# Patient Record
Sex: MALE | Race: WHITE | Employment: OTHER | URBAN - METROPOLITAN AREA
[De-identification: names, ages, dates, MRNs, and addresses within clinical notes are randomized per-mention and may not be internally consistent; named-entity substitution may affect disease eponyms.]

---

## 2017-01-05 ENCOUNTER — OFFICE VISIT (OUTPATIENT)
Dept: SURGERY | Age: 67
End: 2017-01-05

## 2017-01-05 VITALS — WEIGHT: 139 LBS | SYSTOLIC BLOOD PRESSURE: 100 MMHG | BODY MASS INDEX: 27.15 KG/M2 | DIASTOLIC BLOOD PRESSURE: 64 MMHG

## 2017-01-05 DIAGNOSIS — L91.8 HYPERTROPHIC GRANULATION TISSUE: ICD-10-CM

## 2017-01-05 DIAGNOSIS — K94.23 MALFUNCTION OF GASTROSTOMY TUBE (HCC): Primary | ICD-10-CM

## 2017-01-05 PROCEDURE — 99202 OFFICE O/P NEW SF 15 MIN: CPT | Performed by: SURGERY

## 2017-02-09 ENCOUNTER — OFFICE VISIT (OUTPATIENT)
Dept: SURGERY | Age: 67
End: 2017-02-09

## 2017-02-09 VITALS — SYSTOLIC BLOOD PRESSURE: 100 MMHG | DIASTOLIC BLOOD PRESSURE: 70 MMHG

## 2017-02-09 DIAGNOSIS — K94.23 GASTROSTOMY TUBE DYSFUNCTION (HCC): Primary | ICD-10-CM

## 2017-02-09 PROCEDURE — 43760 CHANGE GASTROSTOMY TUBE PERCUTANEOUS W/O GUIDE: CPT | Performed by: SURGERY

## 2017-02-09 PROCEDURE — 1036F TOBACCO NON-USER: CPT | Performed by: SURGERY

## 2017-03-10 ENCOUNTER — HOSPITAL ENCOUNTER (OUTPATIENT)
Dept: ULTRASOUND IMAGING | Age: 67
Discharge: HOME | End: 2017-03-10
Attending: UROLOGY | Admitting: UROLOGY

## 2017-03-10 DIAGNOSIS — R33.9 RETENTION OF URINE: ICD-10-CM

## 2017-03-10 DIAGNOSIS — R33.9 RETENTION OF URINE, UNSPECIFIED: ICD-10-CM

## 2017-04-02 PROBLEM — I95.9 HYPOTENSION: Status: ACTIVE | Noted: 2017-04-02

## 2017-04-03 PROBLEM — G40.909 RECURRENT SEIZURES (HCC): Status: ACTIVE | Noted: 2017-04-03

## 2017-05-10 PROBLEM — N39.0 UTI (URINARY TRACT INFECTION): Status: ACTIVE | Noted: 2017-05-10

## 2018-03-13 ENCOUNTER — HOSPITAL ENCOUNTER (OUTPATIENT)
Dept: OTHER | Age: 68
Discharge: HOME | End: 2018-03-13
Attending: INTERNAL MEDICINE | Admitting: INTERNAL MEDICINE

## 2018-03-13 DIAGNOSIS — K94.23: ICD-10-CM

## 2018-03-15 ENCOUNTER — OFFICE VISIT (OUTPATIENT)
Dept: SURGERY | Age: 68
End: 2018-03-15

## 2018-03-15 DIAGNOSIS — K94.23 MALFUNCTION OF GASTROSTOMY TUBE (HCC): Primary | ICD-10-CM

## 2018-03-15 PROCEDURE — 43760 CHANGE GASTROSTOMY TUBE PERCUTANEOUS W/O GUIDE: CPT | Performed by: SURGERY

## 2018-03-22 ENCOUNTER — HOSPITAL ENCOUNTER (OUTPATIENT)
Dept: OTHER | Age: 68
Discharge: HOME | End: 2018-03-22
Attending: SURGERY | Admitting: SURGERY

## 2018-03-22 DIAGNOSIS — K94.23 MALFUNCTION OF GASTROSTOMY TUBE (HCC): Primary | ICD-10-CM

## 2018-03-22 DIAGNOSIS — K94.13 JEJUNOSTOMY MALFUNCTION (HCC): Primary | ICD-10-CM

## 2018-03-22 DIAGNOSIS — K94.13: ICD-10-CM

## 2018-09-26 PROBLEM — N39.0 UTI (URINARY TRACT INFECTION): Status: RESOLVED | Noted: 2017-05-10 | Resolved: 2018-09-26

## 2019-03-23 ENCOUNTER — APPOINTMENT (OUTPATIENT)
Dept: GENERAL RADIOLOGY | Age: 69
End: 2019-03-23
Payer: MEDICARE

## 2019-03-23 ENCOUNTER — HOSPITAL ENCOUNTER (EMERGENCY)
Age: 69
Discharge: HOME | End: 2019-03-23
Payer: MEDICARE

## 2019-03-23 VITALS
WEIGHT: 150 LBS | TEMPERATURE: 97.9 F | OXYGEN SATURATION: 99 % | RESPIRATION RATE: 16 BRPM | HEIGHT: 69 IN | HEART RATE: 65 BPM | SYSTOLIC BLOOD PRESSURE: 132 MMHG | DIASTOLIC BLOOD PRESSURE: 69 MMHG | BODY MASS INDEX: 22.22 KG/M2

## 2019-03-23 DIAGNOSIS — M25.512 ACUTE PAIN OF LEFT SHOULDER: Primary | ICD-10-CM

## 2019-03-23 PROCEDURE — 99283 EMERGENCY DEPT VISIT LOW MDM: CPT

## 2019-03-23 PROCEDURE — 73030 X-RAY EXAM OF SHOULDER: CPT

## 2019-03-23 RX ORDER — ACETAMINOPHEN 160 MG
2000 TABLET,DISINTEGRATING ORAL DAILY
COMMUNITY

## 2019-03-23 RX ORDER — GUAIFENESIN 100 MG/5ML
200 SOLUTION ORAL EVERY 4 HOURS PRN
Status: ON HOLD | COMMUNITY
End: 2021-02-01

## 2019-03-23 RX ORDER — SODIUM PHOSPHATE, DIBASIC AND SODIUM PHOSPHATE, MONOBASIC 7; 19 G/133ML; G/133ML
1 ENEMA RECTAL
Status: ON HOLD | COMMUNITY
End: 2021-02-04 | Stop reason: HOSPADM

## 2019-03-23 RX ORDER — BENZOIN
TINCTURE TOPICAL
COMMUNITY
End: 2020-08-21 | Stop reason: ALTCHOICE

## 2019-03-23 ASSESSMENT — ENCOUNTER SYMPTOMS
DIARRHEA: 0
COLOR CHANGE: 0
SHORTNESS OF BREATH: 0
NAUSEA: 0
COUGH: 0
VOMITING: 0
CONSTIPATION: 0
RESPIRATORY NEGATIVE: 1
ABDOMINAL PAIN: 0
BACK PAIN: 0

## 2019-03-23 ASSESSMENT — PAIN DESCRIPTION - ORIENTATION: ORIENTATION: LEFT

## 2019-03-23 ASSESSMENT — PAIN DESCRIPTION - PAIN TYPE: TYPE: ACUTE PAIN

## 2019-03-23 ASSESSMENT — PAIN DESCRIPTION - LOCATION: LOCATION: SHOULDER

## 2019-03-23 ASSESSMENT — PAIN SCALES - GENERAL: PAINLEVEL_OUTOF10: 4

## 2019-04-12 ENCOUNTER — TELEPHONE (OUTPATIENT)
Dept: ORTHOPEDIC SURGERY | Age: 69
End: 2019-04-12

## 2019-04-12 ENCOUNTER — OFFICE VISIT (OUTPATIENT)
Dept: ORTHOPEDIC SURGERY | Age: 69
End: 2019-04-12
Payer: MEDICARE

## 2019-04-12 VITALS
HEART RATE: 71 BPM | SYSTOLIC BLOOD PRESSURE: 115 MMHG | BODY MASS INDEX: 22.15 KG/M2 | HEIGHT: 69 IN | DIASTOLIC BLOOD PRESSURE: 62 MMHG

## 2019-04-12 DIAGNOSIS — S49.92XA INJURY OF LEFT SHOULDER, INITIAL ENCOUNTER: Primary | ICD-10-CM

## 2019-04-12 PROCEDURE — 4040F PNEUMOC VAC/ADMIN/RCVD: CPT | Performed by: ORTHOPAEDIC SURGERY

## 2019-04-12 PROCEDURE — G8420 CALC BMI NORM PARAMETERS: HCPCS | Performed by: ORTHOPAEDIC SURGERY

## 2019-04-12 PROCEDURE — 1123F ACP DISCUSS/DSCN MKR DOCD: CPT | Performed by: ORTHOPAEDIC SURGERY

## 2019-04-12 PROCEDURE — 99202 OFFICE O/P NEW SF 15 MIN: CPT | Performed by: ORTHOPAEDIC SURGERY

## 2019-04-12 PROCEDURE — 1036F TOBACCO NON-USER: CPT | Performed by: ORTHOPAEDIC SURGERY

## 2019-04-12 PROCEDURE — G8427 DOCREV CUR MEDS BY ELIG CLIN: HCPCS | Performed by: ORTHOPAEDIC SURGERY

## 2019-04-12 PROCEDURE — 3017F COLORECTAL CA SCREEN DOC REV: CPT | Performed by: ORTHOPAEDIC SURGERY

## 2019-04-12 NOTE — PROGRESS NOTES
by Per J Tube route 3 times daily      omeprazole (PRILOSEC) 20 MG delayed release capsule Take 10 mg by mouth daily      Nystatin POWD Take 1 oz by mouth 4 times daily 1 each 0    ibuprofen (ADVIL;MOTRIN) 600 MG tablet 600 mg by Per G Tube route every 8 hours as needed for Pain      lamoTRIgine (LAMICTAL) 150 MG tablet Take 500 mg by mouth 2 times daily At 1730      QUEtiapine (SEROQUEL) 100 MG tablet Take 100 mg by mouth nightly 2200      docusate (COLACE) 50 MG/5ML liquid Take 50 mg by mouth daily      acetaminophen (TYLENOL) 160 MG/5ML solution 20.3 mLs by Per G Tube route every 4 hours as needed for Fever or Pain 473 mL 3    lamoTRIgine (LAMICTAL) 200 MG tablet 2 tablets by Per G Tube route nightly (Patient taking differently: 450 mg by Per G Tube route every morning ) 30 tablet 3    levETIRAcetam (KEPPRA) 100 MG/ML solution 15 mLs by Per G Tube route 2 times daily 1 Bottle 3    magnesium hydroxide (MILK OF MAGNESIA) 400 MG/5ML suspension 30 mLs by Per G Tube route daily as needed for Constipation      LORazepam (ATIVAN) 0.5 MG tablet Take 0.5 mg by mouth every 12 hours as needed for Anxiety       dextromethorphan (DELSYM) 30 MG/5ML extended release liquid Take 60 mg by mouth 2 times daily as needed for Cough      lactulose (CHRONULAC) 10 GM/15ML solution Take 20 g by mouth daily       carBAMazepine (TEGRETOL) 100 MG/5ML suspension 200 mg by Per J Tube route every evening 1800      ipratropium-albuterol (DUONEB) 0.5-2.5 (3) MG/3ML SOLN nebulizer solution Inhale 3 mLs into the lungs every 4 hours as needed for Shortness of Breath 360 mL 0    doxazosin (CARDURA) 4 MG tablet 4 mg by Per G Tube route daily       sennosides (SENOKOT) 8.8 MG/5ML syrup 5 mLs by Per G Tube route daily       Multiple Vitamins-Minerals (CERTA-KENTON) liquid 30 mLs by PEG Tube route daily       potassium chloride (KAYCIEL) 20 MEQ/15ML (10%) solution 10 mEq by Per G Tube route 2 times daily 10 MEQ--7.5CC PER G TUBE TWICE DAY      ascorbic acid (VITAMIN C) 500 MG tablet 500 mg by Per G Tube route 2 times daily.  QUEtiapine (SEROQUEL) 100 MG tablet 50 mg by Per G Tube route 2 times daily 0530; 1730      escitalopram (LEXAPRO) 10 MG tablet 10 mg by Per G Tube route daily.  chlorhexidine (PERIDEX) 0.12 % solution Take 7.5 mLs by mouth 2 times daily.  carBAMazepine (TEGRETOL) 100 MG/5ML suspension 400 mg by Gastrostomy Tube route 2 times daily 0600, 2200       No current facility-administered medications for this visit.       Past Medical History:   Diagnosis Date    Anemia, macrocytic     Anhidrosis     Anhidrosis     Bilateral cataracts     Bipolar affective (HCC)     Blood circulation, collateral     unspecified PVD    Cataract     Clostridium difficile diarrhea 3/7/15    Depression     Dermatophytosis     Drug induced hepatitis     GERD (gastroesophageal reflux disease)     Impulse control disorder     Liver disease     drug induced    Mental handicap     Movement disorder     Neurogenic bladder     Neuromuscular disorder (HCC)     spastic hemiplegia, MDS,neurogenic bladder    NS (nuclear sclerosis)     Nuclear sclerosis     Osteoarthritis     Periodontal disease     Pneumonia 1/17/2013    Primary optic atrophy     Primary optic atrophy     PVD (peripheral vascular disease) ([address]     Seizure [facility]     Spastic hemiplegia     Tinea pedis     Unspecified diseases of blood and blood-forming organs     anemia    Urinary incontinence      Past Surgical History:   Procedure Laterality Date    ABDOMEN SURGERY      can see scar/details unknown, feeding tube    ABDOMINAL EXPLORATION SURGERY  9-28-14    LAPAROTOMY EXPLORATORY, ERIKA-EN-Y TUBE JEJUNOSTOMY, SMALL BOWEL RESECTION                                              COLONOSCOPY      CYSTOSCOPY  6/25/14    with dilitation    DILATATION, ESOPHAGUS      ENDOSCOPY, COLON, DIAGNOSTIC      OTHER SURGICAL HISTORY  4-30-13    JEJUNOSTOMY TUBE INSERTION    UPPER GASTROINTESTINAL ENDOSCOPY  1/24/13    PEG placement    UPPER GASTROINTESTINAL ENDOSCOPY  4/26/13    allergies, social and family histories were reviewed and updated as appropriate. Review of Systems:  Relevant review of systems reviewed and available in the patient's chart    Vital Signs:  /62   Pulse 71   Ht 5' 9\" (1.753 m)   BMI 22.15 kg/m²     General Exam:   Constitutional: Patient is adequately groomed with no evidence of malnutrition  Mental Status: The patient is oriented to person. The patient's mood and affect reflect limited mental capacity. Lymphatic: The lymphatic examination bilaterally reveals all areas to be without enlargement or induration. Left Shoulder Examination:    Inspection:  No ptosis and muscle atrophy from his disuse contractures noted. No abrasions or erythema. No swelling. Palpation:  He does withdrawal to palpation in the shoulder region anteriorly and laterally. Range of Motion:    Shoulder is contracted at his side with flexion contracture of the elbow at about 95° and flexion contracture at his wrist.   Passively he tolerates only about 3° of forward flexion and abduction with pain response. Strength:  Strength to the left upper extremity unable to be assessed due to his hemiplegia. Special Tests:         Capillary refill is brisk. Skin: There are no rashes, ulcerations or lesions. Radiology:     X-rays obtained 3/23/19 and reviewed in office:  Views 3 (AP, Y, Axillary) left shoulder  Impression: Films are of inadequate quality to determine any presence of fracture. Bone mineralization reflexes disuse. Degenerative cnages noted but also difficult to characterize due to limited positioning. Impression:  Encounter Diagnosis   Name Primary?     Injury of left shoulder, initial encounter Yes       Office Procedures:  Orders Placed This Encounter   Procedures    CT SHOULDER LEFT WO CONTRAST     Is the patient

## 2019-04-12 NOTE — TELEPHONE ENCOUNTER
Order received and faxed to [facility] [address]. Pt should call to schedule follow up for results after scan is completed.       Robert Monteiro  2:07 PM  04/12/19

## 2019-04-15 NOTE — PROGRESS NOTES
Name___Cecil Miller  [birth year]____________________________________Tlcnbtp:____________________  Date and time of surgery__5/2/19  1030______________________Arrival Time:_[facility], [address] @ 0900_______________   1. Do not eat or drink anything after 12 midnight (or____hours) prior to surgery. This includes no water, chewing gum or mints. Endoscopy patients follow your doctors bowel prep instructions,which may include taking part of prep after midnight. 2. Take the following pills with a small sip of water on the morning of surgery____tegetrol, cardura, keppra, lamictal _________________________________________________   3. Aspirin, Ibuprofen, Advil, Naproxen, Vitamin E and other Anti-inflammatory products should be stopped for 5 days before surgery or as directed by your physician. 4. Check with your Doctor regarding stopping Plavix, Coumadin,Eliquis, Lovenox,Effient,Pradaxa,Xarelto, Fragmin or other blood thinners and follow their instructions. 5. Do not smoke, and do not drink any alcoholic beverages 24 hours prior to surgery. This includes NA Beer. Refrain from the usage of any recreational drugs. 6. You may brush your teeth and gargle the morning of surgery. DO NOT SWALLOW WATER   7. You MUST make arrangements for a responsible adult to stay on site while you are here and take you home after your surgery. You will not be allowed to leave alone or drive yourself home. It is strongly suggested someone stay with you the first 24 hrs. Your surgery will be cancelled if you do not have a ride home. 8. A parent/legal guardian must accompany a child scheduled for surgery and plan to stay at the hospital until the child is discharged. Please do not bring other children with you.    9. Please wear simple, loose fitting clothing to the hospital.  Mica Oas not bring valuables (money, credit cards, checkbooks, etc.) Do not wear any makeup (including no eye makeup) or nail polish on your fingers or use oxygen and have a portable tank please bring it  with you the DOS             25. Bring a complete list of all your medications with name and dose include any supplements. 26. Other__________________________________________   *Please call pre admission testing if you any further questions   [address]    [place] [id number]. [facility]  [phone]   [address] [place] [address]       All above information reviewed with patient in person or by phone. Patient verbalizes understanding. All questions and concerns addressed. Patient/Rep____________________                                                                                                                                    PRE OP INSTRUCTIONS;  Nothing per J-tube after midnight the evening before procedure except the above medications. Pt.must have responsible party here with him during his stay.   Contact  for prep/[phone]

## 2019-04-16 ENCOUNTER — ANESTHESIA EVENT (OUTPATIENT)
Dept: ENDOSCOPY | Age: 69
End: 2019-04-16
Payer: MEDICARE

## 2019-04-29 ENCOUNTER — HOSPITAL ENCOUNTER (OUTPATIENT)
Dept: CT IMAGING | Age: 69
Discharge: HOME | End: 2019-04-29
Payer: MEDICARE

## 2019-04-29 DIAGNOSIS — S49.92XA INJURY OF LEFT SHOULDER, INITIAL ENCOUNTER: ICD-10-CM

## 2019-04-29 PROCEDURE — 73200 CT UPPER EXTREMITY W/O DYE: CPT

## 2019-04-30 ENCOUNTER — TELEPHONE (OUTPATIENT)
Dept: ORTHOPEDIC SURGERY | Age: 69
End: 2019-04-30

## 2019-04-30 NOTE — TELEPHONE ENCOUNTER
CT did show evidence of partially healed fracture of the proximal humerus. No therapy for ROM would be recommended at this time. Continue sling as needed for comfort.   Follow up in 4 weeks for repeat assessment / x-rays

## 2019-05-02 ENCOUNTER — ANESTHESIA (OUTPATIENT)
Dept: ENDOSCOPY | Age: 69
End: 2019-05-02
Payer: MEDICARE

## 2019-05-02 ENCOUNTER — HOSPITAL ENCOUNTER (OUTPATIENT)
Age: 69
Setting detail: OUTPATIENT SURGERY
Discharge: OTHER INSTITUTION | End: 2019-05-02
Attending: INTERNAL MEDICINE | Admitting: INTERNAL MEDICINE
Payer: MEDICARE

## 2019-05-02 VITALS — DIASTOLIC BLOOD PRESSURE: 64 MMHG | OXYGEN SATURATION: 100 % | SYSTOLIC BLOOD PRESSURE: 132 MMHG

## 2019-05-02 VITALS
TEMPERATURE: 97.8 F | HEIGHT: 65 IN | HEART RATE: 55 BPM | DIASTOLIC BLOOD PRESSURE: 75 MMHG | BODY MASS INDEX: 23.66 KG/M2 | RESPIRATION RATE: 15 BRPM | WEIGHT: 142 LBS | OXYGEN SATURATION: 100 % | SYSTOLIC BLOOD PRESSURE: 166 MMHG

## 2019-05-02 PROCEDURE — 7100000010 HC PHASE II RECOVERY - FIRST 15 MIN: Performed by: INTERNAL MEDICINE

## 2019-05-02 PROCEDURE — 7100000000 HC PACU RECOVERY - FIRST 15 MIN: Performed by: INTERNAL MEDICINE

## 2019-05-02 PROCEDURE — 2709999900 HC NON-CHARGEABLE SUPPLY: Performed by: INTERNAL MEDICINE

## 2019-05-02 PROCEDURE — 7100000001 HC PACU RECOVERY - ADDTL 15 MIN: Performed by: INTERNAL MEDICINE

## 2019-05-02 PROCEDURE — 2580000003 HC RX 258: Performed by: NURSE ANESTHETIST, CERTIFIED REGISTERED

## 2019-05-02 PROCEDURE — 3700000001 HC ADD 15 MINUTES (ANESTHESIA): Performed by: INTERNAL MEDICINE

## 2019-05-02 PROCEDURE — 3700000000 HC ANESTHESIA ATTENDED CARE: Performed by: INTERNAL MEDICINE

## 2019-05-02 PROCEDURE — 2580000003 HC RX 258: Performed by: ANESTHESIOLOGY

## 2019-05-02 PROCEDURE — 3609008400 HC SIGMOIDOSCOPY DIAGNOSTIC: Performed by: INTERNAL MEDICINE

## 2019-05-02 PROCEDURE — 2500000003 HC RX 250 WO HCPCS: Performed by: NURSE ANESTHETIST, CERTIFIED REGISTERED

## 2019-05-02 PROCEDURE — 6360000002 HC RX W HCPCS: Performed by: NURSE ANESTHETIST, CERTIFIED REGISTERED

## 2019-05-02 PROCEDURE — 7100000011 HC PHASE II RECOVERY - ADDTL 15 MIN: Performed by: INTERNAL MEDICINE

## 2019-05-02 RX ORDER — SODIUM CHLORIDE 9 MG/ML
INJECTION, SOLUTION INTRAVENOUS CONTINUOUS
Status: DISCONTINUED | OUTPATIENT
Start: 2019-05-02 | End: 2019-05-02 | Stop reason: HOSPADM

## 2019-05-02 RX ORDER — SODIUM CHLORIDE 0.9 % (FLUSH) 0.9 %
10 SYRINGE (ML) INJECTION PRN
Status: DISCONTINUED | OUTPATIENT
Start: 2019-05-02 | End: 2019-05-02 | Stop reason: HOSPADM

## 2019-05-02 RX ORDER — LIDOCAINE HYDROCHLORIDE 20 MG/ML
INJECTION, SOLUTION INFILTRATION; PERINEURAL PRN
Status: DISCONTINUED | OUTPATIENT
Start: 2019-05-02 | End: 2019-05-02 | Stop reason: SDUPTHER

## 2019-05-02 RX ORDER — SODIUM CHLORIDE 0.9 % (FLUSH) 0.9 %
10 SYRINGE (ML) INJECTION EVERY 12 HOURS SCHEDULED
Status: DISCONTINUED | OUTPATIENT
Start: 2019-05-02 | End: 2019-05-02 | Stop reason: HOSPADM

## 2019-05-02 RX ORDER — LIDOCAINE HYDROCHLORIDE 10 MG/ML
1 INJECTION, SOLUTION EPIDURAL; INFILTRATION; INTRACAUDAL; PERINEURAL
Status: DISCONTINUED | OUTPATIENT
Start: 2019-05-02 | End: 2019-05-02 | Stop reason: HOSPADM

## 2019-05-02 RX ORDER — PROPOFOL 10 MG/ML
INJECTION, EMULSION INTRAVENOUS PRN
Status: DISCONTINUED | OUTPATIENT
Start: 2019-05-02 | End: 2019-05-02 | Stop reason: SDUPTHER

## 2019-05-02 RX ORDER — SODIUM CHLORIDE 9 MG/ML
INJECTION, SOLUTION INTRAVENOUS CONTINUOUS PRN
Status: DISCONTINUED | OUTPATIENT
Start: 2019-05-02 | End: 2019-05-02 | Stop reason: SDUPTHER

## 2019-05-02 RX ADMIN — LIDOCAINE HYDROCHLORIDE 60 MG: 20 INJECTION, SOLUTION INFILTRATION; PERINEURAL at 11:11

## 2019-05-02 RX ADMIN — PROPOFOL 60 MG: 10 INJECTION, EMULSION INTRAVENOUS at 11:11

## 2019-05-02 RX ADMIN — SODIUM CHLORIDE: 9 INJECTION, SOLUTION INTRAVENOUS at 11:06

## 2019-05-02 RX ADMIN — SODIUM CHLORIDE: 9 INJECTION, SOLUTION INTRAVENOUS at 10:30

## 2019-05-02 ASSESSMENT — PULMONARY FUNCTION TESTS
PIF_VALUE: 1
PIF_VALUE: 0
PIF_VALUE: 1

## 2019-05-02 ASSESSMENT — PAIN - FUNCTIONAL ASSESSMENT: PAIN_FUNCTIONAL_ASSESSMENT: 0-10

## 2019-05-02 NOTE — ANESTHESIA PRE PROCEDURE
Department of Anesthesiology  Preprocedure Note       Name:  Duy Mclaughlin   Age:  76 y.o.  :  1950                                          MRN:  [id number]         Date:  2019      Surgeon: Tri Sellers):  Jack Cedeño MD    Procedure: COLONOSCOPY (N/A )    Medications prior to admission:   Prior to Admission medications    Medication Sig Start Date End Date Taking? Authorizing Provider   lansoprazole (PREVACID SOLUTAB) 15 MG disintegrating tablet 15 mg by Per J Tube route daily    Historical Provider, MD   Cholecalciferol (VITAMIN D3) 2000 units CAPS Take 1 capsule by mouth daily    Historical Provider, MD   guaiFENesin (ROBITUSSIN) 100 MG/5ML SOLN oral solution Take 200 mg by mouth every 4 hours as needed for Cough    Historical Provider, MD   Ostomy Supplies ([facility] [facility] [place] Does not apply route Apply topically and cover with dry dressing and duoderm as neeeded    Historical Provider, MD   Sodium Phosphates (FLEET) 7-19 GM/118ML Place 1 enema rectally once as needed If bisacodyl is ineffective. Historical Provider, MD   nystatin (MYCOSTATIN) 681835 UNIT/GM cream Apply topically 2 times daily. 18   Zay Valencia PA-C   zinc oxide 20 % ointment Apply topically 2 times daily Apply topically as needed. Historical Provider, MD   bisacodyl (DULCOLAX) 10 MG suppository Place 10 mg rectally daily    Historical Provider, MD   diazepam (DIASTAT) 10 MG GEL Place 20 mg rectally once as needed.      Historical Provider, MD   baclofen (LIORESAL) 10 MG tablet 15 mg by Per J Tube route 3 times daily    Historical Provider, MD   omeprazole (PRILOSEC) 20 MG delayed release capsule Take 10 mg by mouth daily    Historical Provider, MD   Nystatin POWD Take 1 oz by mouth 4 times daily 3/7/18   Lyndol Pair, APRN - CNP   ibuprofen (ADVIL;MOTRIN) 600 MG tablet 600 mg by Per G Tube route every 8 hours as needed for Pain    Historical Provider, MD   lamoTRIgine (LAMICTAL) 150 MG tablet Take 500 mg by mouth 2 times daily At 1730    Historical Provider, MD   QUEtiapine (SEROQUEL) 100 MG tablet Take 100 mg by mouth nightly 2200    Historical Provider, MD   docusate (COLACE) 50 MG/5ML liquid Take 50 mg by mouth daily    Historical Provider, MD   acetaminophen (TYLENOL) 160 MG/5ML solution 20.3 mLs by Per G Tube route every 4 hours as needed for Fever or Pain 4/3/17   Payal Joshi MD   lamoTRIgine (LAMICTAL) 200 MG tablet 2 tablets by Per G Tube route nightly  Patient taking differently: 450 mg by Per G Tube route every morning  4/3/17   Payal Joshi MD   levETIRAcetam (KEPPRA) 100 MG/ML solution 15 mLs by Per G Tube route 2 times daily 4/3/17   Payal Joshi MD   magnesium hydroxide (MILK OF MAGNESIA) 400 MG/5ML suspension 30 mLs by Per G Tube route daily as needed for Constipation 4/3/17   Payal Joshi MD   LORazepam (ATIVAN) 0.5 MG tablet Take 0.5 mg by mouth every 12 hours as needed for Anxiety.  For seizures up to 7 days    Historical Provider, MD   dextromethorphan (DELSYM) 30 MG/5ML extended release liquid Take 60 mg by mouth 2 times daily as needed for Cough    Historical Provider, MD   lactulose (CHRONULAC) 10 GM/15ML solution Take 20 g by mouth daily     Historical Provider, MD   carBAMazepine (TEGRETOL) 100 MG/5ML suspension 200 mg by Per J Tube route every evening 1800    Historical Provider, MD   ipratropium-albuterol (DUONEB) 0.5-2.5 (3) MG/3ML SOLN nebulizer solution Inhale 3 mLs into the lungs every 4 hours as needed for Shortness of Breath 2/27/16   Payal Joshi MD   doxazosin (CARDURA) 4 MG tablet 4 mg by Per G Tube route daily     Historical Provider, MD   sennosides (SENOKOT) 8.8 MG/5ML syrup 5 mLs by Per G Tube route daily     Historical Provider, MD   Multiple Vitamins-Minerals (CERTA-KENTON) liquid 30 mLs by PEG Tube route daily     Historical Provider, MD   potassium chloride (KAYCIEL) 20 MEQ/15ML (10%) solution 10 mEq by Per G Tube route 2 times daily 10 MEQ--7.5CC PER G TUBE TWICE DAY    Historical Provider, MD   ascorbic acid (VITAMIN C) 500 MG tablet 500 mg by Per G Tube route 2 times daily. Historical Provider, MD   QUEtiapine (SEROQUEL) 100 MG tablet 50 mg by Per G Tube route 2 times daily 0530; 1730    Historical Provider, MD   escitalopram (LEXAPRO) 10 MG tablet 10 mg by Per G Tube route daily. Historical Provider, MD   chlorhexidine (PERIDEX) 0.12 % solution Take 7.5 mLs by mouth 2 times daily. Historical Provider, MD   carBAMazepine (TEGRETOL) 100 MG/5ML suspension 400 mg by Gastrostomy Tube route 2 times daily 0600, 2200    Historical Provider, MD       Current medications:    No current facility-administered medications for this encounter. Allergies:     Allergies   Allergen Reactions    Biaxin [Clarithromycin] Other (See Comments)     Unknown reaction    Invanz [Ertapenem] Other (See Comments)     Caused SEIZURES  Caused SEIZURES       Problem List:    Patient Active Problem List   Diagnosis Code    Anemia D64.9    Altered mental status R41.82    Hyponatremia E87.1    Jejunostomy malfunction ([address] K94.13    Cerebral palsy ([address] G80.9    Seizure disorder ([address] G40.909    Gastrostomy malfunction ([address] K94.23    Nuclear sclerosis H25.10    Fecal impaction ([address] K56.41    Anemia, chronic disease D63.8    Partial epilepsy with impairment of consciousness, intractable ([address] G40.219    Mental retardation F79    Malfunction of gastrostomy tube ([address] K94.23    Pneumonia J18.9    Hypoxemia R09.02    Hypotension I95.9    Acute encephalopathy G93.40    Recurrent seizures ([address] G40.909    Prolapse, 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    CBC:   Lab Results   Component Value Date    WBC 16.6 09/18/2018    RBC 3.14 09/18/2018    HGB 9.5 09/18/2018    HCT 28.6 09/18/2018    MCV 91.0 09/18/2018    RDW 15.6 09/18/2018     09/18/2018       CMP:   Lab Results   Component Value Date     09/18/2018    K 4.5 09/18/2018    K 5.1 05/29/2018    CL 89 09/18/2018    CO2 27 09/18/2018    BUN 30 09/18/2018    CREATININE 0.9 09/18/2018    GFRAA >60 09/18/2018    GFRAA >60 05/16/2013    AGRATIO 1.1 09/17/2018    LABGLOM >60 09/18/2018    GLUCOSE 117 09/18/2018    PROT 6.9 09/17/2018    PROT 5.9 01/25/2013    CALCIUM 9.0 09/18/2018    BILITOT <0.2 09/17/2018    ALKPHOS 112 09/17/2018    AST 18 09/17/2018    ALT 19 09/17/2018       POC Tests: No results for input(s): POCGLU, POCNA, POCK, POCCL, POCBUN, POCHEMO, POCHCT in the last 72 hours. Coags:   Lab Results   Component Value Date    PROTIME 11.3 09/17/2018    INR 0.99 09/17/2018    APTT 31.5 09/17/2018       HCG (If Applicable): No results found for: PREGTESTUR, PREGSERUM, HCG, HCGQUANT     ABGs: No results found for: PHART, PO2ART, JVH8TUQ, IYO4IMS, BEART, P3HJEDSK     Type & Screen (If Applicable):  Lab Results   Component Value Date    LABABO A 01/17/2013    [address] Positive 01/17/2013       Anesthesia Evaluation   no history of anesthetic complications:   Airway: Mallampati: IV  TM distance: >3 FB     Comment: Unable to understand and open mouth   Dental:          Pulmonary:                              Cardiovascular:  Exercise tolerance: poor (<4 METS),                     Neuro/Psych:   (+) seizures (meds taken last night): well controlled, neuromuscular disease (spastic hemiplegia):,             GI/Hepatic/Renal:   (+) GERD:,           Endo/Other:                     Abdominal:           Vascular:   + PVD, aortic or cerebral, . Anesthesia Plan      MAC     ASA 3       Induction: intravenous.       Anesthetic plan and risks discussed with

## 2019-05-02 NOTE — PROGRESS NOTES
Teaching/ education completed for home care including pain management, wound care and infection control. Guardian verbalized understanding.

## 2019-05-02 NOTE — PROGRESS NOTES
Discharge instructions reviewed with patient/responsible adult. All home medications have been reviewed, questions answered and gaurdian verbalized understanding. Discharge instructions signed and copies given. Patient discharged PER OWN W/C with belongings.

## 2019-05-02 NOTE — OP NOTE
[facility]  Endoscopy Note    Patient: Ching Vargas  : 1950  CSN:     Procedure: flex sig    Date:  2019    Surgeon:  Nate Crawford MD    Referring Physician:  Dr. Mandeep Bailey    Preoperative Diagnosis:  Screening. Postoperative Diagnosis:  Poor prep    Anesthesia:  MAC    EBL: <50 mL        Procedure: An informed consent was obtained from the patient after explanation of indications, benefits, possible risks and complications of the procedure. The patient was then taken to the endoscopy suite, placed in the left lateral decubitus position, and the above IV anesthesia was administered. A digital rectal examination was performed and was normal.   The Olympus CFQ-190-AL video colonoscope was placed in the patient's rectum under digital direction and advanced to the sigmoid colon. The prep was very poor. The colon was decompressed and the scope was withdrawn from the patient. The patient tolerated the procedure well and was taken to the PACU in good condition. Impression:   - Poor prep    Recommendations:   - Retry with 2 day prep.        Nate Crawford, [address]  2019

## 2019-05-02 NOTE — TELEPHONE ENCOUNTER
Spoke to nurse at facility where patient resides. Patient is currently at hospital for a procedure. She took information for patient regarding CT scan and scheduled follow up appointment.

## 2019-05-02 NOTE — PROGRESS NOTES
Received from endo ,drowsy,left side semi fowlers,abdomen soft non tender,incontinent of small amount of brown liquid stool,warm blanket,vss.

## 2019-05-02 NOTE — H&P
[facility] [place]    Pre-operative History and Physical    Patient: Lily Torres  : 1950  CSN:       HISTORY OF PRESENT ILLNESS:    The patient is a 76 y.o. male for screening colon. Past Medical History:        Diagnosis Date    Anemia, macrocytic     Anhidrosis     Anhidrosis     Bilateral cataracts     Bipolar affective (HCC)     Blood circulation, collateral     unspecified PVD    Cataract     Clostridium difficile diarrhea 3/7/15    Depression     Dermatophytosis     Drug induced hepatitis     GERD (gastroesophageal reflux disease)     Impulse control disorder     Liver disease     drug induced    Mental handicap     Movement disorder     Neurogenic bladder     Neuromuscular disorder (HCC)     spastic hemiplegia, MDS,neurogenic bladder    NS (nuclear sclerosis)     Nuclear sclerosis     Osteoarthritis     Periodontal disease     Pneumonia 2013    Primary optic atrophy     Primary optic atrophy     [facility] [address]     Seizure ([facility] [address]     Spastic hemiplegia     Tinea pedis     Unspecified diseases of blood and blood-forming organs     anemia    Urinary incontinence      Past Surgical History:        Procedure Laterality Date    ABDOMEN SURGERY      can see scar/details unknown, feeding tube    ABDOMINAL EXPLORATION SURGERY  14    LAPAROTOMY EXPLORATORY, ERIKA-EN-Y TUBE JEJUNOSTOMY, SMALL BOWEL RESECTION                                              COLONOSCOPY      CYSTOSCOPY  14    with dilitation    DILATATION, ESOPHAGUS      ENDOSCOPY, COLON, DIAGNOSTIC      OTHER SURGICAL HISTORY  13    JEJUNOSTOMY TUBE INSERTION    UPPER GASTROINTESTINAL ENDOSCOPY  13    PEG placement    UPPER GASTROINTESTINAL ENDOSCOPY  13     Medications Prior to Admission:   No current facility-administered medications on file prior to encounter.       Current Outpatient Medications on File Prior to Encounter   Medication Sig Dispense Refill    zinc oxide 20 % ointment Apply topically 2 times daily Apply topically as needed.  Nystatin POWD Take 1 oz by mouth 4 times daily 1 each 0    lamoTRIgine (LAMICTAL) 150 MG tablet Take 500 mg by mouth 2 times daily At 1730      QUEtiapine (SEROQUEL) 100 MG tablet Take 100 mg by mouth nightly 2200      levETIRAcetam (KEPPRA) 100 MG/ML solution 15 mLs by Per G Tube route 2 times daily 1 Bottle 3    lactulose (CHRONULAC) 10 GM/15ML solution Take 20 g by mouth daily       doxazosin (CARDURA) 4 MG tablet 4 mg by Per G Tube route daily       sennosides (SENOKOT) 8.8 MG/5ML syrup 5 mLs by Per G Tube route daily       Multiple Vitamins-Minerals (CERTA-KENTON) liquid 30 mLs by PEG Tube route daily       potassium chloride (KAYCIEL) 20 MEQ/15ML (10%) solution 10 mEq by Per G Tube route 2 times daily 10 MEQ--7.5CC PER G TUBE TWICE DAY      ascorbic acid (VITAMIN C) 500 MG tablet 500 mg by Per G Tube route 2 times daily.  QUEtiapine (SEROQUEL) 100 MG tablet 50 mg by Per G Tube route 2 times daily 0530; 1730      escitalopram (LEXAPRO) 10 MG tablet 10 mg by Per G Tube route daily.  chlorhexidine (PERIDEX) 0.12 % solution Take 7.5 mLs by mouth 2 times daily.  carBAMazepine (TEGRETOL) 100 MG/5ML suspension 400 mg by Gastrostomy Tube route 2 times daily 0600, 2200      nystatin (MYCOSTATIN) 463646 UNIT/GM cream Apply topically 2 times daily. 30 g 0    bisacodyl (DULCOLAX) 10 MG suppository Place 10 mg rectally daily      diazepam (DIASTAT) 10 MG GEL Place 20 mg rectally once as needed.        baclofen (LIORESAL) 10 MG tablet 15 mg by Per J Tube route 3 times daily      ibuprofen (ADVIL;MOTRIN) 600 MG tablet 600 mg by Per G Tube route every 8 hours as needed for Pain      docusate (COLACE) 50 MG/5ML liquid Take 50 mg by mouth daily      acetaminophen (TYLENOL) 160 MG/5ML solution 20.3 mLs by Per G Tube route every 4 hours as needed for Fever or Pain 473 mL 3    LORazepam (ATIVAN) 0.5 MG tablet Take 0.5 mg by mouth every 12 hours as needed for Anxiety. For seizures up to 7 days      dextromethorphan (DELSYM) 30 MG/5ML extended release liquid Take 60 mg by mouth 2 times daily as needed for Cough          Allergies:  Biaxin [clarithromycin] and Invanz [ertapenem]    History of allergic reaction to anesthesia:  No    Social History:   TOBACCO:   reports that he has never smoked. He has never used smokeless tobacco.  Family History:   History reviewed. No pertinent family history. PHYSICAL EXAM:      Ht 5' 5\" (1.651 m)   Wt 142 lb (64.4 kg)   BMI 23.63 kg/m²  I        Heart:  Normal apical impulse, regular rate and rhythm, normal S1 and S2, no S3 or S4, and no murmur noted    Lungs:  No increased work of breathing, good air exchange, clear to auscultation bilaterally, no crackles or wheezing    Abdomen:  Soft nt. ASA Grade:  ASA 3 - Patient with moderate systemic disease with functional limitations    Mallampati Class:  Class I: Soft palate, uvula, fauces, pillars visible  __________  Class II: Soft palate, uvula, fauces visible  __________   Class III: Soft palate, base of uvula visible  __________  Class IV: Hard palate only visible   _____x_____        ASSESSMENT AND PLAN:    1. Patient is a 76 y.o. male here for colonoscopy with anesthesia  2. Procedure options, risks and benefits reviewed with guardian. Guardian expresses understanding.   Gabi Pickens MD  [facility] [place]      Vitals:    05/02/19 1145   BP: (!) 166/75   Pulse: 55   Resp: 15   Temp: 97.8 °F (36.6 °C)   SpO2: 100%

## 2019-05-02 NOTE — ANESTHESIA POSTPROCEDURE EVALUATION
Department of Anesthesiology  Postprocedure Note    Patient: Kassidy Colin  MRN: [id number]  YOB: 1950  Date of evaluation: 5/2/2019  Time:  11:27 AM     Procedure Summary     Date:  05/02/19 Room / Location:  [facility] ENDO 05 / [facility] ENDOSCOPY    Anesthesia Start:  1105 Anesthesia Stop:  4217    Procedure:  SIGMOIDOSCOPY DIAGNOSTIC FLEXIBLE (N/A ) Diagnosis:  (Z12.11 - COLON CANCER SCREENING)    Surgeon:  Yulissa Conde MD Responsible Provider:  Jany Moreno MD    Anesthesia Type:  MAC ASA Status:  3          Anesthesia Type: MAC    Nidhi Phase I:      Nidhi Phase II:      Last vitals: Reviewed and per EMR flowsheets.        Anesthesia Post Evaluation    Patient location during evaluation: PACU  Level of consciousness: awake  Airway patency: patent  Complications: no  Cardiovascular status: hemodynamically stable  Respiratory status: acceptable

## 2019-05-16 ENCOUNTER — ANESTHESIA EVENT (OUTPATIENT)
Dept: ENDOSCOPY | Age: 69
End: 2019-05-16
Payer: MEDICARE

## 2019-05-16 RX ORDER — CARBAMAZEPINE 100 MG/5ML
200 SUSPENSION ORAL
COMMUNITY
End: 2019-10-09 | Stop reason: ALTCHOICE

## 2019-05-16 RX ORDER — IPRATROPIUM BROMIDE AND ALBUTEROL SULFATE 2.5; .5 MG/3ML; MG/3ML
1 SOLUTION RESPIRATORY (INHALATION) EVERY 4 HOURS PRN
Status: ON HOLD | COMMUNITY
End: 2019-10-11

## 2019-05-16 NOTE — PROGRESS NOTES
Name_______________________________________Printed:____________________  Date and time of surgery__6/4/19  1000______________________Arrival Time:___0830  hosp-endo_____________   1. Do not eat or drink anything after 12 midnight (or____hours) prior to surgery. This includes no water, chewing gum or mints. Endoscopy patients follow your doctors bowel prep instructions,which may include taking part of prep after midnight. 2. Take the following pills with a small sip of water on the morning of surgery_____tegretol, cardura, lamictal, keppra, seroquel______________________________________________                  Do not take blood pressure medications ending in pril or sartan the trav prior to surgery or the morning of surgery_   3. Aspirin, Ibuprofen, Advil, Naproxen, Vitamin E and other Anti-inflammatory products should be stopped for 5 days before surgery or as directed by your physician. 4. Check with your Doctor regarding stopping Plavix, Coumadin,Eliquis, Lovenox,Effient,Pradaxa,Xarelto, Fragmin or other blood thinners and follow their instructions. 5. Do not smoke, and do not drink any alcoholic beverages 24 hours prior to surgery. This includes NA Beer. Refrain from the usage of any recreational drugs. 6. You may brush your teeth and gargle the morning of surgery. DO NOT SWALLOW WATER   7. You MUST make arrangements for a responsible adult to stay on site while you are here and take you home after your surgery. You will not be allowed to leave alone or drive yourself home. It is strongly suggested someone stay with you the first 24 hrs. Your surgery will be cancelled if you do not have a ride home. 8. A parent/legal guardian must accompany a child scheduled for surgery and plan to stay at the hospital until the child is discharged. Please do not bring other children with you.    9. Please wear simple, loose fitting clothing to the hospital.  Do not bring valuables (money, credit cards, checkbooks, etc.) Do not wear any makeup (including no eye makeup) or nail polish on your fingers or toes. 10. DO NOT wear any jewelry or piercings on day of surgery. All body piercing jewelry must be removed. 11. If you have ___dentures, they will be removed before going to the OR; we will provide you a container. If you wear ___contact lenses or ___glasses, they will be removed; please bring a case for them. 12. Please see your family doctor/pediatrician for a history & physical and/or concerning medications. Bring any test results/reports from your physician's office. PCP__________________Phone___________H&P Appt. Date________             13 If you  have a Living Will and Durable Power of  for Healthcare, please bring in a copy. 15. Notify your Surgeon if you develop any illness between now and surgery  time, cough, cold, fever, sore throat, nausea, vomiting, etc.  Please notify your surgeon if you experience dizziness, shortness of breath or blurred vision between now & the time of your surgery             15. DO NOT shave your operative site 96 hours prior to surgery. For face & neck surgery, men may use an electric razor 48 hours prior to surgery. 16. Shower the night before surgery with ___Antibacterial soap ___Hibiclens             17. To provide excellent care visitors will be limited to one in the room at any given time. 18.  Please bring picture ID and insurance card. 19.  Visit our web site for additional information:  [email or web address]              20.During flu season no children under the age of 15 are permitted in the hospital for the safety of all patients.                               21. If you take a long acting insulin in the evening only  take half of your usual  dose the night  before your procedure              22. If you use a c-pap please bring DOS if staying overnight,             23.For your [facility] has a pharmacy on site to fill your prescriptions. 24. If you use oxygen and have a portable tank please bring it  with you the DOS             25. Bring a complete list of all your medications with name and dose include any supplements. 26. Other__________________________________________   *Please call pre admission testing if you any further questions   [address] [place]  [phone]   HOSP [facility] [address] [facility]   [phone]       All above information reviewed with Halie at [address] by phone/fax. Phyllis/Donya verbalize understanding. All questions and concerns addressed. Patient/Rep____________________                                                                                                                                    PRE OP INSTRUCTIONS    Please contact Dr. Albright Mayor regarding prep instructions. Pt.will need to have responsible party here with him while here. Nothing per g-tube except for meds after midnight prior to procedure.

## 2019-05-29 ENCOUNTER — OFFICE VISIT (OUTPATIENT)
Dept: ORTHOPEDIC SURGERY | Age: 69
End: 2019-05-29
Payer: MEDICARE

## 2019-05-29 VITALS
HEIGHT: 69 IN | BODY MASS INDEX: 22.22 KG/M2 | HEART RATE: 60 BPM | WEIGHT: 150 LBS | SYSTOLIC BLOOD PRESSURE: 132 MMHG | DIASTOLIC BLOOD PRESSURE: 76 MMHG

## 2019-05-29 DIAGNOSIS — S49.92XD INJURY OF LEFT SHOULDER, SUBSEQUENT ENCOUNTER: Primary | ICD-10-CM

## 2019-05-29 PROCEDURE — 1036F TOBACCO NON-USER: CPT | Performed by: ORTHOPAEDIC SURGERY

## 2019-05-29 PROCEDURE — 4040F PNEUMOC VAC/ADMIN/RCVD: CPT | Performed by: ORTHOPAEDIC SURGERY

## 2019-05-29 PROCEDURE — G8420 CALC BMI NORM PARAMETERS: HCPCS | Performed by: ORTHOPAEDIC SURGERY

## 2019-05-29 PROCEDURE — 99212 OFFICE O/P EST SF 10 MIN: CPT | Performed by: ORTHOPAEDIC SURGERY

## 2019-05-29 PROCEDURE — 3017F COLORECTAL CA SCREEN DOC REV: CPT | Performed by: ORTHOPAEDIC SURGERY

## 2019-05-29 PROCEDURE — G8427 DOCREV CUR MEDS BY ELIG CLIN: HCPCS | Performed by: ORTHOPAEDIC SURGERY

## 2019-05-29 PROCEDURE — 1123F ACP DISCUSS/DSCN MKR DOCD: CPT | Performed by: ORTHOPAEDIC SURGERY

## 2019-06-04 ENCOUNTER — HOSPITAL ENCOUNTER (OUTPATIENT)
Age: 69
Setting detail: OUTPATIENT SURGERY
Discharge: HOME | End: 2019-06-04
Attending: INTERNAL MEDICINE | Admitting: INTERNAL MEDICINE
Payer: MEDICARE

## 2019-06-04 ENCOUNTER — ANESTHESIA (OUTPATIENT)
Dept: ENDOSCOPY | Age: 69
End: 2019-06-04
Payer: MEDICARE

## 2019-06-04 VITALS — DIASTOLIC BLOOD PRESSURE: 55 MMHG | SYSTOLIC BLOOD PRESSURE: 91 MMHG | OXYGEN SATURATION: 100 %

## 2019-06-04 VITALS
BODY MASS INDEX: 23.66 KG/M2 | SYSTOLIC BLOOD PRESSURE: 137 MMHG | HEIGHT: 65 IN | RESPIRATION RATE: 16 BRPM | TEMPERATURE: 97 F | WEIGHT: 142 LBS | DIASTOLIC BLOOD PRESSURE: 71 MMHG | OXYGEN SATURATION: 100 % | HEART RATE: 60 BPM

## 2019-06-04 PROCEDURE — 3609010300 HC COLONOSCOPY W/BIOPSY SINGLE/MULTIPLE: Performed by: INTERNAL MEDICINE

## 2019-06-04 PROCEDURE — 7100000010 HC PHASE II RECOVERY - FIRST 15 MIN: Performed by: INTERNAL MEDICINE

## 2019-06-04 PROCEDURE — 2580000003 HC RX 258: Performed by: FAMILY MEDICINE

## 2019-06-04 PROCEDURE — 2500000003 HC RX 250 WO HCPCS: Performed by: NURSE ANESTHETIST, CERTIFIED REGISTERED

## 2019-06-04 PROCEDURE — 3700000001 HC ADD 15 MINUTES (ANESTHESIA): Performed by: INTERNAL MEDICINE

## 2019-06-04 PROCEDURE — 3700000000 HC ANESTHESIA ATTENDED CARE: Performed by: INTERNAL MEDICINE

## 2019-06-04 PROCEDURE — 7100000001 HC PACU RECOVERY - ADDTL 15 MIN: Performed by: INTERNAL MEDICINE

## 2019-06-04 PROCEDURE — 6360000002 HC RX W HCPCS: Performed by: NURSE ANESTHETIST, CERTIFIED REGISTERED

## 2019-06-04 PROCEDURE — 7100000011 HC PHASE II RECOVERY - ADDTL 15 MIN: Performed by: INTERNAL MEDICINE

## 2019-06-04 PROCEDURE — 7100000000 HC PACU RECOVERY - FIRST 15 MIN: Performed by: INTERNAL MEDICINE

## 2019-06-04 PROCEDURE — 2709999900 HC NON-CHARGEABLE SUPPLY: Performed by: INTERNAL MEDICINE

## 2019-06-04 PROCEDURE — 88305 TISSUE EXAM BY PATHOLOGIST: CPT

## 2019-06-04 RX ORDER — PROPOFOL 10 MG/ML
INJECTION, EMULSION INTRAVENOUS PRN
Status: DISCONTINUED | OUTPATIENT
Start: 2019-06-04 | End: 2019-06-04 | Stop reason: SDUPTHER

## 2019-06-04 RX ORDER — SODIUM CHLORIDE 0.9 % (FLUSH) 0.9 %
10 SYRINGE (ML) INJECTION PRN
Status: DISCONTINUED | OUTPATIENT
Start: 2019-06-04 | End: 2019-06-04 | Stop reason: HOSPADM

## 2019-06-04 RX ORDER — KETAMINE HYDROCHLORIDE 10 MG/ML
INJECTION, SOLUTION INTRAMUSCULAR; INTRAVENOUS PRN
Status: DISCONTINUED | OUTPATIENT
Start: 2019-06-04 | End: 2019-06-04 | Stop reason: SDUPTHER

## 2019-06-04 RX ORDER — LIDOCAINE HYDROCHLORIDE 20 MG/ML
INJECTION, SOLUTION INFILTRATION; PERINEURAL PRN
Status: DISCONTINUED | OUTPATIENT
Start: 2019-06-04 | End: 2019-06-04 | Stop reason: SDUPTHER

## 2019-06-04 RX ORDER — SODIUM CHLORIDE 0.9 % (FLUSH) 0.9 %
10 SYRINGE (ML) INJECTION EVERY 12 HOURS SCHEDULED
Status: DISCONTINUED | OUTPATIENT
Start: 2019-06-04 | End: 2019-06-04 | Stop reason: HOSPADM

## 2019-06-04 RX ORDER — SODIUM CHLORIDE 9 MG/ML
INJECTION, SOLUTION INTRAVENOUS CONTINUOUS
Status: DISCONTINUED | OUTPATIENT
Start: 2019-06-04 | End: 2019-06-04 | Stop reason: HOSPADM

## 2019-06-04 RX ADMIN — PROPOFOL 40 MG: 10 INJECTION, EMULSION INTRAVENOUS at 10:26

## 2019-06-04 RX ADMIN — PROPOFOL 40 MG: 10 INJECTION, EMULSION INTRAVENOUS at 10:01

## 2019-06-04 RX ADMIN — KETAMINE HYDROCHLORIDE 4 MG: 10 INJECTION, SOLUTION INTRAMUSCULAR; INTRAVENOUS at 10:05

## 2019-06-04 RX ADMIN — PROPOFOL 20 MG: 10 INJECTION, EMULSION INTRAVENOUS at 10:41

## 2019-06-04 RX ADMIN — SODIUM CHLORIDE: 9 INJECTION, SOLUTION INTRAVENOUS at 09:15

## 2019-06-04 RX ADMIN — LIDOCAINE HYDROCHLORIDE 40 MG: 20 INJECTION, SOLUTION INFILTRATION; PERINEURAL at 10:01

## 2019-06-04 RX ADMIN — PROPOFOL 40 MG: 10 INJECTION, EMULSION INTRAVENOUS at 10:33

## 2019-06-04 RX ADMIN — PROPOFOL 40 MG: 10 INJECTION, EMULSION INTRAVENOUS at 10:18

## 2019-06-04 RX ADMIN — KETAMINE HYDROCHLORIDE 4 MG: 10 INJECTION, SOLUTION INTRAMUSCULAR; INTRAVENOUS at 10:01

## 2019-06-04 RX ADMIN — PROPOFOL 40 MG: 10 INJECTION, EMULSION INTRAVENOUS at 10:10

## 2019-06-04 RX ADMIN — PROPOFOL 40 MG: 10 INJECTION, EMULSION INTRAVENOUS at 10:05

## 2019-06-04 ASSESSMENT — PULMONARY FUNCTION TESTS: PIF_VALUE: 0

## 2019-06-04 ASSESSMENT — LIFESTYLE VARIABLES: SMOKING_STATUS: 0

## 2019-06-04 NOTE — PROGRESS NOTES
Reviewed problem list, assessment, H&P note and checklist preoperatively. Scope verified using 2 person system. Family in waiting room.

## 2019-06-04 NOTE — ANESTHESIA PRE PROCEDURE
Department of Anesthesiology  Preprocedure Note       Name:  Joshua Alonso   Age:  76 y.o.  :  1950                                          MRN:  [id number]         Date:  2019      Surgeon: Mary Ernst):  Shelbie Dumont MD    Procedure: COLONOSCOPY (N/A )    Medications prior to admission:   Prior to Admission medications    Medication Sig Start Date End Date Taking? Authorizing Provider   carBAMazepine (TEGRETOL) 100 MG/5ML suspension 200 mg by Per G Tube route every morning (before breakfast)   Yes Historical Provider, MD   ipratropium-albuterol (DUONEB) 0.5-2.5 (3) MG/3ML SOLN nebulizer solution Inhale 1 vial into the lungs every 4 hours For shortness of breath   Yes Historical Provider, MD   nystatin (MYCOSTATIN) 699712 UNIT/GM cream Apply topically 2 times daily. 18  Yes Teodora Valencia PA-C   zinc oxide 20 % ointment Apply topically 2 times daily Apply topically as needed. Yes Historical Provider, MD   lamoTRIgine (LAMICTAL) 150 MG tablet Take 500 mg by mouth 2 times daily At 1730   Yes Historical Provider, MD   QUEtiapine (SEROQUEL) 100 MG tablet Take 100 mg by mouth nightly 2200   Yes Historical Provider, MD   levETIRAcetam (KEPPRA) 100 MG/ML solution 15 mLs by Per G Tube route 2 times daily 4/3/17  Yes Ignacio Dominguez MD   doxazosin (CARDURA) 4 MG tablet 4 mg by Per G Tube route daily    Yes Historical Provider, MD   sennosides (SENOKOT) 8.8 MG/5ML syrup 5 mLs by Per G Tube route daily    Yes Historical Provider, MD   QUEtiapine (SEROQUEL) 100 MG tablet 50 mg by Per G Tube route 2 times daily 0530; 1730   Yes Historical Provider, MD   escitalopram (LEXAPRO) 10 MG tablet 10 mg by Per G Tube route daily.    Yes Historical Provider, MD   carBAMazepine (TEGRETOL) 100 MG/5ML suspension 400 mg by Per G Tube route nightly    Yes Historical Provider, MD   lansoprazole (PREVACID SOLUTAB) 15 MG disintegrating tablet 15 mg by Per J Tube route daily    Historical Provider, MD   Cholecalciferol (VITAMIN D3) 2000 units CAPS Take 1 capsule by mouth daily    Historical Provider, MD   guaiFENesin (ROBITUSSIN) 100 MG/5ML SOLN oral solution Take 200 mg by mouth every 4 hours as needed for Cough    Historical Provider, MD   Ostomy Supplies ([facility] [place] Does not apply route Apply topically and cover with dry dressing and duoderm as neeeded    Historical Provider, MD   Sodium Phosphates (FLEET) 7-19 GM/118ML Place 1 enema rectally once as needed If bisacodyl is ineffective. Historical Provider, MD   bisacodyl (DULCOLAX) 10 MG suppository Place 10 mg rectally daily    Historical Provider, MD   diazepam (DIASTAT) 10 MG GEL Place 20 mg rectally once as needed. Historical Provider, MD   baclofen (LIORESAL) 10 MG tablet 15 mg by Per J Tube route 3 times daily    Historical Provider, MD   Nystatin POWD Take 1 oz by mouth 4 times daily 3/7/18   Noé Aguilar, APRN - CNP   ibuprofen (ADVIL;MOTRIN) 600 MG tablet 600 mg by Per G Tube route every 8 hours as needed for Pain    Historical Provider, MD   docusate (COLACE) 50 MG/5ML liquid Take 50 mg by mouth daily    Historical Provider, MD   acetaminophen (TYLENOL) 160 MG/5ML solution 20.3 mLs by Per G Tube route every 4 hours as needed for Fever or Pain 4/3/17   Mulugeta Torres MD   LORazepam (ATIVAN) 0.5 MG tablet Take 0.5 mg by mouth every 12 hours as needed for Anxiety.  For seizures up to 7 days    Historical Provider, MD   dextromethorphan (DELSYM) 30 MG/5ML extended release liquid Take 60 mg by mouth 2 times daily as needed for Cough    Historical Provider, MD   lactulose (CHRONULAC) 10 GM/15ML solution Take 20 g by mouth daily     Historical Provider, MD   Multiple Vitamins-Minerals (CERTA-KENTON) liquid 30 mLs by PEG Tube route daily     Historical Provider, MD   potassium chloride (KAYCIEL) 20 MEQ/15ML (10%) solution 10 mEq by Per G Tube route 2 times daily 10 MEQ--7.5CC PER G TUBE TWICE DAY    Historical Provider, MD   ascorbic acid (VITAMIN C) induced    Mental handicap     Movement disorder     Neurogenic bladder     Neuromuscular disorder (HCC)     spastic hemiplegia, MDS,neurogenic bladder    NS (nuclear sclerosis)     Nuclear sclerosis     Osteoarthritis     Periodontal disease     Pneumonia 1/17/2013    Primary optic atrophy     Primary optic atrophy     PVD (peripheral vascular disease) ([facility] [address]     Seizure ([facility] [address]     Spastic hemiplegia     Tinea pedis     Unspecified diseases of blood and blood-forming organs     anemia    Urinary incontinence        Past Surgical History:        Procedure Laterality Date    ABDOMEN SURGERY      can see scar/details unknown, feeding tube    ABDOMINAL EXPLORATION SURGERY  9-28-14    LAPAROTOMY EXPLORATORY, ERIKA-EN-Y TUBE JEJUNOSTOMY, SMALL BOWEL RESECTION                                              COLONOSCOPY      CYSTOSCOPY  6/25/14    with dilitation    DILATATION, ESOPHAGUS      ENDOSCOPY, COLON, DIAGNOSTIC      OTHER SURGICAL HISTORY  4-30-13    JEJUNOSTOMY TUBE INSERTION    SIGMOIDOSCOPY N/A 5/2/2019    SIGMOIDOSCOPY DIAGNOSTIC FLEXIBLE performed by Yulissa Conde MD at [address]  1/24/13    PEG placement    UPPER GASTROINTESTINAL ENDOSCOPY  4/26/13       Social History:    Social History     Tobacco Use    Smoking status: Never Smoker    Smokeless tobacco: Never Used   Substance Use Topics    Alcohol use:  No                                Counseling given: Not Answered      Vital Signs (Current):   Vitals:    05/16/19 0933 06/04/19 0854   BP:  (!) 150/77   Pulse:  70   Resp:  16   Temp:  96.8 °F (36 °C)   TempSrc:  Temporal   SpO2:  95%   Weight: 142 lb (64.4 kg)    Height: 5' 5\" (1.651 m)                                               BP Readings from Last 3 Encounters:   06/04/19 (!) 150/77   05/29/19 132/76   05/02/19 132/64       NPO Status:                                                                                 BMI:   Wt Readings from Last 3 Encounters:   05/16/19 142 lb (64.4 kg)   05/29/19 150 lb (68 kg)   04/15/19 142 lb (64.4 kg)     Body mass index is 23.63 kg/m². CBC:   Lab Results   Component Value Date    WBC 16.6 09/18/2018    RBC 3.14 09/18/2018    HGB 9.5 09/18/2018    HCT 28.6 09/18/2018    MCV 91.0 09/18/2018    RDW 15.6 09/18/2018     09/18/2018       CMP:   Lab Results   Component Value Date     09/18/2018    K 4.5 09/18/2018    K 5.1 05/29/2018    CL 89 09/18/2018    CO2 27 09/18/2018    BUN 30 09/18/2018    CREATININE 0.9 09/18/2018    GFRAA >60 09/18/2018    GFRAA >60 05/16/2013    AGRATIO 1.1 09/17/2018    LABGLOM >60 09/18/2018    GLUCOSE 117 09/18/2018    PROT 6.9 09/17/2018    PROT 5.9 01/25/2013    CALCIUM 9.0 09/18/2018    BILITOT <0.2 09/17/2018    ALKPHOS 112 09/17/2018    AST 18 09/17/2018    ALT 19 09/17/2018       POC Tests: No results for input(s): POCGLU, POCNA, POCK, POCCL, POCBUN, POCHEMO, POCHCT in the last 72 hours.     Coags:   Lab Results   Component Value Date    PROTIME 11.3 09/17/2018    INR 0.99 09/17/2018    APTT 31.5 09/17/2018       HCG (If Applicable): No results found for: PREGTESTUR, PREGSERUM, HCG, HCGQUANT     ABGs: No results found for: PHART, PO2ART, WQV2DQS, HMB7PHK, BEART, P1LWUGIJ     Type & Screen (If Applicable):  Lab Results   Component Value Date    LABABO A 01/17/2013    [address] Positive 01/17/2013       Anesthesia Evaluation  Patient summary reviewed and Nursing notes reviewed no history of anesthetic complications:   Airway: Mallampati: II  TM distance: >3 FB   Neck ROM: limited  Mouth opening: > = 3 FB Dental:          Pulmonary:normal exam  breath sounds clear to auscultation      (-) COPD, asthma, sleep apnea and not a current smoker                          ROS comment: Inhaler use w/ pnas, no recent issues or infx   Cardiovascular:Negative CV ROS        (-) hypertension, past MI, CAD, CABG/stent, dysrhythmias,  angina and  CHF    ECG reviewed  Rhythm: regular  Rate: normal                    Neuro/Psych:   (+) seizures (last keysha in 4/19, compliant w/ meds):, neuromuscular disease:, psychiatric history (bipolar, MRDD):depression/anxiety    (-) TIA and CVA            ROS comment: 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 Seizures improved with LTG titration. Per neurology note GI/Hepatic/Renal:   (+) GERD:, hepatitis (h/o drug induced in past):,      (-) liver disease and no renal disease       Endo/Other:    (+) : arthritis: OA., .    (-) diabetes mellitus, hypothyroidism, hyperthyroidism               Abdominal:           Vascular:                                        Anesthesia Plan      MAC     ASA 3       Induction: intravenous. Anesthetic plan and risks discussed with patient and legal guardian. Plan discussed with CRNA.                   Neville Cruz MD   6/4/2019

## 2019-06-04 NOTE — PROGRESS NOTES
Patient to endoscopy in wheelchair for colonoscopy. Teaching / education initiated regarding perioperative experience, expectations, and pain management during stay. Patient with caregiver from [facility] and brother at bedside. Plan of care verbally understood by caregiver and patients brother. Patient stood and pivoted with caregiver to bed with assist of 2.

## 2019-06-04 NOTE — H&P
[address] [place] [address]  [facility]    Pre-operative History and Physical    Patient: Robinson Allen  : 1950  CSN:     History Obtained From:  patient    HISTORY OF PRESENT ILLNESS:    The patient is a 76 y.o. male with significant past medical history of screening colon    past Medical History:        Diagnosis Date    Anemia, macrocytic     Anhidrosis     Anhidrosis     Bilateral cataracts     Bipolar affective ([address]     Blood circulation, collateral     unspecified PVD    Cataract     Clostridium difficile diarrhea 3/7/15    Depression     Dermatophytosis     Drug induced hepatitis     GERD (gastroesophageal reflux disease)     Impulse control disorder     Liver disease     drug induced    Mental handicap     Movement disorder     Neurogenic bladder     Neuromuscular disorder (HCC)     spastic hemiplegia, MDS,neurogenic bladder    NS (nuclear sclerosis)     Nuclear sclerosis     Osteoarthritis     Periodontal disease     Pneumonia 2013    Primary optic atrophy     Primary optic atrophy     PVD (peripheral vascular disease) ([address]     Seizure ([address]     Spastic hemiplegia     Tinea pedis     Unspecified diseases of blood and blood-forming organs     anemia    Urinary incontinence      Past Surgical History:        Procedure Laterality Date    ABDOMEN SURGERY      can see scar/details unknown, feeding tube    ABDOMINAL EXPLORATION SURGERY  14    LAPAROTOMY EXPLORATORY, ERIKA-EN-Y TUBE JEJUNOSTOMY, SMALL BOWEL RESECTION                                              COLONOSCOPY      CYSTOSCOPY  14    with dilitation    DILATATION, ESOPHAGUS      ENDOSCOPY, COLON, DIAGNOSTIC      OTHER SURGICAL HISTORY  13    JEJUNOSTOMY TUBE INSERTION    SIGMOIDOSCOPY N/A 2019    SIGMOIDOSCOPY DIAGNOSTIC FLEXIBLE performed by Jon Marte MD at [address] [place] [address]  13    PEG placement    UPPER GASTROINTESTINAL ENDOSCOPY  13 Medications Prior to Admission:   No current facility-administered medications on file prior to encounter. Current Outpatient Medications on File Prior to Encounter   Medication Sig Dispense Refill    carBAMazepine (TEGRETOL) 100 MG/5ML suspension 200 mg by Per G Tube route every morning (before breakfast)      ipratropium-albuterol (DUONEB) 0.5-2.5 (3) MG/3ML SOLN nebulizer solution Inhale 1 vial into the lungs every 4 hours For shortness of breath      lansoprazole (PREVACID SOLUTAB) 15 MG disintegrating tablet 15 mg by Per J Tube route daily      Cholecalciferol (VITAMIN D3) 2000 units CAPS Take 1 capsule by mouth daily      nystatin (MYCOSTATIN) 302654 UNIT/GM cream Apply topically 2 times daily. 30 g 0    zinc oxide 20 % ointment Apply topically 2 times daily Apply topically as needed.  baclofen (LIORESAL) 10 MG tablet 15 mg by Per J Tube route 3 times daily      lamoTRIgine (LAMICTAL) 150 MG tablet Take 500 mg by mouth 2 times daily At 1730      QUEtiapine (SEROQUEL) 100 MG tablet Take 100 mg by mouth nightly 2200      levETIRAcetam (KEPPRA) 100 MG/ML solution 15 mLs by Per G Tube route 2 times daily 1 Bottle 3    lactulose (CHRONULAC) 10 GM/15ML solution Take 20 g by mouth daily       doxazosin (CARDURA) 4 MG tablet 4 mg by Per G Tube route daily       sennosides (SENOKOT) 8.8 MG/5ML syrup 5 mLs by Per G Tube route daily       Multiple Vitamins-Minerals (CERTA-KENTON) liquid 30 mLs by PEG Tube route daily       potassium chloride (KAYCIEL) 20 MEQ/15ML (10%) solution 10 mEq by Per G Tube route 2 times daily 10 MEQ--7.5CC PER G TUBE TWICE DAY      ascorbic acid (VITAMIN C) 500 MG tablet 500 mg by Per G Tube route 2 times daily.  QUEtiapine (SEROQUEL) 100 MG tablet 50 mg by Per G Tube route 2 times daily 0530; 1730      escitalopram (LEXAPRO) 10 MG tablet 10 mg by Per G Tube route daily.  chlorhexidine (PERIDEX) 0.12 % solution Take 7.5 mLs by mouth 2 times daily.       carBAMazepine (TEGRETOL) 100 MG/5ML suspension 400 mg by Per G Tube route nightly       guaiFENesin (ROBITUSSIN) 100 MG/5ML SOLN oral solution Take 200 mg by mouth every 4 hours as needed for Cough      Ostomy Supplies (SKIN PREP SPRAY) MISC by Does not apply route Apply topically and cover with dry dressing and duoderm as neeeded      Sodium Phosphates (FLEET) 7-19 GM/118ML Place 1 enema rectally once as needed If bisacodyl is ineffective.  bisacodyl (DULCOLAX) 10 MG suppository Place 10 mg rectally daily      diazepam (DIASTAT) 10 MG GEL Place 20 mg rectally once as needed.  Nystatin POWD Take 1 oz by mouth 4 times daily 1 each 0    ibuprofen (ADVIL;MOTRIN) 600 MG tablet 600 mg by Per G Tube route every 8 hours as needed for Pain      docusate (COLACE) 50 MG/5ML liquid Take 50 mg by mouth daily      acetaminophen (TYLENOL) 160 MG/5ML solution 20.3 mLs by Per G Tube route every 4 hours as needed for Fever or Pain 473 mL 3    LORazepam (ATIVAN) 0.5 MG tablet Take 0.5 mg by mouth every 12 hours as needed for Anxiety. For seizures up to 7 days      dextromethorphan (DELSYM) 30 MG/5ML extended release liquid Take 60 mg by mouth 2 times daily as needed for Cough          Allergies:  Biaxin [clarithromycin] and Invanz [ertapenem]    History of allergic reaction to anesthesia:  No    Social History:   TOBACCO:   reports that he has never smoked. He has never used smokeless tobacco.  Family History:   History reviewed. No pertinent family history.     PHYSICAL EXAM:      BP (!) 150/77   Pulse 70   Temp 96.8 °F (36 °C) (Temporal)   Resp 16   Ht 5' 5\" (1.651 m)   Wt 142 lb (64.4 kg)   SpO2 95%   BMI 23.63 kg/m²  I        Heart:  Normal apical impulse, regular rate and rhythm, normal S1 and S2, no S3 or S4, and no murmur noted    Lungs:  No increased work of breathing, good air exchange, clear to auscultation bilaterally, no crackles or wheezing    Abdomen:   normal bowel sounds, soft, non-distended, non-tender, no masses palpated, no hepatosplenomegally      ASA Grade:  ASA 2 - Patient with mild systemic disease with no functional limitations    Mallampati Class:  Class I: Soft palate, uvula, fauces, pillars visible  __________  Class II: Soft palate, uvula, fauces visible  ____x______   Class III: Soft palate, base of uvula visible  __________  Class IV: Hard palate only visible   __________        ASSESSMENT AND PLAN:    1. Patient is a 76 y.o. male here for colonoscopy with anesthesia  2. Procedure options, risks and benefits reviewed with guardian. Guardian expresses understanding.     Humera Lockhart MD  [address] and [address]

## 2019-06-20 NOTE — PROGRESS NOTES
the patient speak English ? yes   Location of test F   Best days of the week Any   Best times of the day [employer] phone number to reach patient [address], caregiver at [place] [id number] Physician Provided Reason for Exam: Ania Caraballo of left shoulder,   initial encounter   Acuity: Unknown   Type of Exam: Unknown       FINDINGS:   Bones: Osteopenia.  Comminuted mildly impacted fracture involving the   proximal humerus at the head neck junction.  Some callus formation noted   medially and laterally.  Fracture lines are still visualized.       Soft Tissue:  Calcifications noted along the course of the coracoclavicular   ligament likely from previous injury.  Severe fatty atrophy of the   supraspinatus.  Moderate to severe fatty atrophy of the subscapularis and   infraspinatus.       Joint:  Small glenohumeral joint effusion.  Edema noted about the left   shoulder.           Impression   Proximal humeral fractures above.  Some callus formation suggesting this is   subacute to remote however fracture lines are still visualized.       Prominent fatty atrophy involving the supraspinatus, infraspinatus, and   subscapularis.       Remote coracoclavicular ligament injury. Assessment:   Diagnosis Orders   1. Injury of left shoulder, subsequent encounter     Healing left proximal humerus fracture    Plan: At this point he is fracture is healing and easy exhibiting signs of improved pain control. From my perspective he can participate any significant therapy that he would be able to perform given the level of contractures. Still, it is nice to confirm that he actually did have a new fracture with this injury for documentation purposes of his case.     He can follow-up with me as symptoms require

## 2019-10-02 ENCOUNTER — APPOINTMENT (OUTPATIENT)
Dept: GENERAL RADIOLOGY | Age: 69
End: 2019-10-02
Payer: MEDICARE

## 2019-10-02 ENCOUNTER — HOSPITAL ENCOUNTER (EMERGENCY)
Age: 69
Discharge: HOME | End: 2019-10-02
Attending: EMERGENCY MEDICINE
Payer: MEDICARE

## 2019-10-02 VITALS
SYSTOLIC BLOOD PRESSURE: 107 MMHG | BODY MASS INDEX: 22.15 KG/M2 | WEIGHT: 150 LBS | OXYGEN SATURATION: 96 % | DIASTOLIC BLOOD PRESSURE: 55 MMHG | RESPIRATION RATE: 18 BRPM | TEMPERATURE: 96.8 F | HEART RATE: 59 BPM

## 2019-10-02 DIAGNOSIS — Z46.59 ENCOUNTER FOR CARE RELATED TO FEEDING TUBE: Primary | ICD-10-CM

## 2019-10-02 PROCEDURE — 99283 EMERGENCY DEPT VISIT LOW MDM: CPT

## 2019-10-02 PROCEDURE — 6360000004 HC RX CONTRAST MEDICATION

## 2019-10-02 PROCEDURE — 49465 FLUORO EXAM OF G/COLON TUBE: CPT

## 2019-10-02 PROCEDURE — 4500000023 HC ED LEVEL 3 PROCEDURE

## 2019-10-02 RX ADMIN — IOHEXOL 50 ML: 350 INJECTION, SOLUTION INTRAVENOUS at 07:51

## 2019-10-02 ASSESSMENT — ENCOUNTER SYMPTOMS
COLOR CHANGE: 1
NAUSEA: 0
SHORTNESS OF BREATH: 0
ABDOMINAL PAIN: 0
CHEST TIGHTNESS: 0
VOMITING: 0
DIARRHEA: 0

## 2019-10-09 RX ORDER — SENNA PLUS 8.6 MG/1
2 TABLET ORAL DAILY
Status: ON HOLD | COMMUNITY
End: 2021-04-15 | Stop reason: HOSPADM

## 2019-10-10 ENCOUNTER — ANESTHESIA EVENT (OUTPATIENT)
Dept: ENDOSCOPY | Age: 69
End: 2019-10-10
Payer: MEDICARE

## 2019-10-10 RX ORDER — SODIUM CHLORIDE 0.9 % (FLUSH) 0.9 %
10 SYRINGE (ML) INJECTION PRN
Status: CANCELLED | OUTPATIENT
Start: 2019-10-10

## 2019-10-10 RX ORDER — SODIUM CHLORIDE 9 MG/ML
INJECTION, SOLUTION INTRAVENOUS CONTINUOUS
Status: CANCELLED | OUTPATIENT
Start: 2019-10-10

## 2019-10-10 RX ORDER — SODIUM CHLORIDE 0.9 % (FLUSH) 0.9 %
10 SYRINGE (ML) INJECTION EVERY 12 HOURS SCHEDULED
Status: CANCELLED | OUTPATIENT
Start: 2019-10-10

## 2019-10-11 ENCOUNTER — ANESTHESIA (OUTPATIENT)
Dept: ENDOSCOPY | Age: 69
End: 2019-10-11
Payer: MEDICARE

## 2019-10-11 ENCOUNTER — HOSPITAL ENCOUNTER (OUTPATIENT)
Age: 69
Setting detail: OUTPATIENT SURGERY
Discharge: NURSING, REHAB OR LONG-TERM CARE | End: 2019-10-11
Attending: INTERNAL MEDICINE | Admitting: INTERNAL MEDICINE
Payer: MEDICARE

## 2019-10-11 VITALS
OXYGEN SATURATION: 99 % | RESPIRATION RATE: 11 BRPM | SYSTOLIC BLOOD PRESSURE: 76 MMHG | DIASTOLIC BLOOD PRESSURE: 47 MMHG

## 2019-10-11 VITALS
SYSTOLIC BLOOD PRESSURE: 124 MMHG | RESPIRATION RATE: 15 BRPM | DIASTOLIC BLOOD PRESSURE: 62 MMHG | TEMPERATURE: 97.1 F | OXYGEN SATURATION: 98 % | HEART RATE: 61 BPM

## 2019-10-11 PROCEDURE — C1726 CATH, BAL DIL, NON-VASCULAR: HCPCS | Performed by: INTERNAL MEDICINE

## 2019-10-11 PROCEDURE — 2709999900 HC NON-CHARGEABLE SUPPLY: Performed by: INTERNAL MEDICINE

## 2019-10-11 PROCEDURE — 7100000011 HC PHASE II RECOVERY - ADDTL 15 MIN: Performed by: INTERNAL MEDICINE

## 2019-10-11 PROCEDURE — 7100000010 HC PHASE II RECOVERY - FIRST 15 MIN: Performed by: INTERNAL MEDICINE

## 2019-10-11 PROCEDURE — 2580000003 HC RX 258: Performed by: NURSE ANESTHETIST, CERTIFIED REGISTERED

## 2019-10-11 PROCEDURE — 3700000001 HC ADD 15 MINUTES (ANESTHESIA): Performed by: INTERNAL MEDICINE

## 2019-10-11 PROCEDURE — 6360000002 HC RX W HCPCS: Performed by: NURSE ANESTHETIST, CERTIFIED REGISTERED

## 2019-10-11 PROCEDURE — 7100000001 HC PACU RECOVERY - ADDTL 15 MIN: Performed by: INTERNAL MEDICINE

## 2019-10-11 PROCEDURE — 7100000000 HC PACU RECOVERY - FIRST 15 MIN: Performed by: INTERNAL MEDICINE

## 2019-10-11 PROCEDURE — 3700000000 HC ANESTHESIA ATTENDED CARE: Performed by: INTERNAL MEDICINE

## 2019-10-11 PROCEDURE — 2500000003 HC RX 250 WO HCPCS: Performed by: NURSE ANESTHETIST, CERTIFIED REGISTERED

## 2019-10-11 PROCEDURE — 3609017700 HC EGD DILATION GASTRIC/DUODENAL STRICTURE: Performed by: INTERNAL MEDICINE

## 2019-10-11 RX ORDER — ONDANSETRON 2 MG/ML
4 INJECTION INTRAMUSCULAR; INTRAVENOUS
Status: DISCONTINUED | OUTPATIENT
Start: 2019-10-11 | End: 2019-10-11 | Stop reason: HOSPADM

## 2019-10-11 RX ORDER — LABETALOL HYDROCHLORIDE 5 MG/ML
5 INJECTION, SOLUTION INTRAVENOUS EVERY 10 MIN PRN
Status: DISCONTINUED | OUTPATIENT
Start: 2019-10-11 | End: 2019-10-11 | Stop reason: HOSPADM

## 2019-10-11 RX ORDER — SODIUM CHLORIDE 9 MG/ML
INJECTION, SOLUTION INTRAVENOUS CONTINUOUS PRN
Status: DISCONTINUED | OUTPATIENT
Start: 2019-10-11 | End: 2019-10-11 | Stop reason: SDUPTHER

## 2019-10-11 RX ORDER — PROMETHAZINE HYDROCHLORIDE 25 MG/ML
6.25 INJECTION, SOLUTION INTRAMUSCULAR; INTRAVENOUS
Status: DISCONTINUED | OUTPATIENT
Start: 2019-10-11 | End: 2019-10-11 | Stop reason: HOSPADM

## 2019-10-11 RX ORDER — LIDOCAINE HYDROCHLORIDE 20 MG/ML
INJECTION, SOLUTION EPIDURAL; INFILTRATION; INTRACAUDAL; PERINEURAL PRN
Status: DISCONTINUED | OUTPATIENT
Start: 2019-10-11 | End: 2019-10-11 | Stop reason: SDUPTHER

## 2019-10-11 RX ORDER — PROPOFOL 10 MG/ML
INJECTION, EMULSION INTRAVENOUS PRN
Status: DISCONTINUED | OUTPATIENT
Start: 2019-10-11 | End: 2019-10-11 | Stop reason: SDUPTHER

## 2019-10-11 RX ADMIN — LIDOCAINE HYDROCHLORIDE 60 MG: 20 INJECTION, SOLUTION EPIDURAL; INFILTRATION; INTRACAUDAL; PERINEURAL at 10:51

## 2019-10-11 RX ADMIN — LIDOCAINE HYDROCHLORIDE 60 MG: 20 INJECTION, SOLUTION EPIDURAL; INFILTRATION; INTRACAUDAL; PERINEURAL at 10:40

## 2019-10-11 RX ADMIN — PROPOFOL 80 MG: 10 INJECTION, EMULSION INTRAVENOUS at 10:41

## 2019-10-11 RX ADMIN — LIDOCAINE HYDROCHLORIDE 60 MG: 20 INJECTION, SOLUTION EPIDURAL; INFILTRATION; INTRACAUDAL; PERINEURAL at 10:47

## 2019-10-11 RX ADMIN — SODIUM CHLORIDE: 9 INJECTION, SOLUTION INTRAVENOUS at 10:31

## 2019-10-11 ASSESSMENT — PULMONARY FUNCTION TESTS
PIF_VALUE: 1
PIF_VALUE: 3
PIF_VALUE: 1
PIF_VALUE: 2
PIF_VALUE: 1
PIF_VALUE: 4
PIF_VALUE: 1
PIF_VALUE: 4
PIF_VALUE: 1
PIF_VALUE: 1
PIF_VALUE: 4
PIF_VALUE: 6
PIF_VALUE: 1

## 2019-10-11 ASSESSMENT — PAIN - FUNCTIONAL ASSESSMENT: PAIN_FUNCTIONAL_ASSESSMENT: 0-10

## 2020-04-21 ENCOUNTER — HOSPITAL ENCOUNTER (OUTPATIENT)
Age: 70
Setting detail: OUTPATIENT SURGERY
Discharge: NURSING, REHAB OR LONG-TERM CARE | End: 2020-04-21
Attending: INTERNAL MEDICINE | Admitting: INTERNAL MEDICINE
Payer: MEDICARE

## 2020-04-21 PROCEDURE — 3609038000 HC PEG TUBE REMOVAL: Performed by: INTERNAL MEDICINE

## 2020-04-21 PROCEDURE — 2709999900 HC NON-CHARGEABLE SUPPLY: Performed by: INTERNAL MEDICINE

## 2020-04-21 PROCEDURE — 7100000010 HC PHASE II RECOVERY - FIRST 15 MIN: Performed by: INTERNAL MEDICINE

## 2020-04-21 PROCEDURE — 3609013300 HC EGD TUBE PLACEMENT: Performed by: INTERNAL MEDICINE

## 2020-04-21 NOTE — OP NOTE
J Tube CHANGE NOTE:      Sedation: none. A history and physical exam was done and informed consent was obtained. The patient was brought into the procedure room and placed in the supine position with the head elevated. The balloon on the existing tube was deflated, the existing pull-type PEG tube was removed. EBL was none. A 20Fr Balloon type replacement PEG tube was inserted easily into the existing gastrocutaneous fistula and 10ml of saline was instilled into the internal balloon. The external bolster was adjusted to the 2cm dwayne (same dwayne as the old tube). The patient tolerated the procedure well. Placement was confirmed with air via the PEG. The patient was take to recovery. Recommendations:  Routine J tube care. EBL none.        May Thomas MD  [address] and [facility]

## 2020-04-21 NOTE — H&P
20.3 mLs by Per G Tube route every 4 hours as needed for Fever or Pain 473 mL 3    levETIRAcetam (KEPPRA) 100 MG/ML solution 15 mLs by Per G Tube route 2 times daily 1 Bottle 3    LORazepam (ATIVAN) 0.5 MG tablet Take 0.5 mg by mouth every 12 hours as needed for Anxiety. For seizures up to 7 days      dextromethorphan (DELSYM) 30 MG/5ML extended release liquid Take 60 mg by mouth 2 times daily as needed for Cough      lactulose (CHRONULAC) 10 GM/15ML solution Take 30 g by mouth daily       doxazosin (CARDURA) 4 MG tablet 4 mg by Per G Tube route daily       Multiple Vitamins-Minerals (CERTA-KENTON) liquid 30 mLs by PEG Tube route daily       potassium chloride (KAYCIEL) 20 MEQ/15ML (10%) solution 10 mEq by Per G Tube route 2 times daily 10 MEQ--7.5CC PER G TUBE TWICE DAY      ascorbic acid (VITAMIN C) 500 MG tablet 500 mg by Per G Tube route 2 times daily.  QUEtiapine (SEROQUEL) 100 MG tablet 50 mg by Per G Tube route 2 times daily 0530; 1730      escitalopram (LEXAPRO) 10 MG tablet 10 mg by Per G Tube route daily.  chlorhexidine (PERIDEX) 0.12 % solution Take 7.5 mLs by mouth 2 times daily. Allergies:  Biaxin [clarithromycin] and Invanz [ertapenem]      Social History:   Social History     Tobacco Use    Smoking status: Never Smoker    Smokeless tobacco: Never Used   Substance Use Topics    Alcohol use: No     Family History:   No family history on file. PHYSICAL EXAM:      There were no vitals taken for this visit. I        Heart:  RRR, normal s1s2    Lungs:  CTA and normal effort    Abdomen:   Soft, nt nd. PEG tube being used as J tube in Left lower abdomen. ASSESSMENT AND PLAN:    1. Patient is a 71 y.o. male here for endoscopy with MAC sedation. 2.  Procedure options, risks and benefits reviewed with patient and/or guardian. They express understanding.

## 2020-04-21 NOTE — PROGRESS NOTES
Yellow drainage noted around g tube site. Dr Sharon Wallace notified and bumper tightened to 2cm with good results and less drainage.

## 2020-08-21 ENCOUNTER — APPOINTMENT (OUTPATIENT)
Dept: CT IMAGING | Age: 70
DRG: 368 | End: 2020-08-21
Payer: MEDICARE

## 2020-08-21 ENCOUNTER — APPOINTMENT (OUTPATIENT)
Dept: GENERAL RADIOLOGY | Age: 70
DRG: 368 | End: 2020-08-21
Payer: MEDICARE

## 2020-08-21 ENCOUNTER — HOSPITAL ENCOUNTER (INPATIENT)
Age: 70
LOS: 4 days | Discharge: NURSING, REHAB OR LONG-TERM CARE | DRG: 368 | End: 2020-08-25
Attending: EMERGENCY MEDICINE | Admitting: INTERNAL MEDICINE
Payer: MEDICARE

## 2020-08-21 PROBLEM — K92.2 UPPER GI HEMORRHAGE: Status: ACTIVE | Noted: 2020-08-21

## 2020-08-21 LAB
A/G RATIO: 1.2 (ref 1.1–2.2)
ABO/RH: NORMAL
ALBUMIN SERPL-MCNC: 3 G/DL (ref 3.4–5)
ALP BLD-CCNC: 76 U/L (ref 40–129)
ALT SERPL-CCNC: 22 U/L (ref 10–40)
AMMONIA: 46 UMOL/L (ref 16–60)
ANION GAP SERPL CALCULATED.3IONS-SCNC: 8 MMOL/L (ref 3–16)
ANTIBODY SCREEN: NORMAL
AST SERPL-CCNC: 23 U/L (ref 15–37)
BASOPHILS ABSOLUTE: 0 K/UL (ref 0–0.2)
BASOPHILS RELATIVE PERCENT: 0.5 %
BILIRUB SERPL-MCNC: <0.2 MG/DL (ref 0–1)
BILIRUBIN URINE: NEGATIVE
BLOOD, URINE: NEGATIVE
BUN BLDV-MCNC: 72 MG/DL (ref 7–20)
CALCIUM SERPL-MCNC: 8.6 MG/DL (ref 8.3–10.6)
CHLORIDE BLD-SCNC: 103 MMOL/L (ref 99–110)
CLARITY: CLEAR
CO2: 26 MMOL/L (ref 21–32)
COLOR: YELLOW
CREAT SERPL-MCNC: <0.5 MG/DL (ref 0.8–1.3)
EKG ATRIAL RATE: 89 BPM
EKG DIAGNOSIS: NORMAL
EKG P AXIS: 63 DEGREES
EKG P-R INTERVAL: 164 MS
EKG Q-T INTERVAL: 342 MS
EKG QRS DURATION: 72 MS
EKG QTC CALCULATION (BAZETT): 416 MS
EKG R AXIS: 9 DEGREES
EKG T AXIS: 86 DEGREES
EKG VENTRICULAR RATE: 89 BPM
EOSINOPHILS ABSOLUTE: 0.1 K/UL (ref 0–0.6)
EOSINOPHILS RELATIVE PERCENT: 0.8 %
FOLATE: 19.02 NG/ML (ref 4.78–24.2)
GFR AFRICAN AMERICAN: >60
GFR NON-AFRICAN AMERICAN: >60
GLOBULIN: 2.6 G/DL
GLUCOSE BLD-MCNC: 135 MG/DL (ref 70–99)
GLUCOSE URINE: NEGATIVE MG/DL
HCT VFR BLD CALC: 24.7 % (ref 40.5–52.5)
HCT VFR BLD CALC: 28.5 % (ref 40.5–52.5)
HEMOGLOBIN: 8.2 G/DL (ref 13.5–17.5)
HEMOGLOBIN: 9.8 G/DL (ref 13.5–17.5)
IRON SATURATION: 68 % (ref 20–50)
IRON: 219 UG/DL (ref 59–158)
KEPPRA DOSE AMT: NORMAL
KEPPRA: 41.7 UG/ML (ref 6–46)
KETONES, URINE: NEGATIVE MG/DL
LACTIC ACID: 1.9 MMOL/L (ref 0.4–2)
LEUKOCYTE ESTERASE, URINE: NEGATIVE
LIPASE: 23 U/L (ref 13–60)
LYMPHOCYTES ABSOLUTE: 1.5 K/UL (ref 1–5.1)
LYMPHOCYTES RELATIVE PERCENT: 15.6 %
MCH RBC QN AUTO: 35.4 PG (ref 26–34)
MCHC RBC AUTO-ENTMCNC: 34.4 G/DL (ref 31–36)
MCV RBC AUTO: 102.9 FL (ref 80–100)
MICROSCOPIC EXAMINATION: NORMAL
MONOCYTES ABSOLUTE: 1.2 K/UL (ref 0–1.3)
MONOCYTES RELATIVE PERCENT: 13 %
NEUTROPHILS ABSOLUTE: 6.5 K/UL (ref 1.7–7.7)
NEUTROPHILS RELATIVE PERCENT: 70.1 %
NITRITE, URINE: NEGATIVE
OCCULT BLOOD DIAGNOSTIC: ABNORMAL
PDW BLD-RTO: 13.6 % (ref 12.4–15.4)
PH UA: 7 (ref 5–8)
PLATELET # BLD: 350 K/UL (ref 135–450)
PMV BLD AUTO: 8.1 FL (ref 5–10.5)
POTASSIUM REFLEX MAGNESIUM: 5.2 MMOL/L (ref 3.5–5.1)
PRO-BNP: 102 PG/ML (ref 0–124)
PROTEIN UA: NEGATIVE MG/DL
RBC # BLD: 2.77 M/UL (ref 4.2–5.9)
SODIUM BLD-SCNC: 137 MMOL/L (ref 136–145)
SPECIFIC GRAVITY UA: >1.03 (ref 1–1.03)
TOTAL IRON BINDING CAPACITY: 321 UG/DL (ref 260–445)
TOTAL PROTEIN: 5.6 G/DL (ref 6.4–8.2)
TROPONIN: <0.01 NG/ML
URINE REFLEX TO CULTURE: NORMAL
URINE TYPE: NORMAL
UROBILINOGEN, URINE: 1 E.U./DL
VITAMIN B-12: 268 PG/ML (ref 211–911)
WBC # BLD: 9.3 K/UL (ref 4–11)

## 2020-08-21 PROCEDURE — 6360000002 HC RX W HCPCS: Performed by: PHYSICIAN ASSISTANT

## 2020-08-21 PROCEDURE — 83550 IRON BINDING TEST: CPT

## 2020-08-21 PROCEDURE — 84484 ASSAY OF TROPONIN QUANT: CPT

## 2020-08-21 PROCEDURE — 94761 N-INVAS EAR/PLS OXIMETRY MLT: CPT

## 2020-08-21 PROCEDURE — 82746 ASSAY OF FOLIC ACID SERUM: CPT

## 2020-08-21 PROCEDURE — 86900 BLOOD TYPING SEROLOGIC ABO: CPT

## 2020-08-21 PROCEDURE — 1200000000 HC SEMI PRIVATE

## 2020-08-21 PROCEDURE — 85018 HEMOGLOBIN: CPT

## 2020-08-21 PROCEDURE — 85025 COMPLETE CBC W/AUTO DIFF WBC: CPT

## 2020-08-21 PROCEDURE — 83690 ASSAY OF LIPASE: CPT

## 2020-08-21 PROCEDURE — 71045 X-RAY EXAM CHEST 1 VIEW: CPT

## 2020-08-21 PROCEDURE — 85014 HEMATOCRIT: CPT

## 2020-08-21 PROCEDURE — 86923 COMPATIBILITY TEST ELECTRIC: CPT

## 2020-08-21 PROCEDURE — 74177 CT ABD & PELVIS W/CONTRAST: CPT

## 2020-08-21 PROCEDURE — 80177 DRUG SCRN QUAN LEVETIRACETAM: CPT

## 2020-08-21 PROCEDURE — C9113 INJ PANTOPRAZOLE SODIUM, VIA: HCPCS | Performed by: INTERNAL MEDICINE

## 2020-08-21 PROCEDURE — 86901 BLOOD TYPING SEROLOGIC RH(D): CPT

## 2020-08-21 PROCEDURE — 36415 COLL VENOUS BLD VENIPUNCTURE: CPT

## 2020-08-21 PROCEDURE — 84134 ASSAY OF PREALBUMIN: CPT

## 2020-08-21 PROCEDURE — 82140 ASSAY OF AMMONIA: CPT

## 2020-08-21 PROCEDURE — 6360000004 HC RX CONTRAST MEDICATION: Performed by: PHYSICIAN ASSISTANT

## 2020-08-21 PROCEDURE — 2580000003 HC RX 258: Performed by: PHYSICIAN ASSISTANT

## 2020-08-21 PROCEDURE — 93010 ELECTROCARDIOGRAM REPORT: CPT | Performed by: INTERNAL MEDICINE

## 2020-08-21 PROCEDURE — G0328 FECAL BLOOD SCRN IMMUNOASSAY: HCPCS

## 2020-08-21 PROCEDURE — 6370000000 HC RX 637 (ALT 250 FOR IP): Performed by: INTERNAL MEDICINE

## 2020-08-21 PROCEDURE — 93005 ELECTROCARDIOGRAM TRACING: CPT | Performed by: PHYSICIAN ASSISTANT

## 2020-08-21 PROCEDURE — 80053 COMPREHEN METABOLIC PANEL: CPT

## 2020-08-21 PROCEDURE — 82607 VITAMIN B-12: CPT

## 2020-08-21 PROCEDURE — 83605 ASSAY OF LACTIC ACID: CPT

## 2020-08-21 PROCEDURE — 80175 DRUG SCREEN QUAN LAMOTRIGINE: CPT

## 2020-08-21 PROCEDURE — 99285 EMERGENCY DEPT VISIT HI MDM: CPT

## 2020-08-21 PROCEDURE — 2700000000 HC OXYGEN THERAPY PER DAY

## 2020-08-21 PROCEDURE — U0003 INFECTIOUS AGENT DETECTION BY NUCLEIC ACID (DNA OR RNA); SEVERE ACUTE RESPIRATORY SYNDROME CORONAVIRUS 2 (SARS-COV-2) (CORONAVIRUS DISEASE [COVID-19]), AMPLIFIED PROBE TECHNIQUE, MAKING USE OF HIGH THROUGHPUT TECHNOLOGIES AS DESCRIBED BY CMS-2020-01-R: HCPCS

## 2020-08-21 PROCEDURE — 6360000002 HC RX W HCPCS: Performed by: INTERNAL MEDICINE

## 2020-08-21 PROCEDURE — 81003 URINALYSIS AUTO W/O SCOPE: CPT

## 2020-08-21 PROCEDURE — 83540 ASSAY OF IRON: CPT

## 2020-08-21 PROCEDURE — 83880 ASSAY OF NATRIURETIC PEPTIDE: CPT

## 2020-08-21 PROCEDURE — 96374 THER/PROPH/DIAG INJ IV PUSH: CPT

## 2020-08-21 PROCEDURE — 86850 RBC ANTIBODY SCREEN: CPT

## 2020-08-21 PROCEDURE — 2500000003 HC RX 250 WO HCPCS: Performed by: INTERNAL MEDICINE

## 2020-08-21 PROCEDURE — P9016 RBC LEUKOCYTES REDUCED: HCPCS

## 2020-08-21 PROCEDURE — 2580000003 HC RX 258: Performed by: INTERNAL MEDICINE

## 2020-08-21 RX ORDER — GUAIFENESIN 100 MG/5ML
200 SOLUTION ORAL EVERY 4 HOURS PRN
Status: DISCONTINUED | OUTPATIENT
Start: 2020-08-21 | End: 2020-08-25 | Stop reason: HOSPADM

## 2020-08-21 RX ORDER — POTASSIUM CHLORIDE 750 MG/1
10 TABLET, FILM COATED, EXTENDED RELEASE ORAL 2 TIMES DAILY
Status: DISCONTINUED | OUTPATIENT
Start: 2020-08-21 | End: 2020-08-25 | Stop reason: HOSPADM

## 2020-08-21 RX ORDER — NYSTATIN 100000 [USP'U]/G
POWDER TOPICAL 2 TIMES DAILY PRN
Status: ON HOLD | COMMUNITY
End: 2021-02-01

## 2020-08-21 RX ORDER — VITAMIN B COMPLEX
2000 TABLET ORAL DAILY
Status: DISCONTINUED | OUTPATIENT
Start: 2020-08-22 | End: 2020-08-25 | Stop reason: HOSPADM

## 2020-08-21 RX ORDER — LORAZEPAM 0.5 MG/1
0.5 TABLET ORAL EVERY 12 HOURS PRN
Status: DISCONTINUED | OUTPATIENT
Start: 2020-08-21 | End: 2020-08-25 | Stop reason: HOSPADM

## 2020-08-21 RX ORDER — 0.9 % SODIUM CHLORIDE 0.9 %
1000 INTRAVENOUS SOLUTION INTRAVENOUS ONCE
Status: COMPLETED | OUTPATIENT
Start: 2020-08-21 | End: 2020-08-21

## 2020-08-21 RX ORDER — PANTOPRAZOLE SODIUM 40 MG/10ML
80 INJECTION, POWDER, LYOPHILIZED, FOR SOLUTION INTRAVENOUS ONCE
Status: DISCONTINUED | OUTPATIENT
Start: 2020-08-21 | End: 2020-08-25 | Stop reason: HOSPADM

## 2020-08-21 RX ORDER — CHLORHEXIDINE GLUCONATE 0.12 MG/ML
7.5 RINSE ORAL 2 TIMES DAILY
Status: DISCONTINUED | OUTPATIENT
Start: 2020-08-21 | End: 2020-08-25 | Stop reason: HOSPADM

## 2020-08-21 RX ORDER — NYSTATIN 100000 U/G
CREAM TOPICAL 2 TIMES DAILY PRN
Status: DISCONTINUED | OUTPATIENT
Start: 2020-08-21 | End: 2020-08-25 | Stop reason: HOSPADM

## 2020-08-21 RX ORDER — IPRATROPIUM BROMIDE AND ALBUTEROL SULFATE 2.5; .5 MG/3ML; MG/3ML
1 SOLUTION RESPIRATORY (INHALATION) EVERY 4 HOURS
Status: DISCONTINUED | OUTPATIENT
Start: 2020-08-21 | End: 2020-08-21

## 2020-08-21 RX ORDER — QUETIAPINE FUMARATE 25 MG/1
50 TABLET, FILM COATED ORAL 2 TIMES DAILY
Status: DISCONTINUED | OUTPATIENT
Start: 2020-08-22 | End: 2020-08-25 | Stop reason: HOSPADM

## 2020-08-21 RX ORDER — DOXAZOSIN MESYLATE 4 MG/1
4 TABLET ORAL DAILY
Status: DISCONTINUED | OUTPATIENT
Start: 2020-08-22 | End: 2020-08-25 | Stop reason: HOSPADM

## 2020-08-21 RX ORDER — ACETAMINOPHEN 160 MG/5ML
650 SOLUTION ORAL EVERY 4 HOURS PRN
Status: DISCONTINUED | OUTPATIENT
Start: 2020-08-21 | End: 2020-08-25 | Stop reason: HOSPADM

## 2020-08-21 RX ORDER — DIAZEPAM 10 MG/2ML
20 GEL RECTAL
Status: ACTIVE | OUTPATIENT
Start: 2020-08-21 | End: 2020-08-21

## 2020-08-21 RX ORDER — LEVETIRACETAM 100 MG/ML
1500 SOLUTION ORAL 2 TIMES DAILY
Status: DISCONTINUED | OUTPATIENT
Start: 2020-08-21 | End: 2020-08-25 | Stop reason: HOSPADM

## 2020-08-21 RX ORDER — SENNA PLUS 8.6 MG/1
2 TABLET ORAL DAILY
Status: DISCONTINUED | OUTPATIENT
Start: 2020-08-22 | End: 2020-08-25 | Stop reason: HOSPADM

## 2020-08-21 RX ORDER — ALBUTEROL SULFATE 90 UG/1
2 AEROSOL, METERED RESPIRATORY (INHALATION) EVERY 4 HOURS
Status: DISCONTINUED | OUTPATIENT
Start: 2020-08-21 | End: 2020-08-22

## 2020-08-21 RX ORDER — SODIUM CHLORIDE, SODIUM LACTATE, POTASSIUM CHLORIDE, CALCIUM CHLORIDE 600; 310; 30; 20 MG/100ML; MG/100ML; MG/100ML; MG/100ML
INJECTION, SOLUTION INTRAVENOUS CONTINUOUS
Status: DISCONTINUED | OUTPATIENT
Start: 2020-08-21 | End: 2020-08-24

## 2020-08-21 RX ORDER — ASCORBIC ACID 500 MG
500 TABLET ORAL 2 TIMES DAILY
Status: DISCONTINUED | OUTPATIENT
Start: 2020-08-21 | End: 2020-08-25 | Stop reason: HOSPADM

## 2020-08-21 RX ORDER — QUETIAPINE FUMARATE 100 MG/1
100 TABLET, FILM COATED ORAL NIGHTLY
Status: DISCONTINUED | OUTPATIENT
Start: 2020-08-21 | End: 2020-08-25 | Stop reason: HOSPADM

## 2020-08-21 RX ORDER — BISACODYL 10 MG
10 SUPPOSITORY, RECTAL RECTAL DAILY PRN
Status: DISCONTINUED | OUTPATIENT
Start: 2020-08-21 | End: 2020-08-25 | Stop reason: HOSPADM

## 2020-08-21 RX ORDER — LACTULOSE 10 G/15ML
20 SOLUTION ORAL DAILY
Status: DISCONTINUED | OUTPATIENT
Start: 2020-08-22 | End: 2020-08-25 | Stop reason: HOSPADM

## 2020-08-21 RX ORDER — MULTIVIT-MIN/FERROUS GLUCONATE 9 MG/15 ML
15 LIQUID (ML) ORAL DAILY
Status: DISCONTINUED | OUTPATIENT
Start: 2020-08-22 | End: 2020-08-25 | Stop reason: HOSPADM

## 2020-08-21 RX ORDER — IPRATROPIUM BROMIDE AND ALBUTEROL SULFATE 2.5; .5 MG/3ML; MG/3ML
1 SOLUTION RESPIRATORY (INHALATION) EVERY 6 HOURS PRN
COMMUNITY
End: 2021-03-17 | Stop reason: ALTCHOICE

## 2020-08-21 RX ORDER — BACLOFEN 10 MG/1
15 TABLET ORAL 3 TIMES DAILY
Status: DISCONTINUED | OUTPATIENT
Start: 2020-08-21 | End: 2020-08-25 | Stop reason: HOSPADM

## 2020-08-21 RX ORDER — BACITRACIN 500 [USP'U]/G
OINTMENT TOPICAL 2 TIMES DAILY
Status: DISCONTINUED | OUTPATIENT
Start: 2020-08-21 | End: 2020-08-25 | Stop reason: HOSPADM

## 2020-08-21 RX ORDER — ONDANSETRON 2 MG/ML
4 INJECTION INTRAMUSCULAR; INTRAVENOUS ONCE
Status: COMPLETED | OUTPATIENT
Start: 2020-08-21 | End: 2020-08-21

## 2020-08-21 RX ORDER — ESCITALOPRAM OXALATE 10 MG/1
10 TABLET ORAL DAILY
Status: DISCONTINUED | OUTPATIENT
Start: 2020-08-22 | End: 2020-08-25 | Stop reason: HOSPADM

## 2020-08-21 RX ORDER — LAMOTRIGINE 100 MG/1
400 TABLET ORAL 2 TIMES DAILY
Status: DISCONTINUED | OUTPATIENT
Start: 2020-08-21 | End: 2020-08-25 | Stop reason: HOSPADM

## 2020-08-21 RX ORDER — NYSTATIN 100000 U/G
CREAM TOPICAL 4 TIMES DAILY PRN
COMMUNITY

## 2020-08-21 RX ADMIN — SODIUM CHLORIDE 8 MG/HR: 9 INJECTION, SOLUTION INTRAVENOUS at 21:44

## 2020-08-21 RX ADMIN — QUETIAPINE FUMARATE 100 MG: 100 TABLET ORAL at 23:28

## 2020-08-21 RX ADMIN — Medication 500 MG: at 23:28

## 2020-08-21 RX ADMIN — ZINC OXIDE: 200 OINTMENT TOPICAL at 22:33

## 2020-08-21 RX ADMIN — MICONAZOLE NITRATE: 20 POWDER TOPICAL at 22:41

## 2020-08-21 RX ADMIN — CHLORHEXIDINE GLUCONATE 7.5 ML: 1.2 RINSE ORAL at 22:32

## 2020-08-21 RX ADMIN — LEVETIRACETAM 1500 MG: 500 SOLUTION ORAL at 23:26

## 2020-08-21 RX ADMIN — SODIUM CHLORIDE, POTASSIUM CHLORIDE, SODIUM LACTATE AND CALCIUM CHLORIDE: 600; 310; 30; 20 INJECTION, SOLUTION INTRAVENOUS at 21:45

## 2020-08-21 RX ADMIN — SODIUM CHLORIDE 1000 ML: 9 INJECTION, SOLUTION INTRAVENOUS at 12:45

## 2020-08-21 RX ADMIN — LAMOTRIGINE 400 MG: 100 TABLET ORAL at 23:35

## 2020-08-21 RX ADMIN — ONDANSETRON 4 MG: 2 INJECTION INTRAMUSCULAR; INTRAVENOUS at 12:45

## 2020-08-21 RX ADMIN — BACLOFEN 15 MG: 10 TABLET ORAL at 23:26

## 2020-08-21 RX ADMIN — IOPAMIDOL 75 ML: 755 INJECTION, SOLUTION INTRAVENOUS at 13:15

## 2020-08-21 NOTE — CARE COORDINATION
Discharge Planning Assessment     discharge planner met with patient to discuss reason for admission, current living situation, and potential needs at the time of discharge. Pt in ED d/t upper GI bleed    Demographics/Insurance verified:  Yes    Current type of dwelling:  LTC at [address] [place] [address]    Patient from ECF/SW confirmed with:  Demographics and notes    Level of function/Support:  Pt is MRDD    Tentative discharge plan:  Back to [address] [place] [address]    Transportation at the time of discharge:  Pt will need assistance w/transport.     Electronically signed by CECILE Barbosa, KOBI on 8/21/2020 at 6:05 PM

## 2020-08-21 NOTE — ED PROVIDER NOTES
I independently performed a history and physical on [address] [place] [address]. All diagnostic, treatment, and disposition decisions were made by myself in conjunction with the advanced practice provider. Briefly, this is a 71 y.o. male here for vomiting and abdominal cramping. Patient has significant mental disability and is a resident of [address] [place] [address]. The patient is had a G-tube for the last 2 years and has had some trouble with the G-tube in the past.    On exam, patient was sleeping when I entered the room, but aroused easily to voice. Patient's abdomen was soft and nontender. G-tube was in place with some dark drainage at the edges. Bowel sounds are diminished. Patient appears pale and has dry mucous membranes. EKG  The Ekg interpreted by me in the absence of a cardiologist shows. normal sinus rhythm with a rate of 89  Axis is   Normal  QTc is  normal  Intervals and Durations are unremarkable. No specific ST-T wave changes appreciated. No evidence of acute ischemia. No significant change from prior EKG dated 6/1/2017      FINAL IMPRESSION  1. Upper GI bleed    2. Non-intractable vomiting, presence of nausea not specified, unspecified vomiting type    3. Developmental delay    4. Cardiomegaly    5. Seizure disorder (HCC)        Blood pressure (!) 118/55, pulse 98, temperature 97.4 °F (36.3 °C), temperature source Oral, resp. rate 19, height 5' 8\" (1.727 m), weight 160 lb (72.6 kg), SpO2 97 %.      For further details of [address] [place] [address] emergency department encounter, please see documentation by advanced practice provider, LAURA Cole.        Lyndsay Escobedo MD  08/27/20 2851

## 2020-08-21 NOTE — ED PROVIDER NOTES
[facility]        Pt Name: Dalton Lam  MRN: [id number]  [place] 1950  Date of evaluation: 2020  Provider: Kyle Dorantes PA-C  PCP: Vickie Rodriguez     I have seen and evaluated this patient with my supervising physician Arcadio Apgar, MD.    [facility]       Chief Complaint   Patient presents with    Emesis     Pt brought in per [address] [place] [address] from [address] [place] [address] for vomiting onset today. HISTORY OF PRESENT ILLNESS   (Location, Timing/Onset, Context/Setting, Quality, Duration, Modifying Factors, Severity, Associated Signs and Symptoms)  Note limiting factors. Dalton Lam is a 71 y.o. male patient resenting by EMS from [facility]. Patient with history of emesis x1 or 2 this morning. EMS contacted to bring patient. He has G-tube in place. Patient has MR but does respond to conversation minimally. He does have what appears to be hemiparesis on the left. This is likely secondary to his cerebral palsy. He has known seizure disorder, nuclear sclerosis, MR. Patient with G-tube in place due to GI dysfunction. Patient denies chest pain or shortness of breath. No abdominal pain reported. No urinary symptoms. Patient presenting by EMS for evaluation of vomiting x1 or 2. I do note previous EKG 2017 showing normal QTC. I will order Zofran. Nursing Notes were all reviewed and agreed with or any disagreements were addressed in the HPI. REVIEW OF SYSTEMS    (2-9 systems for level 4, 10 or more for level 5)     Review of Systems    Positives and Pertinent negatives as per HPI. Except as noted above in the ROS, all other systems were reviewed and negative.        PAST MEDICAL HISTORY     Past Medical History:   Diagnosis Date    Anemia, macrocytic     Anhidrosis     Anhidrosis     Bilateral cataracts     Bipolar affective ([address]     Blood circulation, collateral     unspecified PVD    Cataract     Clostridium difficile diarrhea 3/7/15    Depression     Dermatophytosis     Drug induced hepatitis     GERD (gastroesophageal reflux disease)     Impulse control disorder     Liver disease     drug induced    Mental handicap     Movement disorder     Neurogenic bladder     Neuromuscular disorder (HCC)     spastic hemiplegia, MDS,neurogenic bladder    NS (nuclear sclerosis)     Nuclear sclerosis     Osteoarthritis     Periodontal disease     Pneumonia 1/17/2013    Primary optic atrophy     Primary optic atrophy     PVD [facility]peripheral vascular disease) ([facility]     Seizure ([facility]     last seizure 9/20/19    Spastic hemiplegia     Tinea pedis     Unspecified diseases of blood and blood-forming organs     anemia    Urinary incontinence          SURGICAL HISTORY     Past Surgical History:   Procedure Laterality Date    ABDOMEN SURGERY      can see scar/details unknown, feeding tube    ABDOMINAL EXPLORATION SURGERY  9-28-14    LAPAROTOMY EXPLORATORY, ERIKA-EN-Y TUBE JEJUNOSTOMY, SMALL BOWEL RESECTION                                              COLONOSCOPY      COLONOSCOPY N/A 6/4/2019    COLONOSCOPY WITH BIOPSY performed by Vanessa Niño MD at [facility]  6/25/14    with dilitation    DILATATION, ESOPHAGUS      ENDOSCOPY, COLON, DIAGNOSTIC      OTHER SURGICAL HISTORY  4-30-13    JEJUNOSTOMY TUBE INSERTION    SIGMOIDOSCOPY N/A 5/2/2019    SIGMOIDOSCOPY DIAGNOSTIC FLEXIBLE performed by Vanessa Niño MD at [facility] N/A 4/21/2020    REPLACEMENT, JEJUNOSTOMY TUBE performed by Vanessa Niño MD at [place] [address]  1/24/13    PEG placement    UPPER GASTROINTESTINAL ENDOSCOPY  4/26/13    UPPER GASTROINTESTINAL ENDOSCOPY N/A 10/11/2019    EGD DILATION BALLOON performed by Vanessa Niño MD at [facility]       Previous Medications    ACETAMINOPHEN (TYLENOL) 160 MG/5ML SOLUTION    20.3 mLs by Per G Tube route every 4 hours as needed for Fever or Pain    ASCORBIC ACID (VITAMIN C) 500 MG TABLET    500 mg by Per G Tube route 2 times daily. BACLOFEN (LIORESAL) 10 MG TABLET    15 mg by Per G Tube route 3 times daily     BISACODYL (DULCOLAX) 10 MG SUPPOSITORY    Place 10 mg rectally daily as needed If no BM in 48 hours    BISACODYL (DULCOLAX) 5 MG EC TABLET    Take 10 mg by mouth as needed (No bowel movement in 48 hours)    CHLORHEXIDINE (PERIDEX) 0.12 % SOLUTION    Take 7.5 mLs by mouth 2 times daily. CHOLECALCIFEROL (VITAMIN D3) 2000 UNITS CAPS    2,000 Units by Per G Tube route daily     DIAZEPAM (DIASTAT) 10 MG GEL    Place 20 mg rectally once as needed (seizures lasting over 5 minutes). DOXAZOSIN (CARDURA) 4 MG TABLET    4 mg by Per G Tube route daily     ESCITALOPRAM (LEXAPRO) 10 MG TABLET    10 mg by Per G Tube route daily. GUAIFENESIN (ROBITUSSIN) 100 MG/5ML SOLN ORAL SOLUTION    Take 200 mg by mouth every 4 hours as needed for Cough    IBUPROFEN (ADVIL;MOTRIN) 600 MG TABLET    600 mg by Per G Tube route every 8 hours as needed for Pain    IPRATROPIUM-ALBUTEROL (DUONEB) 0.5-2.5 (3) MG/3ML SOLN NEBULIZER SOLUTION    Inhale 1 vial into the lungs every 4 hours    LACTULOSE (CHRONULAC) 10 GM/15ML SOLUTION    20 g by Per G Tube route daily     LAMOTRIGINE (LAMICTAL) 150 MG TABLET    Take 400 mg by mouth 2 times daily Per g-tube    LANSOPRAZOLE (PREVACID SOLUTAB) 15 MG DISINTEGRATING TABLET    15 mg by Per J Tube route daily    LEVETIRACETAM (KEPPRA) 100 MG/ML SOLUTION    15 mLs by Per G Tube route 2 times daily    LORAZEPAM (ATIVAN) 0.5 MG TABLET    Take 0.5 mg by mouth every 12 hours as needed for Anxiety.  For seizures up to 7 days    MULTIPLE VITAMINS-MINERALS (CERTA-KENTON) LIQUID    30 mLs by Per G Tube route daily     NYSTATIN (MYCOSTATIN) 797462 UNIT/GM CREAM    Apply topically 2 times daily as needed (yeast infections)    NYSTATIN (MYCOSTATIN) 068997 UNIT/GM POWDER    Apply topically 2 times daily as needed (yeast infections)    POTASSIUM CHLORIDE (KAYCIEL) 20 MEQ/15ML (10%) SOLUTION    10 mEq by Per G Tube route 2 times daily     QUETIAPINE (SEROQUEL) 100 MG TABLET    50 mg by Per G Tube route 2 times daily Administer at 0530 and 1730. Administer 100 mg at night. QUETIAPINE (SEROQUEL) 100 MG TABLET    Take 100 mg by mouth nightly 2200    SENNA (SENOKOT) 8.6 MG TABLET    2 tablets by Per G Tube route daily     SODIUM PHOSPHATES (FLEET) 7-19 GM/118ML    Place 1 enema rectally once as needed If bisacodyl is ineffective. ZINC OXIDE 20 % OINTMENT    Apply topically 2 times daily Apply topically as needed. ALLERGIES     Biaxin [clarithromycin] and Invanz [ertapenem]    FAMILYHISTORY     History reviewed. No pertinent family history. SOCIAL HISTORY       Social History     Tobacco Use    Smoking status: Never Smoker    Smokeless tobacco: Never Used   Substance Use Topics    Alcohol use: No    Drug use: No     Comment: unknown       SCREENINGS             PHYSICAL EXAM    (up to 7 for level 4, 8 or more for level 5)     ED Triage Vitals [08/21/20 1151]   BP Temp Temp Source Pulse Resp SpO2 Height Weight   (!) 116/59 97.4 °F (36.3 °C) Oral 86 19 95 % 5' 8\" (1.727 m) 160 lb (72.6 kg)       Physical Exam  Vitals signs and nursing note reviewed. Constitutional:       Appearance: Normal appearance. He is well-developed. He is obese. HENT:      Head: Normocephalic and atraumatic. Right Ear: External ear normal.      Left Ear: External ear normal.   Eyes:      General: No scleral icterus. Right eye: No discharge. Left eye: No discharge. Conjunctiva/sclera: Conjunctivae normal.   Neck:      Musculoskeletal: Normal range of motion and neck supple. Cardiovascular:      Rate and Rhythm: Normal rate and regular rhythm. Heart sounds: Normal heart sounds.    Pulmonary:      Effort: Pulmonary effort is normal.      Breath sounds: Normal breath sounds. Abdominal:      General: Abdomen is flat. Bowel sounds are normal.      Palpations: Abdomen is soft. Tenderness: There is no abdominal tenderness. Comments: Left upper quadrant G-tube in place. Some inflammation surrounding but not obviously cellulitic. Genitourinary:     Rectum: Guaiac result positive. Comments: A digital rectal exam performed with nurse in the room. No external lesions. No anorectal masses. Stool in the vault. Stool dark in character. Sent for Hemoccult. Musculoskeletal: Normal range of motion. Skin:     General: Skin is warm and dry. Neurological:      General: No focal deficit present. Mental Status: He is alert and oriented to person, place, and time. Mental status is at baseline. Psychiatric:         Mood and Affect: Mood normal.         Behavior: Behavior normal.         Thought Content:  Thought content normal.         Judgment: Judgment normal.         DIAGNOSTIC RESULTS   LABS:    Labs Reviewed   CBC WITH AUTO DIFFERENTIAL - Abnormal; Notable for the following components:       Result Value    RBC 2.77 (*)     Hemoglobin 9.8 (*)     Hematocrit 28.5 (*)     .9 (*)     MCH 35.4 (*)     All other components within normal limits    Narrative:     Performed at:  [facility]  [address] [facility], [address]   Phone [phone]   COMPREHENSIVE METABOLIC PANEL W/ REFLEX TO MG FOR LOW K - Abnormal; Notable for the following components:    Potassium reflex Magnesium 5.2 (*)     Glucose 135 (*)     BUN 72 (*)     CREATININE <0.5 (*)     Total Protein 5.6 (*)     Alb 3.0 (*)     All other components within normal limits    Narrative:     Performed at:  [facility]  [address] [facility], [address]   Phone [phone]   BLOOD OCCULT STOOL DIAGNOSTIC - Abnormal; Notable for the following components:    Occult Blood Diagnostic   (*)     Value: Result: POSITIVE  Normal range: Negative      All other components within normal limits    Narrative:     ORDER#: [id number]                          ORDERED BY: KARYNA MAX  SOURCE: Stool Semi-formed Without PreservatCOLLECTED:  08/21/20 14:41  ANTIBIOTICS AT KIM.:                      RECEIVED :  08/21/20 14:41  Performed at:  [facility] [address] [facility],  [place], [address]   Phone [phone]   LIPASE    Narrative:     Performed at:  [facility] [address] [facility], [address]   Phone [phone]   LACTIC ACID, PLASMA    Narrative:     Performed at:  [facility] [address], [place], [address]   Phone [phone]   URINE RT REFLEX TO CULTURE    Narrative:     Performed at:  [facility] [address] [facility], [address]   Phone [phone]   LEVETIRACETAM LEVEL    Narrative:     Performed at:  [facility]  [address] [place], [place] [address]   Phone [phone]   AMMONIA    Narrative:     Performed at:  [facility] [address], [place], [address]   Phone [phone]   LAMOTRIGINE LEVEL   BRAIN NATRIURETIC PEPTIDE   TROPONIN   IRON AND TIBC   VITAMIN B12 & FOLATE   PREALBUMIN   TYPE AND SCREEN       All other labs were within normal range or not returned as of this dictation. EKG: All EKG's are interpreted by the Emergency Department Physician in the absence of a cardiologist.  Please see their note for interpretation of EKG.       RADIOLOGY:   Non-plain film images such as CT, Ultrasound and MRI are read by the radiologist. Plain radiographic images are visualized and preliminarily interpreted by the ED Provider with the below findings:        Interpretation per the Radiologist below, if available at the time of this note:    CT ABDOMEN PELVIS W IV CONTRAST Additional Contrast? None   Final Result   1. No acute abdominopelvic process   2. Large hiatal hernia   3. Small right pleural effusion   4. Stable left adrenal adenoma         XR CHEST PORTABLE   Final Result   Marked cardiomegaly. No evidence of pulmonary edema or focal infiltrate. No results found. PROCEDURES   Unless otherwise noted below, none     Procedures    CRITICAL CARE TIME   N/A    CONSULTS:  IP CONSULT TO INTERNAL MEDICINE      EMERGENCY DEPARTMENT COURSE and DIFFERENTIAL DIAGNOSIS/MDM:   Vitals:    Vitals:    08/21/20 1515 08/21/20 1530 08/21/20 1600 08/21/20 1630   BP: (!) 125/51 (!) 115/53 122/62 130/61   Pulse:       Resp:       Temp:       TempSrc:       SpO2: (!) 89% 99% 95% 99%   Weight:       Height:           Patient was given the following medications:  Medications   pantoprazole (PROTONIX) injection 80 mg (has no administration in time range)   0.9 % sodium chloride bolus (0 mLs Intravenous Stopped 8/21/20 1634)   ondansetron (ZOFRAN) injection 4 mg (4 mg Intravenous Given 8/21/20 1245)   iopamidol (ISOVUE-370) 76 % injection 75 mL (75 mLs Intravenous Given 8/21/20 1315)           Presenting from [facility] with history of emesis and not hematemesis as reported by staff. Patient with G-tube x6 years and change periodically. Patient pale in character. Patient's BUN 72 with creatinine <0.5 to suggest GI bleed. Patient's WILLIAN shows dark stool which is Hemoccult positive. I believe he has an upper GI bleed. I placed order for Protonix 80 mg bolus. Earlier in visit patient given 1 L saline and Zofran 4 mg IV. CMP does show GFR >60 with normal hepatic function. Patient's RBC 2.7, hemoglobin 9.8. Hemoglobin not far from previous baseline. Patient is pale in character. Abdominal pelvic CT scan with IV contrast shows no acute process. Large hiatal hernia noted. Chest x-ray shows cardiomegaly without pulmonary edema.     This case is discussed with attending physician who recommends admission. Patient will be diagnosed with upper GI bleed with BUN 72 and creatinine less than 0.5 as well as heme positive stool. Chest x-ray showing cardiomegaly. BNP pending result at this time. Patient is resident at [facility]. Will contact Dr. Park Jane. At 5:20 PM I spoke with Dr. Park Jane. Case reviewed and discussed patient with data. He is aware that the BNP and troponin are pending. FINAL IMPRESSION      1. Upper GI bleed    2. Non-intractable vomiting, presence of nausea not specified, unspecified vomiting type    3. Developmental delay    4. Cardiomegaly          DISPOSITION/PLAN   DISPOSITION Decision To Admit 08/21/2020 03:01:41 PM      PATIENT REFERREDTO:  No follow-up provider specified. DISCHARGE MEDICATIONS:  New Prescriptions    No medications on file       DISCONTINUED MEDICATIONS:  Discontinued Medications    DEXTROMETHORPHAN (DELSYM) 30 MG/5ML EXTENDED RELEASE LIQUID    Take 60 mg by mouth 2 times daily as needed for Cough    NYSTATIN (MYCOSTATIN) 835102 UNIT/GM CREAM    Apply topically 2 times daily. NYSTATIN POWD    Take 1 oz by mouth 4 times daily    OSTOMY SUPPLIES (SKIN PREP SPRAY) MISC    by Does not apply route Apply topically and cover with dry dressing and duoderm as neeeded              (Please note that portions of this note were completed with a voice recognition program.  Efforts were made to edit the dictations but occasionally words are mis-transcribed. )    Laurel Estevez PA-C (electronically signed)      \     Laurel Estevez PA-C  08/21/20 2045

## 2020-08-21 NOTE — ED NOTES
Pt arrived by EMS from [facility]  Per staff pt has had some emesis today, hx of DD, G tube present  Pt drowsy, however responds when you speak to him. No active emesis at this time.        Rosalinda Joseph RN  08/21/20 9678

## 2020-08-21 NOTE — ED NOTES
Pharmacy Medication History Note      List of current medications patient is taking is complete. Source of information: Ronalminerva Acevedo [facility]    Changes made to medication list:  Medications flagged for removal (include reason, ex. noncompliance):  · none    Medications removed (include reason, ex. therapy complete or physician discontinued):  · Delsym- therapy completed    Medications added/doses adjusted:  · Duonebs  · Bisacodyl tablet PRN    Other notes (ex. Recent course of antibiotics, Coumadin dosing):  · Denies use of other OTC or herbal medications. Last dose times updated. Eleanor Alberts, PharmD  ED Pharmacist [id number]  8/21/2020    No current facility-administered medications on file prior to encounter. Current Outpatient Medications on File Prior to Encounter   Medication Sig Dispense Refill    ipratropium-albuterol (DUONEB) 0.5-2.5 (3) MG/3ML SOLN nebulizer solution Inhale 1 vial into the lungs every 4 hours      bisacodyl (DULCOLAX) 5 MG EC tablet Take 10 mg by mouth as needed (No bowel movement in 48 hours)      senna (SENOKOT) 8.6 MG tablet 2 tablets by Per G Tube route daily       lansoprazole (PREVACID SOLUTAB) 15 MG disintegrating tablet 15 mg by Per J Tube route daily      Cholecalciferol (VITAMIN D3) 2000 units CAPS 2,000 Units by Per G Tube route daily       guaiFENesin (ROBITUSSIN) 100 MG/5ML SOLN oral solution Take 200 mg by mouth every 4 hours as needed for Cough      Sodium Phosphates (FLEET) 7-19 GM/118ML Place 1 enema rectally once as needed If bisacodyl is ineffective.  zinc oxide 20 % ointment Apply topically 2 times daily Apply topically as needed.  bisacodyl (DULCOLAX) 10 MG suppository Place 10 mg rectally daily as needed If no BM in 48 hours      diazepam (DIASTAT) 10 MG GEL Place 20 mg rectally once as needed (seizures lasting over 5 minutes).        baclofen (LIORESAL) 10 MG tablet 15 mg by Per G Tube route 3 times daily       ibuprofen (ADVIL;MOTRIN) 600 MG tablet 600 mg by Per G Tube route every 8 hours as needed for Pain      lamoTRIgine (LAMICTAL) 150 MG tablet Take 400 mg by mouth 2 times daily Per g-tube      QUEtiapine (SEROQUEL) 100 MG tablet Take 100 mg by mouth nightly 2200      acetaminophen (TYLENOL) 160 MG/5ML solution 20.3 mLs by Per G Tube route every 4 hours as needed for Fever or Pain 473 mL 3    levETIRAcetam (KEPPRA) 100 MG/ML solution 15 mLs by Per G Tube route 2 times daily 1 Bottle 3    LORazepam (ATIVAN) 0.5 MG tablet Take 0.5 mg by mouth every 12 hours as needed for Anxiety. For seizures up to 7 days      lactulose (CHRONULAC) 10 GM/15ML solution 20 g by Per G Tube route daily       doxazosin (CARDURA) 4 MG tablet 4 mg by Per G Tube route daily       Multiple Vitamins-Minerals (CERTA-KENTON) liquid 30 mLs by Per G Tube route daily       potassium chloride (KAYCIEL) 20 MEQ/15ML (10%) solution 10 mEq by Per G Tube route 2 times daily       ascorbic acid (VITAMIN C) 500 MG tablet 500 mg by Per G Tube route 2 times daily.  QUEtiapine (SEROQUEL) 100 MG tablet 50 mg by Per G Tube route 2 times daily Administer at 0530 and 1730. Administer 100 mg at night.  escitalopram (LEXAPRO) 10 MG tablet 10 mg by Per G Tube route daily.  chlorhexidine (PERIDEX) 0.12 % solution Take 7.5 mLs by mouth 2 times daily.  [DISCONTINUED] Ostomy Supplies (SKIN PREP SPRAY) MISC by Does not apply route Apply topically and cover with dry dressing and duoderm as neeeded      [DISCONTINUED] nystatin (MYCOSTATIN) 343192 UNIT/GM cream Apply topically 2 times daily. 30 g 0    [DISCONTINUED] Nystatin POWD Take 1 oz by mouth 4 times daily 1 each 0    [DISCONTINUED] dextromethorphan (DELSYM) 30 MG/5ML extended release liquid Take 60 mg by mouth 2 times daily as needed for Cough                                                       Type and screen drawn, pt more awake at this time, family at bedside  Attempted to cath pt, depend wet. Unable to pass catheter. Pt voided for me per urinal 300 cc of yellow urine. Ewa care completed, barrier wipes used, new brief applied 2 pt assist to roll  Redness noted to bottom, small open area noted. Family at bedside, updated on plan of care.   Will cont to monitor  VSS     Ranjeet Downing RN  08/21/20 6724

## 2020-08-22 ENCOUNTER — ANESTHESIA (OUTPATIENT)
Dept: ENDOSCOPY | Age: 70
DRG: 368 | End: 2020-08-22
Payer: MEDICARE

## 2020-08-22 ENCOUNTER — ANESTHESIA EVENT (OUTPATIENT)
Dept: ENDOSCOPY | Age: 70
DRG: 368 | End: 2020-08-22
Payer: MEDICARE

## 2020-08-22 VITALS — SYSTOLIC BLOOD PRESSURE: 98 MMHG | OXYGEN SATURATION: 99 % | DIASTOLIC BLOOD PRESSURE: 68 MMHG

## 2020-08-22 LAB
HCT VFR BLD CALC: 18.9 % (ref 40.5–52.5)
HCT VFR BLD CALC: 21.4 % (ref 40.5–52.5)
HCT VFR BLD CALC: 22.4 % (ref 40.5–52.5)
HEMOGLOBIN: 6.5 G/DL (ref 13.5–17.5)
HEMOGLOBIN: 7.3 G/DL (ref 13.5–17.5)
HEMOGLOBIN: 7.6 G/DL (ref 13.5–17.5)
PREALBUMIN: 17.2 MG/DL (ref 20–40)
SARS-COV-2, PCR: NOT DETECTED

## 2020-08-22 PROCEDURE — 94761 N-INVAS EAR/PLS OXIMETRY MLT: CPT

## 2020-08-22 PROCEDURE — 1200000000 HC SEMI PRIVATE

## 2020-08-22 PROCEDURE — 2580000003 HC RX 258: Performed by: INTERNAL MEDICINE

## 2020-08-22 PROCEDURE — 36415 COLL VENOUS BLD VENIPUNCTURE: CPT

## 2020-08-22 PROCEDURE — 6370000000 HC RX 637 (ALT 250 FOR IP): Performed by: INTERNAL MEDICINE

## 2020-08-22 PROCEDURE — 94660 CPAP INITIATION&MGMT: CPT

## 2020-08-22 PROCEDURE — 85014 HEMATOCRIT: CPT

## 2020-08-22 PROCEDURE — 88305 TISSUE EXAM BY PATHOLOGIST: CPT

## 2020-08-22 PROCEDURE — C9113 INJ PANTOPRAZOLE SODIUM, VIA: HCPCS | Performed by: INTERNAL MEDICINE

## 2020-08-22 PROCEDURE — 3700000000 HC ANESTHESIA ATTENDED CARE: Performed by: INTERNAL MEDICINE

## 2020-08-22 PROCEDURE — 2700000000 HC OXYGEN THERAPY PER DAY

## 2020-08-22 PROCEDURE — 0DB58ZX EXCISION OF ESOPHAGUS, VIA NATURAL OR ARTIFICIAL OPENING ENDOSCOPIC, DIAGNOSTIC: ICD-10-PCS | Performed by: INTERNAL MEDICINE

## 2020-08-22 PROCEDURE — 94640 AIRWAY INHALATION TREATMENT: CPT

## 2020-08-22 PROCEDURE — 6360000002 HC RX W HCPCS: Performed by: INTERNAL MEDICINE

## 2020-08-22 PROCEDURE — 88341 IMHCHEM/IMCYTCHM EA ADD ANTB: CPT

## 2020-08-22 PROCEDURE — 85018 HEMOGLOBIN: CPT

## 2020-08-22 PROCEDURE — 88342 IMHCHEM/IMCYTCHM 1ST ANTB: CPT

## 2020-08-22 PROCEDURE — 3609012400 HC EGD TRANSORAL BIOPSY SINGLE/MULTIPLE: Performed by: INTERNAL MEDICINE

## 2020-08-22 PROCEDURE — 2709999900 HC NON-CHARGEABLE SUPPLY: Performed by: INTERNAL MEDICINE

## 2020-08-22 PROCEDURE — 88312 SPECIAL STAINS GROUP 1: CPT

## 2020-08-22 PROCEDURE — 2500000003 HC RX 250 WO HCPCS: Performed by: ANESTHESIOLOGY

## 2020-08-22 PROCEDURE — 6360000002 HC RX W HCPCS: Performed by: ANESTHESIOLOGY

## 2020-08-22 RX ORDER — ALBUTEROL SULFATE 90 UG/1
2 AEROSOL, METERED RESPIRATORY (INHALATION) EVERY 4 HOURS PRN
Status: DISCONTINUED | OUTPATIENT
Start: 2020-08-22 | End: 2020-08-25 | Stop reason: HOSPADM

## 2020-08-22 RX ORDER — LIDOCAINE HYDROCHLORIDE 20 MG/ML
INJECTION, SOLUTION INFILTRATION; PERINEURAL PRN
Status: DISCONTINUED | OUTPATIENT
Start: 2020-08-22 | End: 2020-08-22 | Stop reason: SDUPTHER

## 2020-08-22 RX ORDER — PROPOFOL 10 MG/ML
INJECTION, EMULSION INTRAVENOUS PRN
Status: DISCONTINUED | OUTPATIENT
Start: 2020-08-22 | End: 2020-08-22 | Stop reason: SDUPTHER

## 2020-08-22 RX ADMIN — LEVETIRACETAM 1500 MG: 500 SOLUTION ORAL at 21:38

## 2020-08-22 RX ADMIN — CHLORHEXIDINE GLUCONATE 7.5 ML: 1.2 RINSE ORAL at 21:42

## 2020-08-22 RX ADMIN — Medication 500 MG: at 21:29

## 2020-08-22 RX ADMIN — LIDOCAINE HYDROCHLORIDE 140 MG: 20 INJECTION, SOLUTION INFILTRATION; PERINEURAL at 10:29

## 2020-08-22 RX ADMIN — LAMOTRIGINE 400 MG: 100 TABLET ORAL at 21:29

## 2020-08-22 RX ADMIN — SODIUM CHLORIDE 8 MG/HR: 9 INJECTION, SOLUTION INTRAVENOUS at 06:28

## 2020-08-22 RX ADMIN — ZINC OXIDE: 200 OINTMENT TOPICAL at 17:54

## 2020-08-22 RX ADMIN — POTASSIUM CHLORIDE 10 MEQ: 750 TABLET, FILM COATED, EXTENDED RELEASE ORAL at 21:29

## 2020-08-22 RX ADMIN — QUETIAPINE FUMARATE 50 MG: 25 TABLET ORAL at 15:47

## 2020-08-22 RX ADMIN — PROPOFOL 30 MG: 10 INJECTION, EMULSION INTRAVENOUS at 10:34

## 2020-08-22 RX ADMIN — PROPOFOL 50 MG: 10 INJECTION, EMULSION INTRAVENOUS at 10:29

## 2020-08-22 RX ADMIN — BACLOFEN 15 MG: 10 TABLET ORAL at 15:46

## 2020-08-22 RX ADMIN — SODIUM CHLORIDE 8 MG/HR: 9 INJECTION, SOLUTION INTRAVENOUS at 18:06

## 2020-08-22 RX ADMIN — Medication 2 PUFF: at 03:50

## 2020-08-22 RX ADMIN — BACLOFEN 15 MG: 10 TABLET ORAL at 21:29

## 2020-08-22 RX ADMIN — QUETIAPINE FUMARATE 100 MG: 100 TABLET ORAL at 21:29

## 2020-08-22 RX ADMIN — Medication 2 PUFF: at 03:51

## 2020-08-22 RX ADMIN — MICONAZOLE NITRATE: 20 POWDER TOPICAL at 21:30

## 2020-08-22 RX ADMIN — ZINC OXIDE: 200 OINTMENT TOPICAL at 12:54

## 2020-08-22 RX ADMIN — PROPOFOL 20 MG: 10 INJECTION, EMULSION INTRAVENOUS at 10:39

## 2020-08-22 RX ADMIN — MICONAZOLE NITRATE: 20 POWDER TOPICAL at 12:53

## 2020-08-22 RX ADMIN — PROPOFOL 10 MG: 10 INJECTION, EMULSION INTRAVENOUS at 10:37

## 2020-08-22 ASSESSMENT — COPD QUESTIONNAIRES: CAT_SEVERITY: MODERATE

## 2020-08-22 ASSESSMENT — PAIN SCALES - WONG BAKER
WONGBAKER_NUMERICALRESPONSE: 0

## 2020-08-22 NOTE — PROGRESS NOTES
Called patients POA (brother and legal guardian as well). He was aware of admission but I explained that he was now on 5 tower and his reason for admission. Brother states that patient wears c-pap at night for his sleep apnea. Brother states that the patient has had lots of trouble with his J tube because he tugs/pulls at it. The redness/drainage is typical for him and he has zinc cream ordered to protect the skin. Pt is always STRICT NPO. 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 Pt usually needs mouth/lips swabbed several times an hour for dryness but the pale skin is not typical for him. Has no vision/hearing problems, per brother pt has mental intelligence of a 3year old.

## 2020-08-22 NOTE — ANESTHESIA PRE PROCEDURE
Pre-Anesthesia Evaluation/Consultation       Name:  Sanchez Hardin  : 1950  Age:  71 y.o.                                            MRN:  [id number]  Date: 2020           Surgeon: Surgeon(s):  Suni Slaughter MD    Procedure: Procedure(s):  EGD DIAGNOSTIC ONLY     Allergies   Allergen Reactions    Biaxin [Clarithromycin] Other (See Comments)     Unknown reaction    Invanz [Ertapenem] Other (See Comments)     Caused SEIZURES  Caused SEIZURES     Patient Active Problem List   Diagnosis    Anemia    Altered mental status    Hyponatremia    Jejunostomy malfunction ([address]    Cerebral palsy ([address]    Seizure disorder ([address]    Gastrostomy malfunction ([address]    Nuclear sclerosis    Fecal impaction ([address]    Anemia, chronic disease    Partial epilepsy with impairment of consciousness, intractable ([address]    Mental retardation    Malfunction of gastrostomy tube ([address]    Pneumonia    Hypoxemia    Hypotension    Acute encephalopathy    Recurrent seizures ([address]    Prolapse, intestine    Upper GI hemorrhage     Past Medical History:   Diagnosis Date    Anemia, macrocytic     Anhidrosis     Anhidrosis     Bilateral cataracts     Bipolar affective ([address]     Blood circulation, collateral     unspecified PVD    Cataract     Clostridium difficile diarrhea 3/7/15    Depression     Dermatophytosis     Drug induced hepatitis     GERD (gastroesophageal reflux disease)     Hard of hearing     Impulse control disorder     Liver disease     drug induced    Mental handicap     Movement disorder     Neurogenic bladder     Neuromuscular disorder (HCC)     spastic hemiplegia, MDS,neurogenic bladder    NS (nuclear sclerosis)     Nuclear sclerosis     Osteoarthritis     Periodontal disease     Pneumonia 2013    Primary optic atrophy     Primary optic atrophy     PVD (peripheral vascular disease) ([address]     Seizure ([address]     last seizure 19    Spastic hemiplegia     Tinea pedis  Unspecified diseases of blood and blood-forming organs     anemia    Urinary incontinence      Past Surgical History:   Procedure Laterality Date    ABDOMEN SURGERY      can see scar/details unknown, feeding tube    ABDOMINAL EXPLORATION SURGERY  9-28-14    LAPAROTOMY EXPLORATORY, ERIKA-EN-Y TUBE JEJUNOSTOMY, SMALL BOWEL RESECTION                                              COLONOSCOPY      COLONOSCOPY N/A 6/4/2019    COLONOSCOPY WITH BIOPSY performed by Erick Charles MD at [address]  6/25/14    with dilitation    DILATATION, ESOPHAGUS      ENDOSCOPY, COLON, DIAGNOSTIC      OTHER SURGICAL HISTORY  4-30-13    JEJUNOSTOMY TUBE INSERTION    SIGMOIDOSCOPY N/A 5/2/2019    SIGMOIDOSCOPY DIAGNOSTIC FLEXIBLE performed by Erick Charles MD at [place] N/A 4/21/2020    REPLACEMENT, JEJUNOSTOMY TUBE performed by Erick Charles MD at [address]  1/24/13    PEG placement    UPPER GASTROINTESTINAL ENDOSCOPY  4/26/13    UPPER GASTROINTESTINAL ENDOSCOPY N/A 10/11/2019    EGD DILATION BALLOON performed by Erick Charles MD at [address] History     Tobacco Use    Smoking status: Never Smoker    Smokeless tobacco: Never Used   Substance Use Topics    Alcohol use: No    Drug use: No     Comment: unknown     Medications  Current Facility-Administered Medications on File Prior to Visit   Medication Dose Route Frequency Provider Last Rate Last Dose    pantoprazole (PROTONIX) injection 80 mg  80 mg Intravenous Once Anyi Uriarte PA-C        acetaminophen (TYLENOL) 160 MG/5ML solution 650 mg  650 mg Per G Tube Q4H PRN Inocente Francisco MD        vitamin C (ASCORBIC ACID) tablet 500 mg  500 mg Per G Tube BID Inocente Francisco MD   Stopped at 08/22/20 9741    baclofen (LIORESAL) tablet 15 mg  15 mg Per G Tube TID Inocente Francisco MD   Stopped at 08/22/20 0936    bisacodyl (DULCOLAX) suppository 10 mg  10 mg Rectal Daily PRN Fabian Alford MD        bisacodyl (DULCOLAX) EC tablet 10 mg  10 mg Oral PRN Fabian Alford MD        chlorhexidine (PERIDEX) 0.12 % solution 7.5 mL  7.5 mL Mouth/Throat BID Fabian Alford MD   Stopped at 08/22/20 0944    Vitamin D (CHOLECALCIFEROL) tablet 2,000 Units  2,000 Units Per G Tube Daily Fabian Alford MD   Stopped at 08/22/20 7243    doxazosin (CARDURA) tablet 4 mg  4 mg Per G Tube Daily Fabian Alford MD   Stopped at 08/22/20 6841    escitalopram (LEXAPRO) tablet 10 mg  10 mg Per G Tube Daily Fabian Alford MD   Stopped at 08/22/20 0936    guaiFENesin (ROBITUSSIN) 100 MG/5ML oral solution 200 mg  200 mg Oral Q4H PRN Fabian Alford MD        lactulose (CHRONULAC) 10 GM/15ML solution 20 g  20 g Per G Tube Daily Fabian Alford MD   Stopped at 08/22/20 3427    lamoTRIgine (LAMICTAL) tablet 400 mg  400 mg Oral BID Fabian Alford MD   Stopped at 08/22/20 8214    levETIRAcetam (KEPPRA) 100 MG/ML solution 1,500 mg  1,500 mg Per G Tube BID Fabian Alford MD   Stopped at 08/22/20 2792    LORazepam (ATIVAN) tablet 0.5 mg  0.5 mg Oral Q12H PRN Fabian Alford MD        CENTRUM/CERTA-KENTON with minerals oral solution 15 mL  15 mL Per G Tube Daily Fabian Alford MD   Stopped at 08/22/20 1498    nystatin (MYCOSTATIN) cream   Topical BID PRN Fabian Alford MD        miconazole (MICOTIN) 2 % powder   Topical BID Fabian Alford MD        potassium chloride (KLOR-CON) extended release tablet 10 mEq  10 mEq Oral BID Fabian Alford MD   Stopped at 08/22/20 3550    QUEtiapine (SEROQUEL) tablet 50 mg  50 mg Per G Tube BID Fabian Alford MD   Stopped at 08/22/20 7233    QUEtiapine (SEROQUEL) tablet 100 mg  100 mg Oral Nightly Fabian Alford MD   100 mg at 08/21/20 2328    senna (SENOKOT) tablet 17.2 mg  2 tablet Per G Tube Daily Con MD Rocío   Stopped at 08/22/20 8418    zinc oxide 20 % ointment   Topical BID Con MD Leena Alford pantoprazole (PROTONIX) 80 mg in sodium chloride 0.9 % 100 mL infusion  8 mg/hr Intravenous Continuous Robe Arellano MD 10 mL/hr at 08/22/20 0628 8 mg/hr at 08/22/20 1392    lactated ringers infusion   Intravenous Continuous Robe Arellano MD 75 mL/hr at 08/21/20 2145      albuterol sulfate  (90 Base) MCG/ACT inhaler 2 puff  2 puff Inhalation Q4H Robe Arellano MD   2 puff at 08/22/20 0350    ipratropium (ATROVENT HFA) 17 MCG/ACT inhaler 2 puff  2 puff Inhalation Q4H Robe Arellano MD   2 puff at 08/22/20 0122     Current Outpatient Medications on File Prior to Visit   Medication Sig Dispense Refill    ipratropium-albuterol (DUONEB) 0.5-2.5 (3) MG/3ML SOLN nebulizer solution Inhale 1 vial into the lungs every 4 hours      bisacodyl (DULCOLAX) 5 MG EC tablet Take 10 mg by mouth as needed (No bowel movement in 48 hours)      nystatin (MYCOSTATIN) 305427 UNIT/GM powder Apply topically 2 times daily as needed (yeast infections)      nystatin (MYCOSTATIN) 952602 UNIT/GM cream Apply topically 2 times daily as needed (yeast infections)      senna (SENOKOT) 8.6 MG tablet 2 tablets by Per G Tube route daily       lansoprazole (PREVACID SOLUTAB) 15 MG disintegrating tablet 15 mg by Per J Tube route daily      Cholecalciferol (VITAMIN D3) 2000 units CAPS 2,000 Units by Per G Tube route daily       guaiFENesin (ROBITUSSIN) 100 MG/5ML SOLN oral solution Take 200 mg by mouth every 4 hours as needed for Cough      Sodium Phosphates (FLEET) 7-19 GM/118ML Place 1 enema rectally once as needed If bisacodyl is ineffective.  zinc oxide 20 % ointment Apply topically 2 times daily Apply topically as needed.  bisacodyl (DULCOLAX) 10 MG suppository Place 10 mg rectally daily as needed If no BM in 48 hours      diazepam (DIASTAT) 10 MG GEL Place 20 mg rectally once as needed (seizures lasting over 5 minutes).        baclofen (LIORESAL) 10 MG tablet 15 mg by Per G Tube route 3 times daily       ibuprofen (ADVIL;MOTRIN) 600 MG tablet 600 mg by Per G Tube route every 8 hours as needed for Pain      lamoTRIgine (LAMICTAL) 150 MG tablet Take 400 mg by mouth 2 times daily Per g-tube      QUEtiapine (SEROQUEL) 100 MG tablet Take 100 mg by mouth nightly 2200      acetaminophen (TYLENOL) 160 MG/5ML solution 20.3 mLs by Per G Tube route every 4 hours as needed for Fever or Pain 473 mL 3    levETIRAcetam (KEPPRA) 100 MG/ML solution 15 mLs by Per G Tube route 2 times daily 1 Bottle 3    LORazepam (ATIVAN) 0.5 MG tablet Take 0.5 mg by mouth every 12 hours as needed for Anxiety. For seizures up to 7 days      lactulose (CHRONULAC) 10 GM/15ML solution 20 g by Per G Tube route daily       doxazosin (CARDURA) 4 MG tablet 4 mg by Per G Tube route daily       Multiple Vitamins-Minerals (CERTA-KENTON) liquid 30 mLs by Per G Tube route daily       potassium chloride (KAYCIEL) 20 MEQ/15ML (10%) solution 10 mEq by Per G Tube route 2 times daily       ascorbic acid (VITAMIN C) 500 MG tablet 500 mg by Per G Tube route 2 times daily.  QUEtiapine (SEROQUEL) 100 MG tablet 50 mg by Per G Tube route 2 times daily Administer at 0530 and 1730. Administer 100 mg at night.  escitalopram (LEXAPRO) 10 MG tablet 10 mg by Per G Tube route daily.  chlorhexidine (PERIDEX) 0.12 % solution Take 7.5 mLs by mouth 2 times daily. No current facility-administered medications for this visit. No current outpatient medications on file.      Facility-Administered Medications Ordered in Other Visits   Medication Dose Route Frequency Provider Last Rate Last Dose    pantoprazole (PROTONIX) injection 80 mg  80 mg Intravenous Once Linda Reis PA-C        acetaminophen (TYLENOL) 160 MG/5ML solution 650 mg  650 mg Per G Tube Q4H PRN Lieutenant Kashif MD        vitamin C (ASCORBIC ACID) tablet 500 mg  500 mg Per G Tube BID Lieutenant Kashif MD   Stopped at 08/22/20 8554    baclofen (LIORESAL) tablet 15 mg  15 mg Per G Tube TID Lieutenant Kashif MD   Stopped at 08/22/20 2763    bisacodyl (DULCOLAX) suppository 10 mg  10 mg Rectal Daily PRN Lieutenant Kashif MD        bisacodyl (DULCOLAX) EC tablet 10 mg  10 mg Oral PRN Lieutenant Kashif MD        chlorhexidine (PERIDEX) 0.12 % solution 7.5 mL  7.5 mL Mouth/Throat BID Lieutenant Kashif MD   Stopped at 08/22/20 0944    Vitamin D (CHOLECALCIFEROL) tablet 2,000 Units  2,000 Units Per G Tube Daily Lieutenant Kashif MD   Stopped at 08/22/20 4777    doxazosin (CARDURA) tablet 4 mg  4 mg Per G Tube Daily Lieutenant Kashif MD   Stopped at 08/22/20 3610    escitalopram (LEXAPRO) tablet 10 mg  10 mg Per G Tube Daily Lieutenant Kashif MD   Stopped at 08/22/20 0936    guaiFENesin (ROBITUSSIN) 100 MG/5ML oral solution 200 mg  200 mg Oral Q4H PRN Lieutenant Kashif MD        lactulose (CHRONULAC) 10 GM/15ML solution 20 g  20 g Per G Tube Daily Lieutenant Kashif MD   Stopped at 08/22/20 8320    lamoTRIgine (LAMICTAL) tablet 400 mg  400 mg Oral BID Lieutenant Kashif MD   Stopped at 08/22/20 5377    levETIRAcetam (KEPPRA) 100 MG/ML solution 1,500 mg  1,500 mg Per G Tube BID Lieutenant Kashif MD   Stopped at 08/22/20 8213    LORazepam (ATIVAN) tablet 0.5 mg  0.5 mg Oral Q12H PRN Lieutenant Kashif MD        CENTRUM/CERTA-KENTON with minerals oral solution 15 mL  15 mL Per G Tube Daily Lieutenant Kashif MD   Stopped at 08/22/20 2805    nystatin (MYCOSTATIN) cream   Topical BID PRN Lieutenant Kashif MD        miconazole (MICOTIN) 2 % powder   Topical BID Lieutenant Kashif MD        potassium chloride (KLOR-CON) extended release tablet 10 mEq  10 mEq Oral BID Lieutenant Kashif MD   Stopped at 08/22/20 8500    QUEtiapine (SEROQUEL) tablet 50 mg  50 mg Per G Tube BID Lieutenant Kashif MD   Stopped at 08/22/20 7476    QUEtiapine (SEROQUEL) tablet 100 mg  100 mg Oral Nightly Lieutenant Kashif MD   100 mg at 08/21/20 4328    senna (SENOKOT) tablet 17.2 mg  2 tablet Per G Tube Daily Lieutenant Kashif MD   Stopped at 20    zinc oxide 20 % ointment   Topical BID Vilma Diallo MD        pantoprazole (PROTONIX) 80 mg in sodium chloride 0.9 % 100 mL infusion  8 mg/hr Intravenous Continuous Vilma Diallo MD 10 mL/hr at 20 0628 8 mg/hr at 20 8986    lactated ringers infusion   Intravenous Continuous Vilma Diallo MD 75 mL/hr at 20 2145      albuterol sulfate  (90 Base) MCG/ACT inhaler 2 puff  2 puff Inhalation Q4H Vilma Diallo MD   2 puff at 20 0350    ipratropium (ATROVENT HFA) 17 MCG/ACT inhaler 2 puff  2 puff Inhalation Q4H Vilma Diallo MD   2 puff at 20 0351     Vital Signs (Current) There were no vitals filed for this visit. Vital Signs Statistics (for past 48 hrs)     Temp  Av.7 °F (36.5 °C)  Min: 97.4 °F (36.3 °C)   Min taken time: 20 1151  Max: 97.8 °F (36.6 °C)   Max taken time: 20 0415  Pulse  Av.7  Min: [de-identified]   Min taken time: 20 0953  Max: 102   Max taken time: 20 0245  Resp  Av.7  Min: 13   Min taken time: 20 0119  Max: 32   Max taken time: 20 0415  BP  Min: 92/49   Min taken time: 20 0015  Max: 147/66   Max taken time: 20 1730  MAP (mmHg)  Av.2  Min: 61   Min taken time: 20 0015  Max: 115   Max taken time: 20 2000  SpO2  Av.6 %  Min: 61 %   Min taken time: 20 1922  Max: 100 %   Max taken time: 20 0953    BP Readings from Last 3 Encounters:   20 (!) 98/43   10/11/19 (!) 76/47   10/11/19 124/62     BMI  There is no height or weight on file to calculate BMI. Estimated body mass index is 24.33 kg/m² as calculated from the following:    Height as of 20: 5' 8\" (1.727 m). Weight as of 20: 160 lb (72.6 kg).     CBC   Lab Results   Component Value Date    WBC 9.3 2020    RBC 2.77 2020    HGB 7.3 2020    HCT 21.4 2020    .9 2020    RDW 13.6 2020     2020     CMP    Lab Results   Component Value Date     08/21/2020    K 5.2 08/21/2020     08/21/2020    CO2 26 08/21/2020    BUN 72 08/21/2020    CREATININE <0.5 08/21/2020    GFRAA >60 08/21/2020    GFRAA >60 05/16/2013    AGRATIO 1.2 08/21/2020    LABGLOM >60 08/21/2020    GLUCOSE 135 08/21/2020    PROT 5.6 08/21/2020    PROT 5.9 01/25/2013    CALCIUM 8.6 08/21/2020    BILITOT <0.2 08/21/2020    ALKPHOS 76 08/21/2020    AST 23 08/21/2020    ALT 22 08/21/2020     BMP    Lab Results   Component Value Date     08/21/2020    K 5.2 08/21/2020     08/21/2020    CO2 26 08/21/2020    BUN 72 08/21/2020    CREATININE <0.5 08/21/2020    CALCIUM 8.6 08/21/2020    GFRAA >60 08/21/2020    GFRAA >60 05/16/2013    LABGLOM >60 08/21/2020    GLUCOSE 135 08/21/2020     POCGlucose  Recent Labs     08/21/20  1156   GLUCOSE 135*      Coags    Lab Results   Component Value Date    PROTIME 11.3 09/17/2018    INR 0.99 09/17/2018    APTT 31.5 01/22/5209     HCG (If Applicable) No results found for: PREGTESTUR, PREGSERUM, HCG, HCGQUANT   ABGs No results found for: PHART, PO2ART, CVI7FRR, PXE2IHW, BEART, N6QTUTMG   Type & Screen (If Applicable)  Lab Results   Component Value Date    LABABO A 01/17/2013    LABRH Positive 01/17/2013                            BMI: Wt Readings from Last 3 Encounters:       NPO Status:8 hours                          Anesthesia Evaluation  Patient summary reviewed and Nursing notes reviewed no history of anesthetic complications:   Airway: Mallampati: III  TM distance: >3 FB   Neck ROM: full  Mouth opening: < 3 FB Dental:          Pulmonary:Negative Pulmonary ROS and normal exam    (+) COPD: moderate,  sleep apnea: on CPAP,                             Cardiovascular:  Exercise tolerance: poor (<4 METS),             Rate: normal           Beta Blocker:  Not on Beta Blocker         Neuro/Psych:   (+) seizures:, neuromuscular disease (cerebral palsy, left sided contractures):, psychiatric history:             ROS comment: dementia GI/Hepatic/Renal:   (+) GERD:, PUD, liver disease: portal hypertension,           Endo/Other: Negative Endo/Other ROS                    Abdominal:           Vascular: negative vascular ROS. Anesthesia Plan      MAC     ASA 3 - emergent     (  )  Induction: intravenous. Anesthetic plan and risks discussed with patient, sibling and healthcare power of .                       This pre-anesthesia assessment may be used as a history and physical.        Cassie Garcia MD  August 22, 2020  11:09 AM

## 2020-08-22 NOTE — PROGRESS NOTES
Pt continues to de-sat while sleeping into the 70's and 80's. Pt has c-pap at home while sleeping. Respiratory called and notified of situation. Juan Matos they would come put on c-pap. Will continue to monitor.

## 2020-08-22 NOTE — ANESTHESIA POSTPROCEDURE EVALUATION
Department of Anesthesiology  Postprocedure Note    Patient: Fransisco Lopez  MRN: [id number]  YOB: 1950  Date of evaluation: 8/22/2020  Time:  11:20 AM     Procedure Summary     Date:  08/22/20 Room / Location:  [address]    Anesthesia Start:  1025 Anesthesia Stop:  1641    Procedure:  EGD BIOPSY (N/A Abdomen) Diagnosis:  (GI bleed)    Surgeon:  Tobi Boss MD Responsible Provider:  Isis Mitchell MD    Anesthesia Type:  MAC ASA Status:  3 - Emergent          Anesthesia Type: MAC    Nidhi Phase I:      Nidhi Phase II:      Last vitals: Reviewed and per EMR flowsheets.        Anesthesia Post Evaluation    Patient location during evaluation: PACU  Patient participation: complete - patient participated  Level of consciousness: awake and awake and alert  Pain score: 2  Airway patency: patent  Nausea & Vomiting: no vomiting  Complications: no  Cardiovascular status: blood pressure returned to baseline  Respiratory status: acceptable  Hydration status: euvolemic

## 2020-08-22 NOTE — OP NOTE
[address] [place] [address]  Endoscopy Note    Patient: Cathi Ibrahim  : 1950  CSN:     Procedure: Esophagogastroduodenoscopy with biopsy    Date:  2020     Surgeon:  Fani Shaw     Referring Physician:      Preoperative Diagnosis:  GI bleed    Postoperative Diagnosis:  same    Anesthesia:  mac    EBL: <50 mL    Indications: This is a 71y.o. year old male who presents today with Hematemesis and Melena. Description of Procedure:  Informed consent was obtained from the patient's POA after explanation of indications, benefits and possible risks and complications of the procedure. The patient was then taken to the endoscopy suite, placed in the left lateral decubitus position and the above IV sedation was administrered. The Olympus videoendoscope was placed in the patient's mouth and under direct visualization passed into the esophagus. Visualization of the esophagus demonstrated erosive esophagitis with overlying exudate and some ulceration. At the [facility], there was an edematous area with what appeared to be a central split/tear in the tissue. The scope was then advanced into the stomach and then into the second portion of the duodenum. The second portion of the duodenum was normal.  The duodenal bulb was normal. The scope was brought back into the stomach. The entire examined stomach was normal other than a large HH. Retroflexed views of the cardia and fundus were normal.  The scope was anteflexed and the stomach was decompressed, and the scope was completely withdrawn. The patient tolerated the procedure well and was taken to the post anesthesia care unit in good condition. Impression:      1. Ulcerative esophagitis - biopsies obtained but likely due to pt being supine with a large HH. 2. At the [facility], there was an edematous area with what appeared to be a central split/tear in the tissue c/w a MW tear    3. Large, roughly 10 cm HH    4. No blood throughout the exam    5.  No internal PEG bumper so pt must have a J-tube    6.  No esophageal mass noted (POA thought pt was diagnosed with an esophageal cancer)      Recommendations: continue aggressive acid suppression and then bid PPI at d/c  Ok to resume jejunal TF  Await path    Tobi Boss MD  [address]

## 2020-08-22 NOTE — PROGRESS NOTES
Enrique Frye RN to relay information about EGD and LIWS orders, as well as information on a \"cancerous growth in the esophagus\" per POA.

## 2020-08-22 NOTE — PROGRESS NOTES
08/22/20 0119   NIV Type   NIV Started/Stopped On   Equipment Type v60   Mode CPAP   Mask Type Full face mask   Mask Size Medium   Settings/Measurements   CPAP/EPAP 8 cmH2O   Resp 15   FiO2  30 %   Vt Exhaled 606 mL   Minute Volume 6.8 Liters   Mask Leak (lpm) 16 lpm   Comfort Level Good   Using Accessory Muscles No   SpO2 98     Per RN, pt's was desating to 70's and [de-identified]' while sleeping. Nursing notes indicates pt's wear CPAP at home, however, cpap machine is not here. Pt is on hospital's cpap machine with pressure of 8 cm H2O. Tolerating well. Will continue to monitor.

## 2020-08-22 NOTE — PROGRESS NOTES
Call placed by this RN's orientee Blanca to Dr. Jayna Gomez asking if we an hold patients potassium because it was elevated today. Also got the OK to use J tube for meds only right now. No feedings due to GI bleed.

## 2020-08-22 NOTE — PROGRESS NOTES
Reviewed patient's medical and surgical history in electronic record. Scope verified using 2 person system.

## 2020-08-22 NOTE — PROGRESS NOTES
Comprehensive Nutrition Assessment    Type and Reason for Visit:  Initial, Positive Nutrition Screen(For Jtube)    Nutrition Recommendations/Plan:   1. Continue NPO status  2. Once TF can be started RD recommends- 2 Gagan HN @ goal rate of 45 ml/hr. Provides: 2160 gagan and 90 gms protein. Total EN vol 1080 ml. Free water bolus 230 ml q 4 hours or per MD, but total free water needs are 1400 ml. Nutrition Assessment:  Pt is nutritionally stable at this time. Pt was receiving J tube feedings from NH. Pt was getting Nutren 2.0 @ 65 ml/hr for 15 hours. Pt was tolerating. Current wt per RN from bed is 162 lbs. Pt NPO at this time d/t GI bleed. RD to leave recommendations for J tube feeding when TF can be initiated. Malnutrition Assessment:  Malnutrition Status:  No malnutrition    Context:  Chronic Illness     Findings of the 6 clinical characteristics of malnutrition:  Energy Intake:  No significant decrease in energy intake  Weight Loss:  No significant weight loss     Body Fat Loss:  No significant body fat loss     Muscle Mass Loss:  No significant muscle mass loss    Fluid Accumulation:  No significant fluid accumulation     Strength:  Not Performed    Estimated Daily Nutrient Needs:  Energy (kcal):  4044-9698 cals (25-30 cals/kg 73kg); Weight Used for Energy Requirements:  Current     Protein (g):   gms (1.2-1.5 gms/kg 73kg CBW); Weight Used for Protein Requirements:  Current        Fluid (ml/day):  1 ml/kcal; Weight Used for Fluid Requirements:  Current      Nutrition Related Findings:  No edema noted      Wounds:  None       Current Nutrition Therapies:    Diet NPO Effective Now    Anthropometric Measures:  · Height: 5' 8\" (172.7 cm)  · Current Body Weight: 162 lb (73.5 kg)   · Ideal Body Weight: 154 lbs; % Ideal Body Weight 105.2 %   · BMI: 24.6  · BMI Categories: Normal Weight (BMI 18.5-24. 9)       Nutrition Diagnosis:   · Inadequate energy intake related to inadequate protein-energy intake as

## 2020-08-22 NOTE — H&P
[facility]                     [facility], [address]                              HISTORY AND PHYSICAL    PATIENT NAME: Sher Cevallos                     :        1950  MED REC NO:   [id number]                          ROOM:       9532  ACCOUNT NO:   [de-identified]                           ADMIT DATE: 2020  PROVIDER:     Nadia Adam MD    HISTORY OF PRESENT ILLNESS:  The patient is a 49-year-old intellectually  disabled patient with cerebral palsy from [address] [place] [address] came to the  emergency room with coffee-ground emesis. The patient appeared  dehydrated. The patient is as such nonverbal, unable to provide any  history. Resident of [facility], brought in with vomiting. The  patient was also found to be having dehydrated and there was also some  black emesis around the gastrostomy site. The patient is nonambulatory. He is usually dependent on tube feeding. He has a known seizure  disorder, but not had any seizures, no fever, no chills. PAST MEDICAL HISTORY:  Pertinent for cerebral palsy, intellectual  disability, macrocytic anemia and hydrocele; bilateral cataract, nuclear  sclerosis; neuromuscular disorder, neurogenic bladder, movement  disorder, impulse control disorder, chronic liver disease,  dermatophytosis, drug induced hepatitis, C. diff colitis, peripheral  vascular disease, spastic hemiplegia, tinea pedis, urinary incontinence. PAST SURGICAL HISTORY:  Pertinent for gastrostomy, EGD, colonoscopy,  jejunostomy tube insertion, multiple revision of the same procedure,  abdominal exploratory laparotomy, Luigi-en-Y jejunostomy with small bowel  resection.     CURRENT MEDICATIONS:  Include Tylenol, ascorbic acid, baclofen, Peridex,  vitamin D3, Diastat gel, doxazosin, escitalopram, guaifenesin,  ibuprofen, nebulized aerosol, lamotrigine, lansoprazole, lorazepam,  multivitamin, Mycostatin powder, quetiapine, senna, Fleet Enema, initially and then dropped to  8.2 and then 7.3 just 8 hours later; platelet count 761. Iron level is  219, iron saturation 68, TIBC 321, folate level 19.02. Urinalysis  showed no mercedes evidence of urinary tract infection. DIAGNOSTIC DATA:  The patient had a CT scan of the abdomen and pelvis  that shows no acute abdominopelvic process, left hiatal hernia, small  right pleural effusion, stable left adrenal adenoma. Chest x-ray,  marked cardiomegaly, no evidence of pulmonary edema or focal infiltrate  although _____ may be an issue here. ASSESSMENT:  Upper GI hemorrhage, anemia due to acute blood loss,  prerenal azotemia, nuclear sclerosis, functional quadriplegia, severe  intellectual disability. PLAN:  Get him admitted. Treat him with IV hydration, IV Protonix  infusion. Monitor hemoglobin, hematocrit, GI consultation. The patient  is full code as described to me. As always, it is a pleasure and privilege to take care of your patients  at [facility].         Troy Castillo MD    D: 08/22/2020 6:46:20       T: 08/22/2020 12:34:12     [id number]  Job#: [id number]     Doc#: [id number]    CC:  MD Kaitlin Bocanegra

## 2020-08-23 LAB
BLOOD BANK DISPENSE STATUS: NORMAL
BLOOD BANK DISPENSE STATUS: NORMAL
BLOOD BANK PRODUCT CODE: NORMAL
BLOOD BANK PRODUCT CODE: NORMAL
BPU ID: NORMAL
BPU ID: NORMAL
DESCRIPTION BLOOD BANK: NORMAL
DESCRIPTION BLOOD BANK: NORMAL
HCT VFR BLD CALC: 17.6 % (ref 40.5–52.5)
HCT VFR BLD CALC: 26.4 % (ref 40.5–52.5)
HCT VFR BLD CALC: 27.8 % (ref 40.5–52.5)
HEMOGLOBIN: 6 G/DL (ref 13.5–17.5)
HEMOGLOBIN: 9.6 G/DL (ref 13.5–17.5)
HEMOGLOBIN: 9.7 G/DL (ref 13.5–17.5)
LAMOTRIGINE LEVEL: 8.7 UG/ML (ref 2.5–15)

## 2020-08-23 PROCEDURE — 6370000000 HC RX 637 (ALT 250 FOR IP): Performed by: INTERNAL MEDICINE

## 2020-08-23 PROCEDURE — P9016 RBC LEUKOCYTES REDUCED: HCPCS

## 2020-08-23 PROCEDURE — 94761 N-INVAS EAR/PLS OXIMETRY MLT: CPT

## 2020-08-23 PROCEDURE — 2580000003 HC RX 258: Performed by: INTERNAL MEDICINE

## 2020-08-23 PROCEDURE — 2700000000 HC OXYGEN THERAPY PER DAY

## 2020-08-23 PROCEDURE — 85018 HEMOGLOBIN: CPT

## 2020-08-23 PROCEDURE — C9113 INJ PANTOPRAZOLE SODIUM, VIA: HCPCS | Performed by: INTERNAL MEDICINE

## 2020-08-23 PROCEDURE — 36430 TRANSFUSION BLD/BLD COMPNT: CPT

## 2020-08-23 PROCEDURE — 1200000000 HC SEMI PRIVATE

## 2020-08-23 PROCEDURE — 6360000002 HC RX W HCPCS: Performed by: INTERNAL MEDICINE

## 2020-08-23 PROCEDURE — 36415 COLL VENOUS BLD VENIPUNCTURE: CPT

## 2020-08-23 PROCEDURE — 85014 HEMATOCRIT: CPT

## 2020-08-23 RX ORDER — 0.9 % SODIUM CHLORIDE 0.9 %
20 INTRAVENOUS SOLUTION INTRAVENOUS ONCE
Status: COMPLETED | OUTPATIENT
Start: 2020-08-23 | End: 2020-08-23

## 2020-08-23 RX ADMIN — LAMOTRIGINE 400 MG: 100 TABLET ORAL at 10:10

## 2020-08-23 RX ADMIN — BACLOFEN 15 MG: 10 TABLET ORAL at 22:50

## 2020-08-23 RX ADMIN — DOXAZOSIN 4 MG: 4 TABLET ORAL at 10:11

## 2020-08-23 RX ADMIN — LACTULOSE 20 G: 20 SOLUTION ORAL at 10:10

## 2020-08-23 RX ADMIN — LEVETIRACETAM 1500 MG: 500 SOLUTION ORAL at 22:48

## 2020-08-23 RX ADMIN — LEVETIRACETAM 1500 MG: 500 SOLUTION ORAL at 10:09

## 2020-08-23 RX ADMIN — LAMOTRIGINE 400 MG: 100 TABLET ORAL at 22:49

## 2020-08-23 RX ADMIN — Medication 2000 UNITS: at 10:11

## 2020-08-23 RX ADMIN — CHLORHEXIDINE GLUCONATE 7.5 ML: 1.2 RINSE ORAL at 22:52

## 2020-08-23 RX ADMIN — MICONAZOLE NITRATE: 20 POWDER TOPICAL at 10:47

## 2020-08-23 RX ADMIN — Medication 500 MG: at 22:50

## 2020-08-23 RX ADMIN — ZINC OXIDE: 200 OINTMENT TOPICAL at 22:53

## 2020-08-23 RX ADMIN — CHLORHEXIDINE GLUCONATE 7.5 ML: 1.2 RINSE ORAL at 10:47

## 2020-08-23 RX ADMIN — QUETIAPINE FUMARATE 50 MG: 25 TABLET ORAL at 15:00

## 2020-08-23 RX ADMIN — BACLOFEN 15 MG: 10 TABLET ORAL at 15:00

## 2020-08-23 RX ADMIN — ESCITALOPRAM OXALATE 10 MG: 10 TABLET ORAL at 10:11

## 2020-08-23 RX ADMIN — POTASSIUM CHLORIDE 10 MEQ: 750 TABLET, FILM COATED, EXTENDED RELEASE ORAL at 10:12

## 2020-08-23 RX ADMIN — QUETIAPINE FUMARATE 50 MG: 25 TABLET ORAL at 10:10

## 2020-08-23 RX ADMIN — SODIUM CHLORIDE 20 ML: 9 INJECTION, SOLUTION INTRAVENOUS at 11:13

## 2020-08-23 RX ADMIN — POTASSIUM CHLORIDE 10 MEQ: 750 TABLET, FILM COATED, EXTENDED RELEASE ORAL at 22:50

## 2020-08-23 RX ADMIN — MULTIVITAMIN 15 ML: LIQUID ORAL at 15:01

## 2020-08-23 RX ADMIN — MICONAZOLE NITRATE: 20 POWDER TOPICAL at 22:52

## 2020-08-23 RX ADMIN — QUETIAPINE FUMARATE 100 MG: 100 TABLET ORAL at 22:49

## 2020-08-23 RX ADMIN — SODIUM CHLORIDE 8 MG/HR: 9 INJECTION, SOLUTION INTRAVENOUS at 08:56

## 2020-08-23 RX ADMIN — SODIUM CHLORIDE 8 MG/HR: 9 INJECTION, SOLUTION INTRAVENOUS at 22:40

## 2020-08-23 RX ADMIN — Medication 500 MG: at 10:11

## 2020-08-23 RX ADMIN — BACLOFEN 15 MG: 10 TABLET ORAL at 10:11

## 2020-08-23 RX ADMIN — ZINC OXIDE: 200 OINTMENT TOPICAL at 10:48

## 2020-08-23 RX ADMIN — SENNOSIDES 17.2 MG: 8.6 TABLET, FILM COATED ORAL at 10:12

## 2020-08-23 ASSESSMENT — PAIN SCALES - WONG BAKER
WONGBAKER_NUMERICALRESPONSE: 0

## 2020-08-23 ASSESSMENT — PAIN SCALES - GENERAL
PAINLEVEL_OUTOF10: 0
PAINLEVEL_OUTOF10: 0

## 2020-08-23 NOTE — PROGRESS NOTES
First unit of blood is completed. No signs/ symptoms of adverse reaction noted. Will continue to monitor and call blood bank for second unit of blood.

## 2020-08-23 NOTE — PLAN OF CARE
Problem: Falls - Risk of:  Goal: Will remain free from falls  Description: Will remain free from falls  Outcome: Ongoing     Problem: Skin Integrity:  Goal: Will show no infection signs and symptoms  Description: Will show no infection signs and symptoms  Outcome: Ongoing     Problem: Nutrition  Goal: Optimal nutrition therapy  Outcome: Ongoing

## 2020-08-23 NOTE — PROGRESS NOTES
Blood started at 1038. Vitals complete. Educated patient on possible adverse reactions. Instructed patient to notify this RN if any symptoms occur. Patient acknowledged understanding. Remained in patients room for first 15 minutes of blood transfusion. Will continue to monitor.

## 2020-08-23 NOTE — PROGRESS NOTES
Stayed with patient for first 15 min of blood transfusion. No signs of adverse reaction noted. Will continue to monitor patient.

## 2020-08-23 NOTE — PROGRESS NOTES
Second unit of blood started at this time. Instructed patient to notify this RN of any signs/ symptoms of adverse reaction. Vitals are stable. Will stay with patient for first 15 minutes of transfusion.

## 2020-08-23 NOTE — PROGRESS NOTES
cancer)         ASSESSMENT     UGI bleed: esophagitis and ? MW tear noted on EGD.  No blood throughout exam. Drop in hg since admission but no further hematemesis or melena per nursing       PLAN    PPI   J tube feeding started  Transfuse 2 U    Jeanna Chinchilla MD

## 2020-08-23 NOTE — PROGRESS NOTES
Department of Internal Medicine  General Internal Medicine   Progress Note      SUBJECTIVE: appears  Comfortable  No coffee ground emesis     History obtained from chart review and the patient's nursing staff   General ROS: positive for  - fatigue and malaise  negative for - chills, fever or night sweats  Psychological ROS: negative  Respiratory ROS: no cough, shortness of breath, or wheezing  Cardiovascular ROS: positive for - , dyspnea on exertion and shortness of breath  negative for - chest pain  Gastrointestinal ROS: no abdominal pain, change in bowel habits, or black or bloody stools    OBJECTIVE      Medications      Current Facility-Administered Medications: 0.9 % sodium chloride bolus, 20 mL, Intravenous, Once  albuterol sulfate  (90 Base) MCG/ACT inhaler 2 puff, 2 puff, Inhalation, Q4H PRN  ipratropium (ATROVENT HFA) 17 MCG/ACT inhaler 2 puff, 2 puff, Inhalation, Q4H PRN  pantoprazole (PROTONIX) injection 80 mg, 80 mg, Intravenous, Once  acetaminophen (TYLENOL) 160 MG/5ML solution 650 mg, 650 mg, Per G Tube, Q4H PRN  vitamin C (ASCORBIC ACID) tablet 500 mg, 500 mg, Per G Tube, BID  baclofen (LIORESAL) tablet 15 mg, 15 mg, Per G Tube, TID  bisacodyl (DULCOLAX) suppository 10 mg, 10 mg, Rectal, Daily PRN  bisacodyl (DULCOLAX) EC tablet 10 mg, 10 mg, Oral, PRN  chlorhexidine (PERIDEX) 0.12 % solution 7.5 mL, 7.5 mL, Mouth/Throat, BID  Vitamin D (CHOLECALCIFEROL) tablet 2,000 Units, 2,000 Units, Per G Tube, Daily  doxazosin (CARDURA) tablet 4 mg, 4 mg, Per G Tube, Daily  escitalopram (LEXAPRO) tablet 10 mg, 10 mg, Per G Tube, Daily  guaiFENesin (ROBITUSSIN) 100 MG/5ML oral solution 200 mg, 200 mg, Oral, Q4H PRN  lactulose (CHRONULAC) 10 GM/15ML solution 20 g, 20 g, Per G Tube, Daily  lamoTRIgine (LAMICTAL) tablet 400 mg, 400 mg, Oral, BID  levETIRAcetam (KEPPRA) 100 MG/ML solution 1,500 mg, 1,500 mg, Per G Tube, BID  LORazepam (ATIVAN) tablet 0.5 mg, 0.5 mg, Oral, T02Y PRN  CENTRUM/CERTA-KENTON with minerals oral solution 15 mL, 15 mL, Per G Tube, Daily  nystatin (MYCOSTATIN) cream, , Topical, BID PRN  miconazole (MICOTIN) 2 % powder, , Topical, BID  potassium chloride (KLOR-CON) extended release tablet 10 mEq, 10 mEq, Oral, BID  QUEtiapine (SEROQUEL) tablet 50 mg, 50 mg, Per G Tube, BID  QUEtiapine (SEROQUEL) tablet 100 mg, 100 mg, Oral, Nightly  senna (SENOKOT) tablet 17.2 mg, 2 tablet, Per G Tube, Daily  zinc oxide 20 % ointment, , Topical, BID  pantoprazole (PROTONIX) 80 mg in sodium chloride 0.9 % 100 mL infusion, 8 mg/hr, Intravenous, Continuous  lactated ringers infusion, , Intravenous, Continuous    Physical      VITALS:  BP (!) 87/43   Pulse 72   Temp 98.1 °F (36.7 °C) (Axillary)   Resp 16   Ht 5' 8\" (1.727 m)   Wt 159 lb 6.4 oz (72.3 kg)   SpO2 100%   BMI 24.24 kg/m²   TEMPERATURE:  Current - Temp: 98.1 °F (36.7 °C);  Max - Temp  Av °F (36.7 °C)  Min: 97.4 °F (36.3 °C)  Max: 98.3 °F (36.8 °C)  RESPIRATIONS RANGE: Resp  Avg: 15.3  Min: 9  Max: 20  PULSE RANGE: Pulse  Av.9  Min: 72  Max: 90  BLOOD PRESSURE RANGE:  Systolic (25JIP), FRU:971 , Min:87 , BMB:641   ; Diastolic (53FFY), MRO:91, Min:43, Max:68    PULSE OXIMETRY RANGE: SpO2  Av.1 %  Min: 94 %  Max: 100 %  24HR INTAKE/OUTPUT:      Intake/Output Summary (Last 24 hours) at 2020 3520  Last data filed at 2020 0600  Gross per 24 hour   Intake 2738 ml   Output --   Net 2738 ml     CONSTITUTIONAL:  fatigued, somnolent, uncooperative, distracted, severe distress and appears older than stated age  NECK:  supple, symmetrical, trachea midline, skin normal and no stridor  BACK:  symmetric  LUNGS:  Decreased BS taras crackles , few wheezes   CARDIOVASCULAR:  normal apical pulses, tachycardic with regular rhythm and normal S1 and S2  ABDOMEN:  Soft BS + non tender   MUSCULOSKELETAL:  Contracted      NEUROLOGIC:  Right hemiplegia   SKIN:  Warm and dry  and no bruising or bleeding    Data      No results found for: PHART, PO2ART, Hal Quinonez, [place]    Lab Results   Component Value Date     08/21/2020    K 5.2 08/21/2020     08/21/2020    CO2 26 08/21/2020    BUN 72 08/21/2020    CREATININE <0.5 08/21/2020    GLUCOSE 135 08/21/2020    CALCIUM 8.6 08/21/2020     Lab Results   Component Value Date    WBC 9.3 08/21/2020    HGB 6.0 08/23/2020    HCT 17.6 08/23/2020    .9 08/21/2020     08/21/2020         Lab Results   Component Value Date    INR 0.99 09/17/2018    PROTIME 11.3 09/17/2018       ASSESSMENT AND PLAN      Patient Active Problem List   Diagnosis    Anemia    Altered mental status    Hyponatremia    Jejunostomy malfunction (HCC)    Cerebral palsy (HCC)    Seizure disorder (HCC)    Gastrostomy malfunction ([address]    Nuclear sclerosis    Fecal impaction (HCC)    Anemia, chronic disease    Partial epilepsy with impairment of consciousness, intractable ([address]    Mental retardation    Malfunction of gastrostomy tube ([address]    Pneumonia    Hypoxemia    Hypotension    Acute encephalopathy    Recurrent seizures ([address]    Prolapse, intestine    Upper GI hemorrhage     Ct protonix  infusion    s/p EGD  Which showed ulcerative esophagitis , kwadwo ward tear  Ct PPO    monitor H/H

## 2020-08-23 NOTE — PROGRESS NOTES
Received consent for blood from patient's POA Rudy at this time. Will place consent in chart and notify blood bank.

## 2020-08-24 LAB
ANION GAP SERPL CALCULATED.3IONS-SCNC: 12 MMOL/L (ref 3–16)
BUN BLDV-MCNC: 15 MG/DL (ref 7–20)
CALCIUM SERPL-MCNC: 8.5 MG/DL (ref 8.3–10.6)
CHLORIDE BLD-SCNC: 107 MMOL/L (ref 99–110)
CO2: 21 MMOL/L (ref 21–32)
CREAT SERPL-MCNC: <0.5 MG/DL (ref 0.8–1.3)
GFR AFRICAN AMERICAN: >60
GFR NON-AFRICAN AMERICAN: >60
GLUCOSE BLD-MCNC: 94 MG/DL (ref 70–99)
HCT VFR BLD CALC: 31 % (ref 40.5–52.5)
HCT VFR BLD CALC: 31.5 % (ref 40.5–52.5)
HEMOGLOBIN: 10.4 G/DL (ref 13.5–17.5)
HEMOGLOBIN: 11 G/DL (ref 13.5–17.5)
MAGNESIUM: 2.4 MG/DL (ref 1.8–2.4)
MCH RBC QN AUTO: 33.5 PG (ref 26–34)
MCHC RBC AUTO-ENTMCNC: 33.4 G/DL (ref 31–36)
MCV RBC AUTO: 100.3 FL (ref 80–100)
PDW BLD-RTO: 15 % (ref 12.4–15.4)
PHOSPHORUS: 4 MG/DL (ref 2.5–4.9)
PLATELET # BLD: 251 K/UL (ref 135–450)
PMV BLD AUTO: 7.9 FL (ref 5–10.5)
POTASSIUM SERPL-SCNC: 4.1 MMOL/L (ref 3.5–5.1)
RBC # BLD: 3.09 M/UL (ref 4.2–5.9)
SODIUM BLD-SCNC: 140 MMOL/L (ref 136–145)
WBC # BLD: 7.1 K/UL (ref 4–11)

## 2020-08-24 PROCEDURE — 80048 BASIC METABOLIC PNL TOTAL CA: CPT

## 2020-08-24 PROCEDURE — 85027 COMPLETE CBC AUTOMATED: CPT

## 2020-08-24 PROCEDURE — 85018 HEMOGLOBIN: CPT

## 2020-08-24 PROCEDURE — 2580000003 HC RX 258: Performed by: INTERNAL MEDICINE

## 2020-08-24 PROCEDURE — 83735 ASSAY OF MAGNESIUM: CPT

## 2020-08-24 PROCEDURE — 1200000000 HC SEMI PRIVATE

## 2020-08-24 PROCEDURE — 84100 ASSAY OF PHOSPHORUS: CPT

## 2020-08-24 PROCEDURE — C9113 INJ PANTOPRAZOLE SODIUM, VIA: HCPCS | Performed by: INTERNAL MEDICINE

## 2020-08-24 PROCEDURE — 6360000002 HC RX W HCPCS: Performed by: INTERNAL MEDICINE

## 2020-08-24 PROCEDURE — 94660 CPAP INITIATION&MGMT: CPT

## 2020-08-24 PROCEDURE — 36415 COLL VENOUS BLD VENIPUNCTURE: CPT

## 2020-08-24 PROCEDURE — 6370000000 HC RX 637 (ALT 250 FOR IP): Performed by: INTERNAL MEDICINE

## 2020-08-24 PROCEDURE — 85014 HEMATOCRIT: CPT

## 2020-08-24 RX ORDER — PANTOPRAZOLE SODIUM 40 MG/1
40 TABLET, DELAYED RELEASE ORAL
Status: DISCONTINUED | OUTPATIENT
Start: 2020-08-25 | End: 2020-08-25

## 2020-08-24 RX ADMIN — LAMOTRIGINE 400 MG: 100 TABLET ORAL at 21:17

## 2020-08-24 RX ADMIN — Medication 2000 UNITS: at 09:15

## 2020-08-24 RX ADMIN — ZINC OXIDE: 200 OINTMENT TOPICAL at 11:07

## 2020-08-24 RX ADMIN — SODIUM CHLORIDE 8 MG/HR: 9 INJECTION, SOLUTION INTRAVENOUS at 18:53

## 2020-08-24 RX ADMIN — LEVETIRACETAM 1500 MG: 500 SOLUTION ORAL at 21:15

## 2020-08-24 RX ADMIN — CHLORHEXIDINE GLUCONATE 7.5 ML: 1.2 RINSE ORAL at 21:18

## 2020-08-24 RX ADMIN — Medication 500 MG: at 09:16

## 2020-08-24 RX ADMIN — BACLOFEN 15 MG: 10 TABLET ORAL at 09:15

## 2020-08-24 RX ADMIN — POTASSIUM CHLORIDE 10 MEQ: 750 TABLET, FILM COATED, EXTENDED RELEASE ORAL at 21:17

## 2020-08-24 RX ADMIN — ESCITALOPRAM OXALATE 10 MG: 10 TABLET ORAL at 09:16

## 2020-08-24 RX ADMIN — ZINC OXIDE: 200 OINTMENT TOPICAL at 19:17

## 2020-08-24 RX ADMIN — BACLOFEN 15 MG: 10 TABLET ORAL at 21:17

## 2020-08-24 RX ADMIN — LAMOTRIGINE 400 MG: 100 TABLET ORAL at 09:16

## 2020-08-24 RX ADMIN — QUETIAPINE FUMARATE 50 MG: 25 TABLET ORAL at 16:32

## 2020-08-24 RX ADMIN — Medication 500 MG: at 21:17

## 2020-08-24 RX ADMIN — SODIUM CHLORIDE, POTASSIUM CHLORIDE, SODIUM LACTATE AND CALCIUM CHLORIDE: 600; 310; 30; 20 INJECTION, SOLUTION INTRAVENOUS at 05:32

## 2020-08-24 RX ADMIN — DOXAZOSIN 4 MG: 4 TABLET ORAL at 09:15

## 2020-08-24 RX ADMIN — MICONAZOLE NITRATE: 20 POWDER TOPICAL at 21:18

## 2020-08-24 RX ADMIN — CHLORHEXIDINE GLUCONATE 7.5 ML: 1.2 RINSE ORAL at 09:34

## 2020-08-24 RX ADMIN — SENNOSIDES 17.2 MG: 8.6 TABLET, FILM COATED ORAL at 09:15

## 2020-08-24 RX ADMIN — MULTIVITAMIN 15 ML: LIQUID ORAL at 09:14

## 2020-08-24 RX ADMIN — BACLOFEN 15 MG: 10 TABLET ORAL at 16:32

## 2020-08-24 RX ADMIN — LEVETIRACETAM 1500 MG: 500 SOLUTION ORAL at 10:05

## 2020-08-24 RX ADMIN — QUETIAPINE FUMARATE 100 MG: 100 TABLET ORAL at 21:17

## 2020-08-24 RX ADMIN — SODIUM CHLORIDE 8 MG/HR: 9 INJECTION, SOLUTION INTRAVENOUS at 05:32

## 2020-08-24 RX ADMIN — MICONAZOLE NITRATE: 20 POWDER TOPICAL at 11:07

## 2020-08-24 RX ADMIN — QUETIAPINE FUMARATE 50 MG: 25 TABLET ORAL at 09:16

## 2020-08-24 RX ADMIN — SODIUM CHLORIDE, POTASSIUM CHLORIDE, SODIUM LACTATE AND CALCIUM CHLORIDE: 600; 310; 30; 20 INJECTION, SOLUTION INTRAVENOUS at 18:53

## 2020-08-24 RX ADMIN — POTASSIUM CHLORIDE 10 MEQ: 750 TABLET, FILM COATED, EXTENDED RELEASE ORAL at 09:15

## 2020-08-24 RX ADMIN — LACTULOSE 20 G: 20 SOLUTION ORAL at 09:14

## 2020-08-24 ASSESSMENT — PAIN SCALES - GENERAL
PAINLEVEL_OUTOF10: 0

## 2020-08-24 ASSESSMENT — PAIN SCALES - WONG BAKER
WONGBAKER_NUMERICALRESPONSE: 0

## 2020-08-24 NOTE — PROGRESS NOTES
Reassessment completed,No significant change observed patient denies needs at this time, call light in reach, will continue to monitor.

## 2020-08-24 NOTE — CARE COORDINATION
Discharge planning note:    Remains in ICU with Upper GIB. Continues on 2L O2, & Protonix gtt. TF via Jtube. Patient is LTC at [facility].     Loren Nicole RN BSN  Case Management

## 2020-08-24 NOTE — PLAN OF CARE
Problem: Falls - Risk of:  Goal: Will remain free from falls  Description: Will remain free from falls  8/24/2020 0104 by Sunny Amaro RN  Outcome: Ongoing  8/23/2020 1157 by Natividad Padilla RN  Outcome: Ongoing  Goal: Absence of physical injury  Description: Absence of physical injury  8/24/2020 0104 by Sunny Amaro RN  Outcome: Ongoing  8/23/2020 1157 by Natividad Padilla RN  Outcome: Ongoing     Problem: Skin Integrity:  Goal: Will show no infection signs and symptoms  Description: Will show no infection signs and symptoms  8/24/2020 0104 by Sunny Amaro RN  Outcome: Ongoing  8/23/2020 1157 by Natividad Padilla RN  Outcome: Ongoing  Goal: Absence of new skin breakdown  Description: Absence of new skin breakdown  8/24/2020 0104 by Sunny Amaro RN  Outcome: Ongoing  8/23/2020 1157 by Natividad Padilla RN  Outcome: Ongoing     Problem: Nutrition  Goal: Optimal nutrition therapy  8/24/2020 0104 by Sunny Amaro RN  Outcome: Ongoing  8/23/2020 1157 by Natividad Padilla RN  Outcome: Ongoing

## 2020-08-24 NOTE — CONSULTS
Comprehensive Nutrition Assessment    Type and Reason for Visit:  Reassess, Consult(TF ordering and management)    Nutrition Recommendations/Plan:   1. Recommend 2 gagan HN (fluid restricted formula) at goal rate 45 mL per hour to provide 1080 mL total volume, 2160 calories, 90 grams protein, 756 mL free water. 2. Recommend water flush 30 mL q 6 hours for tube maintenance given IV fluids at 75 mL per hour. 3. It is not recommended to check residuals with a J-tube. The jejunum does not have a reservoir like the stomach and checking residuals through the small J-tube increases risk for occlusion. To assess intolerance check for abdominal distention. Nutrition Assessment:  Pt's nutrition status is declining as tube feeds have not been started since admission. EGD 8/22 showed ulcerative esophagitis and Kenna Mitchell tear. Per GI note 8/23, OK to begin tube feeds via J-tube but these were not started. RD to order today; will use 2 Gagan HN as this is hospital equivalent to pt's typical EN formula, Nutren 2.0. BMP, Mg+ and Phos have been ordered per pharmacy. Will monitor for tolerance to EN at goal.     Malnutrition Assessment:  Malnutrition Status:  No malnutrition      Estimated Daily Nutrient Needs:  Energy (kcal):  4488-5810; Weight Used for Energy Requirements:  Admission(72 kg)     Protein (g):   grams; Weight Used for Protein Requirements:  Admission(72 kg; 1.2-1.5 grams per kg)        Fluid (ml/day):  1 mL per kcal; Weight Used for Fluid Requirements:  Admission      Nutrition Related Findings:  No edema noted. Non-verbal. LR at 75 mL per hour. +3.5 liters. Wounds:  None       Current Nutrition Therapies:    Diet NPO Effective Now    Anthropometric Measures:  · Height: 5' 8\" (172.7 cm)  · Current Body Weight: 162 lb (73.5 kg)   · Admission Body Weight: 159 lb (72.1 kg)    · Ideal Body Weight: 154 lbs; % Ideal Body Weight 105.2 %   · BMI: 24.6  · BMI Categories: Normal Weight (BMI 18.5-24. 9) Nutrition Diagnosis:   · Inadequate energy intake related to inadequate protein-energy intake as evidenced by other (comment)(Tube feeds have not started; NPO x 2 days)      Nutrition Interventions:   Food and/or Nutrient Delivery:  Start Tube Feeding  Nutrition Education/Counseling:  Education not indicated   Coordination of Nutrition Care:  Continued Inpatient Monitoring    Goals:  Pt will tolerate EN at goal       Nutrition Monitoring and Evaluation:   Food/Nutrient Intake Outcomes:  Enteral Nutrition Intake/Tolerance  Physical Signs/Symptoms Outcomes:  Weight, GI Status     Discharge Planning:    Enteral Nutrition     Electronically signed by Dillon Long RD, LD on 8/24/20 at 8:38 AM EDT    Contact: [phone]

## 2020-08-24 NOTE — PROGRESS NOTES
Pt awake in bed. Brief changed and frederic care provided. Pt repositioned onto opposite side with pillow support and warm blankets. Denies any needs. Call light in reach and bed alarm engaged.

## 2020-08-24 NOTE — PROGRESS NOTES
more than expected from 6.0 to 9.7 with only 2 U PRBC. hgb 10.4 today. He passed a black stool today which was his first BM here and was likely old blood as VS stable and BUN:cr normalized. PLAN    PPI   F/u repeat h/h today. If hgb stable, ok to resume J tube feeds. Discussed with Dr. Pattie Ballesteros, [address]    I have personally performed a face to face diagnostic evaluation on this patient. I have interviewed and examined the patient and I agree with the findings and recommended plan of care. In summary, my findings and plan are the following: pt passed some dark stool today but no BM yest. Hg improved and BUN normal. No N/V. Ok from GI standpoint for d/c on panto 40mg bid.  Will be available if needed further      Lori Carranza MD  [address] [facility]

## 2020-08-24 NOTE — PROGRESS NOTES
Pt shift assessment completed. Pt is alert and orient * 3,Respond well with yes and No questions. Doesn't appear to be in pain. Pt follows command. Pt respiration are regular and unlabored and on 2L of oxygen. Breath sounds CTA. Fluids infusing in right  PIV. Pt was check and changed. Scheduled medications given as ordered. Patient encouraged to use call light with any needs. Patient states understanding, call light in reach, bed alarm on. All safety checks in place and will continue to monitor pt.

## 2020-08-24 NOTE — PROGRESS NOTES
Patient morning vitals and assessment done:all within normal limits. Tube feed ordered and started. Patient bathed and repositioned. Will continue to monitor.

## 2020-08-25 VITALS
HEART RATE: 66 BPM | BODY MASS INDEX: 25.04 KG/M2 | DIASTOLIC BLOOD PRESSURE: 68 MMHG | TEMPERATURE: 97.8 F | RESPIRATION RATE: 18 BRPM | HEIGHT: 68 IN | SYSTOLIC BLOOD PRESSURE: 128 MMHG | WEIGHT: 165.2 LBS | OXYGEN SATURATION: 96 %

## 2020-08-25 PROBLEM — D62 ANEMIA DUE TO ACUTE BLOOD LOSS: Status: ACTIVE | Noted: 2020-08-25

## 2020-08-25 LAB
BASOPHILS ABSOLUTE: 0.1 K/UL (ref 0–0.2)
BASOPHILS RELATIVE PERCENT: 0.8 %
EOSINOPHILS ABSOLUTE: 0.4 K/UL (ref 0–0.6)
EOSINOPHILS RELATIVE PERCENT: 5.8 %
GLUCOSE BLD-MCNC: 126 MG/DL (ref 70–99)
GLUCOSE BLD-MCNC: 133 MG/DL (ref 70–99)
HCT VFR BLD CALC: 30.4 % (ref 40.5–52.5)
HEMOGLOBIN: 10.4 G/DL (ref 13.5–17.5)
LYMPHOCYTES ABSOLUTE: 1.5 K/UL (ref 1–5.1)
LYMPHOCYTES RELATIVE PERCENT: 21 %
MCH RBC QN AUTO: 34 PG (ref 26–34)
MCHC RBC AUTO-ENTMCNC: 34.2 G/DL (ref 31–36)
MCV RBC AUTO: 99.6 FL (ref 80–100)
MONOCYTES ABSOLUTE: 0.6 K/UL (ref 0–1.3)
MONOCYTES RELATIVE PERCENT: 8 %
NEUTROPHILS ABSOLUTE: 4.7 K/UL (ref 1.7–7.7)
NEUTROPHILS RELATIVE PERCENT: 64.4 %
PDW BLD-RTO: 14.5 % (ref 12.4–15.4)
PERFORMED ON: ABNORMAL
PERFORMED ON: ABNORMAL
PLATELET # BLD: 337 K/UL (ref 135–450)
PMV BLD AUTO: 7.8 FL (ref 5–10.5)
RBC # BLD: 3.05 M/UL (ref 4.2–5.9)
WBC # BLD: 7.4 K/UL (ref 4–11)

## 2020-08-25 PROCEDURE — 36415 COLL VENOUS BLD VENIPUNCTURE: CPT

## 2020-08-25 PROCEDURE — 6360000002 HC RX W HCPCS: Performed by: INTERNAL MEDICINE

## 2020-08-25 PROCEDURE — 6370000000 HC RX 637 (ALT 250 FOR IP): Performed by: INTERNAL MEDICINE

## 2020-08-25 PROCEDURE — 85025 COMPLETE CBC W/AUTO DIFF WBC: CPT

## 2020-08-25 PROCEDURE — C9113 INJ PANTOPRAZOLE SODIUM, VIA: HCPCS | Performed by: INTERNAL MEDICINE

## 2020-08-25 RX ORDER — PANTOPRAZOLE SODIUM 40 MG/10ML
40 INJECTION, POWDER, LYOPHILIZED, FOR SOLUTION INTRAVENOUS 2 TIMES DAILY
Status: DISCONTINUED | OUTPATIENT
Start: 2020-08-25 | End: 2020-08-25 | Stop reason: HOSPADM

## 2020-08-25 RX ORDER — PANTOPRAZOLE SODIUM 40 MG/1
40 TABLET, DELAYED RELEASE ORAL
Qty: 180 TABLET | Refills: 1 | Status: ON HOLD | OUTPATIENT
Start: 2020-08-25 | End: 2021-03-20 | Stop reason: HOSPADM

## 2020-08-25 RX ORDER — LORAZEPAM 0.5 MG/1
0.5 TABLET ORAL EVERY 12 HOURS PRN
Qty: 20 TABLET | Refills: 0 | Status: SHIPPED | OUTPATIENT
Start: 2020-08-25 | End: 2020-09-24

## 2020-08-25 RX ADMIN — LAMOTRIGINE 400 MG: 100 TABLET ORAL at 09:06

## 2020-08-25 RX ADMIN — Medication 2000 UNITS: at 09:06

## 2020-08-25 RX ADMIN — POTASSIUM CHLORIDE 10 MEQ: 750 TABLET, FILM COATED, EXTENDED RELEASE ORAL at 09:06

## 2020-08-25 RX ADMIN — BACLOFEN 15 MG: 10 TABLET ORAL at 09:07

## 2020-08-25 RX ADMIN — ZINC OXIDE: 200 OINTMENT TOPICAL at 09:53

## 2020-08-25 RX ADMIN — MICONAZOLE NITRATE: 20 POWDER TOPICAL at 09:20

## 2020-08-25 RX ADMIN — PANTOPRAZOLE SODIUM 40 MG: 40 INJECTION, POWDER, FOR SOLUTION INTRAVENOUS at 05:44

## 2020-08-25 RX ADMIN — ESCITALOPRAM OXALATE 10 MG: 10 TABLET ORAL at 09:06

## 2020-08-25 RX ADMIN — BACLOFEN 15 MG: 10 TABLET ORAL at 14:50

## 2020-08-25 RX ADMIN — LACTULOSE 20 G: 20 SOLUTION ORAL at 09:44

## 2020-08-25 RX ADMIN — DOXAZOSIN 4 MG: 4 TABLET ORAL at 09:06

## 2020-08-25 RX ADMIN — SENNOSIDES 17.2 MG: 8.6 TABLET, FILM COATED ORAL at 09:06

## 2020-08-25 RX ADMIN — QUETIAPINE FUMARATE 50 MG: 25 TABLET ORAL at 09:11

## 2020-08-25 RX ADMIN — Medication 500 MG: at 09:06

## 2020-08-25 RX ADMIN — MULTIVITAMIN 15 ML: LIQUID ORAL at 11:50

## 2020-08-25 RX ADMIN — QUETIAPINE FUMARATE 50 MG: 25 TABLET ORAL at 14:50

## 2020-08-25 RX ADMIN — LEVETIRACETAM 1500 MG: 500 SOLUTION ORAL at 11:49

## 2020-08-25 ASSESSMENT — PAIN SCALES - GENERAL
PAINLEVEL_OUTOF10: 0

## 2020-08-25 NOTE — PROGRESS NOTES
Assessment completed, see doc flowsheets. Pt is alert, unable to assess oriented level d/t unable to comprehend speech. Lung sounds are clear. VSS. Medication given per [facility]. Patient has no needs at this time. Call light within in reach, will continue to monitor.

## 2020-08-25 NOTE — CARE COORDINATION
Discharge Plan:     Patient discharged to: [facility]  [address]  Phone [phone]   [phone]  SW/DC Planner faxed, [phone]   Narcotic Prescriptions faxed were:  Yes- ATIVAN 0.5 MG  RN: Sebastián Blanco will call report to:    [phone]   Medical Transport with: [facility]  [phone]   time:  5 pm  Family advised of discharge?: Rosy Martins?:   N/A  All discharge needs met per case management.

## 2020-08-25 NOTE — PROGRESS NOTES
Department of Internal Medicine  General Internal Medicine   Progress Note      SUBJECTIVE: appears in no distress  No evidence of any recurrent melena       History obtained from chart review and the patient's nursing staff   General ROS: positive for  - fatigue and malaise  negative for - chills, fever or night sweats  Psychological ROS: negative  Respiratory ROS: no cough, shortness of breath, or wheezing  Cardiovascular ROS: positive for - , dyspnea on exertion and shortness of breath  negative for - chest pain  Gastrointestinal ROS: no abdominal pain, change in bowel habits, or black or bloody stools    OBJECTIVE      Medications      Current Facility-Administered Medications: albuterol sulfate  (90 Base) MCG/ACT inhaler 2 puff, 2 puff, Inhalation, Q4H PRN  ipratropium (ATROVENT HFA) 17 MCG/ACT inhaler 2 puff, 2 puff, Inhalation, Q4H PRN  pantoprazole (PROTONIX) injection 80 mg, 80 mg, Intravenous, Once  acetaminophen (TYLENOL) 160 MG/5ML solution 650 mg, 650 mg, Per G Tube, Q4H PRN  vitamin C (ASCORBIC ACID) tablet 500 mg, 500 mg, Per G Tube, BID  baclofen (LIORESAL) tablet 15 mg, 15 mg, Per G Tube, TID  bisacodyl (DULCOLAX) suppository 10 mg, 10 mg, Rectal, Daily PRN  bisacodyl (DULCOLAX) EC tablet 10 mg, 10 mg, Oral, PRN  chlorhexidine (PERIDEX) 0.12 % solution 7.5 mL, 7.5 mL, Mouth/Throat, BID  Vitamin D (CHOLECALCIFEROL) tablet 2,000 Units, 2,000 Units, Per G Tube, Daily  doxazosin (CARDURA) tablet 4 mg, 4 mg, Per G Tube, Daily  escitalopram (LEXAPRO) tablet 10 mg, 10 mg, Per G Tube, Daily  guaiFENesin (ROBITUSSIN) 100 MG/5ML oral solution 200 mg, 200 mg, Oral, Q4H PRN  lactulose (CHRONULAC) 10 GM/15ML solution 20 g, 20 g, Per G Tube, Daily  lamoTRIgine (LAMICTAL) tablet 400 mg, 400 mg, Oral, BID  levETIRAcetam (KEPPRA) 100 MG/ML solution 1,500 mg, 1,500 mg, Per G Tube, BID  LORazepam (ATIVAN) tablet 0.5 mg, 0.5 mg, Oral, W99B PRN  CENTRUM/CERTA-KENTON with minerals oral solution 15 mL, 15 mL, Per G

## 2020-08-25 NOTE — PROGRESS NOTES
Department of Internal Medicine  General Internal Medicine   Progress Note      SUBJECTIVE: appears to be in no acute distress  But nurse Cami Carbone has reported a large liquid BM in dark color        History obtained from chart review and the patient's nursing staff   General ROS: positive for  - fatigue and malaise  negative for - chills, fever or night sweats  Psychological ROS: negative  Respiratory ROS: no cough, shortness of breath, or wheezing  Cardiovascular ROS: positive for - , dyspnea on exertion and shortness of breath  negative for - chest pain  Gastrointestinal ROS: no abdominal pain, change in bowel habits, or black or bloody stools    OBJECTIVE      Medications      Current Facility-Administered Medications: albuterol sulfate  (90 Base) MCG/ACT inhaler 2 puff, 2 puff, Inhalation, Q4H PRN  ipratropium (ATROVENT HFA) 17 MCG/ACT inhaler 2 puff, 2 puff, Inhalation, Q4H PRN  pantoprazole (PROTONIX) injection 80 mg, 80 mg, Intravenous, Once  acetaminophen (TYLENOL) 160 MG/5ML solution 650 mg, 650 mg, Per G Tube, Q4H PRN  vitamin C (ASCORBIC ACID) tablet 500 mg, 500 mg, Per G Tube, BID  baclofen (LIORESAL) tablet 15 mg, 15 mg, Per G Tube, TID  bisacodyl (DULCOLAX) suppository 10 mg, 10 mg, Rectal, Daily PRN  bisacodyl (DULCOLAX) EC tablet 10 mg, 10 mg, Oral, PRN  chlorhexidine (PERIDEX) 0.12 % solution 7.5 mL, 7.5 mL, Mouth/Throat, BID  Vitamin D (CHOLECALCIFEROL) tablet 2,000 Units, 2,000 Units, Per G Tube, Daily  doxazosin (CARDURA) tablet 4 mg, 4 mg, Per G Tube, Daily  escitalopram (LEXAPRO) tablet 10 mg, 10 mg, Per G Tube, Daily  guaiFENesin (ROBITUSSIN) 100 MG/5ML oral solution 200 mg, 200 mg, Oral, Q4H PRN  lactulose (CHRONULAC) 10 GM/15ML solution 20 g, 20 g, Per G Tube, Daily  lamoTRIgine (LAMICTAL) tablet 400 mg, 400 mg, Oral, BID  levETIRAcetam (KEPPRA) 100 MG/ML solution 1,500 mg, 1,500 mg, Per G Tube, BID  LORazepam (ATIVAN) tablet 0.5 mg, 0.5 mg, Oral, N15P PRN  CENTRUM/CERTA-KENTON with minerals oral solution 15 mL, 15 mL, Per G Tube, Daily  nystatin (MYCOSTATIN) cream, , Topical, BID PRN  miconazole (MICOTIN) 2 % powder, , Topical, BID  potassium chloride (KLOR-CON) extended release tablet 10 mEq, 10 mEq, Oral, BID  QUEtiapine (SEROQUEL) tablet 50 mg, 50 mg, Per G Tube, BID  QUEtiapine (SEROQUEL) tablet 100 mg, 100 mg, Oral, Nightly  senna (SENOKOT) tablet 17.2 mg, 2 tablet, Per G Tube, Daily  zinc oxide 20 % ointment, , Topical, BID  pantoprazole (PROTONIX) 80 mg in sodium chloride 0.9 % 100 mL infusion, 8 mg/hr, Intravenous, Continuous  lactated ringers infusion, , Intravenous, Continuous    Physical      VITALS:  BP (!) 145/79   Pulse 61   Temp 97 °F (36.1 °C) (Axillary)   Resp 20   Ht 5' 8\" (1.727 m)   Wt 162 lb 12.8 oz (73.8 kg)   SpO2 100%   BMI 24.75 kg/m²   TEMPERATURE:  Current - Temp: 97 °F (36.1 °C);  Max - Temp  Av.5 °F (36.4 °C)  Min: 97 °F (36.1 °C)  Max: 98.6 °F (37 °C)  RESPIRATIONS RANGE: Resp  Av  Min: 15  Max: 22  PULSE RANGE: Pulse  Av.8  Min: 47  Max: 72  BLOOD PRESSURE RANGE:  Systolic (02BJG), TYN:375 , Min:91 , TAYA:190   ; Diastolic (44CSK), CG, Min:44, Max:79    PULSE OXIMETRY RANGE: SpO2  Av.6 %  Min: 97 %  Max: 100 %  24HR INTAKE/OUTPUT:    No intake or output data in the 24 hours ending 20  CONSTITUTIONAL:  fatigued, somnolent, uncooperative, distracted, severe distress and appears older than stated age  NECK:  supple, symmetrical, trachea midline, skin normal and no stridor  BACK:  symmetric  LUNGS:  Decreased BS taras crackles , few wheezes   CARDIOVASCULAR:  normal apical pulses, tachycardic with regular rhythm and normal S1 and S2  ABDOMEN:  Soft BS + non tender   MUSCULOSKELETAL:  Contracted      NEUROLOGIC:  Right hemiplegia   SKIN:  Warm and dry  and no bruising or bleeding    Data      No results found for: PHART, PO2ART, IEQ8LBY, XGA4WNU, BEART, H5LLMOZI    Lab Results   Component Value Date     2020    K 4.1 08/24/2020    K 5.2 08/21/2020     08/24/2020    CO2 21 08/24/2020    BUN 15 08/24/2020    CREATININE <0.5 08/24/2020    GLUCOSE 94 08/24/2020    CALCIUM 8.5 08/24/2020     Lab Results   Component Value Date    WBC 7.1 08/24/2020    HGB 11.0 08/24/2020    HCT 31.5 08/24/2020    .3 08/24/2020     08/24/2020         Lab Results   Component Value Date    INR 0.99 09/17/2018    PROTIME 11.3 09/17/2018       ASSESSMENT AND PLAN      Patient Active Problem List   Diagnosis    Anemia    Altered mental status    Hyponatremia    Jejunostomy malfunction (HCC)    Cerebral palsy (HCC)    Seizure disorder (HCC)    Gastrostomy malfunction ([address]    Nuclear sclerosis    Fecal impaction (HCC)    Anemia, chronic disease    Partial epilepsy with impairment of consciousness, intractable ([address]    Mental retardation    Malfunction of gastrostomy tube ([address]    Pneumonia    Hypoxemia    Hypotension    Acute encephalopathy    Recurrent seizures ([address]    Prolapse, intestine    Upper GI hemorrhage     Ct PPI  BID    no indication of transfusion   Advance J tube feeding if   Hgb stable can be dimssed in am

## 2020-08-25 NOTE — DISCHARGE INSTR - COC
Continuity of Care Form    Patient Name: Viri Calvert   :  1950  MRN:  [id number]    Admit date:  2020  Discharge date:  2020    Code Status Order: Full Code   Advance Directives:   [facility] Documentation     Date/Time Healthcare Directive Type of Healthcare Directive Copy in [address] Agent's Name Healthcare Agent's Phone Number    [phone]  Yes, patient has an advance directive for healthcare treatment  Durable power of  for health care  --  Legal Guardian  Sissy Mendoza  --          Admitting Physician:  Dot Peterson MD  PCP: Sofia Valdivia    Discharging Nurse: Fermin. [address] [facility] Final Unit/Room#: 1YZ-2514/8163-69  Discharging Unit Phone Number: [phone]    Emergency Contact:   Extended Emergency Contact Information  Primary Emergency Contact: Caroline Caldwell, [address] [place] [address] [address]  Phone: [phone]  Relation: Legal Guardian  Secondary Emergency Contact: Becca Jeffries [facility] [address]  Phone: [phone]  Relation: None    Past Surgical History:  Past Surgical History:   Procedure Laterality Date    ABDOMEN SURGERY      can see scar/details unknown, feeding tube    ABDOMINAL EXPLORATION SURGERY  14    LAPAROTOMY EXPLORATORY, ERIKA-EN-Y TUBE JEJUNOSTOMY, SMALL BOWEL RESECTION                                              COLONOSCOPY      COLONOSCOPY N/A 2019    COLONOSCOPY WITH BIOPSY performed by cL Villar MD at [address]  14    with dilitation    DILATATION, ESOPHAGUS      ENDOSCOPY, COLON, DIAGNOSTIC      OTHER SURGICAL HISTORY  13    JEJUNOSTOMY TUBE INSERTION    SIGMOIDOSCOPY N/A 2019    SIGMOIDOSCOPY DIAGNOSTIC FLEXIBLE performed by Lc Villar MD at [facility] N/A 2020    REPLACEMENT, JEJUNOSTOMY TUBE performed by Lc Villar MD at [address] UPPER GASTROINTESTINAL ENDOSCOPY  1/24/13    PEG placement    UPPER GASTROINTESTINAL ENDOSCOPY  4/26/13    UPPER GASTROINTESTINAL ENDOSCOPY N/A 10/11/2019    EGD DILATION BALLOON performed by Alexandre Ho MD at [address] ENDOSCOPY N/A 8/22/2020    EGD BIOPSY performed by Beatrice Menjivar MD at [address]       Immunization History:   Immunization History   Administered Date(s) Administered    Influenza Vaccine, unspecified formulation 10/02/2016    Influenza Virus Vaccine 10/16/2009, 09/17/2012, 11/30/2014    Influenza Whole 09/30/2013    Pneumococcal Conjugate 13-valent ([id number]) 10/02/2016    Pneumococcal Conjugate 7-valent (Bunny Nicky) 11/15/2007, 12/02/2013       Active Problems:  Patient Active Problem List   Diagnosis Code    Anemia D64.9    Altered mental status R41.82    Hyponatremia E87.1    Jejunostomy malfunction ([address] K94.13    Cerebral palsy ([address] G80.9    Seizure disorder ([address] G40.909    Gastrostomy malfunction ([address] K94.23    Nuclear sclerosis H25.10    Fecal impaction ([address] K56.41    Anemia, chronic disease D63.8    Partial epilepsy with impairment of consciousness, intractable ([address] G40.219    Mental retardation F79    Malfunction of gastrostomy tube ([address] K94.23    Pneumonia J18.9    Hypoxemia R09.02    Hypotension I95.9    Acute encephalopathy G93.40    Recurrent seizures ([address] G40.909    Prolapse, intestine K63.4    Upper GI hemorrhage K92.2       Isolation/Infection:   Isolation          No Isolation        Patient Infection Status     Infection Onset Added Last Indicated Last Indicated By Review Planned Expiration Resolved Resolved By    None active    Resolved    COVID-19 Rule Out 08/21/20 08/21/20 08/21/20 COVID-19 (Ordered)   08/22/20 Rule-Out Test Resulted          Nurse Assessment:  Last Vital Signs: /75   Pulse 60   Temp 97.8 °F (36.6 °C) (Axillary)   Resp 20   Ht 5' 8\" (1.727 m)   Wt 165 lb 3.2 oz (74.9 kg)   SpO2 96%   BMI 25.12 kg/m²     Last documented pain score (0-10 scale): Pain Level: 0  Last Weight:   Wt Readings from Last 1 Encounters:   08/25/20 165 lb 3.2 oz (74.9 kg)     Mental Status:  alert    IV Access:  - None    Nursing Mobility/ADLs:  Walking   Dependent  Transfer  Dependent  Bathing  Dependent  Dressing  Dependent  Toileting  Dependent  Feeding  Dependent  Med Admin  Dependent  Med Delivery   crushed    Wound Care Documentation and Therapy:        Elimination:  Continence:   · Bowel: No  · Bladder: No  Urinary Catheter: None   Colostomy/Ileostomy/Ileal Conduit: No       Date of Last BM: 8/24/2020  No intake or output data in the 24 hours ending 08/25/20 0945  No intake/output data recorded. Safety Concerns:     History of Falls (last 30 days) and Aspiration Risk    Impairments/Disabilities:      Contractures - left side of body    Nutrition Therapy:  Current Nutrition Therapy:   - Tube Feedings:  Fluid Restricted    Routes of Feeding: Jejunal Tube  Liquids: NPO  Daily Fluid Restriction: yes - amount n/a  Last Modified Barium Swallow with Video (Video Swallowing Test): not done    Treatments at the Time of Hospital Discharge:   Respiratory Treatments: prn  Oxygen Therapy:  is not on home oxygen therapy.   Ventilator:    - BiPAP   IPAP: 12 cmH20, CPAP/EPAP: 6 cmH2O only when sleeping    Rehab Therapies: ***  Weight Bearing Status/Restrictions: No weight bearing restirctions  Other Medical Equipment (for information only, NOT a DME order):  {EQUIPMENT:[id number]}  Other Treatments: ***    Patient's personal belongings (please select all that are sent with patient):  None    RN SIGNATURE:  Electronically signed by Lamarr Rinne, RN on 8/25/20 at 10:24 AM EDT    CASE MANAGEMENT/SOCIAL WORK SECTION    Inpatient Status Date:  08/21/20    Readmission Risk Assessment Score:  Readmission Risk              Risk of Unplanned Readmission:        15           Discharging to Facility/ Agency   Name: [facility]ina S[facility]  Phone [phone]  · Fax [phone]        Dialysis Facility (if applicable)   · Name:  · Address:  · Dialysis Schedule:  · Phone:  · Fax:    / signature: Electronically signed by Mahnaz Ge RN on 8/25/20 at 10:07 AM EDT    PHYSICIAN SECTION    Prognosis: Poor    Condition at Discharge: Stable    Rehab Potential (if transferring to Rehab): Poor    Recommended Labs or Other Treatments After Discharge: cbc in 1 week     Physician Certification: I certify the above information and transfer of Fransisco Lopez  is necessary for the continuing treatment of the diagnosis listed and that he requires Intermediate/Mental Retardation/Developmental Disabilities Care for greater 30 days.      Update Admission H&P: No change in H&P    PHYSICIAN SIGNATURE:  Electronically signed by Alejandra Marinelli MD on 8/25/20 at 10:36 AM EDT

## 2020-09-24 NOTE — PROGRESS NOTES
Patient seen , discharge dictated scripts given , arrangements made , HANK completed .  Discussed with nursing staff  And   If applicable ,  Discussed with  Patient's family , all questions answered and concerns addressed  When applicable

## 2020-09-25 NOTE — DISCHARGE SUMMARY
[facility]                     [facility], [address]                               DISCHARGE SUMMARY    PATIENT NAME: Howard Junior                     :        1950  MED REC NO:   [id number]                          ROOM:       2227  ACCOUNT NO:   [de-identified]                           ADMIT DATE: 2020  PROVIDER:     Janice Birch MD                  DISCHARGE DATE:  2020    FINAL DIAGNOSES:  1. Upper GI hemorrhage. 2.  Anemia due to acute blood loss. 3.  Prerenal azotemia. 4.  Nuclear sclerosis. 5.  Functional quadriplegia. 6.  Severe intellectual disability. DISCHARGE MEDICATIONS:  1. Ativan 0.5 every 12 hours p.r.n.  2.  Protonix 40 mg twice a day before meals. 3.  DuoNeb one every four hours p.r.n.  4.  Dulcolax suppository as directed. 5.  Dulcolax tablets 10 mg daily. 6.  Mycostatin powder apply topically b.i.d.  7.  Senokot 8.6 daily. 8.  Vitamin D3 2000 units daily. 9.  Robitussin 200 mg oral solution every four hours as needed for  cough. 10.  Fleet's enema p.r.n. 11.  Zinc oxide ointment as directed. 12.  Diastat gel 20 mg rectally as needed for seizures. 13.  Baclofen 15 mg p.o. t.i.d.  14.  Lamictal 400 mg twice a day per G-tube. 15.  Seroquel 100 mg nightly per G-tube. 16.  Tylenol 650 q.4 p.r.n.  17.  Keppra 1500 mg per G-tube b.i.d. 18.  Lorazepam 0.5 every 12 p.r.n.  19.  Chronulac 20 gm per G-tube daily. 20.  Cardura 4 mg daily. 21.  Multivitamin once a day. 22.  Potassium chloride 10 mEq twice a day. 23.  Vitamin C 500 mg daily. 24.  Seroquel 50 mg by mouth twice a day. 25.  Escitalopram 10 mg daily. HOSPITAL COURSE:  This intellectually disabled person with cerebral  palsy and nuclear sclerosis came to the emergency room with history of  coffee ground emesis. The patient appeared to be dehydrated, treated  with IV hydration. The patient had stable vital signs.   The patient was  unable to give any history. Blood pressure was 92/49, respirations was  18, heart rate 98. The patient had a gastrostomy tube. The patient was  referred to GI service. The patient had serial hemoglobin and  hematocrit monitoring. CT of the abdomen and pelvis shows no acute  abdominopelvic process, left hiatal hernia, small right pleural  effusion, stable left adrenal adenoma. Chest x-ray showed mild  cardiomegaly, upper GI hemorrhage, anemia due to acute blood loss,  prerenal azotemia treated with IV hydration, IV Protonix infusion, GI  consultation. The patient was a full code. Dr. April Good saw the  patient. The patient has esophagitis and Kenna-Mitchell tear noted on  EGD. There was no blood throughout the examination. The drop in  hemoglobin was not critical enough that needed transfusion. The patient  was started on PPI and G-tube feeding resumed. The patient did need  actually 2 units of blood transfusion because of the critically low  hemoglobin, but finally the patient was discharged in stable condition  after advancing the diet. Hemoglobin and hematocrit remaining stable  and vital signs remaining stable. The patient was discharged to [address] [place] [address]. As always, it was a pleasure to take care of your patients at [facility].         Justine Lacey MD    D: 09/24/2020 1:09:01       T: 09/24/2020 5:00:46     [email or web address]  Job#: [id number]     Doc#: [id number]    CC:  MD Rubi Monte

## 2020-11-26 ENCOUNTER — APPOINTMENT (OUTPATIENT)
Dept: GENERAL RADIOLOGY | Age: 70
End: 2020-11-26
Payer: MEDICARE

## 2020-11-26 ENCOUNTER — HOSPITAL ENCOUNTER (EMERGENCY)
Age: 70
Discharge: HOME | End: 2020-11-27
Payer: MEDICARE

## 2020-11-26 VITALS
RESPIRATION RATE: 18 BRPM | OXYGEN SATURATION: 97 % | DIASTOLIC BLOOD PRESSURE: 71 MMHG | HEIGHT: 68 IN | TEMPERATURE: 98.4 F | HEART RATE: 68 BPM | WEIGHT: 165 LBS | BODY MASS INDEX: 25.01 KG/M2 | SYSTOLIC BLOOD PRESSURE: 144 MMHG

## 2020-11-26 PROCEDURE — 99282 EMERGENCY DEPT VISIT SF MDM: CPT

## 2020-11-26 PROCEDURE — 49465 FLUORO EXAM OF G/COLON TUBE: CPT

## 2020-11-26 ASSESSMENT — ENCOUNTER SYMPTOMS
SHORTNESS OF BREATH: 0
ABDOMINAL PAIN: 0
CHEST TIGHTNESS: 0
VOMITING: 0
DIARRHEA: 0
NAUSEA: 0

## 2020-11-26 NOTE — ED NOTES
Report given to RN at [facility], no further questions at this time.       Christy Chen, CONNOR  11/26/20 9818

## 2020-11-26 NOTE — ED NOTES
Bed: 23  Expected date:   Expected time:   Means of arrival:   Comments:  Squad Oral Ormond, RN  11/26/20 3967

## 2020-11-26 NOTE — ED PROVIDER NOTES
HPI.  Except as noted above in the ROS, all other systems were reviewed and negative.        PAST MEDICAL HISTORY     Past Medical History:   Diagnosis Date    Anemia, macrocytic     Anhidrosis     Anhidrosis     Bilateral cataracts     Bipolar affective ([address]     Blood circulation, collateral     unspecified PVD    Cataract     Clostridium difficile diarrhea 3/7/15    Depression     Dermatophytosis     Drug induced hepatitis     GERD (gastroesophageal reflux disease)     Hard of hearing     Impulse control disorder     Liver disease     drug induced    Mental handicap     Movement disorder     Neurogenic bladder     Neuromuscular disorder (HCC)     spastic hemiplegia, MDS,neurogenic bladder    NS (nuclear sclerosis)     Nuclear sclerosis     Osteoarthritis     Periodontal disease     Pneumonia 1/17/2013    Primary optic atrophy     Primary optic atrophy     PVD (peripheral vascular disease) ([address]     Seizure ([address]     last seizure 9/20/19    Spastic hemiplegia     Tinea pedis     Unspecified diseases of blood and blood-forming organs     anemia    Urinary incontinence          SURGICAL HISTORY     Past Surgical History:   Procedure Laterality Date    ABDOMEN SURGERY      can see scar/details unknown, feeding tube    ABDOMINAL EXPLORATION SURGERY  9-28-14    LAPAROTOMY EXPLORATORY, ERIKA-EN-Y TUBE JEJUNOSTOMY, SMALL BOWEL RESECTION                                              COLONOSCOPY      COLONOSCOPY N/A 6/4/2019    COLONOSCOPY WITH BIOPSY performed by Todd Molina MD at [address] [place] [address]  6/25/14    with dilitation    DILATATION, ESOPHAGUS      ENDOSCOPY, COLON, DIAGNOSTIC      OTHER SURGICAL HISTORY  4-30-13    JEJUNOSTOMY TUBE INSERTION    SIGMOIDOSCOPY N/A 5/2/2019    SIGMOIDOSCOPY DIAGNOSTIC FLEXIBLE performed by Todd Molina MD at [facility] N/A 4/21/2020    REPLACEMENT, JEJUNOSTOMY TUBE performed by Todd Molina MD at [address]  1/24/13    PEG placement    UPPER GASTROINTESTINAL ENDOSCOPY  4/26/13    UPPER GASTROINTESTINAL ENDOSCOPY N/A 10/11/2019    EGD DILATION BALLOON performed by Ginger Rivera MD at [address] 8/22/2020    EGD BIOPSY performed by Praveena Vega MD at [facility]       Previous Medications    ACETAMINOPHEN (TYLENOL) 160 MG/5ML SOLUTION    20.3 mLs by Per G Tube route every 4 hours as needed for Fever or Pain    ASCORBIC ACID (VITAMIN C) 500 MG TABLET    500 mg by Per G Tube route 2 times daily. BACLOFEN (LIORESAL) 10 MG TABLET    15 mg by Per G Tube route 3 times daily     BISACODYL (DULCOLAX) 10 MG SUPPOSITORY    Place 10 mg rectally daily as needed If no BM in 48 hours    BISACODYL (DULCOLAX) 5 MG EC TABLET    Take 10 mg by mouth as needed (No bowel movement in 48 hours)    CHLORHEXIDINE (PERIDEX) 0.12 % SOLUTION    Take 7.5 mLs by mouth 2 times daily. CHOLECALCIFEROL (VITAMIN D3) 2000 UNITS CAPS    2,000 Units by Per G Tube route daily     DIAZEPAM (DIASTAT) 10 MG GEL    Place 20 mg rectally once as needed (seizures lasting over 5 minutes). DOXAZOSIN (CARDURA) 4 MG TABLET    4 mg by Per G Tube route daily     ESCITALOPRAM (LEXAPRO) 10 MG TABLET    10 mg by Per G Tube route daily. GUAIFENESIN (ROBITUSSIN) 100 MG/5ML SOLN ORAL SOLUTION    Take 200 mg by mouth every 4 hours as needed for Cough    IPRATROPIUM-ALBUTEROL (DUONEB) 0.5-2.5 (3) MG/3ML SOLN NEBULIZER SOLUTION    Inhale 1 vial into the lungs every 4 hours    LACTULOSE (CHRONULAC) 10 GM/15ML SOLUTION    20 g by Per G Tube route daily     LAMOTRIGINE (LAMICTAL) 150 MG TABLET    Take 400 mg by mouth 2 times daily Per g-tube    LEVETIRACETAM (KEPPRA) 100 MG/ML SOLUTION    15 mLs by Per G Tube route 2 times daily    LORAZEPAM (ATIVAN) 0.5 MG TABLET    Take 0.5 mg by mouth every 12 hours as needed for Anxiety. hemiparesis. HENT:      Head: Normocephalic and atraumatic. Right Ear: External ear normal.      Left Ear: External ear normal.   Eyes:      General: No scleral icterus. Right eye: No discharge. Left eye: No discharge. Conjunctiva/sclera: Conjunctivae normal.   Neck:      Musculoskeletal: Normal range of motion. Vascular: No JVD. Cardiovascular:      Rate and Rhythm: Normal rate and regular rhythm. Heart sounds: No murmur. No friction rub. No gallop. Pulmonary:      Effort: Pulmonary effort is normal. No accessory muscle usage or respiratory distress. Breath sounds: Normal breath sounds. No wheezing, rhonchi or rales. Abdominal:      General: Abdomen is flat. A surgical scar is present. Bowel sounds are normal. There is no distension. Palpations: Abdomen is soft. Abdomen is not rigid. There is no mass. Tenderness: There is no abdominal tenderness. There is no guarding or rebound. Skin:     General: Skin is warm and dry. Neurological:      Mental Status: He is alert and oriented to person, place, and time. GCS: GCS eye subscore is 4. GCS verbal subscore is 5. GCS motor subscore is 6. Cranial Nerves: No cranial nerve deficit. Sensory: No sensory deficit. Coordination: Coordination normal.   Psychiatric:         Behavior: Behavior normal. Behavior is cooperative. DIAGNOSTIC RESULTS   LABS:    Labs Reviewed - No data to display    All other labs were within normal range or not returned as of this dictation. EKG: All EKG's are interpreted by the Emergency Department Physician in the absence of a cardiologist.  Please see their note for interpretation of EKG.       RADIOLOGY:   Non-plain film images such as CT, Ultrasound and MRI are read by the radiologist. Plain radiographic images are visualized and preliminarily interpreted by the ED Provider with the below findings:        Interpretation per the Radiologist below, if available at the time of this note:    XR INJ CONTRAST GASTRIC TUBE PERC   Final Result   Enteric contrast injected into the jejunostomy tube remains intraluminal   without extravasation. Nonobstructive bowel gas pattern. 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 No results found. PROCEDURES   Unless otherwise noted below, none     Feeding Tube    Date/Time: 11/26/2020 4:53 PM  Performed by: Liudmila Dixon PA-C  Authorized by: Liudmila Dixon PA-C     Consent:     Consent obtained:  Verbal    Consent given by:  Patient    Risks discussed:  Bleeding, infection and pain  Pre-procedure details: Old tube type:  Jejunostomy    Old tube size: 20. Anesthesia (see MAR for exact dosages): Anesthesia method:  None  Procedure details:     Patient position:  Supine    Procedure type:  Replacement    Tube type:  Jejunostomy    Tube size: 20. Bulb inflation volume:  10    Bulb inflation fluid:  Normal saline  Post-procedure details:     Placement/position confirmation:  Auscultation, contrast, gastric contents aspirated and x-ray    Placement difficulty:  None    Bleeding:  None    Patient tolerance of procedure: Tolerated well, no immediate complications        CRITICAL CARE TIME   N/A    CONSULTS:  None      EMERGENCY DEPARTMENT COURSE and DIFFERENTIAL DIAGNOSIS/MDM:   Vitals:    Vitals:    11/26/20 1641   BP: (!) 144/71   Pulse: 68   Resp: 18   Temp: 98.4 °F (36.9 °C)   TempSrc: Oral   SpO2: 97%   Weight: 165 lb (74.8 kg)   Height: 5' 8\" (1.727 m)       Patient was given the following medications:  Medications   iohexol (OMNIPAQUE 350) 350 MG/ML solution (has no administration in time range)           The patient's detailed history of present illness is documented as above. Upon arrival to the emergency department the patient's vital signs are as documented. The patient is noted to be hemodynamically stable and afebrile. Physical examination findings are as above.   The words are mis-transcribed.)    Marnie Henry PA-C (electronically signed)           Laney Butts PA-C  11/26/20 9641

## 2020-12-05 ENCOUNTER — APPOINTMENT (OUTPATIENT)
Dept: GENERAL RADIOLOGY | Age: 70
End: 2020-12-05
Payer: MEDICARE

## 2020-12-05 ENCOUNTER — HOSPITAL ENCOUNTER (EMERGENCY)
Age: 70
Discharge: HOME | End: 2020-12-06
Payer: MEDICARE

## 2020-12-05 ENCOUNTER — APPOINTMENT (OUTPATIENT)
Dept: CT IMAGING | Age: 70
End: 2020-12-05
Payer: MEDICARE

## 2020-12-05 LAB
A/G RATIO: 1.3 (ref 1.1–2.2)
ALBUMIN SERPL-MCNC: 3.9 G/DL (ref 3.4–5)
ALP BLD-CCNC: 140 U/L (ref 40–129)
ALT SERPL-CCNC: 20 U/L (ref 10–40)
ANION GAP SERPL CALCULATED.3IONS-SCNC: 8 MMOL/L (ref 3–16)
AST SERPL-CCNC: 26 U/L (ref 15–37)
BASOPHILS ABSOLUTE: 0.1 K/UL (ref 0–0.2)
BASOPHILS RELATIVE PERCENT: 0.7 %
BILIRUB SERPL-MCNC: <0.2 MG/DL (ref 0–1)
BUN BLDV-MCNC: 22 MG/DL (ref 7–20)
CALCIUM SERPL-MCNC: 8.9 MG/DL (ref 8.3–10.6)
CHLORIDE BLD-SCNC: 103 MMOL/L (ref 99–110)
CO2: 27 MMOL/L (ref 21–32)
CREAT SERPL-MCNC: <0.5 MG/DL (ref 0.8–1.3)
EOSINOPHILS ABSOLUTE: 0.1 K/UL (ref 0–0.6)
EOSINOPHILS RELATIVE PERCENT: 1.2 %
GFR AFRICAN AMERICAN: >60
GFR NON-AFRICAN AMERICAN: >60
GLOBULIN: 3.1 G/DL
GLUCOSE BLD-MCNC: 123 MG/DL (ref 70–99)
HCT VFR BLD CALC: 30.4 % (ref 40.5–52.5)
HEMOGLOBIN: 9.8 G/DL (ref 13.5–17.5)
LIPASE: 46 U/L (ref 13–60)
LYMPHOCYTES ABSOLUTE: 1.2 K/UL (ref 1–5.1)
LYMPHOCYTES RELATIVE PERCENT: 14.5 %
MCH RBC QN AUTO: 29.7 PG (ref 26–34)
MCHC RBC AUTO-ENTMCNC: 32.3 G/DL (ref 31–36)
MCV RBC AUTO: 92 FL (ref 80–100)
MONOCYTES ABSOLUTE: 0.6 K/UL (ref 0–1.3)
MONOCYTES RELATIVE PERCENT: 6.6 %
NEUTROPHILS ABSOLUTE: 6.6 K/UL (ref 1.7–7.7)
NEUTROPHILS RELATIVE PERCENT: 77 %
PDW BLD-RTO: 16.3 % (ref 12.4–15.4)
PLATELET # BLD: 339 K/UL (ref 135–450)
PMV BLD AUTO: 8 FL (ref 5–10.5)
POTASSIUM REFLEX MAGNESIUM: 4.4 MMOL/L (ref 3.5–5.1)
RBC # BLD: 3.31 M/UL (ref 4.2–5.9)
SODIUM BLD-SCNC: 138 MMOL/L (ref 136–145)
TOTAL PROTEIN: 7 G/DL (ref 6.4–8.2)
WBC # BLD: 8.5 K/UL (ref 4–11)

## 2020-12-05 PROCEDURE — 36415 COLL VENOUS BLD VENIPUNCTURE: CPT

## 2020-12-05 PROCEDURE — 74177 CT ABD & PELVIS W/CONTRAST: CPT

## 2020-12-05 PROCEDURE — 80053 COMPREHEN METABOLIC PANEL: CPT

## 2020-12-05 PROCEDURE — 99283 EMERGENCY DEPT VISIT LOW MDM: CPT

## 2020-12-05 PROCEDURE — 85025 COMPLETE CBC W/AUTO DIFF WBC: CPT

## 2020-12-05 PROCEDURE — 71045 X-RAY EXAM CHEST 1 VIEW: CPT

## 2020-12-05 PROCEDURE — 83690 ASSAY OF LIPASE: CPT

## 2020-12-05 PROCEDURE — 6360000004 HC RX CONTRAST MEDICATION: Performed by: PHYSICIAN ASSISTANT

## 2020-12-05 RX ADMIN — IOHEXOL 50 ML: 240 INJECTION, SOLUTION INTRATHECAL; INTRAVASCULAR; INTRAVENOUS; ORAL at 22:36

## 2020-12-05 RX ADMIN — IOPAMIDOL 75 ML: 755 INJECTION, SOLUTION INTRAVENOUS at 22:25

## 2020-12-06 VITALS
HEIGHT: 68 IN | SYSTOLIC BLOOD PRESSURE: 109 MMHG | TEMPERATURE: 97.4 F | WEIGHT: 165 LBS | RESPIRATION RATE: 16 BRPM | OXYGEN SATURATION: 97 % | DIASTOLIC BLOOD PRESSURE: 60 MMHG | HEART RATE: 67 BPM | BODY MASS INDEX: 25.01 KG/M2

## 2020-12-06 PROCEDURE — 6370000000 HC RX 637 (ALT 250 FOR IP): Performed by: PHYSICIAN ASSISTANT

## 2020-12-06 RX ORDER — AMOXICILLIN AND CLAVULANATE POTASSIUM 250; 62.5 MG/5ML; MG/5ML
500 POWDER, FOR SUSPENSION ORAL ONCE
Status: COMPLETED | OUTPATIENT
Start: 2020-12-06 | End: 2020-12-06

## 2020-12-06 RX ORDER — AMOXICILLIN AND CLAVULANATE POTASSIUM 250; 62.5 MG/5ML; MG/5ML
500 POWDER, FOR SUSPENSION ORAL 2 TIMES DAILY
Qty: 200 ML | Refills: 0 | Status: SHIPPED | OUTPATIENT
Start: 2020-12-06 | End: 2020-12-16

## 2020-12-06 RX ADMIN — AMOXICILLIN AND CLAVULANATE POTASSIUM 500 MG: 250; 62.5 POWDER, FOR SUSPENSION ORAL at 02:00

## 2020-12-06 ASSESSMENT — ENCOUNTER SYMPTOMS: VOMITING: 1

## 2020-12-06 NOTE — ED PROVIDER NOTES
[address] [facility]        Pt Name: Lv Au  MRN: [id number]  [place] 1950  Date of evaluation: 12/5/2020  Provider: Vu Benjamin PA-C  PCP: Monet JANE. I have evaluated this patient. My supervising physician was available for consultation. CHIEF COMPLAINT       Chief Complaint   Patient presents with    Emesis     pt in from [facility] via  EMS reporting VSS, pt had a g-tube placed on 11/26 had an episode of emesis lasting 20mins today. pt is at normal baseline at this time       HISTORY OF PRESENT ILLNESS   (Location, Timing/Onset, Context/Setting, Quality, Duration, Modifying Factors, Severity, Associated Signs and Symptoms)  Note limiting factors. Lv Au is a 71 y.o. male patient presents emergency department for evaluation of vomiting for about 20 minutes today. Patient had a G-tube placement at the end of November. They deny any other symptoms. Patient lives at [facility]. He is at his baseline. Nursing Notes were all reviewed and agreed with or any disagreements were addressed in the HPI. REVIEW OF SYSTEMS    (2-9 systems for level 4, 10 or more for level 5)     Review of Systems   Unable to perform ROS: Patient nonverbal   Gastrointestinal: Positive for vomiting. Positives and Pertinent negatives as per HPI. Except as noted above in the ROS, all other systems were reviewed and negative.        PAST MEDICAL HISTORY     Past Medical History:   Diagnosis Date    Anemia, macrocytic     Anhidrosis     Anhidrosis     Bilateral cataracts     Bipolar affective ([address]     Blood circulation, collateral     unspecified PVD    Cataract     Clostridium difficile diarrhea 3/7/15    Depression     Dermatophytosis     Drug induced hepatitis     GERD (gastroesophageal reflux disease)     Hard of hearing     Impulse control disorder     Liver disease     drug induced    Mental handicap     Movement disorder     Neurogenic bladder     Neuromuscular disorder (HCC)     spastic hemiplegia, MDS,neurogenic bladder    NS (nuclear sclerosis)     Nuclear sclerosis     Osteoarthritis     Periodontal disease     Pneumonia 1/17/2013    Primary optic atrophy     Primary optic atrophy     PVD (peripheral vascular disease) ([facility]     Seizure ([facility]     last seizure 9/20/19    Spastic hemiplegia     Tinea pedis     Unspecified diseases of blood and blood-forming organs     anemia    Urinary incontinence          SURGICAL HISTORY     Past Surgical History:   Procedure Laterality Date    ABDOMEN SURGERY      can see scar/details unknown, feeding tube    ABDOMINAL EXPLORATION SURGERY  9-28-14    LAPAROTOMY EXPLORATORY, ERIKA-EN-Y TUBE JEJUNOSTOMY, SMALL BOWEL RESECTION                                              COLONOSCOPY      COLONOSCOPY N/A 6/4/2019    COLONOSCOPY WITH BIOPSY performed by Todd Molina MD at [address] [place] [address]  6/25/14    with dilitation    DILATATION, ESOPHAGUS      ENDOSCOPY, COLON, DIAGNOSTIC      OTHER SURGICAL HISTORY  4-30-13    JEJUNOSTOMY TUBE INSERTION    SIGMOIDOSCOPY N/A 5/2/2019    SIGMOIDOSCOPY DIAGNOSTIC FLEXIBLE performed by Todd Molina MD at [facility] N/A 4/21/2020    REPLACEMENT, JEJUNOSTOMY TUBE performed by Todd Molina MD at [place] [address]  1/24/13    PEG placement    UPPER GASTROINTESTINAL ENDOSCOPY  4/26/13    UPPER GASTROINTESTINAL ENDOSCOPY N/A 10/11/2019    EGD DILATION BALLOON performed by Todd Molina MD at [place] [address] 8/22/2020    EGD BIOPSY performed by Niya Watson MD at [place] [address]       Previous Medications    ACETAMINOPHEN (TYLENOL) 160 MG/5ML SOLUTION    20.3 mLs by Per G Tube route every 4 hours as needed for Fever or Pain    ASCORBIC ACID (VITAMIN C) 500 MG TABLET    500 mg by Per G Tube route 2 times daily. BACLOFEN (LIORESAL) 10 MG TABLET    15 mg by Per G Tube route 3 times daily     BISACODYL (DULCOLAX) 10 MG SUPPOSITORY    Place 10 mg rectally daily as needed If no BM in 48 hours    BISACODYL (DULCOLAX) 5 MG EC TABLET    Take 10 mg by mouth as needed (No bowel movement in 48 hours)    CHLORHEXIDINE (PERIDEX) 0.12 % SOLUTION    Take 7.5 mLs by mouth 2 times daily. CHOLECALCIFEROL (VITAMIN D3) 2000 UNITS CAPS    2,000 Units by Per G Tube route daily     DIAZEPAM (DIASTAT) 10 MG GEL    Place 20 mg rectally once as needed (seizures lasting over 5 minutes). DOXAZOSIN (CARDURA) 4 MG TABLET    4 mg by Per G Tube route daily     ESCITALOPRAM (LEXAPRO) 10 MG TABLET    10 mg by Per G Tube route daily. GUAIFENESIN (ROBITUSSIN) 100 MG/5ML SOLN ORAL SOLUTION    Take 200 mg by mouth every 4 hours as needed for Cough    IPRATROPIUM-ALBUTEROL (DUONEB) 0.5-2.5 (3) MG/3ML SOLN NEBULIZER SOLUTION    Inhale 1 vial into the lungs every 4 hours    LACTULOSE (CHRONULAC) 10 GM/15ML SOLUTION    20 g by Per G Tube route daily     LAMOTRIGINE (LAMICTAL) 150 MG TABLET    Take 400 mg by mouth 2 times daily Per g-tube    LEVETIRACETAM (KEPPRA) 100 MG/ML SOLUTION    15 mLs by Per G Tube route 2 times daily    LORAZEPAM (ATIVAN) 0.5 MG TABLET    Take 0.5 mg by mouth every 12 hours as needed for Anxiety.  For seizures up to 7 days    MULTIPLE VITAMINS-MINERALS (CERTA-KENTON) LIQUID    30 mLs by Per G Tube route daily     NYSTATIN (MYCOSTATIN) 793071 UNIT/GM CREAM    Apply topically 2 times daily as needed (yeast infections)    NYSTATIN (MYCOSTATIN) 678929 UNIT/GM POWDER    Apply topically 2 times daily as needed (yeast infections)    PANTOPRAZOLE (PROTONIX) 40 MG TABLET    Take 1 tablet by mouth 2 times daily (before meals)    POTASSIUM CHLORIDE (KAYCIEL) 20 MEQ/15ML (10%) SOLUTION    10 mEq by Per G Tube route 2 times daily     QUETIAPINE (SEROQUEL) 100 MG TABLET 50 mg by Per G Tube route 2 times daily Administer at 0530 and 1730. Administer 100 mg at night. QUETIAPINE (SEROQUEL) 100 MG TABLET    Take 100 mg by mouth nightly 2200    SENNA (SENOKOT) 8.6 MG TABLET    2 tablets by Per G Tube route daily     SODIUM PHOSPHATES (FLEET) 7-19 GM/118ML    Place 1 enema rectally once as needed If bisacodyl is ineffective. ZINC OXIDE 20 % OINTMENT    Apply topically 2 times daily Apply topically as needed. ALLERGIES     Biaxin [clarithromycin] and Invanz [ertapenem]    FAMILYHISTORY     History reviewed. No pertinent family history. SOCIAL HISTORY       Social History     Tobacco Use    Smoking status: Never Smoker    Smokeless tobacco: Never Used   Substance Use Topics    Alcohol use: No    Drug use: No     Comment: unknown       SCREENINGS             PHYSICAL EXAM    (up to 7 for level 4, 8 or more for level 5)     ED Triage Vitals [12/05/20 1958]   BP Temp Temp src Pulse Resp SpO2 Height Weight   106/64 97.4 °F (36.3 °C) -- 67 16 97 % 5' 8\" (1.727 m) 165 lb (74.8 kg)       Physical Exam  Vitals signs and nursing note reviewed. Constitutional:       General: He is not in acute distress. Appearance: Normal appearance. He is well-developed. He is not ill-appearing, toxic-appearing or diaphoretic. HENT:      Head: Normocephalic and atraumatic. Nose: Nose normal.      Mouth/Throat:      Mouth: Mucous membranes are moist.      Pharynx: Oropharynx is clear. Eyes:      General:         Right eye: No discharge. Left eye: No discharge. Conjunctiva/sclera: Conjunctivae normal.      Pupils: Pupils are equal, round, and reactive to light. Neck:      Musculoskeletal: Normal range of motion and neck supple. Cardiovascular:      Rate and Rhythm: Normal rate and regular rhythm. Heart sounds: Normal heart sounds. No murmur. No gallop. Pulmonary:      Effort: Pulmonary effort is normal. No respiratory distress.       Breath sounds: Normal breath sounds. No wheezing, rhonchi or rales. Abdominal:      General: Abdomen is flat. Bowel sounds are normal.      Tenderness: There is no abdominal tenderness. There is no guarding or rebound. Comments: Midline surgical scar appears well-healed and G-tube in place with clean dry dressing surrounding. Musculoskeletal: Normal range of motion. Skin:     General: Skin is warm and dry. Capillary Refill: Capillary refill takes less than 2 seconds. Coloration: Skin is not jaundiced or pale. Neurological:      General: No focal deficit present. Mental Status: He is alert and oriented to person, place, and time. Psychiatric:         Mood and Affect: Mood normal.         Behavior: Behavior normal.         DIAGNOSTIC RESULTS   LABS:    Labs Reviewed   CBC WITH AUTO DIFFERENTIAL - Abnormal; Notable for the following components:       Result Value    RBC 3.31 (*)     Hemoglobin 9.8 (*)     Hematocrit 30.4 (*)     RDW 16.3 (*)     All other components within normal limits    Narrative:     Performed at:  [facility] [address], [place], [address] [employer]   Phone [phone]   COMPREHENSIVE METABOLIC PANEL W/ REFLEX TO MG FOR LOW K - Abnormal; Notable for the following components:    Glucose 123 (*)     BUN 22 (*)     CREATININE <0.5 (*)     Alkaline Phosphatase 140 (*)     All other components within normal limits    Narrative:     Performed at:  [facility] [address], [place], [facility] [employer]   Phone [phone]   LIPASE    Narrative:     Performed at:  [facility] [address], [place], [address] [employer]   Phone [phone]       All other labs were within normal range or not returned as of this dictation. EKG: All EKG's are interpreted by the Emergency Department Physician in the absence of a cardiologist.  Please see their note for interpretation of EKG.       RADIOLOGY:   Non-plain film images such as CT, Ultrasound and MRI are read by the radiologist. Plain radiographic images are visualized and preliminarily interpreted by the ED Provider with the below findings:        Interpretation per the Radiologist below, if available at the time of this note:    XR CHEST PORTABLE   Final Result   Right basilar atelectasis versus airspace disease. CT ABDOMEN PELVIS W IV CONTRAST Additional Contrast? Oral   Final Result   Large fixed hiatal hernia containing gastric fundus and body. Right lower lobe alveolar pneumonia without pleural effusion. Unchanged 2 cm left adrenal adenoma. No colitis, diverticulitis or appendicitis. Jejunostomy tube which appeared properly positioned. No enteritis was noted. 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 Ct Abdomen Pelvis W Iv Contrast Additional Contrast? Oral    Result Date: 12/5/2020  EXAMINATION: CT OF THE ABDOMEN AND PELVIS WITH CONTRAST 12/5/2020 9:08 pm TECHNIQUE: CT of the abdomen and pelvis was performed with the administration of intravenous contrast. Multiplanar reformatted images are provided for review. Dose modulation, iterative reconstruction, and/or weight based adjustment of the mA/kV was utilized to reduce the radiation dose to as low as reasonably achievable. COMPARISON: 08/21/2020 HISTORY: ORDERING SYSTEM PROVIDED HISTORY: vomiting, new g-tube TECHNOLOGIST PROVIDED HISTORY: Reason for exam:->vomiting, new g-tube Additional Contrast?->Oral Reason for Exam: Emesis ([facility] reporting VSS, pt had a g-tube placed on 11/26 had an episode of emesis lasting 20mins today. pt is at normal baseline at this time) Acuity: Acute Type of Exam: Initial FINDINGS: Lower Chest: A large fixed hiatal hernia was noted containing gastric fundus and body. Right lower lobe alveolar pneumonia was noted. No pleural effusion was seen. Organs:  The liver, gallbladder, spleen, and yes and no for communication. Patient's abdomen is soft nontender without rebound or guarding. G-tube looks appropriate with clean dressing surrounding. Lungs are clear to auscultation bilaterally heart rate is regular without murmurs rubs or gallops. CT of the patient's abdomen shows good placement of the G-tube. Nursing staff were able to flush this easily. Incidentally patient does have a finding of pneumonia in the right lower lobe. Patient's vitals are stable. Laboratory evaluation is unremarkable. X-ray shows only infiltrate in the right lower lung. Patient is started on Augmentin for possible aspiration pneumonia given the recent vomiting. Patient was given the first dose here via G-tube. Prescription to go back to his facility for remaining medications. Patient to follow-up with his physician as needed for confirmation and resolution of his pneumonia. I see nothing that would suggest an acute abdomen at this time. Based on history, physical exam, risk factors, and tests my suspicion for bowel obstruction, incarcerated hernia, acute pancreatitis, intra-abdominal abscess, perforated viscus, diverticulitis, cholecystitis, appendicitis, testicular torsion and cardiac ischemia is very low. There is no evidence of peritonitis, sepsis or toxicity at this time. I feel the patient can be managed as an outpatient with follow-up with his family doctor in 24-48 hours. Instructions have been given for the patient to return to the emergency department for worsening of the pain, high fevers, intractable vomiting, bleeding or any other concerns    FINAL IMPRESSION      1. Aspiration pneumonia of right lower lobe, unspecified aspiration pneumonia type ([address]    2.  Non-intractable vomiting, presence of nausea not specified, unspecified vomiting type          DISPOSITION/PLAN   DISPOSITION Decision To Discharge 12/06/2020 04:00:18 AM      PATIENT REFERREDTO:  [facility] Emergency Department  [address]  [phone]    If symptoms worsen      DISCHARGE MEDICATIONS:  New Prescriptions    AMOXICILLIN-CLAVULANATE (AUGMENTIN) 250-62.5 MG/5ML SUSPENSION    10 mLs by Per G Tube route 2 times daily for 10 days       DISCONTINUED MEDICATIONS:  Discontinued Medications    No medications on file              (Please note that portions of this note were completed with a voice recognition program.  Efforts were made to edit the dictations but occasionally words are mis-transcribed.)    Lennis Romberg, PA-C (electronically signed)            Lennis Romberg, PA-C  12/06/20 [id number]

## 2020-12-06 NOTE — PROGRESS NOTES
50ml oral contrast given via G-tube. Flushed before and after with water. No resistance when injected. Patient moaned in discomfort only when first flushed. After the first flush, patient did not complain or show any signs of discomfort.

## 2020-12-06 NOTE — ED NOTES
Bed: 20  Expected date:   Expected time:   Means of arrival:   Comments:  mara Mckeon, [address] [facility]  12/05/20 [id number]

## 2021-01-31 ENCOUNTER — APPOINTMENT (OUTPATIENT)
Dept: GENERAL RADIOLOGY | Age: 71
DRG: 378 | End: 2021-01-31
Payer: MEDICARE

## 2021-01-31 ENCOUNTER — HOSPITAL ENCOUNTER (INPATIENT)
Age: 71
LOS: 4 days | Discharge: NURSING, REHAB OR LONG-TERM CARE | DRG: 378 | End: 2021-02-04
Attending: INTERNAL MEDICINE | Admitting: INTERNAL MEDICINE
Payer: MEDICARE

## 2021-01-31 DIAGNOSIS — G40.909 RECURRENT SEIZURES (HCC): ICD-10-CM

## 2021-01-31 DIAGNOSIS — K92.0 COFFEE GROUND EMESIS: Primary | ICD-10-CM

## 2021-01-31 PROBLEM — K92.2 GI BLEED: Status: ACTIVE | Noted: 2021-01-31

## 2021-01-31 LAB
A/G RATIO: 1.4 (ref 1.1–2.2)
ABO/RH: NORMAL
ALBUMIN SERPL-MCNC: 3.8 G/DL (ref 3.4–5)
ALP BLD-CCNC: 144 U/L (ref 40–129)
ALT SERPL-CCNC: 20 U/L (ref 10–40)
ANION GAP SERPL CALCULATED.3IONS-SCNC: 9 MMOL/L (ref 3–16)
ANTIBODY SCREEN: NORMAL
APTT: 35.5 SEC (ref 24.2–36.2)
AST SERPL-CCNC: 18 U/L (ref 15–37)
BASOPHILS ABSOLUTE: 0.1 K/UL (ref 0–0.2)
BASOPHILS RELATIVE PERCENT: 1.3 %
BILIRUB SERPL-MCNC: <0.2 MG/DL (ref 0–1)
BUN BLDV-MCNC: 23 MG/DL (ref 7–20)
CALCIUM SERPL-MCNC: 8.9 MG/DL (ref 8.3–10.6)
CHLORIDE BLD-SCNC: 98 MMOL/L (ref 99–110)
CO2: 26 MMOL/L (ref 21–32)
CREAT SERPL-MCNC: <0.5 MG/DL (ref 0.8–1.3)
EOSINOPHILS ABSOLUTE: 0.2 K/UL (ref 0–0.6)
EOSINOPHILS RELATIVE PERCENT: 4.2 %
GFR AFRICAN AMERICAN: >60
GFR NON-AFRICAN AMERICAN: >60
GLOBULIN: 2.8 G/DL
GLUCOSE BLD-MCNC: 122 MG/DL (ref 70–99)
HCT VFR BLD CALC: 28 % (ref 40.5–52.5)
HEMOGLOBIN: 9.6 G/DL (ref 13.5–17.5)
INR BLD: 0.95 (ref 0.86–1.14)
LIPASE: 45 U/L (ref 13–60)
LYMPHOCYTES ABSOLUTE: 0.9 K/UL (ref 1–5.1)
LYMPHOCYTES RELATIVE PERCENT: 16 %
MCH RBC QN AUTO: 29.9 PG (ref 26–34)
MCHC RBC AUTO-ENTMCNC: 34.2 G/DL (ref 31–36)
MCV RBC AUTO: 87.6 FL (ref 80–100)
MONOCYTES ABSOLUTE: 0.5 K/UL (ref 0–1.3)
MONOCYTES RELATIVE PERCENT: 8.9 %
NEUTROPHILS ABSOLUTE: 4 K/UL (ref 1.7–7.7)
NEUTROPHILS RELATIVE PERCENT: 69.6 %
OCCULT BLOOD, OTHER: ABNORMAL
PDW BLD-RTO: 16.6 % (ref 12.4–15.4)
PH, GASTRIC: ABNORMAL
PLATELET # BLD: 417 K/UL (ref 135–450)
PMV BLD AUTO: 7.2 FL (ref 5–10.5)
POTASSIUM SERPL-SCNC: 4.8 MMOL/L (ref 3.5–5.1)
PROTHROMBIN TIME: 11 SEC (ref 10–13.2)
RBC # BLD: 3.2 M/UL (ref 4.2–5.9)
SODIUM BLD-SCNC: 133 MMOL/L (ref 136–145)
TOTAL PROTEIN: 6.6 G/DL (ref 6.4–8.2)
WBC # BLD: 5.8 K/UL (ref 4–11)

## 2021-01-31 PROCEDURE — 86900 BLOOD TYPING SEROLOGIC ABO: CPT

## 2021-01-31 PROCEDURE — 1200000000 HC SEMI PRIVATE

## 2021-01-31 PROCEDURE — 99284 EMERGENCY DEPT VISIT MOD MDM: CPT

## 2021-01-31 PROCEDURE — 93005 ELECTROCARDIOGRAM TRACING: CPT | Performed by: PHYSICIAN ASSISTANT

## 2021-01-31 PROCEDURE — 2580000003 HC RX 258: Performed by: INTERNAL MEDICINE

## 2021-01-31 PROCEDURE — 94761 N-INVAS EAR/PLS OXIMETRY MLT: CPT

## 2021-01-31 PROCEDURE — 85730 THROMBOPLASTIN TIME PARTIAL: CPT

## 2021-01-31 PROCEDURE — 6370000000 HC RX 637 (ALT 250 FOR IP): Performed by: INTERNAL MEDICINE

## 2021-01-31 PROCEDURE — 82271 OCCULT BLOOD OTHER SOURCES: CPT

## 2021-01-31 PROCEDURE — 83690 ASSAY OF LIPASE: CPT

## 2021-01-31 PROCEDURE — 96374 THER/PROPH/DIAG INJ IV PUSH: CPT

## 2021-01-31 PROCEDURE — 71045 X-RAY EXAM CHEST 1 VIEW: CPT

## 2021-01-31 PROCEDURE — 94640 AIRWAY INHALATION TREATMENT: CPT

## 2021-01-31 PROCEDURE — 2700000000 HC OXYGEN THERAPY PER DAY

## 2021-01-31 PROCEDURE — C9113 INJ PANTOPRAZOLE SODIUM, VIA: HCPCS | Performed by: PHYSICIAN ASSISTANT

## 2021-01-31 PROCEDURE — 86850 RBC ANTIBODY SCREEN: CPT

## 2021-01-31 PROCEDURE — 80053 COMPREHEN METABOLIC PANEL: CPT

## 2021-01-31 PROCEDURE — 2580000003 HC RX 258: Performed by: PHYSICIAN ASSISTANT

## 2021-01-31 PROCEDURE — U0003 INFECTIOUS AGENT DETECTION BY NUCLEIC ACID (DNA OR RNA); SEVERE ACUTE RESPIRATORY SYNDROME CORONAVIRUS 2 (SARS-COV-2) (CORONAVIRUS DISEASE [COVID-19]), AMPLIFIED PROBE TECHNIQUE, MAKING USE OF HIGH THROUGHPUT TECHNOLOGIES AS DESCRIBED BY CMS-2020-01-R: HCPCS

## 2021-01-31 PROCEDURE — 96375 TX/PRO/DX INJ NEW DRUG ADDON: CPT

## 2021-01-31 PROCEDURE — 85610 PROTHROMBIN TIME: CPT

## 2021-01-31 PROCEDURE — C9113 INJ PANTOPRAZOLE SODIUM, VIA: HCPCS | Performed by: INTERNAL MEDICINE

## 2021-01-31 PROCEDURE — 6360000002 HC RX W HCPCS: Performed by: PHYSICIAN ASSISTANT

## 2021-01-31 PROCEDURE — 85025 COMPLETE CBC W/AUTO DIFF WBC: CPT

## 2021-01-31 PROCEDURE — 6360000002 HC RX W HCPCS: Performed by: INTERNAL MEDICINE

## 2021-01-31 PROCEDURE — 86901 BLOOD TYPING SEROLOGIC RH(D): CPT

## 2021-01-31 RX ORDER — LEVETIRACETAM 100 MG/ML
1500 SOLUTION ORAL 2 TIMES DAILY
Status: DISCONTINUED | OUTPATIENT
Start: 2021-01-31 | End: 2021-02-01 | Stop reason: ALTCHOICE

## 2021-01-31 RX ORDER — QUETIAPINE FUMARATE 25 MG/1
50 TABLET, FILM COATED ORAL 2 TIMES DAILY
Status: DISCONTINUED | OUTPATIENT
Start: 2021-02-01 | End: 2021-02-04 | Stop reason: HOSPADM

## 2021-01-31 RX ORDER — IPRATROPIUM BROMIDE AND ALBUTEROL SULFATE 2.5; .5 MG/3ML; MG/3ML
1 SOLUTION RESPIRATORY (INHALATION) EVERY 4 HOURS
Status: DISCONTINUED | OUTPATIENT
Start: 2021-01-31 | End: 2021-01-31

## 2021-01-31 RX ORDER — LACTULOSE 10 G/15ML
20 SOLUTION ORAL DAILY
Status: DISCONTINUED | OUTPATIENT
Start: 2021-02-01 | End: 2021-02-04 | Stop reason: HOSPADM

## 2021-01-31 RX ORDER — CHLORHEXIDINE GLUCONATE 0.12 MG/ML
7.5 RINSE ORAL 2 TIMES DAILY
Status: DISCONTINUED | OUTPATIENT
Start: 2021-01-31 | End: 2021-02-04 | Stop reason: HOSPADM

## 2021-01-31 RX ORDER — 0.9 % SODIUM CHLORIDE 0.9 %
500 INTRAVENOUS SOLUTION INTRAVENOUS PRN
Status: DISCONTINUED | OUTPATIENT
Start: 2021-01-31 | End: 2021-02-04 | Stop reason: HOSPADM

## 2021-01-31 RX ORDER — PANTOPRAZOLE SODIUM 40 MG/1
40 TABLET, DELAYED RELEASE ORAL
Status: DISCONTINUED | OUTPATIENT
Start: 2021-02-01 | End: 2021-01-31 | Stop reason: SDUPTHER

## 2021-01-31 RX ORDER — ESCITALOPRAM OXALATE 10 MG/1
10 TABLET ORAL DAILY
Status: DISCONTINUED | OUTPATIENT
Start: 2021-02-01 | End: 2021-02-04 | Stop reason: HOSPADM

## 2021-01-31 RX ORDER — POTASSIUM CHLORIDE 20 MEQ/1
40 TABLET, EXTENDED RELEASE ORAL PRN
Status: DISCONTINUED | OUTPATIENT
Start: 2021-01-31 | End: 2021-02-04 | Stop reason: HOSPADM

## 2021-01-31 RX ORDER — ALBUTEROL SULFATE 90 UG/1
2 AEROSOL, METERED RESPIRATORY (INHALATION) EVERY 4 HOURS
Status: DISCONTINUED | OUTPATIENT
Start: 2021-01-31 | End: 2021-02-01

## 2021-01-31 RX ORDER — BACLOFEN 10 MG/1
15 TABLET ORAL 3 TIMES DAILY
Status: DISCONTINUED | OUTPATIENT
Start: 2021-01-31 | End: 2021-02-04 | Stop reason: HOSPADM

## 2021-01-31 RX ORDER — DIAZEPAM 10 MG/2ML
20 GEL RECTAL
Status: DISPENSED | OUTPATIENT
Start: 2021-01-31 | End: 2021-01-31

## 2021-01-31 RX ORDER — POTASSIUM CHLORIDE 7.45 MG/ML
10 INJECTION INTRAVENOUS PRN
Status: DISCONTINUED | OUTPATIENT
Start: 2021-01-31 | End: 2021-02-04 | Stop reason: HOSPADM

## 2021-01-31 RX ORDER — PANTOPRAZOLE SODIUM 40 MG/10ML
80 INJECTION, POWDER, LYOPHILIZED, FOR SOLUTION INTRAVENOUS ONCE
Status: COMPLETED | OUTPATIENT
Start: 2021-01-31 | End: 2021-01-31

## 2021-01-31 RX ORDER — SODIUM CHLORIDE 0.9 % (FLUSH) 0.9 %
10 SYRINGE (ML) INJECTION PRN
Status: DISCONTINUED | OUTPATIENT
Start: 2021-01-31 | End: 2021-02-04 | Stop reason: HOSPADM

## 2021-01-31 RX ORDER — SENNA PLUS 8.6 MG/1
2 TABLET ORAL DAILY
Status: DISCONTINUED | OUTPATIENT
Start: 2021-02-01 | End: 2021-02-04 | Stop reason: HOSPADM

## 2021-01-31 RX ORDER — ONDANSETRON 2 MG/ML
4 INJECTION INTRAMUSCULAR; INTRAVENOUS EVERY 6 HOURS PRN
Status: DISCONTINUED | OUTPATIENT
Start: 2021-01-31 | End: 2021-02-04 | Stop reason: HOSPADM

## 2021-01-31 RX ORDER — QUETIAPINE FUMARATE 100 MG/1
100 TABLET, FILM COATED ORAL NIGHTLY
Status: DISCONTINUED | OUTPATIENT
Start: 2021-01-31 | End: 2021-02-04 | Stop reason: HOSPADM

## 2021-01-31 RX ORDER — SODIUM CHLORIDE 0.9 % (FLUSH) 0.9 %
10 SYRINGE (ML) INJECTION EVERY 12 HOURS SCHEDULED
Status: DISCONTINUED | OUTPATIENT
Start: 2021-01-31 | End: 2021-02-04 | Stop reason: HOSPADM

## 2021-01-31 RX ORDER — ACETAMINOPHEN 325 MG/1
650 TABLET ORAL EVERY 6 HOURS PRN
Status: DISCONTINUED | OUTPATIENT
Start: 2021-01-31 | End: 2021-02-04 | Stop reason: HOSPADM

## 2021-01-31 RX ORDER — ONDANSETRON 2 MG/ML
4 INJECTION INTRAMUSCULAR; INTRAVENOUS ONCE
Status: COMPLETED | OUTPATIENT
Start: 2021-01-31 | End: 2021-01-31

## 2021-01-31 RX ORDER — SODIUM CHLORIDE 9 MG/ML
INJECTION, SOLUTION INTRAVENOUS CONTINUOUS
Status: DISCONTINUED | OUTPATIENT
Start: 2021-01-31 | End: 2021-02-03

## 2021-01-31 RX ORDER — DOXAZOSIN MESYLATE 4 MG/1
4 TABLET ORAL DAILY
Status: DISCONTINUED | OUTPATIENT
Start: 2021-02-01 | End: 2021-02-04 | Stop reason: HOSPADM

## 2021-01-31 RX ORDER — ASCORBIC ACID 500 MG
500 TABLET ORAL 2 TIMES DAILY
Status: DISCONTINUED | OUTPATIENT
Start: 2021-01-31 | End: 2021-02-04 | Stop reason: HOSPADM

## 2021-01-31 RX ORDER — HYDRALAZINE HYDROCHLORIDE 20 MG/ML
10 INJECTION INTRAMUSCULAR; INTRAVENOUS EVERY 6 HOURS PRN
Status: DISCONTINUED | OUTPATIENT
Start: 2021-01-31 | End: 2021-02-04 | Stop reason: HOSPADM

## 2021-01-31 RX ORDER — GUAIFENESIN 100 MG/5ML
200 SOLUTION ORAL EVERY 4 HOURS PRN
Status: DISCONTINUED | OUTPATIENT
Start: 2021-01-31 | End: 2021-02-04 | Stop reason: HOSPADM

## 2021-01-31 RX ORDER — LORAZEPAM 0.5 MG/1
0.5 TABLET ORAL EVERY 12 HOURS PRN
Status: DISCONTINUED | OUTPATIENT
Start: 2021-01-31 | End: 2021-02-01 | Stop reason: RX

## 2021-01-31 RX ORDER — ACETAMINOPHEN 160 MG/5ML
650 SOLUTION ORAL EVERY 4 HOURS PRN
Status: DISCONTINUED | OUTPATIENT
Start: 2021-01-31 | End: 2021-02-04 | Stop reason: HOSPADM

## 2021-01-31 RX ORDER — PROMETHAZINE HYDROCHLORIDE 25 MG/1
12.5 TABLET ORAL EVERY 6 HOURS PRN
Status: DISCONTINUED | OUTPATIENT
Start: 2021-01-31 | End: 2021-02-04 | Stop reason: HOSPADM

## 2021-01-31 RX ORDER — ACETAMINOPHEN 650 MG/1
650 SUPPOSITORY RECTAL EVERY 6 HOURS PRN
Status: DISCONTINUED | OUTPATIENT
Start: 2021-01-31 | End: 2021-02-04 | Stop reason: HOSPADM

## 2021-01-31 RX ORDER — VITAMIN B COMPLEX
2000 TABLET ORAL DAILY
Status: DISCONTINUED | OUTPATIENT
Start: 2021-02-01 | End: 2021-02-04 | Stop reason: HOSPADM

## 2021-01-31 RX ORDER — LAMOTRIGINE 100 MG/1
400 TABLET ORAL 2 TIMES DAILY
Status: DISCONTINUED | OUTPATIENT
Start: 2021-01-31 | End: 2021-02-04 | Stop reason: HOSPADM

## 2021-01-31 RX ORDER — MULTIVIT WITH IRON,MINERALS
30 LIQUID (ML) ORAL DAILY
Status: DISCONTINUED | OUTPATIENT
Start: 2021-02-01 | End: 2021-02-04 | Stop reason: HOSPADM

## 2021-01-31 RX ADMIN — SODIUM CHLORIDE 8 MG/HR: 9 INJECTION, SOLUTION INTRAVENOUS at 21:46

## 2021-01-31 RX ADMIN — Medication 2 PUFF: at 23:35

## 2021-01-31 RX ADMIN — SODIUM CHLORIDE 8 MG/HR: 9 INJECTION, SOLUTION INTRAVENOUS at 19:20

## 2021-01-31 RX ADMIN — SODIUM CHLORIDE: 9 INJECTION, SOLUTION INTRAVENOUS at 21:46

## 2021-01-31 RX ADMIN — Medication 2 PUFF: at 23:33

## 2021-01-31 RX ADMIN — ONDANSETRON 4 MG: 2 INJECTION INTRAMUSCULAR; INTRAVENOUS at 18:42

## 2021-01-31 RX ADMIN — PANTOPRAZOLE SODIUM 80 MG: 40 INJECTION, POWDER, FOR SOLUTION INTRAVENOUS at 18:42

## 2021-01-31 ASSESSMENT — ENCOUNTER SYMPTOMS
COLOR CHANGE: 0
ABDOMINAL PAIN: 0
COUGH: 0
NAUSEA: 1
SHORTNESS OF BREATH: 0
VOMITING: 1

## 2021-01-31 NOTE — ED NOTES
PIV placed, gtube vented to basin. Male urine device placed,  PT attached to monitors. PT DD, non verbal, Left UE contraction, PT with hand kehinde to right hand.   EKG obtained     Ronaldo Cornelius RN  01/31/21 Sincere Bagley RN  01/31/21 2017

## 2021-01-31 NOTE — ED NOTES
Bed: 14  Expected date:   Expected time:   Means of arrival: [facility]  Comments:  [facility] [address] [place] [address], [facility]  01/31/21 [id number]

## 2021-01-31 NOTE — ED PROVIDER NOTES
[facility]        Pt Name: Marry Oleary  MRN: [id number]  [place] 1950  Date of evaluation: 1/31/2021  Provider: Ana Cristina Sheth PA-C  PCP: Mino JANE. I have evaluated this patient. My supervising physician was available for consultation. CHIEF COMPLAINT       Chief Complaint   Patient presents with    Emesis     PT arrived via EMS from [address] [place] [address] with c/o coffee ground emesis. No other information provided       HISTORY OF PRESENT ILLNESS   (Location, Timing/Onset, Context/Setting, Quality, Duration, Modifying Factors, Severity, Associated Signs and Symptoms)  Note limiting factors. Marry Oleary is a 79 y.o. male who is mentally handicapped and therefore a difficult historian and difficult to understand with reports of episode of nausea and coffee ground emesis starting this evening at place of residence. He is from [facility]. He does have history of upper GI bleed. There was no reported hematemesis,bloody or black stool. Patient also has history of aspiration pneumonia. When asked if he has any pain he says no. Nursing Notes were all reviewed and agreed with or any disagreements were addressed in the HPI. REVIEW OF SYSTEMS    (2-9 systems for level 4, 10 or more for level 5)     Review of Systems   Constitutional: Negative for chills and fever. HENT: Negative. Eyes: Negative for visual disturbance. Respiratory: Negative for cough and shortness of breath. Cardiovascular: Negative for chest pain. Gastrointestinal: Positive for nausea and vomiting. Negative for abdominal pain. Skin: Negative for color change, pallor, rash and wound. Neurological: Negative for syncope. All other systems reviewed and are negative. unable to assess other ROS due to difficulty understanding patient due to chronic speech disturbance    Positives and Pertinent negatives as per HPI. Except as noted above in the ROS, all other systems were reviewed and negative.        PAST MEDICAL HISTORY     Past Medical History:   Diagnosis Date    Anemia, macrocytic     Anhidrosis     Anhidrosis     Bilateral cataracts     Bipolar affective ([address]     Blood circulation, collateral     unspecified PVD    Cataract     Clostridium difficile diarrhea 3/7/15    Depression     Dermatophytosis     Drug induced hepatitis     GERD (gastroesophageal reflux disease)     Hard of hearing     Impulse control disorder     Liver disease     drug induced    Mental handicap     Movement disorder     Neurogenic bladder     Neuromuscular disorder (HCC)     spastic hemiplegia, MDS,neurogenic bladder    NS (nuclear sclerosis)     Nuclear sclerosis     Osteoarthritis     Periodontal disease     Pneumonia 1/17/2013    Primary optic atrophy     Primary optic atrophy     PVD (peripheral vascular disease) ([address]     Seizure ([address]     last seizure 9/20/19    Spastic hemiplegia     Tinea pedis     Unspecified diseases of blood and blood-forming organs     anemia    Urinary incontinence          SURGICAL HISTORY     Past Surgical History:   Procedure Laterality Date    ABDOMEN SURGERY      can see scar/details unknown, feeding tube    ABDOMINAL EXPLORATION SURGERY  9-28-14    LAPAROTOMY EXPLORATORY, ERIKA-EN-Y TUBE JEJUNOSTOMY, SMALL BOWEL RESECTION                                              COLONOSCOPY      COLONOSCOPY N/A 6/4/2019    COLONOSCOPY WITH BIOPSY performed by Ana Reed MD at [address] [place] [address]  6/25/14    with dilitation    DILATATION, ESOPHAGUS      ENDOSCOPY, COLON, DIAGNOSTIC      OTHER SURGICAL HISTORY  4-30-13    JEJUNOSTOMY TUBE INSERTION    SIGMOIDOSCOPY N/A 5/2/2019    SIGMOIDOSCOPY DIAGNOSTIC FLEXIBLE performed by Ana Reed MD at [facility] N/A 4/21/2020    REPLACEMENT, JEJUNOSTOMY TUBE performed by Ana Reed MD at [facility] END[facility]COPY    UPPER GASTROINTESTINAL ENDOSCOPY  1/24/13    PEG placement    UPPER GASTROINTESTINAL ENDOSCOPY  4/26/13    UPPER GASTROINTESTINAL ENDOSCOPY N/A 10/11/2019    EGD DILATION BALLOON performed by Hilary Duron MD at [address] 8/22/2020    EGD BIOPSY performed by Cruzito Heard MD at [address]       Previous Medications    ACETAMINOPHEN (TYLENOL) 160 MG/5ML SOLUTION    20.3 mLs by Per G Tube route every 4 hours as needed for Fever or Pain    ASCORBIC ACID (VITAMIN C) 500 MG TABLET    500 mg by Per G Tube route 2 times daily. BACLOFEN (LIORESAL) 10 MG TABLET    15 mg by Per G Tube route 3 times daily     BISACODYL (DULCOLAX) 10 MG SUPPOSITORY    Place 10 mg rectally daily as needed If no BM in 48 hours    BISACODYL (DULCOLAX) 5 MG EC TABLET    Take 10 mg by mouth as needed (No bowel movement in 48 hours)    CHLORHEXIDINE (PERIDEX) 0.12 % SOLUTION    Take 7.5 mLs by mouth 2 times daily. CHOLECALCIFEROL (VITAMIN D3) 2000 UNITS CAPS    2,000 Units by Per G Tube route daily     DIAZEPAM (DIASTAT) 10 MG GEL    Place 20 mg rectally once as needed (seizures lasting over 5 minutes). DOXAZOSIN (CARDURA) 4 MG TABLET    4 mg by Per G Tube route daily     ESCITALOPRAM (LEXAPRO) 10 MG TABLET    10 mg by Per G Tube route daily. GUAIFENESIN (ROBITUSSIN) 100 MG/5ML SOLN ORAL SOLUTION    Take 200 mg by mouth every 4 hours as needed for Cough    IPRATROPIUM-ALBUTEROL (DUONEB) 0.5-2.5 (3) MG/3ML SOLN NEBULIZER SOLUTION    Inhale 1 vial into the lungs every 4 hours    LACTULOSE (CHRONULAC) 10 GM/15ML SOLUTION    20 g by Per G Tube route daily     LAMOTRIGINE (LAMICTAL) 150 MG TABLET    Take 400 mg by mouth 2 times daily Per g-tube    LEVETIRACETAM (KEPPRA) 100 MG/ML SOLUTION    15 mLs by Per G Tube route 2 times daily    LORAZEPAM (ATIVAN) 0.5 MG TABLET    Take 0.5 mg by mouth every 12 hours as needed for Anxiety.  For seizures up to 7 days    MULTIPLE VITAMINS-MINERALS (CERTA-KENTON) LIQUID    30 mLs by Per G Tube route daily     NYSTATIN (MYCOSTATIN) 207955 UNIT/GM CREAM    Apply topically 2 times daily as needed (yeast infections)    NYSTATIN (MYCOSTATIN) 066453 UNIT/GM POWDER    Apply topically 2 times daily as needed (yeast infections)    PANTOPRAZOLE (PROTONIX) 40 MG TABLET    Take 1 tablet by mouth 2 times daily (before meals)    POTASSIUM CHLORIDE (KAYCIEL) 20 MEQ/15ML (10%) SOLUTION    10 mEq by Per G Tube route 2 times daily     QUETIAPINE (SEROQUEL) 100 MG TABLET    50 mg by Per G Tube route 2 times daily Administer at 0530 and 1730. Administer 100 mg at night. QUETIAPINE (SEROQUEL) 100 MG TABLET    Take 100 mg by mouth nightly 2200    SENNA (SENOKOT) 8.6 MG TABLET    2 tablets by Per G Tube route daily     SODIUM PHOSPHATES (FLEET) 7-19 GM/118ML    Place 1 enema rectally once as needed If bisacodyl is ineffective. ZINC OXIDE 20 % OINTMENT    Apply topically 2 times daily Apply topically as needed. ALLERGIES     Biaxin [clarithromycin] and Invanz [ertapenem]    FAMILYHISTORY     History reviewed. No pertinent family history. SOCIAL HISTORY       Social History     Tobacco Use    Smoking status: Never Smoker    Smokeless tobacco: Never Used   Substance Use Topics    Alcohol use: No    Drug use: No     Comment: unknown       SCREENINGS             PHYSICAL EXAM    (up to 7 for level 4, 8 or more for level 5)     ED Triage Vitals   BP Temp Temp src Pulse Resp SpO2 Height Weight   -- -- -- -- -- -- -- --       Physical Exam  Vitals signs and nursing note reviewed. Constitutional:       Appearance: Normal appearance. He is well-developed. He is not toxic-appearing or diaphoretic. HENT:      Head: Normocephalic and atraumatic.       Right Ear: External ear normal.      Left Ear: External ear normal.      Nose: Nose normal.      Mouth/Throat:      Mouth: Mucous membranes are moist.      Pharynx: Oropharynx is clear. Eyes:      General: No scleral icterus. Right eye: No discharge. Left eye: No discharge. Extraocular Movements: Extraocular movements intact. Conjunctiva/sclera: Conjunctivae normal.      Pupils: Pupils are equal, round, and reactive to light. Neck:      Musculoskeletal: Normal range of motion. Cardiovascular:      Rate and Rhythm: Normal rate. Pulmonary:      Effort: Pulmonary effort is normal.      Breath sounds: Normal breath sounds. Abdominal:      General: Bowel sounds are normal.      Palpations: Abdomen is soft. Tenderness: There is no abdominal tenderness. Comments: G tube present   Musculoskeletal: Normal range of motion. Skin:     General: Skin is warm and dry. Capillary Refill: Capillary refill takes less than 2 seconds. Coloration: Skin is not jaundiced or pale. Findings: No bruising, erythema, lesion or rash. Neurological:      Mental Status: He is alert. Mental status is at baseline.       Comments: Bed bound with extremity contracture   Psychiatric:         Mood and Affect: Mood normal.         Behavior: Behavior normal.         DIAGNOSTIC RESULTS   LABS:    Labs Reviewed   CBC WITH AUTO DIFFERENTIAL - Abnormal; Notable for the following components:       Result Value    RBC 3.20 (*)     Hemoglobin 9.6 (*)     Hematocrit 28.0 (*)     RDW 16.6 (*)     Lymphocytes Absolute 0.9 (*)     All other components within normal limits    Narrative:     Performed at:  [facility] [address], [place], [facility] [address]   Phone [phone]   COMPREHENSIVE METABOLIC PANEL - Abnormal; Notable for the following components:    Sodium 133 (*)     Chloride 98 (*)     Glucose 122 (*)     BUN 23 (*)     CREATININE <0.5 (*)     Alkaline Phosphatase 144 (*)     All other components within normal limits    Narrative:     Performed at:  [facility] [address], [place], No medications on file              (Please note that portions of this note were completed with a voice recognition program.  Efforts were made to edit the dictations but occasionally words are mis-transcribed.)    Sissy Garcia PA-C (electronically signed)            Sissy Garcia PA-C  01/31/21 4002

## 2021-02-01 ENCOUNTER — APPOINTMENT (OUTPATIENT)
Dept: GENERAL RADIOLOGY | Age: 71
DRG: 378 | End: 2021-02-01
Payer: MEDICARE

## 2021-02-01 ENCOUNTER — APPOINTMENT (OUTPATIENT)
Dept: CT IMAGING | Age: 71
DRG: 378 | End: 2021-02-01
Payer: MEDICARE

## 2021-02-01 LAB
A/G RATIO: 1 (ref 1.1–2.2)
ALBUMIN SERPL-MCNC: 3.3 G/DL (ref 3.4–5)
ALP BLD-CCNC: 144 U/L (ref 40–129)
ALT SERPL-CCNC: 18 U/L (ref 10–40)
ANION GAP SERPL CALCULATED.3IONS-SCNC: 8 MMOL/L (ref 3–16)
AST SERPL-CCNC: 26 U/L (ref 15–37)
BASOPHILS ABSOLUTE: 0 K/UL (ref 0–0.2)
BASOPHILS RELATIVE PERCENT: 0.6 %
BILIRUB SERPL-MCNC: <0.2 MG/DL (ref 0–1)
BUN BLDV-MCNC: 15 MG/DL (ref 7–20)
CALCIUM SERPL-MCNC: 8.4 MG/DL (ref 8.3–10.6)
CHLORIDE BLD-SCNC: 106 MMOL/L (ref 99–110)
CO2: 23 MMOL/L (ref 21–32)
CREAT SERPL-MCNC: <0.5 MG/DL (ref 0.8–1.3)
EKG ATRIAL RATE: 71 BPM
EKG DIAGNOSIS: NORMAL
EKG P AXIS: 60 DEGREES
EKG P-R INTERVAL: 192 MS
EKG Q-T INTERVAL: 406 MS
EKG QRS DURATION: 76 MS
EKG QTC CALCULATION (BAZETT): 441 MS
EKG R AXIS: 3 DEGREES
EKG T AXIS: 89 DEGREES
EKG VENTRICULAR RATE: 71 BPM
EOSINOPHILS ABSOLUTE: 0.3 K/UL (ref 0–0.6)
EOSINOPHILS RELATIVE PERCENT: 3.3 %
GFR AFRICAN AMERICAN: >60
GFR NON-AFRICAN AMERICAN: >60
GLOBULIN: 3.3 G/DL
GLUCOSE BLD-MCNC: 92 MG/DL (ref 70–99)
HCT VFR BLD CALC: 29.1 % (ref 40.5–52.5)
HCT VFR BLD CALC: 31.7 % (ref 40.5–52.5)
HEMOGLOBIN: 9.3 G/DL (ref 13.5–17.5)
HEMOGLOBIN: 9.8 G/DL (ref 13.5–17.5)
IRON SATURATION: 9 % (ref 20–50)
IRON: 43 UG/DL (ref 59–158)
LYMPHOCYTES ABSOLUTE: 1.3 K/UL (ref 1–5.1)
LYMPHOCYTES RELATIVE PERCENT: 16.2 %
MCH RBC QN AUTO: 28.1 PG (ref 26–34)
MCHC RBC AUTO-ENTMCNC: 32 G/DL (ref 31–36)
MCV RBC AUTO: 87.8 FL (ref 80–100)
MONOCYTES ABSOLUTE: 0.9 K/UL (ref 0–1.3)
MONOCYTES RELATIVE PERCENT: 11.1 %
NEUTROPHILS ABSOLUTE: 5.4 K/UL (ref 1.7–7.7)
NEUTROPHILS RELATIVE PERCENT: 68.8 %
PDW BLD-RTO: 16.5 % (ref 12.4–15.4)
PLATELET # BLD: 417 K/UL (ref 135–450)
PMV BLD AUTO: 7.6 FL (ref 5–10.5)
POTASSIUM REFLEX MAGNESIUM: 4.5 MMOL/L (ref 3.5–5.1)
RBC # BLD: 3.31 M/UL (ref 4.2–5.9)
SARS-COV-2, PCR: NOT DETECTED
SODIUM BLD-SCNC: 137 MMOL/L (ref 136–145)
TOTAL IRON BINDING CAPACITY: 460 UG/DL (ref 260–445)
TOTAL PROTEIN: 6.6 G/DL (ref 6.4–8.2)
WBC # BLD: 7.8 K/UL (ref 4–11)

## 2021-02-01 PROCEDURE — 6360000002 HC RX W HCPCS: Performed by: PHYSICIAN ASSISTANT

## 2021-02-01 PROCEDURE — 85018 HEMOGLOBIN: CPT

## 2021-02-01 PROCEDURE — 74177 CT ABD & PELVIS W/CONTRAST: CPT

## 2021-02-01 PROCEDURE — 94761 N-INVAS EAR/PLS OXIMETRY MLT: CPT

## 2021-02-01 PROCEDURE — 83550 IRON BINDING TEST: CPT

## 2021-02-01 PROCEDURE — 83540 ASSAY OF IRON: CPT

## 2021-02-01 PROCEDURE — 1200000000 HC SEMI PRIVATE

## 2021-02-01 PROCEDURE — 6360000004 HC RX CONTRAST MEDICATION: Performed by: STUDENT IN AN ORGANIZED HEALTH CARE EDUCATION/TRAINING PROGRAM

## 2021-02-01 PROCEDURE — 99222 1ST HOSP IP/OBS MODERATE 55: CPT | Performed by: STUDENT IN AN ORGANIZED HEALTH CARE EDUCATION/TRAINING PROGRAM

## 2021-02-01 PROCEDURE — 93010 ELECTROCARDIOGRAM REPORT: CPT | Performed by: INTERNAL MEDICINE

## 2021-02-01 PROCEDURE — 94640 AIRWAY INHALATION TREATMENT: CPT

## 2021-02-01 PROCEDURE — 85014 HEMATOCRIT: CPT

## 2021-02-01 PROCEDURE — 2700000000 HC OXYGEN THERAPY PER DAY

## 2021-02-01 PROCEDURE — 74018 RADEX ABDOMEN 1 VIEW: CPT

## 2021-02-01 PROCEDURE — 80053 COMPREHEN METABOLIC PANEL: CPT

## 2021-02-01 PROCEDURE — C9113 INJ PANTOPRAZOLE SODIUM, VIA: HCPCS | Performed by: INTERNAL MEDICINE

## 2021-02-01 PROCEDURE — 6370000000 HC RX 637 (ALT 250 FOR IP): Performed by: INTERNAL MEDICINE

## 2021-02-01 PROCEDURE — 36415 COLL VENOUS BLD VENIPUNCTURE: CPT

## 2021-02-01 PROCEDURE — 2580000003 HC RX 258: Performed by: INTERNAL MEDICINE

## 2021-02-01 PROCEDURE — 85025 COMPLETE CBC W/AUTO DIFF WBC: CPT

## 2021-02-01 PROCEDURE — 6360000002 HC RX W HCPCS: Performed by: INTERNAL MEDICINE

## 2021-02-01 PROCEDURE — C9113 INJ PANTOPRAZOLE SODIUM, VIA: HCPCS | Performed by: PHYSICIAN ASSISTANT

## 2021-02-01 RX ORDER — IPRATROPIUM BROMIDE AND ALBUTEROL SULFATE 2.5; .5 MG/3ML; MG/3ML
1 SOLUTION RESPIRATORY (INHALATION)
Status: DISCONTINUED | OUTPATIENT
Start: 2021-02-02 | End: 2021-02-04 | Stop reason: HOSPADM

## 2021-02-01 RX ORDER — PANTOPRAZOLE SODIUM 40 MG/10ML
40 INJECTION, POWDER, LYOPHILIZED, FOR SOLUTION INTRAVENOUS 2 TIMES DAILY
Status: DISCONTINUED | OUTPATIENT
Start: 2021-02-01 | End: 2021-02-04 | Stop reason: HOSPADM

## 2021-02-01 RX ORDER — LORAZEPAM 2 MG/ML
0.5 INJECTION INTRAMUSCULAR EVERY 12 HOURS PRN
Status: DISCONTINUED | OUTPATIENT
Start: 2021-02-01 | End: 2021-02-03 | Stop reason: ALTCHOICE

## 2021-02-01 RX ADMIN — QUETIAPINE FUMARATE 50 MG: 25 TABLET ORAL at 10:46

## 2021-02-01 RX ADMIN — Medication 2 PUFF: at 02:52

## 2021-02-01 RX ADMIN — PANTOPRAZOLE SODIUM 40 MG: 40 INJECTION, POWDER, FOR SOLUTION INTRAVENOUS at 20:47

## 2021-02-01 RX ADMIN — OXYCODONE HYDROCHLORIDE AND ACETAMINOPHEN 500 MG: 500 TABLET ORAL at 10:46

## 2021-02-01 RX ADMIN — SODIUM CHLORIDE 8 MG/HR: 9 INJECTION, SOLUTION INTRAVENOUS at 03:28

## 2021-02-01 RX ADMIN — SODIUM CHLORIDE: 9 INJECTION, SOLUTION INTRAVENOUS at 03:29

## 2021-02-01 RX ADMIN — LEVETIRACETAM 1500 MG: 100 INJECTION, SOLUTION INTRAVENOUS at 21:07

## 2021-02-01 RX ADMIN — SENNOSIDES 17.2 MG: 8.6 TABLET, FILM COATED ORAL at 10:47

## 2021-02-01 RX ADMIN — IOHEXOL 50 ML: 240 INJECTION, SOLUTION INTRATHECAL; INTRAVASCULAR; INTRAVENOUS; ORAL at 20:31

## 2021-02-01 RX ADMIN — SODIUM CHLORIDE: 9 INJECTION, SOLUTION INTRAVENOUS at 16:37

## 2021-02-01 RX ADMIN — IOPAMIDOL 75 ML: 755 INJECTION, SOLUTION INTRAVENOUS at 23:07

## 2021-02-01 RX ADMIN — Medication 2 PUFF: at 02:54

## 2021-02-01 RX ADMIN — Medication 2000 UNITS: at 10:46

## 2021-02-01 RX ADMIN — CHLORHEXIDINE GLUCONATE 7.5 ML: 1.2 RINSE ORAL at 20:44

## 2021-02-01 RX ADMIN — CHLORHEXIDINE GLUCONATE 7.5 ML: 1.2 RINSE ORAL at 11:00

## 2021-02-01 RX ADMIN — MULTIVITAMIN 30 ML: LIQUID ORAL at 11:00

## 2021-02-01 RX ADMIN — LEVETIRACETAM 1500 MG: 500 SOLUTION ORAL at 10:44

## 2021-02-01 RX ADMIN — POTASSIUM BICARBONATE 10 MEQ: 782 TABLET, EFFERVESCENT ORAL at 10:46

## 2021-02-01 RX ADMIN — SODIUM CHLORIDE: 9 INJECTION, SOLUTION INTRAVENOUS at 10:43

## 2021-02-01 RX ADMIN — DOXAZOSIN 4 MG: 4 TABLET ORAL at 10:46

## 2021-02-01 RX ADMIN — LACTULOSE 20 G: 20 SOLUTION ORAL at 10:58

## 2021-02-01 RX ADMIN — BACLOFEN 15 MG: 10 TABLET ORAL at 10:47

## 2021-02-01 RX ADMIN — Medication 10 ML: at 10:47

## 2021-02-01 RX ADMIN — ESCITALOPRAM OXALATE 10 MG: 10 TABLET ORAL at 10:46

## 2021-02-01 RX ADMIN — LAMOTRIGINE 400 MG: 100 TABLET ORAL at 10:45

## 2021-02-01 ASSESSMENT — PAIN SCALES - WONG BAKER
WONGBAKER_NUMERICALRESPONSE: 0
WONGBAKER_NUMERICALRESPONSE: 0

## 2021-02-01 NOTE — CONSULTS
Gastroenterology Consult Note      Patient: Cecilia Ovalle  : 1950  Acct#:      Date:  2021    Subjective:       History of Present Illness  Patient is a 79 y.o.   male admitted with GI bleed [K92.2] who is seen in consult for coffee ground emesis. H/o mental disability and lives at an [facility]. He has a J-tube in place. H/o gini-en-y jejunojejunostomy. He has been seen by our group several times in the past for hematemesis. Most recently he was seen 2020. EGD with ulcerative esophagitis, MW tear at GEJ, large 10 cm hiatal hernia. Per report he had coffee ground emesis yesterday. No vomiting here. No BMs. He is nonverbal with me.        Past Medical History:   Diagnosis Date    Anemia, macrocytic     Anhidrosis     Anhidrosis     Bilateral cataracts     Bipolar affective (HCC)     Blood circulation, collateral     unspecified PVD    Cataract     Clostridium difficile diarrhea 3/7/15    Depression     Dermatophytosis     Drug induced hepatitis     GERD (gastroesophageal reflux disease)     Hard of hearing     Impulse control disorder     Liver disease     drug induced    Mental handicap     Movement disorder     Neurogenic bladder     Neuromuscular disorder (HCC)     spastic hemiplegia, MDS,neurogenic bladder    NS (nuclear sclerosis)     Nuclear sclerosis     Osteoarthritis     Periodontal disease     Pneumonia 2013    Primary optic atrophy     Primary optic atrophy     PVD (peripheral vascular disease) ([address]     Seizure ([address]     last seizure 19    Spastic hemiplegia     Tinea pedis     Unspecified diseases of blood and blood-forming organs     anemia    Urinary incontinence       Past Surgical History:   Procedure Laterality Date    ABDOMEN SURGERY      can see scar/details unknown, feeding tube    ABDOMINAL EXPLORATION SURGERY  14    LAPAROTOMY EXPLORATORY, GINI-EN-Y TUBE JEJUNOSTOMY, SMALL BOWEL RESECTION lungs every 4 hours      bisacodyl (DULCOLAX) 5 MG EC tablet Take 10 mg by mouth as needed (No bowel movement in 48 hours)      nystatin (MYCOSTATIN) 274210 UNIT/GM cream Apply topically 2 times daily as needed (yeast infections)      senna (SENOKOT) 8.6 MG tablet 2 tablets by Per G Tube route daily       Cholecalciferol (VITAMIN D3) 2000 units CAPS 2,000 Units by Per G Tube route daily       Sodium Phosphates (FLEET) 7-19 GM/118ML Place 1 enema rectally once as needed If bisacodyl is ineffective.  zinc oxide 20 % ointment Apply topically 2 times daily Apply topically as needed.  bisacodyl (DULCOLAX) 10 MG suppository Place 10 mg rectally daily as needed If no BM in 48 hours      diazepam (DIASTAT) 10 MG GEL Place 20 mg rectally once as needed (seizures lasting over 5 minutes).  baclofen (LIORESAL) 10 MG tablet 15 mg by Per G Tube route 3 times daily       lamoTRIgine (LAMICTAL) 150 MG tablet Take 400 mg by mouth 2 times daily Per g-tube      QUEtiapine (SEROQUEL) 100 MG tablet Take 100 mg by mouth nightly 2200      acetaminophen (TYLENOL) 160 MG/5ML solution 20.3 mLs by Per G Tube route every 4 hours as needed for Fever or Pain 473 mL 3    levETIRAcetam (KEPPRA) 100 MG/ML solution 15 mLs by Per G Tube route 2 times daily 1 Bottle 3    LORazepam (ATIVAN) 0.5 MG tablet Take 0.5 mg by mouth every 12 hours as needed for Anxiety. For seizures up to 7 days      lactulose (CHRONULAC) 10 GM/15ML solution 20 g by Per G Tube route daily       doxazosin (CARDURA) 4 MG tablet 4 mg by Per G Tube route daily       Multiple Vitamins-Minerals (CERTA-KENTON) liquid 30 mLs by Per G Tube route daily       potassium chloride (KAYCIEL) 20 MEQ/15ML (10%) solution 10 mEq by Per G Tube route 2 times daily       ascorbic acid (VITAMIN C) 500 MG tablet 500 mg by Per G Tube route 2 times daily.  QUEtiapine (SEROQUEL) 100 MG tablet 50 mg by Per G Tube route 2 times daily Administer at 0530 and 1730. Administer 100 mg at night.  escitalopram (LEXAPRO) 10 MG tablet 10 mg by Per G Tube route daily.  chlorhexidine (PERIDEX) 0.12 % solution Take 7.5 mLs by mouth 2 times daily.  nystatin (MYCOSTATIN) 727560 UNIT/GM powder Apply topically 2 times daily as needed (yeast infections)      guaiFENesin (ROBITUSSIN) 100 MG/5ML SOLN oral solution Take 200 mg by mouth every 4 hours as needed for Cough              Allergies  Allergies   Allergen Reactions    Biaxin [Clarithromycin] Other (See Comments)     Unknown reaction    Invanz [Ertapenem] Other (See Comments)     Caused SEIZURES  Caused SEIZURES      Social   Social History     Tobacco Use    Smoking status: Never Smoker    Smokeless tobacco: Never Used   Substance Use Topics    Alcohol use: No        History reviewed. No pertinent family history. Review of Systems  Review of systems not obtained due to patient factors. Physical Exam  Blood pressure 132/66, pulse 61, temperature 97 °F (36.1 °C), temperature source Temporal, resp. rate 20, weight 174 lb 2.6 oz (79 kg), SpO2 100 %. General appearance: sleeping but awakens. Nonverbal with me. cooperative, no distress, appears stated age  Eyes: Anicteric  Head: Normocephalic, without obvious abnormality  Lungs: clear to auscultation bilaterally, Normal Effort  Heart: regular rate and rhythm, normal S1 and S2, no murmurs or rubs  Abdomen: soft, non-tender. Bowel sounds normal. No masses,  no organomegaly. multiple scars. j-tube in place in left abdomen. Extremities: atraumatic, no cyanosis or edema  Skin: warm and dry, no jaundice  Neuro: awakens. Nonverbal.  Musculoskeletal: contractures.        Data Review:    Recent Labs     01/31/21 1753 02/01/21 0048 02/01/21  0726   WBC 5.8  --  7.8   HGB 9.6* 9.8* 9.3*   HCT 28.0* 31.7* 29.1*   MCV 87.6  --  87.8     --  417     Recent Labs     01/31/21  1753 02/01/21  0726   * 137   K 4.8 4.5   CL 98* 106   CO2 26 23   BUN 23* 15

## 2021-02-01 NOTE — ED NOTES
Zia Acevedo called and left Charge RN number of [phone]. Brother also given update.          Josr Lindsay RN  01/31/21 0846

## 2021-02-01 NOTE — PROGRESS NOTES
4 Eyes Skin Assessment     The patient is being assess for  Admission    I agree that 2 RN's have performed a thorough Head to Toe Skin Assessment on the patient. ALL assessment sites listed below have been assessed. Areas assessed by both nurses:   [x]   Head, Face, and Ears   [x]   Shoulders, Back, and Chest  [x]   Arms, Elbows, and Hands   [x]   Coccyx, Sacrum, and IschIum  [x]   Legs, Feet, and Heels        Does the Patient have Skin Breakdown?   No         Cheikh Prevention initiated:  Yes   Wound Care Orders initiated:  NA      [facility] nurse consulted for Pressure Injury (Stage 3,4, Unstageable, DTI, NWPT, and Complex wounds), New and Established Ostomies:  NA      Nurse 1 eSignature: Electronically signed by Hamida Mendoza RN on 2/1/21 at 3:53 AM EST    **SHARE this note so that the co-signing nurse is able to place an eSignature**    Nurse 2 eSignature: Electronically signed by David Crystal RN on 2/1/21 at 5:19 AM EST

## 2021-02-01 NOTE — H&P
GASTROINTESTINAL ENDOSCOPY  4/26/13    UPPER GASTROINTESTINAL ENDOSCOPY N/A 10/11/2019    EGD DILATION BALLOON performed by Bunny Watson MD at [facility] ENDOSCOPY N/A 8/22/2020    EGD BIOPSY performed by Kalpana Gómez MD at [facility]       Social History:   Social History     Tobacco History     Smoking Status  Never Smoker    Smokeless Tobacco Use  Never Used          Alcohol History     Alcohol Use Status  No          Drug Use     Drug Use Status  No Comment  unknown          Sexual Activity     Sexually Active  Not Currently                Fam History: History reviewed. No pertinent family history. PFSH: The above PMHx, PSHx, SocHx, FamHx has been reviewed by myself. (1 area for detailed, 2-3 for comprehensive)      Code Status: Prior    Meds - following list of home medications fromelectronic chart has been reviewed by myself  Prior to Admission medications    Medication Sig Start Date End Date Taking?  Authorizing Provider   pantoprazole (PROTONIX) 40 MG tablet Take 1 tablet by mouth 2 times daily (before meals) 8/25/20   Rodolfo Cortez MD   ipratropium-albuterol (DUONEB) 0.5-2.5 (3) MG/3ML SOLN nebulizer solution Inhale 1 vial into the lungs every 4 hours    Historical Provider, MD   bisacodyl (DULCOLAX) 5 MG EC tablet Take 10 mg by mouth as needed (No bowel movement in 48 hours)    Historical Provider, MD   nystatin (MYCOSTATIN) 339682 UNIT/GM powder Apply topically 2 times daily as needed (yeast infections)    Historical Provider, MD   nystatin (MYCOSTATIN) 359710 UNIT/GM cream Apply topically 2 times daily as needed (yeast infections)    Historical Provider, MD   senna (SENOKOT) 8.6 MG tablet 2 tablets by Per G Tube route daily     Historical Provider, MD   Cholecalciferol (VITAMIN D3) 2000 units CAPS 2,000 Units by Per G Tube route daily     Historical Provider, MD   guaiFENesin (ROBITUSSIN) 100 MG/5ML SOLN oral solution Take 200 mg by mouth every 4 hours as needed for Cough    Historical Provider, MD   Sodium Phosphates (FLEET) 7-19 GM/118ML Place 1 enema rectally once as needed If bisacodyl is ineffective. Historical Provider, MD   zinc oxide 20 % ointment Apply topically 2 times daily Apply topically as needed. Historical Provider, MD   bisacodyl (DULCOLAX) 10 MG suppository Place 10 mg rectally daily as needed If no BM in 48 hours    Historical Provider, MD   diazepam (DIASTAT) 10 MG GEL Place 20 mg rectally once as needed (seizures lasting over 5 minutes). Historical Provider, MD   baclofen (LIORESAL) 10 MG tablet 15 mg by Per G Tube route 3 times daily     Historical Provider, MD   lamoTRIgine (LAMICTAL) 150 MG tablet Take 400 mg by mouth 2 times daily Per g-tube    Historical Provider, MD   QUEtiapine (SEROQUEL) 100 MG tablet Take 100 mg by mouth nightly 2200    Historical Provider, MD   acetaminophen (TYLENOL) 160 MG/5ML solution 20.3 mLs by Per G Tube route every 4 hours as needed for Fever or Pain 4/3/17   Alex Thomas MD   levETIRAcetam (KEPPRA) 100 MG/ML solution 15 mLs by Per G Tube route 2 times daily 4/3/17   Alex Thomas MD   LORazepam (ATIVAN) 0.5 MG tablet Take 0.5 mg by mouth every 12 hours as needed for Anxiety. For seizures up to 7 days    Historical Provider, MD   lactulose (CHRONULAC) 10 GM/15ML solution 20 g by Per G Tube route daily     Historical Provider, MD   doxazosin (CARDURA) 4 MG tablet 4 mg by Per G Tube route daily     Historical Provider, MD   Multiple Vitamins-Minerals (CERTA-KENTON) liquid 30 mLs by Per G Tube route daily     Historical Provider, MD   potassium chloride (KAYCIEL) 20 MEQ/15ML (10%) solution 10 mEq by Per G Tube route 2 times daily     Historical Provider, MD   ascorbic acid (VITAMIN C) 500 MG tablet 500 mg by Per G Tube route 2 times daily. Historical Provider, MD   QUEtiapine (SEROQUEL) 100 MG tablet 50 mg by Per G Tube route 2 times daily Administer at 0530 and 1730.  Administer 100 mg at night.    Historical Provider, MD   escitalopram (LEXAPRO) 10 MG tablet 10 mg by Per G Tube route daily. Historical Provider, MD   chlorhexidine (PERIDEX) 0.12 % solution Take 7.5 mLs by mouth 2 times daily. Historical Provider, MD         Allergies   Allergen Reactions    Biaxin [Clarithromycin] Other (See Comments)     Unknown reaction    Invanz [Ertapenem] Other (See Comments)     Caused SEIZURES  Caused SEIZURES             EXAM: (2-7 system for EPF/Detailed, ?8 for Comprehensive)  /67   Pulse 74   Temp 98.4 °F (36.9 °C) (Infrared)   Resp 24   SpO2 94%   Constitutional: vitals as above: alert and appears stated age  Head: Normocephalic, without obvious abnormality, atraumatic  Eyes:lids and lashes normal, conjunctivae and sclerae normal and pupils equal, round, reactive to light and accomodation  EMNT: external ears normal, lips noral  Neck: no JVD, supple, symmetrical, trachea midline and thyroid not enlarged, symmetric, no tenderness/mass/nodules   Respiratory: clear to auscultation and percussion bilaterally with normal respiratory effort  Cardiovascular: nl s1s2, no rmg  Gastrointestinal: soft, non-tender, non-distended, normal bowel sounds, no masses or organomegaly  Lymphatic:   Extremities: no edema, no clubbing  Skin:No rashes or nodules noted.   Neurologic:    LABS:  Labs Reviewed   CBC WITH AUTO DIFFERENTIAL - Abnormal; Notable for the following components:       Result Value    RBC 3.20 (*)     Hemoglobin 9.6 (*)     Hematocrit 28.0 (*)     RDW 16.6 (*)     Lymphocytes Absolute 0.9 (*)     All other components within normal limits    Narrative:     Performed at:  [facility] [address], [facility], [address]   Phone [phone]   COMPREHENSIVE METABOLIC PANEL - Abnormal; Notable for the following components:    Sodium 133 (*)     Chloride 98 (*)     Glucose 122 (*)     BUN 23 (*)     CREATININE <0.5 (*)     Alkaline Phosphatase 144 (*)     All other components within normal limits    Narrative:     Performed at:  [facility] [address], [address]   Phone [phone]   OCCULT BLOOD GASTRIC / DUODENUM - Abnormal; Notable for the following components:    Occult Blood, Other   (*)     Value: Result: POSITIVE  Normal range: Negative      All other components within normal limits    Narrative:     ORDER#: [id number]                          ORDERED BY: Angelika Canela  SOURCE: Gastric                            COLLECTED:  01/31/21 17:53  ANTIBIOTICS AT KIM.:                      RECEIVED :  01/31/21 18:15  Performed at:  [facility] [address],  [place], [address]   Phone [phone]   LIPASE    Narrative:     Performed at:  [facility] [address],  [place], [address]   Phone [phone]   APTT    Narrative:     Performed at:  [facility] [address],  [place], [place]   Phone [phone]   PROTIME-INR    Narrative:     Performed at:  [facility] [address],  [place], [address]   Phone [phone]   TYPE AND SCREEN    Narrative:     Performed at:  [facility] [address], [place]   Phone [phone]         IMAGING:  Imaging results from the ER have been reviewed in the computerized chart. Xr Chest Portable    Result Date: 1/31/2021  EXAMINATION: ONE XRAY VIEW OF THE CHEST 1/31/2021 5:49 pm COMPARISON: 12/05/2020 HISTORY: ORDERING SYSTEM PROVIDED HISTORY: n/v TECHNOLOGIST PROVIDED HISTORY: Reason for exam:->n/v Reason for Exam: coffee ground emesis. No other information provided Acuity: Acute Type of Exam: Initial FINDINGS: Cardiomegaly unchanged. Patient's hand obscures portions of the left lower lung at the lung base. No discrete area of consolidation.   No pleural effusion or pneumothorax. Bones grossly intact. Cardiomegaly unchanged. No focal consolidation. EKG: from ER, interpreted by self, sinus rhythm at 71, no acute st elevation noted, no twi seen. MEDICAL DECISION MAKING:    Principal Problem:    GI bleed -New Problem to me. Pt with apparent gib - coffee ground emesis reported by snf staff  Plan: admit, iv ppi, gi consulted. Trend h/h  Active Problems:    Cerebral palsy ([facility] [address]  Plan: Continue present orders/plan. Seizure disorder ([facility] [address] -Established problem. Stable. No recurrence here  Plan: Continue present orders/plan. Anemia, -New Problem to me. Acute loss from gi source? Plan: No indication for transfusion. Cont to monitor h/h to assess progression of anemia. Recommend ferrous sulfate or MVI as outpatient. Mental retardation  Plan: Continue present orders/plan. Diagnoses as listed above, designated as new or established and plan outlined for each. Data Reviewed:   (1) Lab tests were reviewed or ordered. (1) Radiology tests were reviewed or ordered. (1) Medical test (Echo, EKG, PFT/matti) were ordered. (1)History was obtained from someone other than patient  (1) Old records  were reviewed - see HPI/MDM for pertinent details if review done. (2) Case wasdiscussed with another health care provider: [facility]  (2) Imaging was viewed by myself. (2) EKG  was viewed by myself. The patient isbeing placed in inpatient status with the expectation of requiring a hospital stay spanning at least two midnights for care and treatment of the problems noted in the problem list.  This determination is also based on thepatients comorbidities and past medical history, the severity and timing of the signs and symptoms upon presentation.         Electronically signed by: Chacho Mccoy 1/31/2021

## 2021-02-01 NOTE — PROGRESS NOTES
Patient arrived to the unit in stable condition. Patient non-verbal, unable to orient patient to room and use of call light at this time. VSS, on room air. External urinary catheter in place. Fall precautions in place, will continue to monitor.

## 2021-02-01 NOTE — CONSULTS
[facility]     Consult Note      Reason for Consult: bowel obstruction    History of Present Illness:   Heide Milton is a 79 y.o. male with Hx of cerebral palsy (lives at [facility]), ulcerative esophagitis who presented yesterday with coffee ground emesis. Pt is well known to the GI service who on last EGD found ulcerative esophatitis and a tear in the mucosa at the GEJ. Pt has not had bloody emesis during admission. Pt was found to have abdominal distension and XR abd showed SB dilatation. Unable to take history from patient due to condition; however, according to nursing he did have some flatus when being moved today.     Past Medical History:        Diagnosis Date    Anemia, macrocytic     Anhidrosis     Anhidrosis     Bilateral cataracts     Bipolar affective (HCC)     Blood circulation, collateral     unspecified PVD    Cataract     Clostridium difficile diarrhea 3/7/15    Depression     Dermatophytosis     Drug induced hepatitis     GERD (gastroesophageal reflux disease)     Hard of hearing     Impulse control disorder     Liver disease     drug induced    Mental handicap     Movement disorder     Neurogenic bladder     Neuromuscular disorder (HCC)     spastic hemiplegia, MDS,neurogenic bladder    NS (nuclear sclerosis)     Nuclear sclerosis     Osteoarthritis     Periodontal disease     Pneumonia 1/17/2013    Primary optic atrophy     Primary optic atrophy     PVD (peripheral vascular disease) ([address]     Seizure ([address]     last seizure 9/20/19    Spastic hemiplegia     Tinea pedis     Unspecified diseases of blood and blood-forming organs     anemia    Urinary incontinence        Past Surgical History:        Procedure Laterality Date    ABDOMEN SURGERY      can see scar/details unknown, feeding tube    ABDOMINAL EXPLORATION SURGERY  9-28-14    LAPAROTOMY EXPLORATORY, ERIKA-EN-Y TUBE JEJUNOSTOMY, SMALL BOWEL RESECTION 2 times daily Apply topically as needed.  bisacodyl (DULCOLAX) 10 MG suppository Place 10 mg rectally daily as needed If no BM in 48 hours      diazepam (DIASTAT) 10 MG GEL Place 20 mg rectally once as needed (seizures lasting over 5 minutes).  baclofen (LIORESAL) 10 MG tablet 15 mg by Per G Tube route 3 times daily       lamoTRIgine (LAMICTAL) 150 MG tablet Take 400 mg by mouth 2 times daily Per g-tube      QUEtiapine (SEROQUEL) 100 MG tablet Take 100 mg by mouth nightly 2200      acetaminophen (TYLENOL) 160 MG/5ML solution 20.3 mLs by Per G Tube route every 4 hours as needed for Fever or Pain 473 mL 3    levETIRAcetam (KEPPRA) 100 MG/ML solution 15 mLs by Per G Tube route 2 times daily 1 Bottle 3    LORazepam (ATIVAN) 0.5 MG tablet Take 0.5 mg by mouth every 12 hours as needed for Anxiety. For seizures up to 7 days      lactulose (CHRONULAC) 10 GM/15ML solution 20 g by Per G Tube route daily       doxazosin (CARDURA) 4 MG tablet 4 mg by Per G Tube route daily       Multiple Vitamins-Minerals (CERTA-KENTON) liquid 30 mLs by Per G Tube route daily       potassium chloride (KAYCIEL) 20 MEQ/15ML (10%) solution 10 mEq by Per G Tube route 2 times daily       ascorbic acid (VITAMIN C) 500 MG tablet 500 mg by Per G Tube route 2 times daily.  QUEtiapine (SEROQUEL) 100 MG tablet 50 mg by Per G Tube route 2 times daily Administer at 0530 and 1730. Administer 100 mg at night.  escitalopram (LEXAPRO) 10 MG tablet 10 mg by Per G Tube route daily.  chlorhexidine (PERIDEX) 0.12 % solution Take 7.5 mLs by mouth 2 times daily.          Current Meds      pantoprazole (PROTONIX) injection 40 mg, BID      lip balm petroleum free (PHYTOPLEX) stick, PRN      levETIRAcetam (KEPPRA) 1,500 mg in sodium chloride 0.9 % 100 mL IVPB, BID      LORazepam (ATIVAN) injection 0.5 mg, Q12H PRN      multivitamin with iron-minerals liquid 30 mL, Daily      potassium bicarb-citric acid (EFFER-K) effervescent tablet 10 mEq, BID      ascorbic acid (VITAMIN C) tablet 500 mg, BID      QUEtiapine (SEROQUEL) tablet 50 mg, BID      escitalopram (LEXAPRO) tablet 10 mg, Daily      chlorhexidine (PERIDEX) 0.12 % solution 7.5 mL, BID      doxazosin (CARDURA) tablet 4 mg, Daily      lactulose (CHRONULAC) 10 GM/15ML solution 20 g, Daily      acetaminophen (TYLENOL) 160 MG/5ML solution 650 mg, Q4H PRN      lamoTRIgine (LAMICTAL) tablet 400 mg, BID      QUEtiapine (SEROQUEL) tablet 100 mg, Nightly      baclofen (LIORESAL) tablet 15 mg, TID      Vitamin D (CHOLECALCIFEROL) tablet 2,000 Units, Daily      guaiFENesin (ROBITUSSIN) 100 MG/5ML oral solution 200 mg, Q4H PRN      senna (SENOKOT) tablet 17.2 mg, Daily      bisacodyl (DULCOLAX) EC tablet 10 mg, PRN      0.9 % sodium chloride infusion, Continuous      sodium chloride flush 0.9 % injection 10 mL, 2 times per day      sodium chloride flush 0.9 % injection 10 mL, PRN      promethazine (PHENERGAN) tablet 12.5 mg, Q6H PRN    Or      ondansetron (ZOFRAN) injection 4 mg, Q6H PRN      acetaminophen (TYLENOL) tablet 650 mg, Q6H PRN    Or      acetaminophen (TYLENOL) suppository 650 mg, Q6H PRN      hydrALAZINE (APRESOLINE) injection 10 mg, Q6H PRN      0.9 % sodium chloride bolus, PRN      potassium chloride (KLOR-CON M) extended release tablet 40 mEq, PRN    Or      potassium bicarb-citric acid (EFFER-K) effervescent tablet 40 mEq, PRN    Or      potassium chloride 10 mEq/100 mL IVPB (Peripheral Line), PRN      albuterol sulfate  (90 Base) MCG/ACT inhaler 2 puff, Q4H      ipratropium (ATROVENT HFA) 17 MCG/ACT inhaler 2 puff, Q4H        Family History:   History reviewed. No pertinent family history. Social History:   TOBACCO:   reports that he has never smoked. He has never used smokeless tobacco.  ETOH:   reports no history of alcohol use. DRUGS:   reports no history of drug use.     Review of Systems:     Unable to obtain accurately    Physical exam:    Vitals:    02/01/21 0945 02/01/21 1041 02/01/21 1109 02/01/21 1815   BP:  (!) 141/72  (!) 140/65   Pulse:  60  57   Resp: 18 18  16   Temp:  97 °F (36.1 °C)  96.6 °F (35.9 °C)   TempSrc:  Temporal  Temporal   SpO2: 100% 100% 99% 100%   Weight:           General appearance: alert, no acute distress, grooming appropriate  Eyes: PERRLA, no scleral icterus  Neck: trachea midline, no JVD, no lymphadenopathy  Chest/Lungs: CTAB, no crackles/rales, wheezes/rhonchi, normal effort  Cardiovascular: RRR, no murmurs/gallops/rubs  Abdomen: soft, non-tender, mildly distended, no guarding/rigidity/rebound, well healed surgical scars; J tube with tube feed output  Skin: warm and dry, no rashes  Extremities: no edema, no cyanosis  Neuro: A&Ox3, no focal deficits, sensation intact    Labs:    CBC:   Recent Labs     01/31/21 1753 02/01/21  0048 02/01/21  0726   WBC 5.8  --  7.8   HGB 9.6* 9.8* 9.3*   HCT 28.0* 31.7* 29.1*   MCV 87.6  --  87.8     --  417     BMP:   Recent Labs     01/31/21  1753 02/01/21  0726   * 137   K 4.8 4.5   CL 98* 106   CO2 26 23   BUN 23* 15   CREATININE <0.5* <0.5*     PT/INR:   Recent Labs     01/31/21 1753   PROTIME 11.0   INR 0.95     APTT:   Recent Labs     01/31/21 1753   APTT 35.5     Liver Profile:   Lab Results   Component Value Date    AST 26 02/01/2021    ALT 18 02/01/2021    BILIDIR <0.2 03/05/2015    BILITOT <0.2 02/01/2021    ALKPHOS 144 02/01/2021     Lab Results   Component Value Date    TRIG 103 01/24/2013     UA:   Lab Results   Component Value Date    COLORU YELLOW 08/21/2020    PHUR 7.0 08/21/2020    WBCUA 81 09/17/2018    RBCUA 5 09/17/2018    BACTERIA 4+ 09/17/2018    CLARITYU Clear 08/21/2020    SPECGRAV >1.030 08/21/2020    LEUKOCYTESUR Negative 08/21/2020    UROBILINOGEN 1.0 08/21/2020    BILIRUBINUR Negative 08/21/2020    BLOODU Negative 08/21/2020    GLUCOSEU Negative 08/21/2020       Imaging:   XR ABDOMEN (KUB) (SINGLE AP VIEW)   Final Result   Findings concerning for early/developing distal small bowel obstruction. Recommend close follow-up radiographs. XR CHEST PORTABLE   Final Result   Cardiomegaly unchanged. No focal consolidation. XR ABDOMEN (KUB) (SINGLE AP VIEW)    (Results Pending)   CT ABDOMEN PELVIS W IV CONTRAST Additional Contrast? Oral    (Results Pending)         Assessment/Plan: This is a 79 y.o. male with possible SBO    Will obtain CT abdomen/pelvis after administering contrast through J tube. After the scan is done, would place J tube to gravity overnight.   In these situations if there is an obstruction an NG tube would be best even though patient has feeding access (J-tube) because they often don't provide adequate drainage; however in this case I think that it might be near impossible to place an NG tube due to large hiatal hernia so we will trial drainage through the J tube    Gina Jackson MD  02/01/21  6:30 PM

## 2021-02-01 NOTE — PROGRESS NOTES
Notified Dr. Rosendo Jeans that pt's covid test result is negative, received order to discontinue droplet plus isolation

## 2021-02-01 NOTE — PROGRESS NOTES
Progress Note - Dr. Florence Olivera - Internal Medicine  PCP: Andreina Macario No address on file None    Hospital Day: 1  Code Status: Full Code  Current Diet: Diet NPO Effective Now Exceptions are: Ice Chips        CC: follow up on medical issues    Subjective:   Viridiana Zavaleta is a 79 y.o. male. Pt does not give interval hx  hgb stable      I have reviewed the patient's medical and social history in detail and updated the computerized patient record. To recap: He  has a past medical history of Anemia, macrocytic, Anhidrosis, Anhidrosis, Bilateral cataracts, Bipolar affective ([address] Blood circulation, collateral, Cataract, Clostridium difficile diarrhea, Depression, Dermatophytosis, Drug induced hepatitis, GERD (gastroesophageal reflux disease), Hard of hearing, Impulse control disorder, Liver disease, Mental handicap, Movement disorder, Neurogenic bladder, Neuromuscular disorder ([address] NS (nuclear sclerosis), Nuclear sclerosis, Osteoarthritis, Periodontal disease, Pneumonia, Primary optic atrophy, Primary optic atrophy, PVD (peripheral vascular disease) ([address] Seizure ([address] Spastic hemiplegia, Tinea pedis, Unspecified diseases of blood and blood-forming organs, and Urinary incontinence. . He  has a past surgical history that includes Upper gastrointestinal endoscopy (1/24/13); Upper gastrointestinal endoscopy (4/26/13); other surgical history (4-30-13); Dilatation, esophagus; Endoscopy, colon, diagnostic; Colonoscopy; Abdomen surgery; Cystocopy (6/25/14); Abdominal exploration surgery (9-28-14); sigmoidoscopy (N/A, 5/2/2019); Colonoscopy (N/A, 6/4/2019); Upper gastrointestinal endoscopy (N/A, 10/11/2019); Stomach surgery (N/A, 4/21/2020); and Upper gastrointestinal endoscopy (N/A, 8/22/2020). Alfa Menon He  reports that he has never smoked. He has never used smokeless tobacco. He reports that he does not drink alcohol or use drugs. .        Active Hospital Problems    Diagnosis Date Noted    GI bleed [K92.2] 01/31/2021    Mental retardation [F79]     Anemia, chronic disease [D63.8] 03/04/2015    Cerebral palsy ([facility] [G80.9] 09/28/2014    Seizure disorder ([facility] [G40.909] 09/28/2014       Current Facility-Administered Medications: multivitamin with iron-minerals liquid 30 mL, 30 mL, Per G Tube, Daily  potassium bicarb-citric acid (EFFER-K) effervescent tablet 10 mEq, 10 mEq, Per G Tube, BID  ascorbic acid (VITAMIN C) tablet 500 mg, 500 mg, Per G Tube, BID  QUEtiapine (SEROQUEL) tablet 50 mg, 50 mg, Per G Tube, BID  escitalopram (LEXAPRO) tablet 10 mg, 10 mg, Per G Tube, Daily  chlorhexidine (PERIDEX) 0.12 % solution 7.5 mL, 7.5 mL, Mouth/Throat, BID  doxazosin (CARDURA) tablet 4 mg, 4 mg, Per G Tube, Daily  LORazepam (ATIVAN) tablet 0.5 mg, 0.5 mg, Oral, Q12H PRN  lactulose (CHRONULAC) 10 GM/15ML solution 20 g, 20 g, Per G Tube, Daily  acetaminophen (TYLENOL) 160 MG/5ML solution 650 mg, 650 mg, Per G Tube, Q4H PRN  levETIRAcetam (KEPPRA) 100 MG/ML solution 1,500 mg, 1,500 mg, Per G Tube, BID  lamoTRIgine (LAMICTAL) tablet 400 mg, 400 mg, Oral, BID  QUEtiapine (SEROQUEL) tablet 100 mg, 100 mg, Oral, Nightly  baclofen (LIORESAL) tablet 15 mg, 15 mg, Per G Tube, TID  Vitamin D (CHOLECALCIFEROL) tablet 2,000 Units, 2,000 Units, Per G Tube, Daily  guaiFENesin (ROBITUSSIN) 100 MG/5ML oral solution 200 mg, 200 mg, Oral, Q4H PRN  senna (SENOKOT) tablet 17.2 mg, 2 tablet, Per G Tube, Daily  bisacodyl (DULCOLAX) EC tablet 10 mg, 10 mg, Oral, PRN  0.9 % sodium chloride infusion, , Intravenous, Continuous  sodium chloride flush 0.9 % injection 10 mL, 10 mL, Intravenous, 2 times per day  sodium chloride flush 0.9 % injection 10 mL, 10 mL, Intravenous, PRN  promethazine (PHENERGAN) tablet 12.5 mg, 12.5 mg, Oral, Q6H PRN **OR** ondansetron (ZOFRAN) injection 4 mg, 4 mg, Intravenous, Q6H PRN  acetaminophen (TYLENOL) tablet 650 mg, 650 mg, Oral, Q6H PRN **OR** acetaminophen (TYLENOL) suppository 650 mg, 650 mg, Rectal, Q6H PRN  pantoprazole (PROTONIX) 80 mg in sodium chloride 0.9 % 100 mL infusion, 8 mg/hr, Intravenous, Continuous  hydrALAZINE (APRESOLINE) injection 10 mg, 10 mg, Intravenous, Q6H PRN  0.9 % sodium chloride bolus, 500 mL, Intravenous, PRN  potassium chloride (KLOR-CON M) extended release tablet 40 mEq, 40 mEq, Oral, PRN **OR** potassium bicarb-citric acid (EFFER-K) effervescent tablet 40 mEq, 40 mEq, Oral, PRN **OR** potassium chloride 10 mEq/100 mL IVPB (Peripheral Line), 10 mEq, Intravenous, PRN  albuterol sulfate  (90 Base) MCG/ACT inhaler 2 puff, 2 puff, Inhalation, Q4H  ipratropium (ATROVENT HFA) 17 MCG/ACT inhaler 2 puff, 2 puff, Inhalation, Q4H         Objective:  /66   Pulse 61   Temp 97 °F (36.1 °C) (Temporal)   Resp 20   Wt 174 lb 2.6 oz (79 kg)   SpO2 100%   BMI 26.48 kg/m²      Patient Vitals for the past 24 hrs:   BP Temp Temp src Pulse Resp SpO2 Weight   02/01/21 0520 132/66 97 °F (36.1 °C) Temporal 61 20 100 % --   02/01/21 0254 -- -- -- -- -- 100 % --   02/01/21 0252 -- -- -- -- 18 100 % --   01/31/21 2335 -- -- -- -- 18 99 % --   01/31/21 2333 -- -- -- -- 18 (!) 79 % --   01/31/21 2316 (!) 122/53 96.9 °F (36.1 °C) Temporal 62 18 92 % --   01/31/21 2107 119/60 97 °F (36.1 °C) Temporal 61 20 95 % 174 lb 2.6 oz (79 kg)   01/31/21 1900 (!) 135/51 -- -- -- -- 92 % --   01/31/21 1842 129/67 -- -- -- -- 94 % --   01/31/21 1746 115/68 -- -- 74 24 97 % --   01/31/21 1733 -- 98.4 °F (36.9 °C) Infrared -- -- -- --     Patient Vitals for the past 96 hrs (Last 3 readings):   Weight   01/31/21 2107 174 lb 2.6 oz (79 kg)           Intake/Output Summary (Last 24 hours) at 2/1/2021 0911  Last data filed at 2/1/2021 0652  Gross per 24 hour   Intake --   Output 550 ml   Net -550 ml         Physical Exam:   /66   Pulse 61   Temp 97 °F (36.1 °C) (Temporal)   Resp 20   Wt 174 lb 2.6 oz (79 kg)   SpO2 100%   BMI 26.48 kg/m²   General appearance: alert, appears stated age and cooperative  Head: Normocephalic, Continue present orders/plan. Disp - await GI recs.  Dr Kalpana Hilliard to assume care in AM as he normally covers this facility            Willi Cagle  2/1/2021

## 2021-02-02 ENCOUNTER — APPOINTMENT (OUTPATIENT)
Dept: GENERAL RADIOLOGY | Age: 71
DRG: 378 | End: 2021-02-02
Payer: MEDICARE

## 2021-02-02 LAB
ANION GAP SERPL CALCULATED.3IONS-SCNC: 12 MMOL/L (ref 3–16)
BASOPHILS ABSOLUTE: 0 K/UL (ref 0–0.2)
BASOPHILS RELATIVE PERCENT: 0.9 %
BUN BLDV-MCNC: 7 MG/DL (ref 7–20)
CALCIUM SERPL-MCNC: 8.7 MG/DL (ref 8.3–10.6)
CHLORIDE BLD-SCNC: 101 MMOL/L (ref 99–110)
CO2: 20 MMOL/L (ref 21–32)
CREAT SERPL-MCNC: <0.5 MG/DL (ref 0.8–1.3)
EOSINOPHILS ABSOLUTE: 0.1 K/UL (ref 0–0.6)
EOSINOPHILS RELATIVE PERCENT: 1.7 %
GFR AFRICAN AMERICAN: >60
GFR NON-AFRICAN AMERICAN: >60
GLUCOSE BLD-MCNC: 88 MG/DL (ref 70–99)
HCT VFR BLD CALC: 27.8 % (ref 40.5–52.5)
HEMOGLOBIN: 8.8 G/DL (ref 13.5–17.5)
LYMPHOCYTES ABSOLUTE: 1 K/UL (ref 1–5.1)
LYMPHOCYTES RELATIVE PERCENT: 18.2 %
MCH RBC QN AUTO: 28.1 PG (ref 26–34)
MCHC RBC AUTO-ENTMCNC: 31.7 G/DL (ref 31–36)
MCV RBC AUTO: 88.6 FL (ref 80–100)
MONOCYTES ABSOLUTE: 0.4 K/UL (ref 0–1.3)
MONOCYTES RELATIVE PERCENT: 7.9 %
NEUTROPHILS ABSOLUTE: 3.7 K/UL (ref 1.7–7.7)
NEUTROPHILS RELATIVE PERCENT: 71.3 %
PDW BLD-RTO: 16.2 % (ref 12.4–15.4)
PLATELET # BLD: 404 K/UL (ref 135–450)
PMV BLD AUTO: 7.6 FL (ref 5–10.5)
POTASSIUM SERPL-SCNC: 4.1 MMOL/L (ref 3.5–5.1)
RBC # BLD: 3.14 M/UL (ref 4.2–5.9)
SODIUM BLD-SCNC: 133 MMOL/L (ref 136–145)
WBC # BLD: 5.2 K/UL (ref 4–11)

## 2021-02-02 PROCEDURE — APPSS15 APP SPLIT SHARED TIME 0-15 MINUTES: Performed by: NURSE PRACTITIONER

## 2021-02-02 PROCEDURE — 6370000000 HC RX 637 (ALT 250 FOR IP): Performed by: INTERNAL MEDICINE

## 2021-02-02 PROCEDURE — 74018 RADEX ABDOMEN 1 VIEW: CPT

## 2021-02-02 PROCEDURE — 99232 SBSQ HOSP IP/OBS MODERATE 35: CPT | Performed by: SURGERY

## 2021-02-02 PROCEDURE — 94640 AIRWAY INHALATION TREATMENT: CPT

## 2021-02-02 PROCEDURE — 85025 COMPLETE CBC W/AUTO DIFF WBC: CPT

## 2021-02-02 PROCEDURE — 1200000000 HC SEMI PRIVATE

## 2021-02-02 PROCEDURE — 94761 N-INVAS EAR/PLS OXIMETRY MLT: CPT

## 2021-02-02 PROCEDURE — APPNB30 APP NON BILLABLE TIME 0-30 MINS: Performed by: NURSE PRACTITIONER

## 2021-02-02 PROCEDURE — 6360000002 HC RX W HCPCS: Performed by: PHYSICIAN ASSISTANT

## 2021-02-02 PROCEDURE — C9113 INJ PANTOPRAZOLE SODIUM, VIA: HCPCS | Performed by: PHYSICIAN ASSISTANT

## 2021-02-02 PROCEDURE — 80048 BASIC METABOLIC PNL TOTAL CA: CPT

## 2021-02-02 PROCEDURE — 2580000003 HC RX 258: Performed by: INTERNAL MEDICINE

## 2021-02-02 PROCEDURE — 6360000002 HC RX W HCPCS: Performed by: INTERNAL MEDICINE

## 2021-02-02 RX ADMIN — IPRATROPIUM BROMIDE AND ALBUTEROL SULFATE 1 AMPULE: .5; 3 SOLUTION RESPIRATORY (INHALATION) at 16:25

## 2021-02-02 RX ADMIN — LAMOTRIGINE 400 MG: 100 TABLET ORAL at 20:46

## 2021-02-02 RX ADMIN — Medication 10 ML: at 20:42

## 2021-02-02 RX ADMIN — CHLORHEXIDINE GLUCONATE 7.5 ML: 1.2 RINSE ORAL at 10:36

## 2021-02-02 RX ADMIN — POTASSIUM BICARBONATE 10 MEQ: 782 TABLET, EFFERVESCENT ORAL at 20:44

## 2021-02-02 RX ADMIN — Medication 10 ML: at 10:36

## 2021-02-02 RX ADMIN — IPRATROPIUM BROMIDE AND ALBUTEROL SULFATE 1 AMPULE: .5; 3 SOLUTION RESPIRATORY (INHALATION) at 18:40

## 2021-02-02 RX ADMIN — IPRATROPIUM BROMIDE AND ALBUTEROL SULFATE 1 AMPULE: .5; 3 SOLUTION RESPIRATORY (INHALATION) at 08:47

## 2021-02-02 RX ADMIN — PANTOPRAZOLE SODIUM 40 MG: 40 INJECTION, POWDER, FOR SOLUTION INTRAVENOUS at 10:36

## 2021-02-02 RX ADMIN — SODIUM CHLORIDE: 9 INJECTION, SOLUTION INTRAVENOUS at 00:30

## 2021-02-02 RX ADMIN — SODIUM CHLORIDE: 9 INJECTION, SOLUTION INTRAVENOUS at 10:36

## 2021-02-02 RX ADMIN — BACLOFEN 15 MG: 10 TABLET ORAL at 15:52

## 2021-02-02 RX ADMIN — IPRATROPIUM BROMIDE AND ALBUTEROL SULFATE 1 AMPULE: .5; 3 SOLUTION RESPIRATORY (INHALATION) at 12:15

## 2021-02-02 RX ADMIN — OXYCODONE HYDROCHLORIDE AND ACETAMINOPHEN 500 MG: 500 TABLET ORAL at 20:46

## 2021-02-02 RX ADMIN — LEVETIRACETAM 1500 MG: 100 INJECTION, SOLUTION INTRAVENOUS at 21:04

## 2021-02-02 RX ADMIN — BACLOFEN 15 MG: 10 TABLET ORAL at 20:47

## 2021-02-02 RX ADMIN — CHLORHEXIDINE GLUCONATE 7.5 ML: 1.2 RINSE ORAL at 20:48

## 2021-02-02 RX ADMIN — LEVETIRACETAM 1500 MG: 100 INJECTION, SOLUTION INTRAVENOUS at 11:48

## 2021-02-02 RX ADMIN — PANTOPRAZOLE SODIUM 40 MG: 40 INJECTION, POWDER, FOR SOLUTION INTRAVENOUS at 20:42

## 2021-02-02 RX ADMIN — QUETIAPINE FUMARATE 100 MG: 100 TABLET ORAL at 20:45

## 2021-02-02 RX ADMIN — QUETIAPINE FUMARATE 50 MG: 25 TABLET ORAL at 15:51

## 2021-02-02 ASSESSMENT — PAIN SCALES - WONG BAKER: WONGBAKER_NUMERICALRESPONSE: 0

## 2021-02-02 NOTE — CONSULTS
Comprehensive Nutrition Assessment    Type and Reason for Visit:  Consult    Nutrition Recommendations/Plan:   1. Recommend 2 Gagan HN (fluid restricted formula) to start at 10 mL per hour per surgery. 2. Surgery to manage rate advancement. Recommend 2 Gagan HN (fluid restricted formula) at eventual goal rate 40 mL per hour to provide 960 mL total volume, 1920 calories, 80 grams protein, 672 mL free water. 3. Recommend water flush 30 mL q 4 hours for tube maintenance given IV fluids at 150 mL per hour. 4. It is not recommended to check residuals with a J-tube. The jejunum does not have a reservoir like the stomach and checking residuals through the small J-tube increases risk for occlusion. To assess intolerance check for abdominal distention. Nutrition Assessment:  Pt is adequately nourished upon admission as evidenced by tube feeds meeting 100% of pt's nutrition needs. Pt with J tube and typically receives Nutren 2.0 at goal rate 65 mL per hour x 15 hours per day. Pt admitted with coffee ground emesis but has not had any here. OK for tube feeds today at 10 mL per hour per surgery. Will start 2 Gagan HN (hospital equivalent to Nutren 2.0) and monitor for tolerance as able to advance towards goal rate per surgery. Malnutrition Assessment:  Malnutrition Status:  No malnutrition      Estimated Daily Nutrient Needs:  Energy (kcal):  6063-1471; Weight Used for Energy Requirements:  Current(79 kg)     Protein (g):   grams; Weight Used for Protein Requirements:  Ideal(70 kg; 1.2-1.5 grams per kg)        Fluid (ml/day):   ; Method Used for Fluid Requirements:  1 ml/kcal      Nutrition Related Findings:  J tube. No edema noted. Na+ 133.       Wounds:  None       Current Nutrition Therapies:    DIET TUBE FEED CONTINUOUS/CYCLIC NPO; Fluid Restricted; Jejunostomy; Continuous; 10; 10; 24    Anthropometric Measures:  · Height: 5' 8\" (172.7 cm)  · Current Body Weight: 174 lb (78.9 kg)   · Ideal Body Weight: 154 lbs; % Ideal Body Weight 113 %   · BMI: 26.5  · BMI Categories: Overweight (BMI 25.0-29. 9)       Nutrition Diagnosis:   · Inadequate oral intake related to inadequate protein-energy intake as evidenced by NPO or clear liquid status due to medical condition      Nutrition Interventions:   Food and/or Nutrient Delivery:  Start Tube Feeding  Nutrition Education/Counseling:  Education not indicated   Coordination of Nutrition Care:  Continue to monitor while inpatient    Goals:  Pt will tolerate EN at goal       Nutrition Monitoring and Evaluation:   Behavioral-Environmental Outcomes:  None Identified   Food/Nutrient Intake Outcomes:  Enteral Nutrition Intake/Tolerance  Physical Signs/Symptoms Outcomes:  Biochemical Data, Weight     Discharge Planning:    Enteral Nutrition     Electronically signed by Kianna Wright RD, LD on 2/2/21 at 2:23 PM EST    Contact: [phone]

## 2021-02-02 NOTE — PROGRESS NOTES
[facility]        Progress Note    Patient Name: Justa Pope  MRN: [id number]  YOB: 1950  Date of Evaluation: 2021    Chief Complaint / Reason for Consult: Abdominal distention, possible bowel obstruction    Subjective:  No acute events overnight  Denies pain, no signs of acute distress  No reports of nausea or vomiting; J-tube remains to gravity drainage  No recorded BM  Resting in bed at this time      Vital Signs:  Patient Vitals for the past 24 hrs:   BP Temp Temp src Pulse Resp SpO2 Height   21 1427 -- -- -- -- -- -- 5' 8\" (1.727 m)   21 1216 -- -- -- -- 18 98 % --   21 1034 (!) 146/67 97 °F (36.1 °C) Temporal 72 18 97 % --   21 0847 -- -- -- -- 16 97 % --   21 0359 127/74 97.7 °F (36.5 °C) Temporal 65 16 99 % --   21 2348 (!) 162/52 96.9 °F (36.1 °C) Temporal 61 16 100 % --   21 132/65 96.8 °F (36 °C) Temporal 60 16 97 % --   21 1815 (!) 140/65 96.6 °F (35.9 °C) Temporal 57 16 100 % --      TEMPERATURE HISTORY 24H: Temp (24hrs), Av °F (36.1 °C), Min:96.6 °F (35.9 °C), Max:97.7 °F (36.5 °C)    BLOOD PRESSURE HISTORY: Systolic (48HPU), UPQ:686 , Min:127 , KJR:290    Diastolic (93SPM), KKK:10, Min:52, Max:74      Intake/Output:  I/O last 3 completed shifts: In: 30 [NG/GT:30]  Out: 1550 [Urine:1450; Emesis/NG output:100]  No intake/output data recorded.   Drain/tube Output:       Physical Exam:  General: awake, alert, minimal coherent verbal response   Cardiovascular:  regular rate and rhythm and no murmur noted  Lungs: clear to auscultation  Abdomen: soft, mild distention, non-tender, active bowel sounds present  Drain: J-tube in place to gravity drainage, bilious output, skin surrounding tube is erythematous/raw    Labs:  CBC:    Recent Labs     21  1753 21  0048 21  0726 21  0525   WBC 5.8  --  7.8 5.2   HGB 9.6* 9.8* 9.3* 8.8*   HCT 28.0* 31.7* 29.1* 27.8*    --  417 404     BMP:    Recent Labs     01/31/21  1753 02/01/21  0726 02/02/21  0525   * 137 133*   K 4.8 4.5 4.1   CL 98* 106 101   CO2 26 23 20*   BUN 23* 15 7   CREATININE <0.5* <0.5* <0.5*   GLUCOSE 122* 92 88     Hepatic:    Recent Labs     01/31/21  1753 02/01/21  0726   AST 18 26   ALT 20 18   BILITOT <0.2 <0.2   ALKPHOS 144* 144*     Amylase:  No results found for: AMYLASE  Lipase:    Lab Results   Component Value Date    LIPASE 45.0 01/31/2021    LIPASE 46.0 12/05/2020    LIPASE 23.0 08/21/2020      Mag:    Lab Results   Component Value Date    MG 2.40 08/24/2020    MG 2.10 03/14/2015     Phos:     Lab Results   Component Value Date    PHOS 4.0 08/24/2020    PHOS 3.6 03/14/2015      Coags:   Lab Results   Component Value Date    PROTIME 11.0 01/31/2021    INR 0.95 01/31/2021    APTT 35.5 01/31/2021       Cultures:  Anaerobic culture  No results found for: LABANAE  Fungus stain  No results found for requested labs within last 30 days. Gram stain  No results found for requested labs within last 30 days. Organism  Lab Results   Component Value Date/Time    ORG Klebsiella pneumoniae ssp pneumoniae (A) 09/17/2018 02:40 PM     Surgical culture  No results found for: CXSURG  Blood culture 1  No results found for requested labs within last 30 days. Blood culture 2  No results found for requested labs within last 30 days. Fecal occult  No results found for requested labs within last 30 days. GI bacterial pathogens by PCR  No results found for requested labs within last 30 days. C. difficile  No results found for requested labs within last 30 days.      Urine culture  Lab Results   Component Value Date    LABURIN >100,000 CFU/ml 09/17/2018       Pathology:  No relevant pathology     Imaging:  I have personally reviewed the following films:    Xr Abdomen (kub) (single Ap View)    Result Date: 2/2/2021  EXAMINATION: ONE SUPINE XRAY VIEW(S) OF THE ABDOMEN 2/2/2021 9:14 am COMPARISON: CT low as reasonably achievable. COMPARISON: CT abdomen pelvis 12/05/2020. HISTORY: ORDERING SYSTEM PROVIDED HISTORY: evaluate for bowel obstruction TECHNOLOGIST PROVIDED HISTORY: Place contrast through J tube Reason for exam:->evaluate for bowel obstruction Additional Contrast?->Oral Reason for Exam: Evaluate for bowel obstruction. Acuity: Acute Type of Exam: Initial FINDINGS: Lower Chest: Trace pleural effusions and scattered atelectasis. Coronary artery calcifications noted. Organs: Liver: Unremarkable on this phase of enhancement. Granuloma. Spleen: Unremarkable on this phase of enhancement. Granulomas. Pancreas: Unremarkable on this phase of enhancement. Portion of pancreas herniates through the hiatus. Adrenal glands: Adrenal nodules measuring up to 1.5 cm. Kidneys: Too small to characterize low attenuation focus on the left. No hydronephrosis. Suboptimal evaluation for renal calculi due to presence of contrast in the renal collecting systems. Gallbladder: Incompletely distended. GI/Bowel: Visualized esophagus/stomach: Moderate to large hiatal hernia. Small bowel: No dilated small bowel. Percutaneous jejunostomy tube. Large bowel: Scattered colonic diverticula without adjacent stranding. Segmental wall thickening and narrowing of the sigmoid colon. Appendix: Normal. Pelvis: Bladder is incompletely distended. Prostate and seminal vesicles appear unremarkable. Peritoneum/Retroperitoneum: Adenopathy: No adenopathy by size criteria. Abdominal aorta: No abdominal aortic aneurysm. Free fluid/air: No free fluid. No free air. Bones/Soft Tissues: Fat filled right inguinal hernia without stranding. Scarring in the soft tissues overlying the left hip. Osteonecrosis of the femoral heads. Scattered degenerative changes noted in the visualized spine without spondylolisthesis. 1. No appendicitis, diverticulitis, or small bowel obstruction. 2. Segmental wall thickening with narrowing at the sigmoid colon.  Colonoscopy should be considered to exclude an underlying mass. 3.  Other findings as described. Xr Chest Portable    Result Date: 1/31/2021  EXAMINATION: ONE XRAY VIEW OF THE CHEST 1/31/2021 5:49 pm COMPARISON: 12/05/2020 HISTORY: ORDERING SYSTEM PROVIDED HISTORY: n/v TECHNOLOGIST PROVIDED HISTORY: Reason for exam:->n/v Reason for Exam: coffee ground emesis. No other information provided Acuity: Acute Type of Exam: Initial FINDINGS: Cardiomegaly unchanged. Patient's hand obscures portions of the left lower lung at the lung base. No discrete area of consolidation. No pleural effusion or pneumothorax. Bones grossly intact. Cardiomegaly unchanged. No focal consolidation. Scheduled Meds:   menthol-zinc oxide   Topical BID    pantoprazole  40 mg Intravenous BID    levetiracetam  1,500 mg Intravenous BID    ipratropium-albuterol  1 ampule Inhalation Q4H WA    multivitamin with iron-minerals  30 mL Per G Tube Daily    potassium bicarb-citric acid  10 mEq Per G Tube BID    ascorbic acid  500 mg Per G Tube BID    QUEtiapine  50 mg Per G Tube BID    escitalopram  10 mg Per G Tube Daily    chlorhexidine  7.5 mL Mouth/Throat BID    doxazosin  4 mg Per G Tube Daily    lactulose  20 g Per G Tube Daily    lamoTRIgine  400 mg Oral BID    QUEtiapine  100 mg Oral Nightly    baclofen  15 mg Per G Tube TID    Vitamin D  2,000 Units Per G Tube Daily    senna  2 tablet Per G Tube Daily    sodium chloride flush  10 mL Intravenous 2 times per day     Continuous Infusions:   sodium chloride 150 mL/hr at 02/02/21 1036     PRN Meds:.lip balm petroleum free, LORazepam, acetaminophen, guaiFENesin, bisacodyl, sodium chloride flush, promethazine **OR** ondansetron, acetaminophen **OR** acetaminophen, hydrALAZINE, sodium chloride, potassium chloride **OR** potassium alternative oral replacement **OR** potassium chloride      Assessment:  79 y.o. male admitted with   1.  Coffee ground emesis        Abdominal distention

## 2021-02-02 NOTE — PROGRESS NOTES
Shift assessment completed. Routine vitals stable. Scheduled medications given except PO meds held per order. Oral contrast started through J tube. Patient is awake, resting in bed. Brief changed and frederic care provided. Respirations are easy and unlabored. Patient does not appear to be in distress. Call light within reach.

## 2021-02-02 NOTE — PLAN OF CARE
Nutrition Problem #1: Inadequate oral intake  Intervention: Food and/or Nutrient Delivery: Start Tube Feeding  Nutritional Goals: Pt will tolerate EN at goal

## 2021-02-02 NOTE — PROGRESS NOTES
Patient had large BM at this time. Brief changed and frederic care provided. Repositioned in bed. J tube draining to gravity. Will continue to monitor.

## 2021-02-02 NOTE — PROGRESS NOTES
[facility]  Gastroenterology Progress Note    Carlyn Sloan is a 79 y.o. male patient. Principal Problem:    GI bleed  Active Problems:    Cerebral palsy (HCC)    Seizure disorder (HCC)    Anemia, chronic disease    Mental retardation  Resolved Problems:    * No resolved hospital problems. *      SUBJECTIVE:  No vomiting. Had a BM last night. Verbal with me today. denies abdominal pain. ROS:  No fever, chills  No chest pain, palpitations  No SOB, cough  Gastrointestinal ROS: see above    Physical    VITALS:  BP (!) 146/67   Pulse 72   Temp 97 °F (36.1 °C) (Temporal)   Resp 18   Wt 174 lb 2.6 oz (79 kg)   SpO2 97%   BMI 26.48 kg/m²   TEMPERATURE:  Current - Temp: 97 °F (36.1 °C); Max - Temp  Av °F (36.1 °C)  Min: 96.6 °F (35.9 °C)  Max: 97.7 °F (36.5 °C)    NAD  Regular rate   Lungs CTA Bilaterally  Abdomen soft, ND, NT,  Bowel sounds normal. jtube in place, skin excoriated    Data    Data Review:    Recent Labs     21  0048 21  0726 21  0525   WBC 5.8  --  7.8 5.2   HGB 9.6* 9.8* 9.3* 8.8*   HCT 28.0* 31.7* 29.1* 27.8*   MCV 87.6  --  87.8 88.6     --  417 404     Recent Labs     21  0726 21  0525   * 137 133*   K 4.8 4.5 4.1   CL 98* 106 101   CO2 26 23 20*   BUN 23* 15 7   CREATININE <0.5* <0.5* <0.5*     Recent Labs     21  0726   AST 18 26   ALT 20 18   BILITOT <0.2 <0.2   ALKPHOS 144* 144*     Recent Labs     21   LIPASE 45.0     Recent Labs     21   PROTIME 11.0   INR 0.95     No results for input(s): PTT in the last 72 hours. CT with IV and PO contrast 21  Impression:     1. No appendicitis, diverticulitis, or small bowel obstruction. 2. Segmental wall thickening with narrowing at the sigmoid colon. Colonoscopy should be considered to exclude an underlying mass. 3.  Other findings as described. ASSESSMENT :    Coffee ground emesis - occurred at the [facility].  none here. H/o coffee ground emesis from ulcerative esophagitis. Last EGD 8/2020. Hgb is stable at baseline of 9. protonix 40 mg BID listed as a home medications. AXR 2/1 c/w SBO. Appreciate surgery eval. No SBO on f/u CT but did show wall thickening of the sigmoid colon which may be artifact.  His last screening colonoscopy was 6/2019 with small clean based ulcer in proximal ascending colon (path active colitis, medication effect or infection) and otherwise normal.     PLAN :  - PPI IV BID  - ok to resume j-tube feeds  - apply donna skin around j tube     Discussed with Dr. Jono Mistry, [address] and [address] [facility] [address]

## 2021-02-03 LAB
A/G RATIO: 1.1 (ref 1.1–2.2)
ALBUMIN SERPL-MCNC: 3.5 G/DL (ref 3.4–5)
ALP BLD-CCNC: 148 U/L (ref 40–129)
ALT SERPL-CCNC: 22 U/L (ref 10–40)
ANION GAP SERPL CALCULATED.3IONS-SCNC: 10 MMOL/L (ref 3–16)
AST SERPL-CCNC: 37 U/L (ref 15–37)
BILIRUB SERPL-MCNC: <0.2 MG/DL (ref 0–1)
BUN BLDV-MCNC: 6 MG/DL (ref 7–20)
CALCIUM SERPL-MCNC: 8.7 MG/DL (ref 8.3–10.6)
CHLORIDE BLD-SCNC: 103 MMOL/L (ref 99–110)
CO2: 22 MMOL/L (ref 21–32)
CREAT SERPL-MCNC: <0.5 MG/DL (ref 0.8–1.3)
GFR AFRICAN AMERICAN: >60
GFR NON-AFRICAN AMERICAN: >60
GLOBULIN: 3.2 G/DL
GLUCOSE BLD-MCNC: 91 MG/DL (ref 70–99)
HCT VFR BLD CALC: 26.6 % (ref 40.5–52.5)
HEMOGLOBIN: 8.7 G/DL (ref 13.5–17.5)
MAGNESIUM: 1.9 MG/DL (ref 1.8–2.4)
MCH RBC QN AUTO: 28.7 PG (ref 26–34)
MCHC RBC AUTO-ENTMCNC: 32.7 G/DL (ref 31–36)
MCV RBC AUTO: 87.9 FL (ref 80–100)
PDW BLD-RTO: 16.6 % (ref 12.4–15.4)
PHOSPHORUS: 2.8 MG/DL (ref 2.5–4.9)
PLATELET # BLD: 386 K/UL (ref 135–450)
PMV BLD AUTO: 7.2 FL (ref 5–10.5)
POTASSIUM SERPL-SCNC: 3.4 MMOL/L (ref 3.5–5.1)
RBC # BLD: 3.03 M/UL (ref 4.2–5.9)
SODIUM BLD-SCNC: 135 MMOL/L (ref 136–145)
TOTAL PROTEIN: 6.7 G/DL (ref 6.4–8.2)
TRIGL SERPL-MCNC: 49 MG/DL (ref 0–150)
WBC # BLD: 6.6 K/UL (ref 4–11)

## 2021-02-03 PROCEDURE — 6370000000 HC RX 637 (ALT 250 FOR IP): Performed by: INTERNAL MEDICINE

## 2021-02-03 PROCEDURE — 94761 N-INVAS EAR/PLS OXIMETRY MLT: CPT

## 2021-02-03 PROCEDURE — APPSS15 APP SPLIT SHARED TIME 0-15 MINUTES: Performed by: NURSE PRACTITIONER

## 2021-02-03 PROCEDURE — C9113 INJ PANTOPRAZOLE SODIUM, VIA: HCPCS | Performed by: PHYSICIAN ASSISTANT

## 2021-02-03 PROCEDURE — 2700000000 HC OXYGEN THERAPY PER DAY

## 2021-02-03 PROCEDURE — 99232 SBSQ HOSP IP/OBS MODERATE 35: CPT | Performed by: SURGERY

## 2021-02-03 PROCEDURE — 6360000002 HC RX W HCPCS: Performed by: PHYSICIAN ASSISTANT

## 2021-02-03 PROCEDURE — 80053 COMPREHEN METABOLIC PANEL: CPT

## 2021-02-03 PROCEDURE — 84478 ASSAY OF TRIGLYCERIDES: CPT

## 2021-02-03 PROCEDURE — 94640 AIRWAY INHALATION TREATMENT: CPT

## 2021-02-03 PROCEDURE — 2580000003 HC RX 258: Performed by: INTERNAL MEDICINE

## 2021-02-03 PROCEDURE — 6360000002 HC RX W HCPCS: Performed by: INTERNAL MEDICINE

## 2021-02-03 PROCEDURE — 1200000000 HC SEMI PRIVATE

## 2021-02-03 PROCEDURE — 36415 COLL VENOUS BLD VENIPUNCTURE: CPT

## 2021-02-03 PROCEDURE — APPNB30 APP NON BILLABLE TIME 0-30 MINS: Performed by: NURSE PRACTITIONER

## 2021-02-03 PROCEDURE — 83735 ASSAY OF MAGNESIUM: CPT

## 2021-02-03 PROCEDURE — 6370000000 HC RX 637 (ALT 250 FOR IP): Performed by: PHYSICIAN ASSISTANT

## 2021-02-03 PROCEDURE — 84100 ASSAY OF PHOSPHORUS: CPT

## 2021-02-03 PROCEDURE — 85027 COMPLETE CBC AUTOMATED: CPT

## 2021-02-03 RX ORDER — LEVETIRACETAM 100 MG/ML
1500 SOLUTION ORAL 2 TIMES DAILY
Status: DISCONTINUED | OUTPATIENT
Start: 2021-02-03 | End: 2021-02-04 | Stop reason: HOSPADM

## 2021-02-03 RX ORDER — LORAZEPAM 0.5 MG/1
0.5 TABLET ORAL EVERY 12 HOURS PRN
Status: DISCONTINUED | OUTPATIENT
Start: 2021-02-03 | End: 2021-02-04 | Stop reason: HOSPADM

## 2021-02-03 RX ADMIN — IPRATROPIUM BROMIDE AND ALBUTEROL SULFATE 1 AMPULE: .5; 3 SOLUTION RESPIRATORY (INHALATION) at 12:43

## 2021-02-03 RX ADMIN — ANORECTAL OINTMENT: 15.7; .44; 24; 20.6 OINTMENT TOPICAL at 09:46

## 2021-02-03 RX ADMIN — OXYCODONE HYDROCHLORIDE AND ACETAMINOPHEN 500 MG: 500 TABLET ORAL at 08:56

## 2021-02-03 RX ADMIN — PANTOPRAZOLE SODIUM 40 MG: 40 INJECTION, POWDER, FOR SOLUTION INTRAVENOUS at 22:18

## 2021-02-03 RX ADMIN — POTASSIUM BICARBONATE 10 MEQ: 782 TABLET, EFFERVESCENT ORAL at 08:56

## 2021-02-03 RX ADMIN — QUETIAPINE FUMARATE 100 MG: 100 TABLET ORAL at 22:11

## 2021-02-03 RX ADMIN — CHLORHEXIDINE GLUCONATE 7.5 ML: 1.2 RINSE ORAL at 22:40

## 2021-02-03 RX ADMIN — LEVETIRACETAM 1500 MG: 500 SOLUTION ORAL at 22:11

## 2021-02-03 RX ADMIN — LAMOTRIGINE 400 MG: 100 TABLET ORAL at 09:46

## 2021-02-03 RX ADMIN — QUETIAPINE FUMARATE 50 MG: 25 TABLET ORAL at 14:29

## 2021-02-03 RX ADMIN — CHLORHEXIDINE GLUCONATE 7.5 ML: 1.2 RINSE ORAL at 08:57

## 2021-02-03 RX ADMIN — OXYCODONE HYDROCHLORIDE AND ACETAMINOPHEN 500 MG: 500 TABLET ORAL at 22:11

## 2021-02-03 RX ADMIN — QUETIAPINE FUMARATE 50 MG: 25 TABLET ORAL at 08:56

## 2021-02-03 RX ADMIN — SENNOSIDES 17.2 MG: 8.6 TABLET, FILM COATED ORAL at 08:56

## 2021-02-03 RX ADMIN — POTASSIUM BICARBONATE 10 MEQ: 782 TABLET, EFFERVESCENT ORAL at 22:10

## 2021-02-03 RX ADMIN — BACLOFEN 15 MG: 10 TABLET ORAL at 14:29

## 2021-02-03 RX ADMIN — DOXAZOSIN 4 MG: 4 TABLET ORAL at 08:56

## 2021-02-03 RX ADMIN — IPRATROPIUM BROMIDE AND ALBUTEROL SULFATE 1 AMPULE: .5; 3 SOLUTION RESPIRATORY (INHALATION) at 21:26

## 2021-02-03 RX ADMIN — ESCITALOPRAM OXALATE 10 MG: 10 TABLET ORAL at 08:56

## 2021-02-03 RX ADMIN — Medication 10 ML: at 22:41

## 2021-02-03 RX ADMIN — BACLOFEN 15 MG: 10 TABLET ORAL at 22:11

## 2021-02-03 RX ADMIN — MULTIVITAMIN 30 ML: LIQUID ORAL at 14:29

## 2021-02-03 RX ADMIN — Medication 10 ML: at 08:58

## 2021-02-03 RX ADMIN — IPRATROPIUM BROMIDE AND ALBUTEROL SULFATE 1 AMPULE: .5; 3 SOLUTION RESPIRATORY (INHALATION) at 16:43

## 2021-02-03 RX ADMIN — BACLOFEN 15 MG: 10 TABLET ORAL at 08:55

## 2021-02-03 RX ADMIN — LAMOTRIGINE 400 MG: 100 TABLET ORAL at 22:11

## 2021-02-03 RX ADMIN — LACTULOSE 20 G: 20 SOLUTION ORAL at 08:55

## 2021-02-03 RX ADMIN — Medication 2000 UNITS: at 08:56

## 2021-02-03 RX ADMIN — IPRATROPIUM BROMIDE AND ALBUTEROL SULFATE 1 AMPULE: .5; 3 SOLUTION RESPIRATORY (INHALATION) at 08:42

## 2021-02-03 RX ADMIN — LEVETIRACETAM 1500 MG: 100 INJECTION, SOLUTION INTRAVENOUS at 11:14

## 2021-02-03 RX ADMIN — SODIUM CHLORIDE: 9 INJECTION, SOLUTION INTRAVENOUS at 01:36

## 2021-02-03 RX ADMIN — PANTOPRAZOLE SODIUM 40 MG: 40 INJECTION, POWDER, FOR SOLUTION INTRAVENOUS at 08:55

## 2021-02-03 NOTE — PROGRESS NOTES
Shift assessment completed. Routine vitals stable. Scheduled medications given via J tube. J tube sites remains leaking. Patient is awake, resting in bed. Patient cleaned up in bed. Respirations are easy and unlabored. Patient does not appear to be in distress. Call light within reach.

## 2021-02-03 NOTE — PROGRESS NOTES
[facility]        Progress Note    Patient Name: Amalia Hodges  MRN: [id number]  YOB: 1950  Date of Evaluation: 2/3/2021    Chief Complaint / Reason for Consult: Abdominal distention, possible bowel obstruction    Subjective:  No acute events overnight  Denies pain, no signs of acute distress  No reports of nausea or vomiting; tolerating trophic J-tube feeds  Passing stool  Resting in bed at this time      Vital Signs:  Patient Vitals for the past 24 hrs:   BP Temp Temp src Pulse Resp SpO2 Height   21 1242 139/65 97.7 °F (36.5 °C) Oral 67 16 97 % --   21 0854 (!) 143/66 96.9 °F (36.1 °C) Temporal 63 16 98 % --   21 0843 -- -- -- -- 16 97 % --   21 0456 (!) 147/73 98.6 °F (37 °C) Temporal 62 19 99 % --   21 2323 128/85 97.5 °F (36.4 °C) Temporal 63 16 98 % --   21 2040 132/83 97.2 °F (36.2 °C) Temporal 71 16 98 % --   21 1840 -- -- -- -- 16 98 % --   21 1625 -- -- -- -- 16 97 % --   21 1552 112/79 97.1 °F (36.2 °C) Temporal 73 18 98 % --   21 1427 -- -- -- -- -- -- 5' 8\" (1.727 m)      TEMPERATURE HISTORY 24H: Temp (24hrs), Av.5 °F (36.4 °C), Min:96.9 °F (36.1 °C), Max:98.6 °F (37 °C)    BLOOD PRESSURE HISTORY: Systolic (79JFH), JUQ:151 , Min:112 , AOV:376    Diastolic (87CCT), YIW:78, Min:65, Max:85      Intake/Output:  I/O last 3 completed shifts:   In: 517 [NG/GT:517]  Out: 1900 [Urine:1900]  I/O this shift:  In: 249 [NG/GT:249]  Out: -   Drain/tube Output:       Physical Exam:  General: awake, alert, minimal coherent verbal response   Cardiovascular:  regular rate and rhythm and no murmur noted  Lungs: clear to auscultation  Abdomen: soft, mild distention, non-tender, active bowel sounds present  Drain: J-tube in place, bilious output draining around insertion site, skin surrounding tube is erythematous/raw    Labs:  CBC:    Recent Labs     21  0726 21  0525 21  0608   WBC 7.8 5.2 6.6   HGB 9.3* 8.8* 8.7*   HCT 29.1* 27.8* 26.6*    404 386     BMP:    Recent Labs     02/01/21  0726 02/02/21  0525 02/03/21  0608    133* 135*   K 4.5 4.1 3.4*    101 103   CO2 23 20* 22   BUN 15 7 6*   CREATININE <0.5* <0.5* <0.5*   GLUCOSE 92 88 91     Hepatic:    Recent Labs     01/31/21  1753 02/01/21  0726 02/03/21  0608   AST 18 26 37   ALT 20 18 22   BILITOT <0.2 <0.2 <0.2   ALKPHOS 144* 144* 148*     Amylase:  No results found for: AMYLASE  Lipase:    Lab Results   Component Value Date    LIPASE 45.0 01/31/2021    LIPASE 46.0 12/05/2020    LIPASE 23.0 08/21/2020      Mag:    Lab Results   Component Value Date    MG 1.90 02/03/2021    MG 2.40 08/24/2020     Phos:     Lab Results   Component Value Date    PHOS 2.8 02/03/2021    PHOS 4.0 08/24/2020      Coags:   Lab Results   Component Value Date    PROTIME 11.0 01/31/2021    INR 0.95 01/31/2021    APTT 35.5 01/31/2021       Cultures:  Anaerobic culture  No results found for: LABANAE  Fungus stain  No results found for requested labs within last 30 days. Gram stain  No results found for requested labs within last 30 days. Organism  Lab Results   Component Value Date/Time    ORG Klebsiella pneumoniae ssp pneumoniae (A) 09/17/2018 02:40 PM     Surgical culture  No results found for: CXSURG  Blood culture 1  No results found for requested labs within last 30 days. Blood culture 2  No results found for requested labs within last 30 days. Fecal occult  No results found for requested labs within last 30 days. GI bacterial pathogens by PCR  No results found for requested labs within last 30 days. C. difficile  No results found for requested labs within last 30 days.      Urine culture  Lab Results   Component Value Date    LABURIN >100,000 CFU/ml 09/17/2018       Pathology:  No relevant pathology     Imaging:  I have personally reviewed the following films:    Xr Abdomen (kub) (single Ap View)    Result Date: 2/2/2021  EXAMINATION: ONE SUPINE XRAY VIEW(S) OF THE ABDOMEN 2/2/2021 9:14 am COMPARISON: CT abdomen and pelvis and abdominal radiograph February 1, 2021. HISTORY: ORDERING SYSTEM PROVIDED HISTORY: f/u SBO TECHNOLOGIST PROVIDED HISTORY: Reason for exam:->f/u SBO Reason for Exam: Emesis (PT arrived via EMS from [address] [place] [address] with c/o coffee ground emesis. No other information provided Acuity: Acute Type of Exam: Initial FINDINGS: Percutaneous jejunostomy tube is again seen over the left abdomen. Residual contrast is seen within the right colon and appendix. There are few mildly dilated gas-filled loops of small bowel in left abdomen, decreased in size and caliber compared to prior. There are few mildly dilated gas-filled loops of bowel in the left mid abdomen, decreased in size and caliber compared to prior. Xr Abdomen (kub) (single Ap View)    Result Date: 2/1/2021  EXAMINATION: ONE SUPINE XRAY VIEW(S) OF THE ABDOMEN 2/1/2021 3:09 pm COMPARISON: CT abdomen/pelvis December 5, 2020 HISTORY: ORDERING SYSTEM PROVIDED HISTORY: N/V. j-tube. r/o obstruction TECHNOLOGIST PROVIDED HISTORY: Reason for exam:->N/V. j-tube. r/o obstruction Reason for Exam: N/V. j-tube. r/o obstruction Acuity: Acute Type of Exam: Unknown FINDINGS: Air-filled distention of small bowel loops in the central midline abdomen and just to the right of the abdomen at the lower lumbar spine level are present. There is air within normal caliber colon and proximal small bowel. Findings concerning for early/developing distal small bowel obstruction. Recommend close follow-up radiographs. Ct Abdomen Pelvis W Iv Contrast Additional Contrast? Oral    Result Date: 2/1/2021  EXAMINATION: CT OF THE ABDOMEN AND PELVIS WITH CONTRAST 2/1/2021 11:09 pm TECHNIQUE: CT of the abdomen and pelvis was performed with the administration of intravenous contrast. Multiplanar reformatted images are provided for review.  Dose modulation, iterative reconstruction, and/or weight based adjustment of the mA/kV was utilized to reduce the radiation dose to as low as reasonably achievable. COMPARISON: CT abdomen pelvis 12/05/2020. HISTORY: ORDERING SYSTEM PROVIDED HISTORY: evaluate for bowel obstruction TECHNOLOGIST PROVIDED HISTORY: Place contrast through J tube Reason for exam:->evaluate for bowel obstruction Additional Contrast?->Oral Reason for Exam: Evaluate for bowel obstruction. Acuity: Acute Type of Exam: Initial FINDINGS: Lower Chest: Trace pleural effusions and scattered atelectasis. Coronary artery calcifications noted. Organs: Liver: Unremarkable on this phase of enhancement. Granuloma. Spleen: Unremarkable on this phase of enhancement. Granulomas. Pancreas: Unremarkable on this phase of enhancement. Portion of pancreas herniates through the hiatus. Adrenal glands: Adrenal nodules measuring up to 1.5 cm. Kidneys: Too small to characterize low attenuation focus on the left. No hydronephrosis. Suboptimal evaluation for renal calculi due to presence of contrast in the renal collecting systems. Gallbladder: Incompletely distended. GI/Bowel: Visualized esophagus/stomach: Moderate to large hiatal hernia. Small bowel: No dilated small bowel. Percutaneous jejunostomy tube. Large bowel: Scattered colonic diverticula without adjacent stranding. Segmental wall thickening and narrowing of the sigmoid colon. Appendix: Normal. Pelvis: Bladder is incompletely distended. Prostate and seminal vesicles appear unremarkable. Peritoneum/Retroperitoneum: Adenopathy: No adenopathy by size criteria. Abdominal aorta: No abdominal aortic aneurysm. Free fluid/air: No free fluid. No free air. Bones/Soft Tissues: Fat filled right inguinal hernia without stranding. Scarring in the soft tissues overlying the left hip. Osteonecrosis of the femoral heads. Scattered degenerative changes noted in the visualized spine without spondylolisthesis.      1. No appendicitis, diverticulitis, or small bowel obstruction. 2. Segmental wall thickening with narrowing at the sigmoid colon. Colonoscopy should be considered to exclude an underlying mass. 3.  Other findings as described. Scheduled Meds:   menthol-zinc oxide   Topical BID    pantoprazole  40 mg Intravenous BID    levetiracetam  1,500 mg Intravenous BID    ipratropium-albuterol  1 ampule Inhalation Q4H WA    multivitamin with iron-minerals  30 mL Per G Tube Daily    potassium bicarb-citric acid  10 mEq Per G Tube BID    ascorbic acid  500 mg Per G Tube BID    QUEtiapine  50 mg Per G Tube BID    escitalopram  10 mg Per G Tube Daily    chlorhexidine  7.5 mL Mouth/Throat BID    doxazosin  4 mg Per G Tube Daily    lactulose  20 g Per G Tube Daily    lamoTRIgine  400 mg Oral BID    QUEtiapine  100 mg Oral Nightly    baclofen  15 mg Per G Tube TID    Vitamin D  2,000 Units Per G Tube Daily    senna  2 tablet Per G Tube Daily    sodium chloride flush  10 mL Intravenous 2 times per day     Continuous Infusions:   sodium chloride 150 mL/hr at 02/03/21 0136     PRN Meds:.sodium chloride, lip balm petroleum free, LORazepam, acetaminophen, guaiFENesin, bisacodyl, sodium chloride flush, promethazine **OR** ondansetron, acetaminophen **OR** acetaminophen, hydrALAZINE, sodium chloride, potassium chloride **OR** potassium alternative oral replacement **OR** potassium chloride      Assessment:  79 y.o. male admitted with   1. Coffee ground emesis        Abdominal distention   Coffee ground emesis  Cerebral palsy  Mental retardation      Plan:  1. Gradually advance enteral feeds via J-tube to goal as tolerated, apply hydrocolloid dressing to protect skin surrounding J-tube; monitor for bowel function--passing stool  2. IV hydration until adequate enteral intake; monitor and correct electrolytes  3. Activity as tolerated  4. PRN analgesics and antiemetics--minimizing narcotics as tolerated  5.  Management of medical comorbid etiologies per primary team and consulting services  6. Disposition: Discharge planning; okay for discharge back to nursing facility when stable medically    EDUCATION:  Educated patient on plan of care and disease process--all questions answered. Plans discussed with patient and nursing. Reviewed and discussed with Dr. Bertram Chirinos.       Signed:  CATALINA Gan CNP  2/3/2021 1:31 PM     57-year-old male seen in follow-up for a probable ileus  Tolerating trophic tube feeds  Still having some excoriation surrounding his J-tube  Will have a hydrocolloid placed around the J-tube to protect the skin from the acidic GI contents which are leaking around the tube  Advance tube feeds as tolerated today  Continue twice daily PPI  Okay for discharge from my perspective when medically stable

## 2021-02-03 NOTE — PROGRESS NOTES
Department of Internal Medicine  General Internal Medicine   Progress Note      SUBJECTIVE: appears to be in no acute distress  But nursing staff reporting some intermittent  Distress        History obtained from chart review and the patient's nursing staff   General ROS: positive for  - fatigue and malaise  negative for - chills, fever or night sweats  Psychological ROS: negative  Respiratory ROS: no cough, shortness of breath, or wheezing  Cardiovascular ROS: positive for - , dyspnea on exertion and shortness of breath  negative for - chest pain  Gastrointestinal ROS: no abdominal pain, change in bowel habits, or black or bloody stools    OBJECTIVE      Medications      Current Facility-Administered Medications: sodium chloride (OCEAN, BABY AYR) 0.65 % nasal spray 1 spray, 1 spray, Each Nostril, PRN  menthol-zinc oxide (CALMOSEPTINE) 0.44-20.6 % ointment, , Topical, BID  pantoprazole (PROTONIX) injection 40 mg, 40 mg, Intravenous, BID  lip balm petroleum free (PHYTOPLEX) stick, , Topical, PRN  levETIRAcetam (KEPPRA) 1,500 mg in sodium chloride 0.9 % 100 mL IVPB, 1,500 mg, Intravenous, BID  LORazepam (ATIVAN) injection 0.5 mg, 0.5 mg, Intravenous, Q12H PRN  ipratropium-albuterol (DUONEB) nebulizer solution 1 ampule, 1 ampule, Inhalation, Q4H WA  multivitamin with iron-minerals liquid 30 mL, 30 mL, Per G Tube, Daily  potassium bicarb-citric acid (EFFER-K) effervescent tablet 10 mEq, 10 mEq, Per G Tube, BID  ascorbic acid (VITAMIN C) tablet 500 mg, 500 mg, Per G Tube, BID  QUEtiapine (SEROQUEL) tablet 50 mg, 50 mg, Per G Tube, BID  escitalopram (LEXAPRO) tablet 10 mg, 10 mg, Per G Tube, Daily  chlorhexidine (PERIDEX) 0.12 % solution 7.5 mL, 7.5 mL, Mouth/Throat, BID  doxazosin (CARDURA) tablet 4 mg, 4 mg, Per G Tube, Daily  lactulose (CHRONULAC) 10 GM/15ML solution 20 g, 20 g, Per G Tube, Daily  acetaminophen (TYLENOL) 160 MG/5ML solution 650 mg, 650 mg, Per G Tube, Q4H PRN  lamoTRIgine (LAMICTAL) tablet 400 mg, 400 mg, Oral, BID  QUEtiapine (SEROQUEL) tablet 100 mg, 100 mg, Oral, Nightly  baclofen (LIORESAL) tablet 15 mg, 15 mg, Per G Tube, TID  Vitamin D (CHOLECALCIFEROL) tablet 2,000 Units, 2,000 Units, Per G Tube, Daily  guaiFENesin (ROBITUSSIN) 100 MG/5ML oral solution 200 mg, 200 mg, Oral, Q4H PRN  senna (SENOKOT) tablet 17.2 mg, 2 tablet, Per G Tube, Daily  bisacodyl (DULCOLAX) EC tablet 10 mg, 10 mg, Oral, PRN  sodium chloride flush 0.9 % injection 10 mL, 10 mL, Intravenous, 2 times per day  sodium chloride flush 0.9 % injection 10 mL, 10 mL, Intravenous, PRN  promethazine (PHENERGAN) tablet 12.5 mg, 12.5 mg, Oral, Q6H PRN **OR** ondansetron (ZOFRAN) injection 4 mg, 4 mg, Intravenous, Q6H PRN  acetaminophen (TYLENOL) tablet 650 mg, 650 mg, Oral, Q6H PRN **OR** acetaminophen (TYLENOL) suppository 650 mg, 650 mg, Rectal, Q6H PRN  hydrALAZINE (APRESOLINE) injection 10 mg, 10 mg, Intravenous, Q6H PRN  0.9 % sodium chloride bolus, 500 mL, Intravenous, PRN  potassium chloride (KLOR-CON M) extended release tablet 40 mEq, 40 mEq, Oral, PRN **OR** potassium bicarb-citric acid (EFFER-K) effervescent tablet 40 mEq, 40 mEq, Oral, PRN **OR** potassium chloride 10 mEq/100 mL IVPB (Peripheral Line), 10 mEq, Intravenous, PRN    Physical      VITALS:  /65   Pulse 67   Temp 97.7 °F (36.5 °C) (Oral)   Resp 16   Ht 5' 8\" (1.727 m)   Wt 174 lb 2.6 oz (79 kg)   SpO2 97%   BMI 26.48 kg/m²   TEMPERATURE:  Current - Temp: 97.7 °F (36.5 °C);  Max - Temp  Av.5 °F (36.4 °C)  Min: 96.9 °F (36.1 °C)  Max: 98.6 °F (37 °C)  RESPIRATIONS RANGE: Resp  Av.5  Min: 16  Max: 19  PULSE RANGE: Pulse  Av.5  Min: 62  Max: 73  BLOOD PRESSURE RANGE:  Systolic (00HGX), GLH:255 , Min:112 , RFX:373   ; Diastolic (25BRL), VJP:40, Min:65, Max:85    PULSE OXIMETRY RANGE: SpO2  Av.7 %  Min: 97 %  Max: 99 %  24HR INTAKE/OUTPUT:      Intake/Output Summary (Last 24 hours) at 2/3/2021 1412  Last data filed at 2/3/2021 1002  Gross per 24 hour Intake 766 ml   Output 1900 ml   Net -1134 ml     CONSTITUTIONAL:  fatigued, somnolent, uncooperative, distracted, severe distress and appears older than stated age  NECK:  supple, symmetrical, trachea midline, skin normal and no stridor  BACK:  symmetric  LUNGS:  Decreased BS taras crackles , few wheezes   CARDIOVASCULAR:  normal apical pulses, tachycardic with regular rhythm and normal S1 and S2  ABDOMEN:  Soft BS + non tender   MUSCULOSKELETAL:  Contracted      NEUROLOGIC:  Right hemiplegia   SKIN:  Warm and dry  and no bruising or bleeding    Data      No results found for: [place] , BEART, [id number]    Lab Results   Component Value Date     02/03/2021    K 3.4 02/03/2021    K 4.5 02/01/2021     02/03/2021    CO2 22 02/03/2021    BUN 6 02/03/2021    CREATININE <0.5 02/03/2021    GLUCOSE 91 02/03/2021    CALCIUM 8.7 02/03/2021     Lab Results   Component Value Date    WBC 6.6 02/03/2021    HGB 8.7 02/03/2021    HCT 26.6 02/03/2021    MCV 87.9 02/03/2021     02/03/2021         Lab Results   Component Value Date    INR 0.95 01/31/2021    PROTIME 11.0 01/31/2021       ASSESSMENT AND PLAN      Patient Active Problem List   Diagnosis    Anemia    Altered mental status    Hyponatremia    Jejunostomy malfunction (HCC)    Cerebral palsy (HCC)    Seizure disorder (HCC)    [address]rostomy malfunction ([address]    Nuclear sclerosis    Fecal impaction (HCC)    Anemia, chronic disease    Partial epilepsy with impairment of consciousness, intractable ([address]    Mental retardation    Malfunction of gastrostomy tube ([address]    Hypoxemia    Hypotension    Acute encephalopathy    Recurrent seizures ([address]    Prolapse, intestine    Upper GI hemorrhage    Anemia due to acute blood loss    GI bleed    Coffee ground emesis     No bowel obstruction per CT   Cautious advancement of tube feeding    GI bleed under control,  Ct PPI   Sigmoid colon thickening  On CT is nonspecific    ct treating skin excoriation

## 2021-02-03 NOTE — PROGRESS NOTES
[facility]  Gastroenterology Progress Note    Curt Pablo is a 79 y.o. male patient. Principal Problem:    GI bleed  Active Problems:    Cerebral palsy (HCC)    Seizure disorder (HCC)    Anemia, chronic disease    Mental retardation    Coffee ground emesis  Resolved Problems:    * No resolved hospital problems. *      SUBJECTIVE:  Tolerating tube feeds. No vomiting. Having BMs.  denies abdominal pain. ROS:  No fever, chills  No chest pain, palpitations  No SOB, cough  Gastrointestinal ROS: see above    Physical    VITALS:  BP (!) 143/66   Pulse 63   Temp 96.9 °F (36.1 °C) (Temporal)   Resp 16   Ht 5' 8\" (1.727 m)   Wt 174 lb 2.6 oz (79 kg)   SpO2 98%   BMI 26.48 kg/m²   TEMPERATURE:  Current - Temp: 96.9 °F (36.1 °C); Max - Temp  Av.5 °F (36.4 °C)  Min: 96.9 °F (36.1 °C)  Max: 98.6 °F (37 °C)    NAD  Regular rate   Lungs CTA Bilaterally  Abdomen soft, ND, NT,  Bowel sounds normal. jtube in place, skin excoriated    Data    Data Review:    Recent Labs     21  0721  0525 21  0608   WBC 7.8 5.2 6.6   HGB 9.3* 8.8* 8.7*   HCT 29.1* 27.8* 26.6*   MCV 87.8 88.6 87.9    404 386     Recent Labs     21  0721  0525 21  0608    133* 135*   K 4.5 4.1 3.4*    101 103   CO2 23 20* 22   PHOS  --   --  2.8   BUN 15 7 6*   CREATININE <0.5* <0.5* <0.5*     Recent Labs     21  1753 21  0726 21  0608   AST 18 26 37   ALT 20 18 22   BILITOT <0.2 <0.2 <0.2   ALKPHOS 144* 144* 148*     Recent Labs     21  1753   LIPASE 45.0     Recent Labs     21  1753   PROTIME 11.0   INR 0.95     No results for input(s): PTT in the last 72 hours. CT with IV and PO contrast 21  Impression:     1. No appendicitis, diverticulitis, or small bowel obstruction. 2. Segmental wall thickening with narrowing at the sigmoid colon. Colonoscopy should be considered to exclude an underlying mass. 3.  Other findings as described.           ASSESSMENT :    Coffee ground emesis - occurred at the [facility]. none here. H/o coffee ground emesis from ulcerative esophagitis. Last EGD 8/2020. Hgb is stable near his baseline of 9. protonix 40 mg BID listed as a home medications. AXR 2/1 c/w SBO. Appreciate surgery eval. No SBO on f/u CT but did show wall thickening of the sigmoid colon which may be artifact as his last screening colonoscopy was 6/2019. Tolerating J-tube feeds at low rate. PLAN :  - PPI IV BID  - ok to advance j-tube feeds from GI standpoint     Will sign off. Please call if questions. Discussed with Dr. Blaine Gaines, [address]    I have personally performed a face to face diagnostic evaluation on this patient. I have interviewed and examined the patient and I agree with the findings and recommended plan of care. In summary, my findings and plan are the following: Pt abd is benign (tender around PEG). Slowly advancing TF.  Will be available this admission if needed further       Desmond Lainez MD  [address]

## 2021-02-04 VITALS
SYSTOLIC BLOOD PRESSURE: 110 MMHG | BODY MASS INDEX: 26.4 KG/M2 | TEMPERATURE: 98 F | DIASTOLIC BLOOD PRESSURE: 48 MMHG | HEIGHT: 68 IN | WEIGHT: 174.16 LBS | HEART RATE: 75 BPM | RESPIRATION RATE: 22 BRPM | OXYGEN SATURATION: 93 %

## 2021-02-04 LAB
ANION GAP SERPL CALCULATED.3IONS-SCNC: 8 MMOL/L (ref 3–16)
BUN BLDV-MCNC: 12 MG/DL (ref 7–20)
CALCIUM SERPL-MCNC: 8.8 MG/DL (ref 8.3–10.6)
CHLORIDE BLD-SCNC: 102 MMOL/L (ref 99–110)
CO2: 25 MMOL/L (ref 21–32)
CREAT SERPL-MCNC: <0.5 MG/DL (ref 0.8–1.3)
GFR AFRICAN AMERICAN: >60
GFR NON-AFRICAN AMERICAN: >60
GLUCOSE BLD-MCNC: 111 MG/DL (ref 70–99)
MAGNESIUM: 2.3 MG/DL (ref 1.8–2.4)
PHOSPHORUS: 3.3 MG/DL (ref 2.5–4.9)
POTASSIUM SERPL-SCNC: 4.2 MMOL/L (ref 3.5–5.1)
SODIUM BLD-SCNC: 135 MMOL/L (ref 136–145)

## 2021-02-04 PROCEDURE — 94761 N-INVAS EAR/PLS OXIMETRY MLT: CPT

## 2021-02-04 PROCEDURE — 6370000000 HC RX 637 (ALT 250 FOR IP): Performed by: INTERNAL MEDICINE

## 2021-02-04 PROCEDURE — C9113 INJ PANTOPRAZOLE SODIUM, VIA: HCPCS | Performed by: PHYSICIAN ASSISTANT

## 2021-02-04 PROCEDURE — 84100 ASSAY OF PHOSPHORUS: CPT

## 2021-02-04 PROCEDURE — 80048 BASIC METABOLIC PNL TOTAL CA: CPT

## 2021-02-04 PROCEDURE — 94640 AIRWAY INHALATION TREATMENT: CPT

## 2021-02-04 PROCEDURE — 2580000003 HC RX 258: Performed by: INTERNAL MEDICINE

## 2021-02-04 PROCEDURE — 6360000002 HC RX W HCPCS: Performed by: PHYSICIAN ASSISTANT

## 2021-02-04 PROCEDURE — 36415 COLL VENOUS BLD VENIPUNCTURE: CPT

## 2021-02-04 PROCEDURE — 83735 ASSAY OF MAGNESIUM: CPT

## 2021-02-04 PROCEDURE — 99232 SBSQ HOSP IP/OBS MODERATE 35: CPT | Performed by: SURGERY

## 2021-02-04 PROCEDURE — 2700000000 HC OXYGEN THERAPY PER DAY

## 2021-02-04 RX ORDER — PROMETHAZINE HYDROCHLORIDE 12.5 MG/1
12.5 TABLET ORAL EVERY 6 HOURS PRN
Qty: 30 TABLET | Refills: 0 | Status: SHIPPED | OUTPATIENT
Start: 2021-02-04 | End: 2021-02-11

## 2021-02-04 RX ORDER — LORAZEPAM 0.5 MG/1
0.5 TABLET ORAL EVERY 12 HOURS PRN
Qty: 10 TABLET | Refills: 0 | Status: ON HOLD | OUTPATIENT
Start: 2021-02-04 | End: 2021-02-17 | Stop reason: SDUPTHER

## 2021-02-04 RX ADMIN — SENNOSIDES 17.2 MG: 8.6 TABLET, FILM COATED ORAL at 08:49

## 2021-02-04 RX ADMIN — ESCITALOPRAM OXALATE 10 MG: 10 TABLET ORAL at 08:50

## 2021-02-04 RX ADMIN — POTASSIUM BICARBONATE 10 MEQ: 782 TABLET, EFFERVESCENT ORAL at 08:51

## 2021-02-04 RX ADMIN — MULTIVITAMIN 30 ML: LIQUID ORAL at 10:15

## 2021-02-04 RX ADMIN — BACLOFEN 15 MG: 10 TABLET ORAL at 14:23

## 2021-02-04 RX ADMIN — DOXAZOSIN 4 MG: 4 TABLET ORAL at 08:50

## 2021-02-04 RX ADMIN — IPRATROPIUM BROMIDE AND ALBUTEROL SULFATE 1 AMPULE: .5; 3 SOLUTION RESPIRATORY (INHALATION) at 07:26

## 2021-02-04 RX ADMIN — OXYCODONE HYDROCHLORIDE AND ACETAMINOPHEN 500 MG: 500 TABLET ORAL at 08:50

## 2021-02-04 RX ADMIN — ANORECTAL OINTMENT: 15.7; .44; 24; 20.6 OINTMENT TOPICAL at 08:50

## 2021-02-04 RX ADMIN — Medication 10 ML: at 10:01

## 2021-02-04 RX ADMIN — PANTOPRAZOLE SODIUM 40 MG: 40 INJECTION, POWDER, FOR SOLUTION INTRAVENOUS at 09:00

## 2021-02-04 RX ADMIN — LACTULOSE 20 G: 20 SOLUTION ORAL at 08:50

## 2021-02-04 RX ADMIN — QUETIAPINE FUMARATE 50 MG: 25 TABLET ORAL at 14:23

## 2021-02-04 RX ADMIN — CHLORHEXIDINE GLUCONATE 7.5 ML: 1.2 RINSE ORAL at 08:51

## 2021-02-04 RX ADMIN — IPRATROPIUM BROMIDE AND ALBUTEROL SULFATE 1 AMPULE: .5; 3 SOLUTION RESPIRATORY (INHALATION) at 12:08

## 2021-02-04 RX ADMIN — Medication 2000 UNITS: at 08:50

## 2021-02-04 RX ADMIN — LEVETIRACETAM 1500 MG: 500 SOLUTION ORAL at 08:50

## 2021-02-04 RX ADMIN — QUETIAPINE FUMARATE 50 MG: 25 TABLET ORAL at 08:50

## 2021-02-04 RX ADMIN — BACLOFEN 15 MG: 10 TABLET ORAL at 08:50

## 2021-02-04 RX ADMIN — LAMOTRIGINE 400 MG: 100 TABLET ORAL at 08:50

## 2021-02-04 ASSESSMENT — PAIN SCALES - GENERAL: PAINLEVEL_OUTOF10: 0

## 2021-02-04 NOTE — PROGRESS NOTES
Shift assessment complete. VSS. No signs of distress. Scheduled medications given. Patient tolerating continuous feeds at 65mL/hr. Bed alarm on. Call light within reach.

## 2021-02-04 NOTE — PROGRESS NOTES
Routine vital signs stable. O2 sats 96% on RA. Patients head to toe assessment completed. Patient is awake, resting in bed. Scheduled medications given via J tube. J tube site remains leaking. No signs of distress. Respirations are easy and unlabored. Paula Martinez

## 2021-02-04 NOTE — CARE COORDINATION
Discharge order placed for patient. Set up transport w/[facility] at 3pm today. Contacted facility and POA informing of discharge time. Informed RN as well. Completed HANK. Faxed all paperwork. Discharge packet complete and left on pt's chart. All needs met per case mgmt. Discharge Plan:  [facility] AND [place]  [address]  Report = [phone]  Fax = [phone]    [facility] transport at 3pm today.     Electronically signed by CECILE Daly, VINAYW on 2/4/2021 at 1:31 PM

## 2021-02-04 NOTE — PLAN OF CARE
Nutrition Problem #1: Other (Comment)(Excessive EN infusion)  Intervention: Food and/or Nutrient Delivery: Modify Tube Feeding  Nutritional Goals: New goal- Pt tolerate TF at new goal rate

## 2021-02-04 NOTE — PROGRESS NOTES
Patient has been discharged per MD orders. Patient verbalizes understanding of all instructions, medications, and follow up. Report called to [facility]. IV has been removed, catheter intact, patient tolerated well. All belongings have been gathered and packed. Transport has been notified of discharge.

## 2021-02-04 NOTE — PROGRESS NOTES
Comprehensive Nutrition Assessment    Type and Reason for Visit:  Reassess    Nutrition Recommendations/Plan:   1. 2 Gagan HN at 40 ml/hr. Provides: 1920 gagan and 80 gms protein. Total EN vol 960 ml. Free water bolus 210 ml q 4 hours or per MD.  2. Monitor TF     Nutrition Assessment:  Pt remains nutritionally stable at this time. Pt was being over fed d/t TF running at 65 ml/hr for 24 hours. Pt was receiving Nutren 2.0 @ 65 ml/hr for 15 hours NOT 24 hours. RD adjusted TF goal rate to 40 ml/hr which will meet pt's energy and protein needs. RD will continue to monitor tolerance of TF. Malnutrition Assessment:  Malnutrition Status:  No malnutrition      Estimated Daily Nutrient Needs:  Energy (kcal):  8473-6482; Weight Used for Energy Requirements:  (Continue using 79kg as wt)     Protein (g):   grams; Weight Used for Protein Requirements:  Ideal(70 kg; 1.2-1.5 grams per kg)        Fluid (ml/day):   ; Method Used for Fluid Requirements:  1 ml/kcal      Nutrition Related Findings:  J-tube. I/O's: -1930. Na 135L but improving      Wounds:  None       Current Nutrition Therapies:    Current Tube Feeding (TF) Orders:  · Feeding Route: Jejunostomy  · Formula: Fluid restricted(2 Gagan HN)  · Schedule: Continuous  · Additives/Modulars:    · Water Flushes:    · Current TF & Flush Orders Provides: 2 Gagan HN at 65 ml/hr. Provides: 3120 cals and 130 gms protein. Total EN vol 1560 ml.  · Goal TF & Flush Orders Provides: 2 Gagan HN at 40 ml/hr. Provides: 1920 gagan and 80 gms protein. Total EN vol 960 ml. Free water bolus 210 ml q 4 hours or per MD.      Anthropometric Measures:  · Height: 5' 8\" (172.7 cm)  · Current Body Weight: 174 lb (78.9 kg)   · Ideal Body Weight: 154 lbs; % Ideal Body Weight 113 %   · BMI: 26.5  · Adjusted Body Weight:  ; No Adjustment   · BMI Categories: Overweight (BMI 25.0-29. 9)       Nutrition Diagnosis:   · Other (Comment)(Excessive EN infusion) related to excessive energy intake as evidenced by (150% of pt's energy needs were being met)      Nutrition Interventions:   Food and/or Nutrient Delivery:  Modify Tube Feeding  Nutrition Education/Counseling:  Education not indicated   Coordination of Nutrition Care:  Continue to monitor while inpatient    Goals:  New goal- Pt tolerate TF at new goal rate       Nutrition Monitoring and Evaluation:   Behavioral-Environmental Outcomes:  None Identified   Food/Nutrient Intake Outcomes:  Enteral Nutrition Intake/Tolerance  Physical Signs/Symptoms Outcomes:  Biochemical Data, Weight     Discharge Planning:    Enteral Nutrition     Electronically signed by Murali Flores RD, CNSC, LD on 2/4/21 at 12:37 PM EST    Contact: [phone]

## 2021-02-04 NOTE — PROGRESS NOTES
Pt tolerated tube feed at 65ml/hr overnight with no n/v/d no concerns noted external catheter removed pt with incontinence pads clean and dry, oral care performed, no further needs at this time

## 2021-02-04 NOTE — PROGRESS NOTES
Enrike Cerda is a 79 y.o. male patient.     Chief complaint-abdominal distention, coffee-ground emesis    HPI-79year-old male seen in follow-up for partial small bowel obstruction versus ileus  Current Facility-Administered Medications   Medication Dose Route Frequency Provider Last Rate Last Admin    sodium chloride (OCEAN, BABY AYR) 0.65 % nasal spray 1 spray  1 spray Each Nostril PRN Latesha Saenz MD        levETIRAcetam (KEPPRA) 100 MG/ML solution 1,500 mg  1,500 mg Per J Tube BID Latesha Saenz MD   1,500 mg at 02/04/21 0850    LORazepam (ATIVAN) tablet 0.5 mg  0.5 mg Oral Q12H PRN Latesha Saenz MD        menthol-zinc oxide (CALMOSEPTINE) 0.44-20.6 % ointment   Topical BID Silvia Burrell PA-C   Given at 02/04/21 0850    pantoprazole (PROTONIX) injection 40 mg  40 mg Intravenous BID Silvia Burrell PA-C   40 mg at 02/04/21 0900    lip balm petroleum free (PHYTOPLEX) stick   Topical PRN Chano Webber MD   Given at 02/01/21 1614    ipratropium-albuterol (DUONEB) nebulizer solution 1 ampule  1 ampule Inhalation Q4H WA Latesha Saenz MD   1 ampule at 02/04/21 1208    multivitamin with iron-minerals liquid 30 mL  30 mL Per G Tube Daily Chano Webber MD   30 mL at 02/04/21 1015    potassium bicarb-citric acid (EFFER-K) effervescent tablet 10 mEq  10 mEq Per G Tube BID Chano Webber MD   10 mEq at 02/04/21 5795    ascorbic acid (VITAMIN C) tablet 500 mg  500 mg Per G Tube BID Chano Webber MD   500 mg at 02/04/21 0850    QUEtiapine (SEROQUEL) tablet 50 mg  50 mg Per G Tube BID Chano Webber MD   50 mg at 02/04/21 0850    escitalopram (LEXAPRO) tablet 10 mg  10 mg Per G Tube Daily Chano Webber MD   10 mg at 02/04/21 0850    chlorhexidine (PERIDEX) 0.12 % solution 7.5 mL  7.5 mL Mouth/Throat BID Chano Webber MD   7.5 mL at 02/04/21 1738    doxazosin (CARDURA) tablet 4 mg  4 mg Per G Tube Daily Chano Webber MD   4 mg at 02/04/21 0850    lactulose ([facility]) potassium chloride 10 mEq/100 mL IVPB (Peripheral Line)  10 mEq Intravenous PRN Nuha Bates MD         Allergies   Allergen Reactions    Biaxin [Clarithromycin] Other (See Comments)     Unknown reaction    Invanz [Ertapenem] Other (See Comments)     Caused SEIZURES  Caused SEIZURES     Principal Problem:    GI bleed  Active Problems:    Cerebral palsy (HCC)    Seizure disorder (HCC)    Anemia, chronic disease    Mental retardation    Coffee ground emesis  Resolved Problems:    * No resolved hospital problems. *    Blood pressure (!) 110/48, pulse 75, temperature 98 °F (36.7 °C), temperature source Axillary, resp. rate 22, height 5' 8\" (1.727 m), weight 174 lb 2.6 oz (79 kg), SpO2 93 %. Subjective:  Symptoms:  Improved. Diet:  Dietary issues: Tolerating tube feeds at goal.    Activity level: Normal.      Objective:  General Appearance:  Comfortable. Vital signs: (most recent): Blood pressure (!) 110/48, pulse 75, temperature 98 °F (36.7 °C), temperature source Axillary, resp. rate 22, height 5' 8\" (1.727 m), weight 174 lb 2.6 oz (79 kg), SpO2 93 %. Output: Producing urine. Lungs:  Normal effort and normal respiratory rate. Abdomen: Abdomen is soft. (Mild diffuse abdominal distention without abdominal tenderness). Neurological: Patient is alert and oriented to person, place and time. Skin:  Warm and dry. Assessment & Plan 66-year-old male seen in follow-up for a partial small bowel obstruction versus ileus. There is now a hydrocolloid surrounding his J-tube which is doing a good job of protecting the skin. He is tolerating tube feeds at goal.  Will follow as needed. Please call if questions.     Bertram Shafer MD  2/4/2021

## 2021-02-16 ENCOUNTER — APPOINTMENT (OUTPATIENT)
Dept: GENERAL RADIOLOGY | Age: 71
DRG: 377 | End: 2021-02-16
Payer: MEDICARE

## 2021-02-16 ENCOUNTER — HOSPITAL ENCOUNTER (INPATIENT)
Age: 71
LOS: 1 days | Discharge: HOME | DRG: 377 | End: 2021-02-17
Attending: EMERGENCY MEDICINE | Admitting: INTERNAL MEDICINE
Payer: MEDICARE

## 2021-02-16 ENCOUNTER — APPOINTMENT (OUTPATIENT)
Dept: CT IMAGING | Age: 71
DRG: 377 | End: 2021-02-16
Payer: MEDICARE

## 2021-02-16 DIAGNOSIS — G40.909 SEIZURE DISORDER (HCC): ICD-10-CM

## 2021-02-16 DIAGNOSIS — G40.909 RECURRENT SEIZURES (HCC): ICD-10-CM

## 2021-02-16 DIAGNOSIS — K92.2 UPPER GI BLEED: Primary | ICD-10-CM

## 2021-02-16 DIAGNOSIS — R19.5 OCCULT BLOOD POSITIVE STOOL: ICD-10-CM

## 2021-02-16 PROBLEM — D62 ANEMIA DUE TO ACUTE BLOOD LOSS: Status: RESOLVED | Noted: 2020-08-25 | Resolved: 2021-02-16

## 2021-02-16 LAB
A/G RATIO: 1.1 (ref 1.1–2.2)
ABO/RH: NORMAL
ALBUMIN SERPL-MCNC: 3.8 G/DL (ref 3.4–5)
ALP BLD-CCNC: 134 U/L (ref 40–129)
ALT SERPL-CCNC: 18 U/L (ref 10–40)
ANION GAP SERPL CALCULATED.3IONS-SCNC: 9 MMOL/L (ref 3–16)
ANTIBODY SCREEN: NORMAL
APTT: 33.7 SEC (ref 24.2–36.2)
AST SERPL-CCNC: 19 U/L (ref 15–37)
BANDED NEUTROPHILS RELATIVE PERCENT: 1 % (ref 0–7)
BASOPHILS ABSOLUTE: 0 K/UL (ref 0–0.2)
BASOPHILS RELATIVE PERCENT: 0 %
BILIRUB SERPL-MCNC: <0.2 MG/DL (ref 0–1)
BUN BLDV-MCNC: 36 MG/DL (ref 7–20)
CALCIUM SERPL-MCNC: 9.6 MG/DL (ref 8.3–10.6)
CHLORIDE BLD-SCNC: 98 MMOL/L (ref 99–110)
CO2: 27 MMOL/L (ref 21–32)
CREAT SERPL-MCNC: 0.6 MG/DL (ref 0.8–1.3)
EOSINOPHILS ABSOLUTE: 0 K/UL (ref 0–0.6)
EOSINOPHILS RELATIVE PERCENT: 0 %
GFR AFRICAN AMERICAN: >60
GFR NON-AFRICAN AMERICAN: >60
GLOBULIN: 3.4 G/DL
GLUCOSE BLD-MCNC: 110 MG/DL (ref 70–99)
HCT VFR BLD CALC: 27.6 % (ref 40.5–52.5)
HCT VFR BLD CALC: 29.1 % (ref 40.5–52.5)
HEMOGLOBIN: 8.7 G/DL (ref 13.5–17.5)
HEMOGLOBIN: 9.1 G/DL (ref 13.5–17.5)
HYPOCHROMIA: ABNORMAL
INR BLD: 1.01 (ref 0.86–1.14)
LYMPHOCYTES ABSOLUTE: 1 K/UL (ref 1–5.1)
LYMPHOCYTES RELATIVE PERCENT: 9 %
MCH RBC QN AUTO: 27.6 PG (ref 26–34)
MCHC RBC AUTO-ENTMCNC: 31.4 G/DL (ref 31–36)
MCV RBC AUTO: 87.9 FL (ref 80–100)
MICROCYTES: ABNORMAL
MONOCYTES ABSOLUTE: 0.1 K/UL (ref 0–1.3)
MONOCYTES RELATIVE PERCENT: 1 %
NEUTROPHILS ABSOLUTE: 10.3 K/UL (ref 1.7–7.7)
NEUTROPHILS RELATIVE PERCENT: 89 %
OCCULT BLOOD DIAGNOSTIC: ABNORMAL
OVALOCYTES: ABNORMAL
PDW BLD-RTO: 17 % (ref 12.4–15.4)
PLATELET # BLD: 436 K/UL (ref 135–450)
PLATELET SLIDE REVIEW: ADEQUATE
PMV BLD AUTO: 7.7 FL (ref 5–10.5)
POLYCHROMASIA: ABNORMAL
POTASSIUM SERPL-SCNC: 4.3 MMOL/L (ref 3.5–5.1)
PRO-BNP: 176 PG/ML (ref 0–124)
PROTHROMBIN TIME: 11.7 SEC (ref 10–13.2)
RBC # BLD: 3.31 M/UL (ref 4.2–5.9)
SLIDE REVIEW: ABNORMAL
SODIUM BLD-SCNC: 134 MMOL/L (ref 136–145)
TOTAL PROTEIN: 7.2 G/DL (ref 6.4–8.2)
TROPONIN: <0.01 NG/ML
WBC # BLD: 11.4 K/UL (ref 4–11)

## 2021-02-16 PROCEDURE — 96365 THER/PROPH/DIAG IV INF INIT: CPT

## 2021-02-16 PROCEDURE — 86900 BLOOD TYPING SEROLOGIC ABO: CPT

## 2021-02-16 PROCEDURE — C9113 INJ PANTOPRAZOLE SODIUM, VIA: HCPCS | Performed by: INTERNAL MEDICINE

## 2021-02-16 PROCEDURE — 1200000000 HC SEMI PRIVATE

## 2021-02-16 PROCEDURE — 6360000002 HC RX W HCPCS: Performed by: INTERNAL MEDICINE

## 2021-02-16 PROCEDURE — 80053 COMPREHEN METABOLIC PANEL: CPT

## 2021-02-16 PROCEDURE — 2580000003 HC RX 258: Performed by: INTERNAL MEDICINE

## 2021-02-16 PROCEDURE — 85025 COMPLETE CBC W/AUTO DIFF WBC: CPT

## 2021-02-16 PROCEDURE — 85730 THROMBOPLASTIN TIME PARTIAL: CPT

## 2021-02-16 PROCEDURE — 2580000003 HC RX 258: Performed by: PHYSICIAN ASSISTANT

## 2021-02-16 PROCEDURE — C9113 INJ PANTOPRAZOLE SODIUM, VIA: HCPCS | Performed by: PHYSICIAN ASSISTANT

## 2021-02-16 PROCEDURE — G0328 FECAL BLOOD SCRN IMMUNOASSAY: HCPCS

## 2021-02-16 PROCEDURE — 85014 HEMATOCRIT: CPT

## 2021-02-16 PROCEDURE — 74176 CT ABD & PELVIS W/O CONTRAST: CPT

## 2021-02-16 PROCEDURE — 84484 ASSAY OF TROPONIN QUANT: CPT

## 2021-02-16 PROCEDURE — 86850 RBC ANTIBODY SCREEN: CPT

## 2021-02-16 PROCEDURE — 71045 X-RAY EXAM CHEST 1 VIEW: CPT

## 2021-02-16 PROCEDURE — 6360000002 HC RX W HCPCS: Performed by: PHYSICIAN ASSISTANT

## 2021-02-16 PROCEDURE — 36415 COLL VENOUS BLD VENIPUNCTURE: CPT

## 2021-02-16 PROCEDURE — 6370000000 HC RX 637 (ALT 250 FOR IP): Performed by: INTERNAL MEDICINE

## 2021-02-16 PROCEDURE — 99284 EMERGENCY DEPT VISIT MOD MDM: CPT

## 2021-02-16 PROCEDURE — 94760 N-INVAS EAR/PLS OXIMETRY 1: CPT

## 2021-02-16 PROCEDURE — U0003 INFECTIOUS AGENT DETECTION BY NUCLEIC ACID (DNA OR RNA); SEVERE ACUTE RESPIRATORY SYNDROME CORONAVIRUS 2 (SARS-COV-2) (CORONAVIRUS DISEASE [COVID-19]), AMPLIFIED PROBE TECHNIQUE, MAKING USE OF HIGH THROUGHPUT TECHNOLOGIES AS DESCRIBED BY CMS-2020-01-R: HCPCS

## 2021-02-16 PROCEDURE — 85610 PROTHROMBIN TIME: CPT

## 2021-02-16 PROCEDURE — 85018 HEMOGLOBIN: CPT

## 2021-02-16 PROCEDURE — 86901 BLOOD TYPING SEROLOGIC RH(D): CPT

## 2021-02-16 PROCEDURE — 83880 ASSAY OF NATRIURETIC PEPTIDE: CPT

## 2021-02-16 RX ORDER — CHLORHEXIDINE GLUCONATE 0.12 MG/ML
7.5 RINSE ORAL 2 TIMES DAILY
Status: DISCONTINUED | OUTPATIENT
Start: 2021-02-16 | End: 2021-02-17 | Stop reason: HOSPADM

## 2021-02-16 RX ORDER — BACITRACIN 500 [USP'U]/G
OINTMENT TOPICAL 2 TIMES DAILY
Status: DISCONTINUED | OUTPATIENT
Start: 2021-02-16 | End: 2021-02-17 | Stop reason: HOSPADM

## 2021-02-16 RX ORDER — SENNA PLUS 8.6 MG/1
2 TABLET ORAL DAILY
Status: DISCONTINUED | OUTPATIENT
Start: 2021-02-16 | End: 2021-02-17 | Stop reason: HOSPADM

## 2021-02-16 RX ORDER — SODIUM CHLORIDE, SODIUM LACTATE, POTASSIUM CHLORIDE, CALCIUM CHLORIDE 600; 310; 30; 20 MG/100ML; MG/100ML; MG/100ML; MG/100ML
INJECTION, SOLUTION INTRAVENOUS CONTINUOUS
Status: DISCONTINUED | OUTPATIENT
Start: 2021-02-16 | End: 2021-02-17

## 2021-02-16 RX ORDER — ACETAMINOPHEN 160 MG/5ML
650 SOLUTION ORAL EVERY 4 HOURS PRN
Status: DISCONTINUED | OUTPATIENT
Start: 2021-02-16 | End: 2021-02-17 | Stop reason: HOSPADM

## 2021-02-16 RX ORDER — BISACODYL 10 MG
10 SUPPOSITORY, RECTAL RECTAL DAILY PRN
Status: DISCONTINUED | OUTPATIENT
Start: 2021-02-16 | End: 2021-02-17 | Stop reason: HOSPADM

## 2021-02-16 RX ORDER — FERROUS SULFATE 325(65) MG
325 TABLET ORAL 2 TIMES DAILY
Status: DISCONTINUED | OUTPATIENT
Start: 2021-02-16 | End: 2021-02-17 | Stop reason: HOSPADM

## 2021-02-16 RX ORDER — NYSTATIN 100000 U/G
CREAM TOPICAL 2 TIMES DAILY PRN
Status: DISCONTINUED | OUTPATIENT
Start: 2021-02-16 | End: 2021-02-17 | Stop reason: HOSPADM

## 2021-02-16 RX ORDER — LAMOTRIGINE 100 MG/1
400 TABLET ORAL 2 TIMES DAILY
Status: DISCONTINUED | OUTPATIENT
Start: 2021-02-16 | End: 2021-02-17 | Stop reason: HOSPADM

## 2021-02-16 RX ORDER — ASCORBIC ACID 500 MG
500 TABLET ORAL 2 TIMES DAILY
Status: DISCONTINUED | OUTPATIENT
Start: 2021-02-16 | End: 2021-02-17 | Stop reason: HOSPADM

## 2021-02-16 RX ORDER — LORAZEPAM 0.5 MG/1
0.5 TABLET ORAL EVERY 12 HOURS PRN
Status: DISCONTINUED | OUTPATIENT
Start: 2021-02-16 | End: 2021-02-17 | Stop reason: HOSPADM

## 2021-02-16 RX ORDER — ESCITALOPRAM OXALATE 10 MG/1
10 TABLET ORAL DAILY
Status: DISCONTINUED | OUTPATIENT
Start: 2021-02-17 | End: 2021-02-17 | Stop reason: HOSPADM

## 2021-02-16 RX ORDER — LACTULOSE 10 G/15ML
20 SOLUTION ORAL DAILY
Status: DISCONTINUED | OUTPATIENT
Start: 2021-02-17 | End: 2021-02-17 | Stop reason: HOSPADM

## 2021-02-16 RX ORDER — QUETIAPINE FUMARATE 100 MG/1
100 TABLET, FILM COATED ORAL NIGHTLY
Status: DISCONTINUED | OUTPATIENT
Start: 2021-02-16 | End: 2021-02-17 | Stop reason: HOSPADM

## 2021-02-16 RX ORDER — DOXAZOSIN MESYLATE 4 MG/1
4 TABLET ORAL DAILY
Status: DISCONTINUED | OUTPATIENT
Start: 2021-02-17 | End: 2021-02-17 | Stop reason: HOSPADM

## 2021-02-16 RX ORDER — FERROUS SULFATE 220 (44)/5
220 ELIXIR ORAL DAILY
COMMUNITY

## 2021-02-16 RX ORDER — IPRATROPIUM BROMIDE AND ALBUTEROL SULFATE 2.5; .5 MG/3ML; MG/3ML
1 SOLUTION RESPIRATORY (INHALATION) EVERY 4 HOURS PRN
Status: DISCONTINUED | OUTPATIENT
Start: 2021-02-16 | End: 2021-02-17 | Stop reason: HOSPADM

## 2021-02-16 RX ORDER — LEVETIRACETAM 100 MG/ML
1500 SOLUTION ORAL 2 TIMES DAILY
Status: DISCONTINUED | OUTPATIENT
Start: 2021-02-16 | End: 2021-02-17 | Stop reason: HOSPADM

## 2021-02-16 RX ORDER — QUETIAPINE FUMARATE 25 MG/1
50 TABLET, FILM COATED ORAL EVERY 12 HOURS SCHEDULED
Status: DISCONTINUED | OUTPATIENT
Start: 2021-02-16 | End: 2021-02-17 | Stop reason: HOSPADM

## 2021-02-16 RX ORDER — DIAZEPAM 10 MG/2ML
20 GEL RECTAL
Status: DISPENSED | OUTPATIENT
Start: 2021-02-16 | End: 2021-02-16

## 2021-02-16 RX ORDER — BACLOFEN 10 MG/1
15 TABLET ORAL 3 TIMES DAILY
Status: DISCONTINUED | OUTPATIENT
Start: 2021-02-16 | End: 2021-02-17 | Stop reason: HOSPADM

## 2021-02-16 RX ORDER — VITAMIN B COMPLEX
2000 TABLET ORAL DAILY
Status: DISCONTINUED | OUTPATIENT
Start: 2021-02-17 | End: 2021-02-17 | Stop reason: HOSPADM

## 2021-02-16 RX ADMIN — SODIUM CHLORIDE 8 MG/HR: 9 INJECTION, SOLUTION INTRAVENOUS at 14:04

## 2021-02-16 RX ADMIN — POTASSIUM BICARBONATE 10 MEQ: 782 TABLET, EFFERVESCENT ORAL at 21:16

## 2021-02-16 RX ADMIN — BACLOFEN 15 MG: 10 TABLET ORAL at 21:29

## 2021-02-16 RX ADMIN — SODIUM CHLORIDE 80 MG: 9 INJECTION, SOLUTION INTRAVENOUS at 13:42

## 2021-02-16 RX ADMIN — OXYCODONE HYDROCHLORIDE AND ACETAMINOPHEN 500 MG: 500 TABLET ORAL at 21:29

## 2021-02-16 RX ADMIN — FERROUS SULFATE TAB 325 MG (65 MG ELEMENTAL FE) 325 MG: 325 (65 FE) TAB at 21:31

## 2021-02-16 RX ADMIN — LAMOTRIGINE 400 MG: 100 TABLET ORAL at 21:31

## 2021-02-16 RX ADMIN — QUETIAPINE FUMARATE 50 MG: 25 TABLET ORAL at 21:33

## 2021-02-16 RX ADMIN — SENNOSIDES 17.2 MG: 8.6 TABLET, FILM COATED ORAL at 21:34

## 2021-02-16 RX ADMIN — QUETIAPINE FUMARATE 100 MG: 100 TABLET ORAL at 21:33

## 2021-02-16 RX ADMIN — ZINC OXIDE: 200 OINTMENT TOPICAL at 21:46

## 2021-02-16 RX ADMIN — SODIUM CHLORIDE 8 MG/HR: 9 INJECTION, SOLUTION INTRAVENOUS at 21:16

## 2021-02-16 RX ADMIN — LEVETIRACETAM 1500 MG: 500 SOLUTION ORAL at 21:33

## 2021-02-16 RX ADMIN — SODIUM CHLORIDE, POTASSIUM CHLORIDE, SODIUM LACTATE AND CALCIUM CHLORIDE: 600; 310; 30; 20 INJECTION, SOLUTION INTRAVENOUS at 21:44

## 2021-02-16 ASSESSMENT — ENCOUNTER SYMPTOMS
BLOOD IN STOOL: 1
SHORTNESS OF BREATH: 0
DIARRHEA: 0
COUGH: 0
RHINORRHEA: 0
VOMITING: 0
ABDOMINAL PAIN: 0
NAUSEA: 0

## 2021-02-16 ASSESSMENT — PAIN SCALES - GENERAL: PAINLEVEL_OUTOF10: 0

## 2021-02-16 ASSESSMENT — PAIN SCALES - WONG BAKER
WONGBAKER_NUMERICALRESPONSE: 0
WONGBAKER_NUMERICALRESPONSE: 0

## 2021-02-16 NOTE — PROGRESS NOTES
4 Eyes Skin Assessment     NAME:  Alex Martin  YOB: 1950  MEDICAL RECORD NUMBER:  [id number]    The patient is being assess for  Admission    I agree that 2 RN's have performed a thorough Head to Toe Skin Assessment on the patient. ALL assessment sites listed below have been assessed. Areas assessed by both nurses:    Head, Face, Ears, Shoulders, Back, Chest, Arms, Elbows, Hands, Sacrum. Buttock, Coccyx, Ischium and Legs. Feet and Heels        Does the Patient have a Wound?  No noted wound(s)       Cheikh Prevention initiated:  Yes   Wound Care Orders initiated:  No    Pressure Injury (Stage 3,4, Unstageable, DTI, NWPT, and Complex wounds) if present place consult order under [de-identified] No    New and Established Ostomies if present place consult order under : No      Nurse 1 eSignature: Electronically signed by Tr Osborne RN on 2/16/21 at 6:37 PM EST    **SHARE this note so that the co-signing nurse is able to place an eSignature**    Nurse 2 eSignature: Electronically signed by Speedy Christiansen RN on 2/16/21 at 6:40 PM EST

## 2021-02-16 NOTE — ED NOTES
Pharmacy Medication History Note      List of current medications patient is taking is complete. Source of information: Zia Acevedo    Changes made to medication list:  Medications flagged for removal (include reason, ex. noncompliance):  none    Medications removed (include reason, ex. therapy complete or physician discontinued):  none    Medications added/doses adjusted:  none    Other notes (ex. Recent course of antibiotics, Coumadin dosing):  Denies use of other OTC or herbal medications. Last dose times updated. Nettie Gómez, PharmD  ED Pharmacist [id number]  2/16/2021    No current facility-administered medications on file prior to encounter. Current Outpatient Medications on File Prior to Encounter   Medication Sig Dispense Refill    ferrous sulfate 220 (44 Fe) MG/5ML solution Take 220 mg by mouth 2 times daily      LORazepam (ATIVAN) 0.5 MG tablet Take 1 tablet by mouth every 12 hours as needed for Anxiety for up to 30 days.  For seizures up to 7 days 10 tablet 0    pantoprazole (PROTONIX) 40 MG tablet Take 1 tablet by mouth 2 times daily (before meals) 180 tablet 1    ipratropium-albuterol (DUONEB) 0.5-2.5 (3) MG/3ML SOLN nebulizer solution Inhale 1 vial into the lungs every 6 hours as needed       bisacodyl (DULCOLAX) 5 MG EC tablet Take 10 mg by mouth as needed (No bowel movement in 48 hours)      nystatin (MYCOSTATIN) 540851 UNIT/GM cream Apply topically 2 times daily as needed (yeast infections)      senna (SENOKOT) 8.6 MG tablet 2 tablets by Per G Tube route daily       Cholecalciferol (VITAMIN D3) 2000 units CAPS 2,000 Units by Per G Tube route daily       zinc oxide 20 % ointment Apply topically 2 times daily       bisacodyl (DULCOLAX) 10 MG suppository Place 10 mg rectally daily as needed If no BM in 48 hours      baclofen (LIORESAL) 10 MG tablet 15 mg by Per G Tube route 3 times daily       lamoTRIgine (LAMICTAL) 150 MG tablet 400 mg by Per J Tube route 2 times daily       QUEtiapine (SEROQUEL) 100 MG tablet Take 100 mg by mouth nightly Give 50 mg twice daily at 5:30 am/5:30 pm. Give 100 mg at bedtime. acetaminophen (TYLENOL) 160 MG/5ML solution 20.3 mLs by Per G Tube route every 4 hours as needed for Fever or Pain 473 mL 3    levETIRAcetam (KEPPRA) 100 MG/ML solution 15 mLs by Per G Tube route 2 times daily 1 Bottle 3    lactulose (CHRONULAC) 10 GM/15ML solution 20 g by Per G Tube route daily       doxazosin (CARDURA) 4 MG tablet 4 mg by Per G Tube route daily       Multiple Vitamins-Minerals (CERTA-KENTON) liquid 30 mLs by Per G Tube route daily       potassium chloride (KAYCIEL) 20 MEQ/15ML (10%) solution 10 mEq by Per G Tube route 2 times daily       ascorbic acid (VITAMIN C) 500 MG tablet 500 mg by Per G Tube route 2 times daily. QUEtiapine (SEROQUEL) 100 MG tablet 50 mg by Per G Tube route 2 times daily Give 50 mg twice daily at 5:30 am/5:30 pm. Give 100 mg at bedtime. escitalopram (LEXAPRO) 10 MG tablet 10 mg by Per G Tube route daily. chlorhexidine (PERIDEX) 0.12 % solution Take 7.5 mLs by mouth 2 times daily. [DISCONTINUED] sodium chloride (OCEAN, BABY AYR) 0.65 % nasal spray 1 spray by Nasal route as needed for Congestion  0    diazepam (DIASTAT) 10 MG GEL Place 20 mg rectally once as needed (seizures lasting over 5 minutes).

## 2021-02-16 NOTE — CARE COORDINATION
Discharge Planning Assessment     discharge planner met with patient to discuss reason for admission, current living situation, and potential needs at the time of discharge. Pt in ED d/t Upper GI Bleed    Demographics/Insurance verified:  Yes    Current type of dwelling:  LTC at [address] [place] [address] Avenue    Patient from ECF/SW confirmed with:  Previous notes from recent discharge on 2/4    Living arrangements:  LTC at [facility]    Level of function/Support:  MRDD    Tentative discharge plan:  Back to [employer] at the time of discharge: Will need ambulance transport again.     Electronically signed by CECILE Johnson, KOBI on 2/16/2021 at 4:12 PM

## 2021-02-16 NOTE — ED PROVIDER NOTES
This patient was seen by the Mid-Level Provider. I have seen and examined the patient, agree with the workup, evaluation, management and diagnosis. Care plan has been discussed. My assessment reveals a 66-year-old male who presents with a possible GI bleed. This is a 9year-old male with a history of a GI bleed who presents from an extended care facility with coffee-ground substances from his J-tube. Patient denies any pain. Radiology results:    CT ABDOMEN PELVIS WO CONTRAST Additional Contrast? None   Final Result   1. No CT evidence of an acute intra-abdominal or intrapelvic process. 2.  No findings to suggest acute appendicitis; no ureter calculus or   hydronephrosis. 3. Probable abdominal adhesions with multifocal bowel loops appearing   adherent to the anterior abdominal wall. 4. Jejunostomy tube appears appropriately positioned left upper quadrant. 5. Stable, large hiatal hernia. 6. Stable benign bilateral adrenal adenomas. XR CHEST PORTABLE   Final Result   Slightly increased right basilar pulmonary opacities which may be due to   atelectasis or sequelae of infection, to include aspiration in the   appropriate clinical setting. Marked cardiomegaly, unchanged.                LABS:    Labs Reviewed   BLOOD OCCULT STOOL DIAGNOSTIC - Abnormal; Notable for the following components:       Result Value    Occult Blood Diagnostic   (*)     Value: Result: POSITIVE  Normal range: Negative      All other components within normal limits    Narrative:     ORDER#: [id number]                          ORDERED BY: BONNIE Lim  SOURCE: Stool rectum                       COLLECTED:  02/16/21 12:05  ANTIBIOTICS AT KIM.:                      RECEIVED :  02/16/21 12:11  Performed at:  [facility] [address], [facility], [address]   Phone [phone]   CBC WITH AUTO DIFFERENTIAL - Abnormal; Notable for the following components:    WBC 11.4 (*)     RBC 3.31 (*)     Hemoglobin 9.1 (*)     Hematocrit 29.1 (*)     RDW 17.0 (*)     Neutrophils Absolute 10.3 (*)     Microcytes Occasional (*)     Polychromasia Occasional (*)     Hypochromia Occasional (*)     Ovalocytes Occasional (*)     All other components within normal limits    Narrative:     Performed at:  [facility] [address]   Phone [phone]   COMPREHENSIVE METABOLIC PANEL - Abnormal; Notable for the following components:    Sodium 134 (*)     Chloride 98 (*)     Glucose 110 (*)     BUN 36 (*)     CREATININE 0.6 (*)     Alkaline Phosphatase 134 (*)     All other components within normal limits    Narrative:     Performed at:  [facility] [address] [employer]   Phone [phone]   BRAIN NATRIURETIC PEPTIDE - Abnormal; Notable for the following components:    Pro- (*)     All other components within normal limits    Narrative:     Performed at:  [facility] [address] [employer], [place]   Phone [phone]   APTT    Narrative:     Performed at:  [facility] [address]   Phone [phone]   PROTIME-INR    Narrative:     Performed at:  [facility] [address] [employer]   Phone [phone]   TROPONIN    Narrative:     Performed at:  [facility] [address] [facility] [place]   Phone [phone]   URINE RT REFLEX TO CULTURE   OCCULT BLOOD GASTRIC / DUODENUM   TYPE AND SCREEN    Narrative:     Performed at:  [facility] [address]   Phone [phone]               Exam:    Well-nourished male in no acute distress. Patient had GJ tube in place. Abdomen soft and benign with no guarding or rebound.       Medical decision [age]year-old male presents with what appears to be a GI bleed. The patient was Gastroccult positive. Patient has elevated BUN consistent with an upper GI bleed. The patient will be admitted for further care and GI consultation. He is in stable condition. FINAL IMPRESSION:    1. Upper GI bleed    2.  Occult blood positive stool           Brooklynn Menjivar MD  [phone]

## 2021-02-16 NOTE — ED PROVIDER NOTES
Negative for abdominal pain, diarrhea, nausea and vomiting. Genitourinary: Negative for difficulty urinating, dysuria and hematuria. Positives and Pertinent negatives as per HPI. Except as noted above in the ROS, all other systems were reviewed and negative.        PAST MEDICAL HISTORY     Past Medical History:   Diagnosis Date    Anemia, macrocytic     Anhidrosis     Anhidrosis     Bilateral cataracts     Bipolar affective ([address]     Blood circulation, collateral     unspecified PVD    Cataract     Clostridium difficile diarrhea 3/7/15    Depression     Dermatophytosis     Drug induced hepatitis     GERD (gastroesophageal reflux disease)     Hard of hearing     Impulse control disorder     Liver disease     drug induced    Mental handicap     Movement disorder     Neurogenic bladder     Neuromuscular disorder (HCC)     spastic hemiplegia, MDS,neurogenic bladder    NS (nuclear sclerosis)     Nuclear sclerosis     Osteoarthritis     Periodontal disease     Pneumonia 1/17/2013    Primary optic atrophy     Primary optic atrophy     PVD (peripheral vascular disease) ([address]     Seizure ([address]     last seizure 9/20/19    Spastic hemiplegia     Tinea pedis     Unspecified diseases of blood and blood-forming organs     anemia    Urinary incontinence          SURGICAL HISTORY     Past Surgical History:   Procedure Laterality Date    ABDOMEN SURGERY      can see scar/details unknown, feeding tube    ABDOMINAL EXPLORATION SURGERY  9-28-14    LAPAROTOMY EXPLORATORY, ERIKA-EN-Y TUBE JEJUNOSTOMY, SMALL BOWEL RESECTION                                              COLONOSCOPY      COLONOSCOPY N/A 6/4/2019    COLONOSCOPY WITH BIOPSY [facility]  6/25/14    with dilitation    DILATATION, ESOPHAGUS      ENDOSCOPY, COLON, DIAGNOSTIC      OTHER SURGICAL HISTORY  4-30-13    JEJUNOSTOMY TUBE INSERTION    SIGMOIDOSCOPY N/A 5/2/2019 SIGMOIDOSCOPY DIAGNOSTIC FLEXIBLE performed by Godfrey Lim MD at [facility] ER N/A 4/21/2020    REPLACEMENT, JEJUNOSTOMY TUBE performed by Godfrey Lim MD at [address] [place] [address]  1/24/13    PEG placement    UPPER GASTROINTESTINAL ENDOSCOPY  4/26/13    UPPER GASTROINTESTINAL ENDOSCOPY N/A 10/11/2019    EGD DILATION BALLOON performed by Godfrey Lim MD at [address] [place] [address] 8/22/2020    EGD BIOPSY performed by Jorge Rees MD at [phone]       Previous Medications    ACETAMINOPHEN (TYLENOL) 160 MG/5ML SOLUTION    20.3 mLs by Per G Tube route every 4 hours as needed for Fever or Pain    ASCORBIC ACID (VITAMIN C) 500 MG TABLET    500 mg by Per G Tube route 2 times daily. BACLOFEN (LIORESAL) 10 MG TABLET    15 mg by Per G Tube route 3 times daily     BISACODYL (DULCOLAX) 10 MG SUPPOSITORY    Place 10 mg rectally daily as needed If no BM in 48 hours    BISACODYL (DULCOLAX) 5 MG EC TABLET    Take 10 mg by mouth as needed (No bowel movement in 48 hours)    CHLORHEXIDINE (PERIDEX) 0.12 % SOLUTION    Take 7.5 mLs by mouth 2 times daily. CHOLECALCIFEROL (VITAMIN D3) 2000 UNITS CAPS    2,000 Units by Per G Tube route daily     DIAZEPAM (DIASTAT) 10 MG GEL    Place 20 mg rectally once as needed (seizures lasting over 5 minutes). DOXAZOSIN (CARDURA) 4 MG TABLET    4 mg by Per G Tube route daily     ESCITALOPRAM (LEXAPRO) 10 MG TABLET    10 mg by Per G Tube route daily.     IPRATROPIUM-ALBUTEROL (DUONEB) 0.5-2.5 (3) MG/3ML SOLN NEBULIZER SOLUTION    Inhale 1 vial into the lungs every 4 hours    LACTULOSE (CHRONULAC) 10 GM/15ML SOLUTION    20 g by Per G Tube route daily     LAMOTRIGINE (LAMICTAL) 150 MG TABLET    Take 400 mg by mouth 2 times daily Per g-tube    LEVETIRACETAM (KEPPRA) 100 MG/ML SOLUTION    15 mLs by Per G Tube route 2 times daily    LORAZEPAM (ATIVAN) 0.5 MG TABLET    Take 1 tablet by mouth every 12 hours as needed for Anxiety for up to 30 days. For seizures up to 7 days    MULTIPLE VITAMINS-MINERALS (CERTA-KENTON) LIQUID    30 mLs by Per G Tube route daily     NYSTATIN (MYCOSTATIN) 674710 UNIT/GM CREAM    Apply topically 2 times daily as needed (yeast infections)    PANTOPRAZOLE (PROTONIX) 40 MG TABLET    Take 1 tablet by mouth 2 times daily (before meals)    POTASSIUM CHLORIDE (KAYCIEL) 20 MEQ/15ML (10%) SOLUTION    10 mEq by Per G Tube route 2 times daily     QUETIAPINE (SEROQUEL) 100 MG TABLET    50 mg by Per G Tube route 2 times daily Administer at 0530 and 1730. Administer 100 mg at night. QUETIAPINE (SEROQUEL) 100 MG TABLET    Take 100 mg by mouth nightly 2200    SENNA (SENOKOT) 8.6 MG TABLET    2 tablets by Per G Tube route daily     SODIUM CHLORIDE (OCEAN, BABY AYR) 0.65 % NASAL SPRAY    1 spray by Nasal route as needed for Congestion    ZINC OXIDE 20 % OINTMENT    Apply topically 2 times daily Apply topically as needed. ALLERGIES     Biaxin [clarithromycin] and Invanz [ertapenem]    FAMILYHISTORY     History reviewed. No pertinent family history. SOCIAL HISTORY       Social History     Tobacco Use    Smoking status: Never Smoker    Smokeless tobacco: Never Used   Substance Use Topics    Alcohol use: No    Drug use: No     Comment: unknown       SCREENINGS             PHYSICAL EXAM    (up to 7 for level 4, 8 or more for level 5)     ED Triage Vitals [02/16/21 1020]   BP Temp Temp Source Pulse Resp SpO2 Height Weight   (!) 116/56 98.5 °F (36.9 °C) Axillary 87 27 95 % 5' 6\" (1.676 m) 170 lb (77.1 kg)       Physical Exam  Vitals signs and nursing note reviewed. Constitutional:       Appearance: He is well-developed. He is not diaphoretic. HENT:      Head: Normocephalic and atraumatic. Right Ear: External ear normal.      Left Ear: External ear normal.      Nose: Nose normal.   Eyes:      General:         Right eye: No discharge. Left eye: No discharge. Neck:      Musculoskeletal: Normal range of motion and neck supple. Trachea: No tracheal deviation. Cardiovascular:      Rate and Rhythm: Normal rate and regular rhythm. Heart sounds: No murmur. Pulmonary:      Effort: Pulmonary effort is normal. No respiratory distress. Breath sounds: No wheezing or rales. Comments: Diminished aeration to bilateral bases  Abdominal:      General: Bowel sounds are normal. There is no distension. Palpations: Abdomen is soft. Tenderness: There is no abdominal tenderness. There is no guarding. Comments: G-tube noted, with what appears to be stomach contents, no coffee-ground cereal noted   Genitourinary:     Comments: RN present for exam, stool is dark brown in color. Musculoskeletal:      Comments: Partially contracted, at baseline   Skin:     General: Skin is warm and dry. Neurological:      General: No focal deficit present. Mental Status: He is alert. Mental status is at baseline.    Psychiatric:         Behavior: Behavior normal.         DIAGNOSTIC RESULTS   LABS:    Labs Reviewed   BLOOD OCCULT STOOL DIAGNOSTIC - Abnormal; Notable for the following components:       Result Value    Occult Blood Diagnostic   (*)     Value: Result: POSITIVE  Normal range: Negative      All other components within normal limits    Narrative:     ORDER#: [id number]                          ORDERED BY: Quinton Chan  SOURCE: Stool rectum                       COLLECTED:  02/16/21 12:05  ANTIBIOTICS AT KIM.:                      RECEIVED :  02/16/21 12:11  Performed at:  [facility]  [address],  [place], [facility] [address]   Phone [phone]   CBC WITH AUTO DIFFERENTIAL - Abnormal; Notable for the following components:    WBC 11.4 (*)     RBC 3.31 (*)     Hemoglobin 9.1 (*)     Hematocrit 29.1 (*)     RDW 17.0 (*)     Neutrophils Absolute 10.3 (*)     Microcytes Occasional (*)     Polychromasia Occasional (*)     Hypochromia Occasional (*)     Ovalocytes Occasional (*)     All other components within normal limits    Narrative:     Performed at:  [facility] [address] [address] [place]   Phone [phone]   COMPREHENSIVE METABOLIC PANEL - Abnormal; Notable for the following components:    Sodium 134 (*)     Chloride 98 (*)     Glucose 110 (*)     BUN 36 (*)     CREATININE 0.6 (*)     Alkaline Phosphatase 134 (*)     All other components within normal limits    Narrative:     Performed at:  [facility] [address] [address] [place]   Phone [phone]   BRAIN NATRIURETIC PEPTIDE - Abnormal; Notable for the following components:    Pro- (*)     All other components within normal limits    Narrative:     Performed at:  [facility] [address], [place], [address] [place]   Phone [phone]   APTT    Narrative:     Performed at:  [facility] [address], [place], [address] [place]   Phone [phone]   PROTIME-INR    Narrative:     Performed at:  [facility] [address], [place], [address] [place]   Phone [phone]   TROPONIN    Narrative:     Performed at:  [facility] [address], [place], [address] [place]   Phone [phone]   URINE RT REFLEX TO CULTURE   OCCULT BLOOD GASTRIC / DUODENUM   TYPE AND SCREEN    Narrative:     Performed at:  [facility] [address], [place], [address] [place]   Phone [phone]       All other labs were within normal range or not returned as of this dictation. EKG: All EKG's are interpreted by the Emergency Department Physician in the absence of a cardiologist.  Please see their note for interpretation of EKG.       RADIOLOGY:   Non-plain film images such as CT, Ultrasound and MRI are read by the radiologist. Plain radiographic images are visualized and preliminarily interpreted by the ED Provider with the below findings:        Interpretation per the Radiologist below, if available at the time of this note:    CT ABDOMEN PELVIS WO CONTRAST Additional Contrast? None   Final Result   1. No CT evidence of an acute intra-abdominal or intrapelvic process. 2.  No findings to suggest acute appendicitis; no ureter calculus or   hydronephrosis. 3. Probable abdominal adhesions with multifocal bowel loops appearing   adherent to the anterior abdominal wall. 4. Jejunostomy tube appears appropriately positioned left upper quadrant. 5. Stable, large hiatal hernia. 6. Stable benign bilateral adrenal adenomas. XR CHEST PORTABLE   Final Result   Slightly increased right basilar pulmonary opacities which may be due to   atelectasis or sequelae of infection, to include aspiration in the   appropriate clinical setting. Marked cardiomegaly, unchanged. Xr Chest Portable    Result Date: 2/16/2021  EXAMINATION: ONE XRAY VIEW OF THE CHEST 2/16/2021 7:35 am COMPARISON: 02/02/2021, 02/01/2021, 01/31/2021 HISTORY: ORDERING SYSTEM PROVIDED HISTORY: SOB TECHNOLOGIST PROVIDED HISTORY: Reason for exam:->SOB Reason for Exam: Shortness of breath Acuity: Acute Type of Exam: Initial FINDINGS: Slightly increased opacities in the right lung base relative to prior. No pneumothorax or pleural effusion. Marked cardiomegaly, unchanged. No acute bony abnormality with remote posttraumatic change of the left proximal humerus. Slightly increased right basilar pulmonary opacities which may be due to atelectasis or sequelae of infection, to include aspiration in the appropriate clinical setting. Marked cardiomegaly, unchanged.            PROCEDURES   Unless otherwise noted below, none     Procedures    CRITICAL CARE TIME   N/A    CONSULTS:  IP CONSULT TO PRIMARY CARE PROVIDER      EMERGENCY DEPARTMENT COURSE and DIFFERENTIAL DIAGNOSIS/MDM:   Vitals:    Vitals:    02/16/21 1100 02/16/21 1115 02/16/21 1130 02/16/21 1200   BP: (!) 109/54  (!) 115/57 120/61   Pulse: 75 73 76 72   Resp: 23 22 20 22   Temp:       TempSrc:       SpO2: 95% 95% 97% 97%   Weight:       Height:           Patient was given the following medications:  Medications   pantoprazole (PROTONIX) 80 mg in sodium chloride 0.9 % 50 mL bolus (80 mg Intravenous New Bag 2/16/21 1342)   pantoprazole (PROTONIX) 80 mg in sodium chloride 0.9 % 100 mL infusion (has no administration in time range)           Briefly, this is a 22-year-old male with a history of bleeding ulcers, cerebral palsy history of chronic anemia who presents for possible GI bleed. Apparently his facility at [facility] noticed that the patient had darker stools and coffee-ground material in his G-tube. Patient was actually recently admitted for same    On exam, the patient appears to be at his baseline, partially contracted. Stool is dark brown in color, abdomen appears to be benign, no coffee-ground material noted NG tube    Chest x-ray was obtained, slight increased pulmonary opacities likely due to atelectasis    CBC shows a nonspecific leukocytosis of 11.4, hemoglobin is stable. CMP does show an elevated BUN, occult blood is positive, patient was given Protonix bolus and infusion for likely upper GI source. CT was obtained no acute intra-abdominal or intrapelvic process    The patient will need to be admitted, likely for endoscopy, Dr. Rodolfo Alcazar has agreed for admission, patient is stable for admission    FINAL IMPRESSION      1. Upper GI bleed    2. Occult blood positive stool          DISPOSITION/PLAN   DISPOSITION Decision To Admit 02/16/2021 01:19:20 PM      PATIENT REFERREDTO:  No follow-up provider specified.     DISCHARGE MEDICATIONS:  New Prescriptions    No medications on file       DISCONTINUED MEDICATIONS:  Discontinued Medications    No medications on file              (Please note that portions of this note were completed with a voice recognition program.  Efforts were made to edit the dictations but occasionally words are mis-transcribed.)    Concetta Kraft PA-C (electronically signed)              Concetta Kraft PA-C  02/16/21 2176

## 2021-02-16 NOTE — PROGRESS NOTES
Pt admitted to the floor. Vitals stable, pt cleaned up with bath wipes and placed on external catheter suction to wall to collect urine sample. Pt is alert to self. Vitals stable, pt on Protonix drip. Abdominal binder in place.

## 2021-02-17 ENCOUNTER — ANESTHESIA (OUTPATIENT)
Dept: ENDOSCOPY | Age: 71
DRG: 377 | End: 2021-02-17
Payer: MEDICARE

## 2021-02-17 ENCOUNTER — ANESTHESIA EVENT (OUTPATIENT)
Dept: ENDOSCOPY | Age: 71
DRG: 377 | End: 2021-02-17
Payer: MEDICARE

## 2021-02-17 VITALS
WEIGHT: 170 LBS | RESPIRATION RATE: 16 BRPM | TEMPERATURE: 97.5 F | HEIGHT: 66 IN | OXYGEN SATURATION: 99 % | SYSTOLIC BLOOD PRESSURE: 136 MMHG | HEART RATE: 64 BPM | BODY MASS INDEX: 27.32 KG/M2 | DIASTOLIC BLOOD PRESSURE: 79 MMHG

## 2021-02-17 VITALS — DIASTOLIC BLOOD PRESSURE: 56 MMHG | OXYGEN SATURATION: 100 % | SYSTOLIC BLOOD PRESSURE: 135 MMHG

## 2021-02-17 LAB
BACTERIA: ABNORMAL /HPF
BILIRUBIN URINE: NEGATIVE
BLOOD, URINE: ABNORMAL
CLARITY: ABNORMAL
COLOR: YELLOW
EPITHELIAL CELLS, UA: 1 /HPF (ref 0–5)
GLUCOSE BLD-MCNC: 94 MG/DL (ref 70–99)
GLUCOSE URINE: NEGATIVE MG/DL
HCT VFR BLD CALC: 26.6 % (ref 40.5–52.5)
HCT VFR BLD CALC: 28.6 % (ref 40.5–52.5)
HEMOGLOBIN: 8.4 G/DL (ref 13.5–17.5)
HEMOGLOBIN: 8.9 G/DL (ref 13.5–17.5)
HYALINE CASTS: 1 /LPF (ref 0–8)
KETONES, URINE: NEGATIVE MG/DL
LEUKOCYTE ESTERASE, URINE: ABNORMAL
MICROSCOPIC EXAMINATION: YES
NITRITE, URINE: POSITIVE
PERFORMED ON: NORMAL
PH UA: 6 (ref 5–8)
PROTEIN UA: ABNORMAL MG/DL
RBC UA: 15 /HPF (ref 0–4)
SARS-COV-2: NOT DETECTED
SPECIFIC GRAVITY UA: 1.02 (ref 1–1.03)
URINE REFLEX TO CULTURE: YES
URINE TYPE: ABNORMAL
UROBILINOGEN, URINE: 0.2 E.U./DL
WBC UA: >900 /HPF (ref 0–5)

## 2021-02-17 PROCEDURE — 3609017100 HC EGD: Performed by: INTERNAL MEDICINE

## 2021-02-17 PROCEDURE — 2709999900 HC NON-CHARGEABLE SUPPLY: Performed by: INTERNAL MEDICINE

## 2021-02-17 PROCEDURE — 87186 SC STD MICRODIL/AGAR DIL: CPT

## 2021-02-17 PROCEDURE — 7100000001 HC PACU RECOVERY - ADDTL 15 MIN: Performed by: INTERNAL MEDICINE

## 2021-02-17 PROCEDURE — 6360000002 HC RX W HCPCS: Performed by: NURSE ANESTHETIST, CERTIFIED REGISTERED

## 2021-02-17 PROCEDURE — 6370000000 HC RX 637 (ALT 250 FOR IP): Performed by: INTERNAL MEDICINE

## 2021-02-17 PROCEDURE — 85018 HEMOGLOBIN: CPT

## 2021-02-17 PROCEDURE — 81001 URINALYSIS AUTO W/SCOPE: CPT

## 2021-02-17 PROCEDURE — 3700000001 HC ADD 15 MINUTES (ANESTHESIA): Performed by: INTERNAL MEDICINE

## 2021-02-17 PROCEDURE — 7100000000 HC PACU RECOVERY - FIRST 15 MIN: Performed by: INTERNAL MEDICINE

## 2021-02-17 PROCEDURE — C9113 INJ PANTOPRAZOLE SODIUM, VIA: HCPCS | Performed by: INTERNAL MEDICINE

## 2021-02-17 PROCEDURE — 2580000003 HC RX 258: Performed by: INTERNAL MEDICINE

## 2021-02-17 PROCEDURE — 36415 COLL VENOUS BLD VENIPUNCTURE: CPT

## 2021-02-17 PROCEDURE — 0DJ08ZZ INSPECTION OF UPPER INTESTINAL TRACT, VIA NATURAL OR ARTIFICIAL OPENING ENDOSCOPIC: ICD-10-PCS | Performed by: INTERNAL MEDICINE

## 2021-02-17 PROCEDURE — 3700000000 HC ANESTHESIA ATTENDED CARE: Performed by: INTERNAL MEDICINE

## 2021-02-17 PROCEDURE — 6360000002 HC RX W HCPCS: Performed by: INTERNAL MEDICINE

## 2021-02-17 PROCEDURE — 87077 CULTURE AEROBIC IDENTIFY: CPT

## 2021-02-17 PROCEDURE — 2500000003 HC RX 250 WO HCPCS: Performed by: NURSE ANESTHETIST, CERTIFIED REGISTERED

## 2021-02-17 PROCEDURE — 87086 URINE CULTURE/COLONY COUNT: CPT

## 2021-02-17 PROCEDURE — 85014 HEMATOCRIT: CPT

## 2021-02-17 RX ORDER — PROPOFOL 10 MG/ML
INJECTION, EMULSION INTRAVENOUS PRN
Status: DISCONTINUED | OUTPATIENT
Start: 2021-02-17 | End: 2021-02-17 | Stop reason: SDUPTHER

## 2021-02-17 RX ORDER — LORAZEPAM 0.5 MG/1
0.5 TABLET ORAL EVERY 12 HOURS PRN
Qty: 10 TABLET | Refills: 0 | Status: ON HOLD | OUTPATIENT
Start: 2021-02-17 | End: 2021-03-20

## 2021-02-17 RX ORDER — DIAZEPAM 10 MG/2ML
20 GEL RECTAL
Qty: 15 EACH | Refills: 3 | Status: ON HOLD | OUTPATIENT
Start: 2021-02-17 | End: 2021-03-20

## 2021-02-17 RX ORDER — LIDOCAINE HYDROCHLORIDE 20 MG/ML
INJECTION, SOLUTION EPIDURAL; INFILTRATION; INTRACAUDAL; PERINEURAL PRN
Status: DISCONTINUED | OUTPATIENT
Start: 2021-02-17 | End: 2021-02-17 | Stop reason: SDUPTHER

## 2021-02-17 RX ADMIN — ESCITALOPRAM OXALATE 10 MG: 10 TABLET ORAL at 08:38

## 2021-02-17 RX ADMIN — PROPOFOL 60 MG: 10 INJECTION, EMULSION INTRAVENOUS at 11:19

## 2021-02-17 RX ADMIN — LAMOTRIGINE 400 MG: 100 TABLET ORAL at 08:38

## 2021-02-17 RX ADMIN — BACLOFEN 15 MG: 10 TABLET ORAL at 14:09

## 2021-02-17 RX ADMIN — LACTULOSE 20 G: 20 SOLUTION ORAL at 08:50

## 2021-02-17 RX ADMIN — Medication 30 ML: at 08:54

## 2021-02-17 RX ADMIN — POTASSIUM BICARBONATE 10 MEQ: 782 TABLET, EFFERVESCENT ORAL at 08:31

## 2021-02-17 RX ADMIN — LEVETIRACETAM 1500 MG: 500 SOLUTION ORAL at 08:32

## 2021-02-17 RX ADMIN — ZINC OXIDE: 200 OINTMENT TOPICAL at 08:52

## 2021-02-17 RX ADMIN — Medication 2000 UNITS: at 08:38

## 2021-02-17 RX ADMIN — SODIUM CHLORIDE 8 MG/HR: 9 INJECTION, SOLUTION INTRAVENOUS at 03:06

## 2021-02-17 RX ADMIN — DOXAZOSIN 4 MG: 4 TABLET ORAL at 08:38

## 2021-02-17 RX ADMIN — QUETIAPINE FUMARATE 50 MG: 25 TABLET ORAL at 17:01

## 2021-02-17 RX ADMIN — QUETIAPINE FUMARATE 50 MG: 25 TABLET ORAL at 05:09

## 2021-02-17 RX ADMIN — FERROUS SULFATE TAB 325 MG (65 MG ELEMENTAL FE) 325 MG: 325 (65 FE) TAB at 08:38

## 2021-02-17 RX ADMIN — ZINC OXIDE: 200 OINTMENT TOPICAL at 17:01

## 2021-02-17 RX ADMIN — OXYCODONE HYDROCHLORIDE AND ACETAMINOPHEN 500 MG: 500 TABLET ORAL at 08:38

## 2021-02-17 RX ADMIN — LIDOCAINE HYDROCHLORIDE 80 MG: 20 INJECTION, SOLUTION EPIDURAL; INFILTRATION; INTRACAUDAL; PERINEURAL at 11:19

## 2021-02-17 RX ADMIN — SENNOSIDES 17.2 MG: 8.6 TABLET, FILM COATED ORAL at 08:38

## 2021-02-17 RX ADMIN — BACLOFEN 15 MG: 10 TABLET ORAL at 08:38

## 2021-02-17 ASSESSMENT — PAIN SCALES - GENERAL
PAINLEVEL_OUTOF10: 0

## 2021-02-17 ASSESSMENT — PULMONARY FUNCTION TESTS
PIF_VALUE: 0
PIF_VALUE: 1
PIF_VALUE: 0
PIF_VALUE: 1

## 2021-02-17 NOTE — ANESTHESIA POSTPROCEDURE EVALUATION
Department of Anesthesiology  Postprocedure Note    Patient: Yuri Gonzales  MRN: [id number]  YOB: 1950  Date of evaluation: 2/17/2021  Time:  12:44 PM     Procedure Summary     Date: 02/17/21 Room / Location: [address] [place] [address]    Anesthesia Start: 1115 Anesthesia Stop: 1134    Procedure: EGD DIAGNOSTIC ONLY (N/A Abdomen) Diagnosis: (Coffee ground emesis)    Surgeons: Bryant Gold MD Responsible Provider: Lacy Dubois MD    Anesthesia Type: MAC ASA Status: 4          Anesthesia Type: MAC    Nidhi Phase I: Nidhi Score: 9    Nidhi Phase II:      Last vitals: Reviewed and per EMR flowsheets.        Anesthesia Post Evaluation    Level of consciousness: awake  Complications: no

## 2021-02-17 NOTE — PROGRESS NOTES
Patient returned from endo in stable condition. VSS. Patient repositioned in bed and does not appear to be in distress. Bed alarm engaged, call light in reach. Will monitor.

## 2021-02-17 NOTE — PROGRESS NOTES
Patient checked for incontinence at this time, frederic care provided and brief changed. Patient repositioned in bed. Does not appear to be in distress and denies additional needs. Bed alarm engaged, call light in reach. Will monitor.

## 2021-02-17 NOTE — PROGRESS NOTES
Pt arrived from endo to PACU bay 3. Report received from endo staff. Pt on RA, SB, VSS. Will continue to monitor.

## 2021-02-17 NOTE — CONSULTS
Gastroenterology Consult Note        Patient: Yusef Goode  : 1950  Acct#:      Date:  2021    Subjective:       History of Present Illness  Patient is a 79 y.o.   male admitted with Upper GI hemorrhage [K92.2] who is seen in consult for coffee ground emesis. H/o mental disability and lives at an [facility]. He is unable to provide history. He has a J-tube in place. H/o gini-en-y jejunojejunostomy. He has been seen by our group several times in the past for hematemesis. Most recently he EGD was 2020 with ulcerative esophagitis, MW tear at GEJ, large 10 cm hiatal hernia. He was admitted earlier this month for coffee ground emesis. AXR concerning for SBO but CT negative for this. TFs were resumed, which he tolerated and he was d/c. He was brought to the ED yesterday for coffee ground emesis. Had brown stool on WILLIAN per ED note. No vomiting here. He is on iron pills.      Past Medical History:   Diagnosis Date    Anemia, macrocytic     Anhidrosis     Anhidrosis     Bilateral cataracts     Bipolar affective (HCC)     Blood circulation, collateral     unspecified PVD    Cataract     Clostridium difficile diarrhea 3/7/15    Depression     Dermatophytosis     Drug induced hepatitis     GERD (gastroesophageal reflux disease)     Hard of hearing     Impulse control disorder     Liver disease     drug induced    Mental handicap     Movement disorder     Neurogenic bladder     Neuromuscular disorder (HCC)     spastic hemiplegia, MDS,neurogenic bladder    NS (nuclear sclerosis)     Nuclear sclerosis     Osteoarthritis     Periodontal disease     Pneumonia 2013    Primary optic atrophy     Primary optic atrophy     PVD (peripheral vascular disease) ([address]     Seizure ([address]     last seizure 19    Spastic hemiplegia     Tinea pedis     Unspecified diseases of blood and blood-forming organs     anemia    Urinary incontinence       Past Surgical History: Procedure Laterality Date    ABDOMEN SURGERY      can see scar/details unknown, feeding tube    ABDOMINAL EXPLORATION SURGERY  9-28-14    LAPAROTOMY EXPLORATORY, ERIKA-EN-Y TUBE JEJUNOSTOMY, SMALL BOWEL RESECTION                                              COLONOSCOPY      COLONOSCOPY N/A 6/4/2019    [address] WITH [address]SY [address] [facility] [address]  6/25/14    with dilitation    DILATATION, ESOPHAGUS      ENDOSCOPY, COLON, DIAGNOSTIC      OTHER SURGICAL HISTORY  4-30-13    JEJUNOSTOMY TUBE INSERTION    SIGMOIDOSCOPY N/A 5/2/2019    SIG[facility]    REPLACEMENT, JEJUNOSTOMY TUBE performed by Greyson Bates MD at [address]  1/24/13    PEG placement    UPPER GASTROINTESTINAL ENDOSCOPY  4/26/13    UPPER GASTROINTESTINAL ENDOSCOPY N/A 10/11/2019    EGD DILATION BALLOON performed by Greyson Bates MD at [address] N/A 8/22/2020    EGD BIOPSY performed by Funmilayo Martinez MD at [address] [place] [address]      Past Endoscopic History:  EGD with Dr Ron Keller 8/22/20 for hematemesis  Impression:       1. Ulcerative esophagitis - biopsies obtained but likely due to pt being supine with a large HH.      2. At the [facility], there was an edematous area with what appeared to be a central split/tear in the tissue c/w a MW tear     3. Large, roughly 10 cm HH     4. No blood throughout the exam     5. No internal PEG bumper so pt must have a J-tube     6. No esophageal mass noted (POA thought pt was diagnosed with an esophageal cancer)    screening colonoscopy was 6/2019 with normal colon. Admission Meds  No current facility-administered medications on file prior to encounter.       Current Outpatient Medications on File Prior to Encounter   Medication Sig Dispense Refill    ferrous sulfate 220 (44 Fe) MG/5ML solution Take 220 mg by mouth 2 times daily      LORazepam (ATIVAN) 0.5 MG tablet Take 1 tablet by mouth every 12 hours as needed for Anxiety for up to 30 days. For seizures up to 7 days 10 tablet 0    pantoprazole (PROTONIX) 40 MG tablet Take 1 tablet by mouth 2 times daily (before meals) 180 tablet 1    ipratropium-albuterol (DUONEB) 0.5-2.5 (3) MG/3ML SOLN nebulizer solution Inhale 1 vial into the lungs every 6 hours as needed       bisacodyl (DULCOLAX) 5 MG EC tablet Take 10 mg by mouth as needed (No bowel movement in 48 hours)      nystatin (MYCOSTATIN) 668106 UNIT/GM cream Apply topically 2 times daily as needed (yeast infections)      senna (SENOKOT) 8.6 MG tablet 2 tablets by Per G Tube route daily       Cholecalciferol (VITAMIN D3) 2000 units CAPS 2,000 Units by Per G Tube route daily       zinc oxide 20 % ointment Apply topically 2 times daily       bisacodyl (DULCOLAX) 10 MG suppository Place 10 mg rectally daily as needed If no BM in 48 hours      baclofen (LIORESAL) 10 MG tablet 15 mg by Per G Tube route 3 times daily       lamoTRIgine (LAMICTAL) 150 MG tablet 400 mg by Per J Tube route 2 times daily       QUEtiapine (SEROQUEL) 100 MG tablet Take 100 mg by mouth nightly Give 50 mg twice daily at 5:30 am/5:30 pm. Give 100 mg at bedtime.       acetaminophen (TYLENOL) 160 MG/5ML solution 20.3 mLs by Per G Tube route every 4 hours as needed for Fever or Pain 473 mL 3    levETIRAcetam (KEPPRA) 100 MG/ML solution 15 mLs by Per G Tube route 2 times daily 1 Bottle 3    lactulose (CHRONULAC) 10 GM/15ML solution 20 g by Per G Tube route daily       doxazosin (CARDURA) 4 MG tablet 4 mg by Per G Tube route daily       Multiple Vitamins-Minerals (CERTA-KENTON) liquid 30 mLs by Per G Tube route daily       potassium chloride (KAYCIEL) 20 MEQ/15ML (10%) solution 10 mEq by Per G Tube route 2 times daily       ascorbic acid (VITAMIN C) 500 MG tablet 500 mg by Per G Tube route 2 times daily.      QUEtiapine (SEROQUEL) 100 MG tablet 50 mg by Per G Tube route 2 times daily Give 50 mg twice daily at 5:30 am/5:30 pm. Give 100 mg at bedtime.  escitalopram (LEXAPRO) 10 MG tablet 10 mg by Per G Tube route daily.  chlorhexidine (PERIDEX) 0.12 % solution Take 7.5 mLs by mouth 2 times daily.  diazepam (DIASTAT) 10 MG GEL Place 20 mg rectally once as needed (seizures lasting over 5 minutes). Allergies  Allergies   Allergen Reactions    Biaxin [Clarithromycin] Other (See Comments)     Unknown reaction    Invanz [Ertapenem] Other (See Comments)     Caused SEIZURES  Caused SEIZURES      Social   Social History     Tobacco Use    Smoking status: Never Smoker    Smokeless tobacco: Never Used   Substance Use Topics    Alcohol use: No        History reviewed. No pertinent family history. Review of Systems  Review of systems not obtained due to patient factors. Physical Exam  Blood pressure 136/74, pulse 60, temperature 96.1 °F (35.6 °C), temperature source Axillary, resp. rate 18, height 5' 6\" (1.676 m), weight 170 lb (77.1 kg), SpO2 97 %. General appearance: alert, cooperative, no distress, appears stated age  Eyes: Anicteric  Head: Normocephalic, without obvious abnormality  Lungs: clear to auscultation bilaterally, Normal Effort  Heart: regular rate and rhythm, normal S1 and S2, no murmurs or rubs  Abdomen: soft, non-tender. Bowel sounds normal. No masses,  no organomegaly. J-tube in left abdomen. Skin excoriated. Extremities: atraumatic, no cyanosis or edema  Skin: warm and dry, no jaundice  Neuro: nonverbal. Sleeping.    Musculoskeletal: contractures      Data Review:    Recent Labs     02/16/21  1205 02/16/21  2334   WBC 11.4*  --    HGB 9.1* 8.7*   HCT 29.1* 27.6*   MCV 87.9  --      --      Recent Labs     02/16/21  1205   *   K 4.3   CL 98*   CO2 27   BUN 36*   CREATININE 0.6*     Recent Labs     02/16/21  1205   AST 19   ALT 18   BILITOT <0. 2   ALKPHOS 134*     No results for input(s): LIPASE, AMYLASE in the last 72 hours. Recent Labs     02/16/21  1205   PROTIME 11.7   INR 1.01     No results for input(s): PTT in the last 72 hours. No results for input(s): OCCULTBLD in the last 72 hours. Imaging Studies:                 CT-scan of abdomen and pelvis wo contrast 2/16/21  Impression   1.  No CT evidence of an acute intra-abdominal or intrapelvic process. 2.  No findings to suggest acute appendicitis; no ureter calculus or   hydronephrosis. 3. Probable abdominal adhesions with multifocal bowel loops appearing   adherent to the anterior abdominal wall. 4. Jejunostomy tube appears appropriately positioned left upper quadrant. 5. Stable, large hiatal hernia. 6. Stable benign bilateral adrenal adenomas. Assessment:     Active Problems:    Jejunostomy malfunction (HCC)    Cerebral palsy (HCC)    Seizure disorder (HCC)    Gastrostomy malfunction (HCC)    Nuclear sclerosis    Anemia, chronic disease    Partial epilepsy with impairment of consciousness, intractable ([address]    Mental retardation    Malfunction of gastrostomy tube (HCC)    Hypotension    Recurrent seizures ([address]    Upper GI hemorrhage    GI bleed    Coffee ground emesis  Resolved Problems:    * No resolved hospital problems. *    Coffee ground emesis - occurred at [facility]. None here. CT nonobstructive. Hgb stable from recent admission. Recommendations:   - PPI  - monitor hgb  - EGD today  - zinc oxide to skin around J-tube    Discussed with Dr. Marci Day, PA-C  [address]    I have personally performed a face to face diagnostic evaluation on this patient. I have interviewed and examined the patient and I agree with the findings and recommended plan of care. In summary, my findings and plan are the following: CGE but on iron supplement.  hgb stable from previous admission. Ab soft. + j tube in place.   Will plan egd to assess      Francoise Raphael Alexys Hebert, [address] [facility]

## 2021-02-17 NOTE — CARE COORDINATION
Discharge Plan:     Patient discharged to: [facility]   SW/CATHY Planner faxed, [address] and [address] [phone]  Narcotic Prescriptions faxed [phone]  RN: Norma Sanchez will call report to:     [phone]  Medical Transport with: [facility]  [phone]   time:8:30 pm today  Family advised of discharge?:yes CM left brother and POA vm   HENS Submitted?:  Not required   All discharge needs met per case management.     Akira Luo RN, BSN  [phone]

## 2021-02-17 NOTE — ANESTHESIA PRE PROCEDURE
Department of Anesthesiology  Preprocedure Note       Name:  Marry Oleary   Age:  79 y.o.  :  1950                                          MRN:  [id number]         Date:  2021      Surgeon: Clarice Garnica):  Samira Del Castillo MD    Procedure: Procedure(s):  EGD DIAGNOSTIC ONLY    Medications prior to admission:   Prior to Admission medications    Medication Sig Start Date End Date Taking? Authorizing Provider   ferrous sulfate 220 (44 Fe) MG/5ML solution Take 220 mg by mouth 2 times daily   Yes Historical Provider, MD   LORazepam (ATIVAN) 0.5 MG tablet Take 1 tablet by mouth every 12 hours as needed for Anxiety for up to 30 days.  For seizures up to 7 days 2/4/21 3/6/21 Yes Tammy Smyth MD   pantoprazole (PROTONIX) 40 MG tablet Take 1 tablet by mouth 2 times daily (before meals) 20  Yes Tammy Smyth MD   ipratropium-albuterol (DUONEB) 0.5-2.5 (3) MG/3ML SOLN nebulizer solution Inhale 1 vial into the lungs every 6 hours as needed    Yes Historical Provider, MD   bisacodyl (DULCOLAX) 5 MG EC tablet Take 10 mg by mouth as needed (No bowel movement in 48 hours)   Yes Historical Provider, MD   nystatin (MYCOSTATIN) 315616 UNIT/GM cream Apply topically 2 times daily as needed (yeast infections)   Yes Historical Provider, MD   senna (SENOKOT) 8.6 MG tablet 2 tablets by Per G Tube route daily    Yes Historical Provider, MD   Cholecalciferol (VITAMIN D3) 2000 units CAPS 2,000 Units by Per G Tube route daily    Yes Historical Provider, MD   zinc oxide 20 % ointment Apply topically 2 times daily    Yes Historical Provider, MD   bisacodyl (DULCOLAX) 10 MG suppository Place 10 mg rectally daily as needed If no BM in 48 hours   Yes Historical Provider, MD   baclofen (LIORESAL) 10 MG tablet 15 mg by Per G Tube route 3 times daily    Yes Historical Provider, MD   lamoTRIgine (LAMICTAL) 150 MG tablet 400 mg by Per J Tube route 2 times daily    Yes Historical Provider, MD   QUEtiapine (SEROQUEL) 100 MG tablet Take 100 mg by mouth nightly Give 50 mg twice daily at 5:30 am/5:30 pm. Give 100 mg at bedtime. Yes Historical Provider, MD   acetaminophen (TYLENOL) 160 MG/5ML solution 20.3 mLs by Per G Tube route every 4 hours as needed for Fever or Pain 4/3/17  Yes Aleksandar Newman MD   levETIRAcetam (KEPPRA) 100 MG/ML solution 15 mLs by Per G Tube route 2 times daily 4/3/17  Yes Aleksandar Newman MD   lactulose (CHRONULAC) 10 GM/15ML solution 20 g by Per G Tube route daily    Yes Historical Provider, MD   doxazosin (CARDURA) 4 MG tablet 4 mg by Per G Tube route daily    Yes Historical Provider, MD   Multiple Vitamins-Minerals (CERTA-KENTON) liquid 30 mLs by Per G Tube route daily    Yes Historical Provider, MD   potassium chloride (KAYCIEL) 20 MEQ/15ML (10%) solution 10 mEq by Per G Tube route 2 times daily    Yes Historical Provider, MD   ascorbic acid (VITAMIN C) 500 MG tablet 500 mg by Per G Tube route 2 times daily. Yes Historical Provider, MD   QUEtiapine (SEROQUEL) 100 MG tablet 50 mg by Per G Tube route 2 times daily Give 50 mg twice daily at 5:30 am/5:30 pm. Give 100 mg at bedtime. Yes Historical Provider, MD   escitalopram (LEXAPRO) 10 MG tablet 10 mg by Per G Tube route daily. Yes Historical Provider, MD   chlorhexidine (PERIDEX) 0.12 % solution Take 7.5 mLs by mouth 2 times daily. Yes Historical Provider, MD   diazepam (DIASTAT) 10 MG GEL Place 20 mg rectally once as needed (seizures lasting over 5 minutes).      Historical Provider, MD       Current medications:    Current Facility-Administered Medications   Medication Dose Route Frequency Provider Last Rate Last Admin    acetaminophen (TYLENOL) 160 MG/5ML solution 650 mg  650 mg Per G Tube Q4H PRN Aleksandar Newman MD        ascorbic acid (VITAMIN C) tablet 500 mg  500 mg Per G Tube BID Aleksandar Newman MD   500 mg at 02/17/21 0841    baclofen (LIORESAL) tablet 15 mg  15 mg Per G Tube TID Aleksandar Newman MD   15 mg at 02/17/21 0838    bisacodyl (DULCOLAX) suppository 10 mg  10 mg Rectal Daily PRN Arron Haro MD        bisacodyl (DULCOLAX) EC tablet 10 mg  10 mg Oral PRN Arron Haro MD        chlorhexidine (PERIDEX) 0.12 % solution 7.5 mL  7.5 mL Mouth/Throat BID Arron Haro MD        Vitamin D (CHOLECALCIFEROL) tablet 2,000 Units  2,000 Units Per G Tube Daily Arron Haro MD   2,000 Units at 02/17/21 3433    doxazosin (CARDURA) tablet 4 mg  4 mg Per G Tube Daily Arron Haro MD   4 mg at 02/17/21 0699    escitalopram (LEXAPRO) tablet 10 mg  10 mg Per G Tube Daily Arron Haro MD   10 mg at 02/17/21 1462    ferrous sulfate (IRON 325) tablet 325 mg  325 mg Per G Tube BID Arron Haro MD   325 mg at 02/17/21 0838    ipratropium-albuterol (DUONEB) nebulizer solution 3 mL  1 vial Inhalation Q4H PRN Arron Haro MD        lactulose (CHRONULAC) 10 GM/15ML solution 20 g  20 g Per G Tube Daily Arron Haro MD   20 g at 02/17/21 0850    lamoTRIgine (LAMICTAL) tablet 400 mg  400 mg Per J Tube BID Arron Haro MD   400 mg at 02/17/21 6587    levETIRAcetam (KEPPRA) 100 MG/ML solution 1,500 mg  1,500 mg Per G Tube BID Arron Haro MD   1,500 mg at 02/17/21 9573    LORazepam (ATIVAN) tablet 0.5 mg  0.5 mg Oral Q12H PRN Arron Haro MD        CertaVite/Antioxidants solution 30 mL  30 mL Per G Tube Daily Arron Haro MD   30 mL at 02/17/21 0854    nystatin (MYCOSTATIN) cream   Topical BID PRN Arron Haro MD        QUEtiapine (SEROQUEL) tablet 50 mg  50 mg Per G Tube 2 times per day Arron Haro MD   50 mg at 02/17/21 0509    QUEtiapine (SEROQUEL) tablet 100 mg  100 mg Oral Nightly Arron Haro MD   100 mg at 02/16/21 2133    senna (SENOKOT) tablet 17.2 mg  2 tablet Per G Tube Daily Arron Haro MD   17.2 mg at 02/17/21 0838    zinc oxide 20 % ointment   Topical BID Arron Haro MD   Given at 02/17/21 0852    pantoprazole (PROTONIX) 80 mg in sodium chloride 0.9 % 100 mL infusion  8 mg/hr  Pneumonia 1/17/2013    Primary optic atrophy     Primary optic atrophy     PVD (peripheral vascular disease) [facility]     Seizure ([facility]     last seizure 9/20/19    Spastic hemiplegia     Tinea pedis     Unspecified diseases of blood and blood-forming organs     anemia    Urinary incontinence        Past Surgical History:        Procedure Laterality Date    ABDOMEN SURGERY      can see scar/details unknown, feeding tube    ABDOMINAL EXPLORATION SURGERY  9-28-14    LAPAROTOMY EXPLORATORY, ERIKA-EN-Y TUBE JEJUNOSTOMY, SMALL BOWEL RESECTION                                              COLONOSCOPY      COLONOSCOPY N/A 6/4/2019    COLONOSCOPY WITH BIOPSY performed by Amor Stoo MD at [address]  6/25/14    with dilitation    DILATATION, ESOPHAGUS      ENDOSCOPY, COLON, DIAGNOSTIC      OTHER SURGICAL HISTORY  4-30-13    JEJUNOSTOMY TUBE INSERTION    SIGMOIDOSCOPY N/A 5/2/2019    SIGMOIDOSCOPY DIAGNOSTIC FLEXIBLE performed by Amor Soto MD at [facility] N/A 4/21/2020    REPLACEMENT, JEJUNOSTOMY TUBE performed by Amor Soto MD at [address]  1/24/13    PEG placement    UPPER GASTROINTESTINAL ENDOSCOPY  4/26/13    UPPER GASTROINTESTINAL ENDOSCOPY N/A 10/11/2019    EGD DILATION BALLOON performed by Amor Soto MD at [address] N/A 8/22/2020    EGD BIOPSY performed by Jerod Faith MD at [address] History:    Social History     Tobacco Use    Smoking status: Never Smoker    Smokeless tobacco: Never Used   Substance Use Topics    Alcohol use:  No                                Counseling given: Not Answered      Vital Signs (Current):   Vitals:    02/17/21 0829 02/17/21 0858 02/17/21 1101 02/17/21 1104   BP: 112/68   (!) 157/61   Pulse: 54 60 63    Resp: 16  17    Temp: 97.4 °F (36.3 °C)  97 °F (36.1 °C)    TempSrc: Axillary Temporal    SpO2: 99%  98%    Weight:       Height:                                                  BP Readings from Last 3 Encounters:   02/17/21 (!) 157/61   02/04/21 (!) 110/48   12/06/20 109/60       NPO Status: Time of last liquid consumption: 0000                        Time of last solid consumption: 0000                        Date of last liquid consumption: 02/17/21                        Date of last solid food consumption: 02/14/21    BMI:   Wt Readings from Last 3 Encounters:   02/16/21 170 lb (77.1 kg)   01/31/21 174 lb 2.6 oz (79 kg)   12/05/20 165 lb (74.8 kg)     Body mass index is 27.44 kg/m². CBC:   Lab Results   Component Value Date    WBC 11.4 02/16/2021    RBC 3.31 02/16/2021    HGB 8.9 02/17/2021    HCT 28.6 02/17/2021    MCV 87.9 02/16/2021    RDW 17.0 02/16/2021     02/16/2021       CMP:   Lab Results   Component Value Date     02/16/2021    K 4.3 02/16/2021    K 4.5 02/01/2021    CL 98 02/16/2021    CO2 27 02/16/2021    BUN 36 02/16/2021    CREATININE 0.6 02/16/2021    GFRAA >60 02/16/2021    GFRAA >60 05/16/2013    AGRATIO 1.1 02/16/2021    LABGLOM >60 02/16/2021    GLUCOSE 110 02/16/2021    PROT 7.2 02/16/2021    PROT 5.9 01/25/2013    CALCIUM 9.6 02/16/2021    BILITOT <0.2 02/16/2021    ALKPHOS 134 02/16/2021    AST 19 02/16/2021    ALT 18 02/16/2021       POC Tests: No results for input(s): POCGLU, POCNA, POCK, POCCL, POCBUN, POCHEMO, POCHCT in the last 72 hours.     Coags:   Lab Results   Component Value Date    PROTIME 11.7 02/16/2021    INR 1.01 02/16/2021    APTT 33.7 02/16/2021       HCG (If Applicable): No results found for: PREGTESTUR, PREGSERUM, HCG, HCGQUANT     ABGs: No results found for: PHART, PO2ART, JFC4AGN, TFO6CSF, BEART, K5VSFINU     Type & Screen (If Applicable):  Lab Results   Component Value Date    LABABO A 01/17/2013    [address] Positive 01/17/2013       Drug/Infectious Status (If Applicable):  No results found for: HIV, HEPCAB    COVID-19 Screening (If Applicable):   Lab Results   Component Value Date    COVID19 Not Detected 02/16/2021    COVID19 Not Detected 01/31/2021         Anesthesia Evaluation    Airway: Mallampati: II  TM distance: >3 FB   Neck ROM: full  Mouth opening: > = 3 FB Dental:          Pulmonary:                              Cardiovascular:            Rhythm: regular  Rate: normal                    Neuro/Psych:   (+) neuromuscular disease:, psychiatric history:            GI/Hepatic/Renal:   (+) GERD:, liver disease:,           Endo/Other:                     Abdominal:           Vascular:                                        Anesthesia Plan      MAC     ASA 4       Induction: intravenous. Anesthetic plan and risks discussed with patient. Plan discussed with CRNA.                   Kathy Berger MD   2/17/2021

## 2021-02-17 NOTE — PROGRESS NOTES
Reviewed pt problem list, history, H&P and assessment preoperatively. Scope verified using 2 person system.

## 2021-02-17 NOTE — PROGRESS NOTES
Call placed to [address] [place] [address] at this time and report was given to [address] [address].

## 2021-02-17 NOTE — H&P
[facility]                     [facility], [address]                              HISTORY AND PHYSICAL    PATIENT NAME: Chery Loza                     :        1950  MED REC NO:   [id number]                          ROOM:       [id number]  ACCOUNT NO:   [de-identified]                           ADMIT DATE: 2021  PROVIDER:     Sher Skelton MD    HISTORY OF PRESENT ILLNESS:  The patient is a 77-year-old intellectually  disabled patient from [address] [place] [address], came to the emergency room with  history of coffee-ground emesis. The patient is aphasic. The patient  unable to provide any history, suffers from severe intellectual  disability. There is no fever or chills reported. The patient does  have some melanotic stools. The patient does have a gastrostomy. His  stools have been darker. The patient is in chronically contracted  state. There was concern of upper GI hemorrhage. The patient was  actually admitted at the end of January for similar symptoms. He does  have history of bleeding ulcers. The patient does have cerebral palsy,  functional quadriplegia and severe intellectual mental retardation. The  patient is nonambulatory. PAST MEDICAL HISTORY:  Pertinent for intellectual disability, seizure  disorder, spastic hemiplegia, drug abuse, hepatitis, urinary  incontinence, neurogenic bladder, osteoarthritis, gastroesophageal  reflux disease, periodontal disease, tinea pedis, anemia, peripheral  vascular disease, cataract, nuclear sclerosis, anhidrosis, primary optic  atrophy, impulse control disorder, depression, bipolar affective  disorder, liver disease, peripheral vascular disease, bilateral  cataract, nuclear sclerosis, primary optic atrophy, dermatophytosis,  pneumonia, movement disorder, neuromuscular disorder, C. Diff colitis. The patient is very hard of hearing.     PAST SURGICAL HISTORY:  Pertinent for esophageal dilation, EGD and colonoscopy, abdominal surgery, upper GI endoscopy, cystoscopy,  abdominal exploratory laparotomy, sigmoidoscopy, colonoscopy, multiple  endoscopies, stomach surgery. FAMILY HISTORY:  Both the parents are  because of natural  causes. MEDICATIONS:  The patient is on acetaminophen, ascorbic acid, baclofen,  bisacodyl suppository, Peridex solution, vitamin D3 capsules, diazepam,  doxazosin, Lexapro, DuoNeb, lactulose, lamotrigine, Keppra, lorazepam,  multivitamin with minerals, Mycostatin, pantoprazole, potassium  chloride, quetiapine, sodium chloride nasal spray, zinc oxide ointment. ALLERGIES:  The patient is allergic to CLARITHROMYCIN and INVANZ. SOCIAL HISTORY:  Single man, never , never had any children. Never went to work. Never had any occupation. Never had any education. No history of smoking or drinking or drug abuse. REVIEW OF SYSTEMS:  Negative for loss of consciousness. Does have  chronic intellectual disability. The patient is aphasic mainly,  nonambulatory. He is a full-time total care. No angina pectoris. Does  have exertional shortness of breath. Does have history of coffee-ground  emesis and melanotic stools. Some of his behavior does point to  abdominal pain. There is no jaundice. No hematemesis. Does have  coffee-ground emesis and melanotic stools. No diarrhea or constipation. Functionally quadriplegic. PHYSICAL EXAMINATION:  GENERAL:  Lethargic but awake 75-year-old white man looking consistent  with his stated age. VITAL SIGNS:  His temperature is 97.7, blood pressure 130/78,  respirations 20, heart rate 78. HEENT:  Oral mucosa dry. SKIN:  Warm and dry. NECK:  Supple. Faint carotid bruit. No jugular venous distention. The  patient is overall in a contracted posture. LUNGS:  Vesicular breath sounds. Poor inspiration. No crackles or  wheezing. HEART:  Regular rate and rhythm. S1, S2, 1/6 systolic ejection murmur. No gallop rhythm. ABDOMEN:  Soft. There is slight tenderness. There is gastrostomy tube  in present. There is no guarding, no rebound, no rigidity. Bowel  sounds present in all the four quadrants. EXTREMITIES:  Contracted. There is no distal edema. Distal pulsations  are weak. NEUROLOGIC:  There are no acute focal deficit. The patient is  functionally quadriplegic. LABORATORY DATA:  Lab evaluation shows sodium 134, potassium 4.3,  chloride 98, CO2 27, BUN 36, creatinine 0.6, magnesium is 2.30, blood  sugar is 110, calcium is 9.6. ProBNP level 176. Troponin less than  0.01. Albumin 3.8, globulin 3.4, alkaline phosphatase 134, AST and ALT  19 and 18, total protein 7.2. White blood cell count 11.4,  hemoglobin/hematocrit 9.1 and 29.1, platelet count 268. His prior  hemoglobin was in the same range, around 8.7. ASSESSMENT:  Possible upper GI hemorrhage, chronic anemia, intellectual  disability, functional quadriplegia, hypertension, seizure disorder,  schizoaffective disorder. PLAN:  Get him admitted. Treat him with IV hydration, IV proton pump  inhibitor infusion, GI consultation, serial hemoglobin/hematocrit  monitoring. We will also obtain GI consult, IV PPI and  hemoglobin/hematocrit monitoring. Keep the patient n.p.o. for now. As always, it is a pleasure to take care of your patients at [facility].         Hubert Lugo MD    D: 02/16/2021 22:47:27       T: 02/16/2021 22:52:14     [email or web address]  Job#: [id number]     Doc#: [id number]    CC:  MD Liz Contreras

## 2021-02-17 NOTE — BRIEF OP NOTE
EGD    No bleeding lesions.     Large hiatal hernia  See full report in [address] [place] for [facility]ts from [address] [place] [address], MD  [address]

## 2021-02-17 NOTE — PROGRESS NOTES
Message sent to Dr. Kevon Mehta: Patient has history of seizures. Would you like to place seizure precautions? Also, patient only has 100ml urine output overnight. LR running at 100ml/hour. Please advise.

## 2021-02-17 NOTE — PROGRESS NOTES
Patient's head to toe assessment complete at this time. Routine VSS. Patient resting comfortably in bed, respirations appear easy and unlabored. All nightly medications given per G-tube. See MAR for details. Unable to obtain residual from G-tube, but flushes fine. Patient NPO. Continuous Protonix drip infusing and LR started. External urinary catheter in place and attached to suction. Abdominal binder in place. Patient denies pain, no other needs expressed at this time; call light within reach. Will continue to monitor.

## 2021-02-17 NOTE — PROGRESS NOTES
Patient resting in bed at this time with eyes closed. Does not appear to be in distress. Bed alarm engaged, call light in reach. Will monitor.

## 2021-02-17 NOTE — PROGRESS NOTES
Patient Active Problem List   Diagnosis    Anemia    Altered mental status    Jejunostomy malfunction ([address]    Cerebral palsy ([address]    Seizure disorder ([address]    Gastrostomy malfunction ([address]    Nuclear sclerosis    Anemia, chronic disease    Partial epilepsy with impairment of consciousness, intractable ([address]    Mental retardation    Malfunction of gastrostomy tube ([address]    Hypoxemia    Hypotension    Acute encephalopathy    Recurrent seizures ([address]    Upper GI hemorrhage    GI bleed    Coffee ground emesis   H&P dictated

## 2021-02-17 NOTE — CARE COORDINATION
Camacho contacted Jose Giron NP at [facility] [phone] and informed her that pt is ready for d/c.     Cm scheduled transport with [facility] for 8:30 pm tonight. CM notified Jose Giron. CM called CHANTE Rojas and left vm about pt's discharge.      Manda Restrepo RN, BSN  [phone]

## 2021-02-17 NOTE — DISCHARGE INSTR - COC
Continuity of Care Form    Patient Name: Macho Palmer   :  1950  MRN:  [id number]    Admit date:  2021  Discharge date:  2021    Code Status Order: Full Code   Advance Directives:      Admitting Physician:  Donald Hernandez MD  PCP: Amy Cartwright    Discharging Nurse: [facility] CF Unit/Room#: 5RC-4926/9017-06  Discharging Unit Phone Number: [phone]    Emergency Contact:   Extended Emergency Contact Information  Primary Emergency Contact: Tio Caldwell, [address] Phone: [phone]  Relation: Legal Guardian  Secondary Emergency Contact: Ramone [address] Phone: [phone]  Relation: None    Past Surgical History:  Past Surgical History:   Procedure Laterality Date    ABDOMEN SURGERY      can see scar/details unknown, feeding tube    ABDOMINAL EXPLORATION SURGERY  14    LAPAROTOMY EXPLORATORY, ERIKA-EN-Y TUBE JEJUNOSTOMY, SMALL BOWEL RESECTION                                              COLONOSCOPY      COLONOSCOPY N/A 2019    COLONOSCOPY WITH BIOPSY performed by Flory Foster MD at [address] [place] [address]  14    with dilitation    DILATATION, ESOPHAGUS      ENDOSCOPY, COLON, DIAGNOSTIC      OTHER SURGICAL HISTORY  13    JEJUNOSTOMY TUBE INSERTION    SIGMOIDOSCOPY N/A 2019    SIGMOIDOSCOPY DIAGNOSTIC FLEXIBLE performed by Flory Foster MD at [facility] N/A 2020    REPLACEMENT, JEJUNOSTOMY TUBE performed by Flory Foster MD at [address] [place] [address]  13    PEG placement    UPPER GASTROINTESTINAL ENDOSCOPY  13    UPPER GASTROINTESTINAL ENDOSCOPY N/A 10/11/2019    EGD DILATION BALLOON performed by Flory Foster MD at [facility] 2020    [facility] GASTRO[facility]AL [facility]OSCOPY N/A 2/17/2021    EGD DIAGNOSTIC ONLY performed by Yola Dutta MD at [address]       Immunization History:   Immunization History   Administered Date(s) Administered    Influenza Vaccine, unspecified formulation 10/02/2016    Influenza Virus Vaccine 10/16/2009, 09/17/2012, 11/30/2014    Influenza Whole 09/30/2013    Pneumococcal Conjugate 13-valent ([address]) 10/02/2016    Pneumococcal Conjugate 7-valent (Lampe Lennox) 11/15/2007, 12/02/2013       Active Problems:  Patient Active Problem List   Diagnosis Code    Anemia D64.9    Altered mental status R41.82    Jejunostomy malfunction ([address] K94.13    Cerebral palsy ([address] G80.9    Seizure disorder ([address] G40.909    Gastrostomy malfunction ([address] K94.23    Nuclear sclerosis H25.10    Anemia, chronic disease D63.8    Partial epilepsy with impairment of consciousness, intractable ([address] G40.219    Mental retardation F79    Malfunction of gastrostomy tube ([address] K94.23    Hypoxemia R09.02    Hypotension I95.9    Acute encephalopathy G93.40    Recurrent seizures ([address] G40.909    Upper GI hemorrhage K92.2    GI bleed K92.2    Coffee ground emesis K92.0       Isolation/Infection:   Isolation            No Isolation          Patient Infection Status       Infection Onset Added Last Indicated Last Indicated By Review Planned Expiration Resolved Resolved By    None active    Resolved    COVID-19 Rule Out 02/16/21 02/16/21 02/16/21 COVID-19 (Ordered)   02/17/21 Rule-Out Test Resulted    COVID-19 Rule Out 01/31/21 01/31/21 01/31/21 COVID-19 (Ordered)   02/01/21 Rule-Out Test Resulted    COVID-19 Rule Out 08/21/20 08/21/20 08/21/20 COVID-19 (Ordered)   08/22/20 Rule-Out Test Resulted            Nurse Assessment:  Last Vital Signs: /60   Pulse 58   Temp 97.5 °F (36.4 °C) (Axillary)   Resp 16   Ht 5' 6\" (1.676 m)   Wt 170 lb (77.1 kg)   SpO2 97%   BMI 27.44 kg/m²     Last documented pain score (0-10 scale): Pain Level: 0  Last Weight:   Wt Readings from Last 1 Encounters:   02/16/21 170 lb (77.1 kg)     Mental Status:  alert    IV Access:  - None    Nursing Mobility/ADLs:  Walking   Dependent  Transfer  Dependent  Bathing  Dependent  Dressing  Dependent  Toileting  Dependent  Feeding  Dependent  Med Admin  Dependent  Med Delivery   crushed    Wound Care Documentation and Therapy:        Elimination:  Continence:   · Bowel: No  · Bladder: No  Urinary Catheter: None   Colostomy/Ileostomy/Ileal Conduit: No       Date of Last BM: 02/17/2021    Intake/Output Summary (Last 24 hours) at 2/17/2021 1447  Last data filed at 2/17/2021 1127  Gross per 24 hour   Intake 420 ml   Output 100 ml   Net 320 ml     I/O last 3 completed shifts: In: 320 [NG/GT:320]  Out: 100 [Urine:100]    Safety Concerns: At Risk for Falls and History of Seizures    Impairments/Disabilities:      Speech and Contractures - Left Arm     Nutrition Therapy:  Current Nutrition Therapy:   - Tube Feedings:  65 ml/hr over 14 hrs per day    Routes of Feeding: Jejunal Tube  Liquids: No Restrictions  Daily Fluid Restriction: no  Last Modified Barium Swallow with Video (Video Swallowing Test): not done    Treatments at the Time of Hospital Discharge:   Respiratory Treatments:   Oxygen Therapy:  is not on home oxygen therapy. Ventilator:    - No ventilator support    Rehab Therapies:   Weight Bearing Status/Restrictions: No weight bearing restirctions  Other Medical Equipment (for information only, NOT a DME order):     Other Treatments:     Patient's personal belongings (please select all that are sent with patient):  None    RN SIGNATURE:  Electronically signed by Omar Mirza RN on 2/17/21 at 3:16 PM EST    CASE MANAGEMENT/SOCIAL WORK SECTION    Inpatient Status Date: 2/17/2021    Readmission Risk Assessment Score:  Readmission Risk              Risk of Unplanned Readmission:        27           Discharging to Facility/ Agency   · Name: [facility] number 6  · [address] [address]  · Phone:[phone]        UIN:[phone]  Dialysis Facility (if applicable)   · Name:  · Address:  · Dialysis Schedule:  · Phone:  · Fax:    / signature: Osmany Brooks RN, BSN  [phone]      PHYSICIAN SECTION    Prognosis: Poor    Condition at Discharge: Stable    Rehab Potential (if transferring to Rehab): Poor    Recommended Labs or Other Treatments After Discharge: palliative care or hospice would be ideal    Physician Certification: I certify the above information and transfer of Enrique Nuñez  is necessary for the continuing treatment of the diagnosis listed and that he requires Palliative Care for greater 30 days.      Update Admission H&P: No change in H&P    PHYSICIAN SIGNATURE:  Electronically signed by Nita Katz MD on 2/17/21 at 2:47 PM EST

## 2021-02-19 LAB
ORGANISM: ABNORMAL
URINE CULTURE, ROUTINE: ABNORMAL

## 2021-03-11 NOTE — DISCHARGE SUMMARY
[facility]                     350 [facility], [facility]                               DISCHARGE SUMMARY    PATIENT NAME: Madai Livingston                     :        1950  MED REC NO:   [id number]                          ROOM:       6939  ACCOUNT NO:   [de-identified]                           ADMIT DATE: 2021  PROVIDER:     Genny Pinto MD                  DISCHARGE DATE:  2021    FINAL DIAGNOSES:  1. Upper GI bleed. 2.  Anemia due to acute blood loss. 3.  Cerebral palsy. 4.  Seizure disorder. 5.  Mental retardation. DISCHARGE MEDICATIONS:  1. Phenergan 12.5 mg every six hours p.r.n.  2.  Calmoseptine ointment as directed. 3.  Lorazepam 0.5 mg every 12 hours. 4.  Ocean nasal saline spray b.i.d.  5.  Protonix 40 mg b.i.d.  6.  DuoNeb one every four hours p.r.n.  7.  Dulcolax suppository as needed. 8.  Mycostatin cream as needed. 9.  Senokot 8.6 mg daily. 10.  Vitamin D3 2000 units daily. 11.  Zinc oxide ointment as directed. 12.  Bisacodyl suppository p.r.n.  13.  Baclofen 15 mg three times a day. 14.  Lamictal 150 mg b.i.d.  15.  Quetiapine 100 mg nightly. 16.  Tylenol 650 mg q.4 p.r.n.  17.  Keppra 1500 mg b.i.d. 18.  Chronulac syrup 20 gm daily. 19.  Cardura 4 mg daily. 20.  Multivitamin with minerals daily. 21.  Potassium chloride 20 mEq daily. 22.  Vitamin C 500 mg b.i.d.  23.  Seroquel 100 mg b.i.d.  24.  Peridex b.i.d.  25.  Robitussin 200 mg every four hours, was discontinued. HOSPITAL COURSE:  This intellectually disabled 61-year-old man came to  the emergency room with coffee ground emesis. The patient had stable  vital signs. The patient had anemia with acute blood loss. The patient  had stable vital signs. Dr. Cristina Padilla admitted the patient in my absence. The  patient was treated with IV hydration. Hemoglobin and hematocrit  monitoring. He is a resident of [facility].   The patient also has a  gastrostomy tube.  Blood pressure was 129/67, pulse 74, temperature 98.4  and respirations 24. Lungs are clear to auscultation. The patient was  referred to GI service, was given IV proton pump inhibitor. The patient  remains at a very high risk of readmission. Palliative care would be  preferred, but usually at [facility], there is not much acceptance  for that level of care. Dr. Grullon saw the patient. His last EGD was  in 08/2020. His baseline hemoglobin was stable. He chose to continue  the Protonix twice a day and okay to use the J-tube for medication. The  patient also needed followup COVID test.  His plan was if coffee ground  emesis recurs, then he would do an EGD. Presently, there was no  compelling need of it. Dr. Mathieu White followed the patient in my absence and I  resumed the care on 02/02/2021. Dr. Flores Long also came to see  the patient for possible abdominal distension and bowel obstruction. There was no bowel obstruction by CT scan. The patient had an  unremarkable colonoscopy by Dr. Linda Sheikh in 06/2019. The patient was  started on tube feeding. Dr. Grullon again saw the patient on  02/03/2021. The patient was not having vomiting. He was having bowel  movement. The patient denied any abdominal pain and he was tolerating  the tube feeding. Under these circumstances, the patient was dismissal  ready with stable vital signs. On 02/04/2021, the patient was  discharged in stable condition, back to [facility]. The patient was  discharged in stable condition.         Javad De La Rosa MD    D: 03/10/2021 21:25:20       T: 03/11/2021 7:43:19     [email or web address]  Job#: [id number]     Doc#: [id number]    CC:

## 2021-03-17 ENCOUNTER — HOSPITAL ENCOUNTER (INPATIENT)
Age: 71
LOS: 3 days | Discharge: NURSING, REHAB OR LONG-TERM CARE | DRG: 871 | End: 2021-03-20
Attending: STUDENT IN AN ORGANIZED HEALTH CARE EDUCATION/TRAINING PROGRAM | Admitting: INTERNAL MEDICINE
Payer: MEDICARE

## 2021-03-17 ENCOUNTER — APPOINTMENT (OUTPATIENT)
Dept: CT IMAGING | Age: 71
DRG: 871 | End: 2021-03-17
Payer: MEDICARE

## 2021-03-17 DIAGNOSIS — R09.02 HYPOXIA: ICD-10-CM

## 2021-03-17 DIAGNOSIS — G40.909 SEIZURE DISORDER (HCC): ICD-10-CM

## 2021-03-17 DIAGNOSIS — J18.9 PNEUMONIA DUE TO ORGANISM: Primary | ICD-10-CM

## 2021-03-17 DIAGNOSIS — G40.909 RECURRENT SEIZURES (HCC): ICD-10-CM

## 2021-03-17 PROBLEM — J69.0 ACUTE ASPIRATION PNEUMONIA (HCC): Status: ACTIVE | Noted: 2021-03-17

## 2021-03-17 LAB
ANION GAP SERPL CALCULATED.3IONS-SCNC: 10 MMOL/L (ref 3–16)
ANISOCYTOSIS: ABNORMAL
BANDED NEUTROPHILS RELATIVE PERCENT: 4 % (ref 0–7)
BASOPHILS ABSOLUTE: 0 K/UL (ref 0–0.2)
BASOPHILS RELATIVE PERCENT: 0 %
BUN BLDV-MCNC: 38 MG/DL (ref 7–20)
CALCIUM SERPL-MCNC: 9.4 MG/DL (ref 8.3–10.6)
CHLORIDE BLD-SCNC: 97 MMOL/L (ref 99–110)
CO2: 25 MMOL/L (ref 21–32)
CREAT SERPL-MCNC: 0.9 MG/DL (ref 0.8–1.3)
EKG ATRIAL RATE: 90 BPM
EKG DIAGNOSIS: NORMAL
EKG P AXIS: 48 DEGREES
EKG P-R INTERVAL: 172 MS
EKG Q-T INTERVAL: 394 MS
EKG QRS DURATION: 72 MS
EKG QTC CALCULATION (BAZETT): 481 MS
EKG R AXIS: 18 DEGREES
EKG T AXIS: 86 DEGREES
EKG VENTRICULAR RATE: 90 BPM
EOSINOPHILS ABSOLUTE: 0 K/UL (ref 0–0.6)
EOSINOPHILS RELATIVE PERCENT: 0 %
GFR AFRICAN AMERICAN: >60
GFR NON-AFRICAN AMERICAN: >60
GLUCOSE BLD-MCNC: 115 MG/DL (ref 70–99)
HCT VFR BLD CALC: 28.9 % (ref 40.5–52.5)
HEMOGLOBIN: 9.3 G/DL (ref 13.5–17.5)
LACTIC ACID, SEPSIS: 1.7 MMOL/L (ref 0.4–1.9)
LYMPHOCYTES ABSOLUTE: 0.8 K/UL (ref 1–5.1)
LYMPHOCYTES RELATIVE PERCENT: 4 %
MCH RBC QN AUTO: 28.8 PG (ref 26–34)
MCHC RBC AUTO-ENTMCNC: 32.1 G/DL (ref 31–36)
MCV RBC AUTO: 89.8 FL (ref 80–100)
MONOCYTES ABSOLUTE: 0.6 K/UL (ref 0–1.3)
MONOCYTES RELATIVE PERCENT: 3 %
NEUTROPHILS ABSOLUTE: 19.2 K/UL (ref 1.7–7.7)
NEUTROPHILS RELATIVE PERCENT: 89 %
PDW BLD-RTO: 19.1 % (ref 12.4–15.4)
PLATELET # BLD: 542 K/UL (ref 135–450)
PMV BLD AUTO: 7 FL (ref 5–10.5)
POTASSIUM REFLEX MAGNESIUM: 5 MMOL/L (ref 3.5–5.1)
PRO-BNP: 344 PG/ML (ref 0–124)
RBC # BLD: 3.22 M/UL (ref 4.2–5.9)
SLIDE REVIEW: ABNORMAL
SODIUM BLD-SCNC: 132 MMOL/L (ref 136–145)
TROPONIN: <0.01 NG/ML
WBC # BLD: 20.6 K/UL (ref 4–11)

## 2021-03-17 PROCEDURE — 84484 ASSAY OF TROPONIN QUANT: CPT

## 2021-03-17 PROCEDURE — 80048 BASIC METABOLIC PNL TOTAL CA: CPT

## 2021-03-17 PROCEDURE — 71260 CT THORAX DX C+: CPT

## 2021-03-17 PROCEDURE — 6360000004 HC RX CONTRAST MEDICATION: Performed by: STUDENT IN AN ORGANIZED HEALTH CARE EDUCATION/TRAINING PROGRAM

## 2021-03-17 PROCEDURE — 2580000003 HC RX 258: Performed by: INTERNAL MEDICINE

## 2021-03-17 PROCEDURE — 93005 ELECTROCARDIOGRAM TRACING: CPT | Performed by: STUDENT IN AN ORGANIZED HEALTH CARE EDUCATION/TRAINING PROGRAM

## 2021-03-17 PROCEDURE — 1200000000 HC SEMI PRIVATE

## 2021-03-17 PROCEDURE — 94761 N-INVAS EAR/PLS OXIMETRY MLT: CPT

## 2021-03-17 PROCEDURE — 70450 CT HEAD/BRAIN W/O DYE: CPT

## 2021-03-17 PROCEDURE — 2580000003 HC RX 258: Performed by: STUDENT IN AN ORGANIZED HEALTH CARE EDUCATION/TRAINING PROGRAM

## 2021-03-17 PROCEDURE — 83605 ASSAY OF LACTIC ACID: CPT

## 2021-03-17 PROCEDURE — 85025 COMPLETE CBC W/AUTO DIFF WBC: CPT

## 2021-03-17 PROCEDURE — 99284 EMERGENCY DEPT VISIT MOD MDM: CPT

## 2021-03-17 PROCEDURE — 2700000000 HC OXYGEN THERAPY PER DAY

## 2021-03-17 PROCEDURE — 96365 THER/PROPH/DIAG IV INF INIT: CPT

## 2021-03-17 PROCEDURE — 93010 ELECTROCARDIOGRAM REPORT: CPT | Performed by: INTERNAL MEDICINE

## 2021-03-17 PROCEDURE — 6360000002 HC RX W HCPCS: Performed by: STUDENT IN AN ORGANIZED HEALTH CARE EDUCATION/TRAINING PROGRAM

## 2021-03-17 PROCEDURE — 6360000002 HC RX W HCPCS: Performed by: INTERNAL MEDICINE

## 2021-03-17 PROCEDURE — 83880 ASSAY OF NATRIURETIC PEPTIDE: CPT

## 2021-03-17 PROCEDURE — 6370000000 HC RX 637 (ALT 250 FOR IP): Performed by: INTERNAL MEDICINE

## 2021-03-17 RX ORDER — PANTOPRAZOLE SODIUM 40 MG/1
40 TABLET, DELAYED RELEASE ORAL
Status: DISCONTINUED | OUTPATIENT
Start: 2021-03-18 | End: 2021-03-19 | Stop reason: ALTCHOICE

## 2021-03-17 RX ORDER — DIAZEPAM 10 MG/2ML
20 GEL RECTAL
Status: DISPENSED | OUTPATIENT
Start: 2021-03-17 | End: 2021-03-17

## 2021-03-17 RX ORDER — MULTIVIT-MIN/FERROUS GLUCONATE 9 MG/15 ML
30 LIQUID (ML) ORAL DAILY
Status: DISCONTINUED | OUTPATIENT
Start: 2021-03-17 | End: 2021-03-17 | Stop reason: RX

## 2021-03-17 RX ORDER — QUETIAPINE FUMARATE 25 MG/1
50 TABLET, FILM COATED ORAL 2 TIMES DAILY
Status: DISCONTINUED | OUTPATIENT
Start: 2021-03-17 | End: 2021-03-20 | Stop reason: HOSPADM

## 2021-03-17 RX ORDER — VITAMIN B COMPLEX
2000 TABLET ORAL DAILY
Status: DISCONTINUED | OUTPATIENT
Start: 2021-03-17 | End: 2021-03-20 | Stop reason: HOSPADM

## 2021-03-17 RX ORDER — LAMOTRIGINE 100 MG/1
400 TABLET ORAL 2 TIMES DAILY
Status: DISCONTINUED | OUTPATIENT
Start: 2021-03-17 | End: 2021-03-20 | Stop reason: HOSPADM

## 2021-03-17 RX ORDER — ACETAMINOPHEN 160 MG/5ML
650 SOLUTION ORAL EVERY 4 HOURS PRN
Status: DISCONTINUED | OUTPATIENT
Start: 2021-03-17 | End: 2021-03-20 | Stop reason: HOSPADM

## 2021-03-17 RX ORDER — FERROUS SULFATE 300 MG/5ML
300 LIQUID (ML) ORAL 2 TIMES DAILY
Status: DISCONTINUED | OUTPATIENT
Start: 2021-03-17 | End: 2021-03-20 | Stop reason: HOSPADM

## 2021-03-17 RX ORDER — SENNA PLUS 8.6 MG/1
2 TABLET ORAL DAILY
Status: DISCONTINUED | OUTPATIENT
Start: 2021-03-18 | End: 2021-03-20 | Stop reason: HOSPADM

## 2021-03-17 RX ORDER — IPRATROPIUM BROMIDE AND ALBUTEROL SULFATE 2.5; .5 MG/3ML; MG/3ML
1 SOLUTION RESPIRATORY (INHALATION)
Status: DISCONTINUED | OUTPATIENT
Start: 2021-03-18 | End: 2021-03-20 | Stop reason: HOSPADM

## 2021-03-17 RX ORDER — PROMETHAZINE HYDROCHLORIDE 25 MG/1
12.5 TABLET ORAL EVERY 6 HOURS PRN
Status: DISCONTINUED | OUTPATIENT
Start: 2021-03-17 | End: 2021-03-20 | Stop reason: HOSPADM

## 2021-03-17 RX ORDER — PROMETHAZINE HYDROCHLORIDE 12.5 MG/1
12.5 TABLET ORAL EVERY 6 HOURS PRN
COMMUNITY

## 2021-03-17 RX ORDER — CHLORHEXIDINE GLUCONATE 0.12 MG/ML
7.5 RINSE ORAL 2 TIMES DAILY
Status: DISCONTINUED | OUTPATIENT
Start: 2021-03-17 | End: 2021-03-20 | Stop reason: HOSPADM

## 2021-03-17 RX ORDER — DOXAZOSIN MESYLATE 4 MG/1
4 TABLET ORAL DAILY
Status: DISCONTINUED | OUTPATIENT
Start: 2021-03-18 | End: 2021-03-20 | Stop reason: HOSPADM

## 2021-03-17 RX ORDER — ASCORBIC ACID 500 MG
500 TABLET ORAL 2 TIMES DAILY
Status: DISCONTINUED | OUTPATIENT
Start: 2021-03-17 | End: 2021-03-20 | Stop reason: HOSPADM

## 2021-03-17 RX ORDER — LACTULOSE 10 G/15ML
20 SOLUTION ORAL DAILY
Status: DISCONTINUED | OUTPATIENT
Start: 2021-03-18 | End: 2021-03-20 | Stop reason: HOSPADM

## 2021-03-17 RX ORDER — LEVETIRACETAM 100 MG/ML
1500 SOLUTION ORAL 2 TIMES DAILY
Status: DISCONTINUED | OUTPATIENT
Start: 2021-03-17 | End: 2021-03-17

## 2021-03-17 RX ORDER — SODIUM CHLORIDE 9 MG/ML
INJECTION, SOLUTION INTRAVENOUS CONTINUOUS
Status: DISCONTINUED | OUTPATIENT
Start: 2021-03-17 | End: 2021-03-20 | Stop reason: HOSPADM

## 2021-03-17 RX ORDER — BISACODYL 10 MG
10 SUPPOSITORY, RECTAL RECTAL DAILY PRN
Status: DISCONTINUED | OUTPATIENT
Start: 2021-03-17 | End: 2021-03-20 | Stop reason: HOSPADM

## 2021-03-17 RX ORDER — LEVETIRACETAM 5 MG/ML
500 INJECTION INTRAVASCULAR EVERY 12 HOURS
Status: DISCONTINUED | OUTPATIENT
Start: 2021-03-17 | End: 2021-03-20 | Stop reason: HOSPADM

## 2021-03-17 RX ORDER — BACLOFEN 10 MG/1
10 TABLET ORAL 3 TIMES DAILY
Status: DISCONTINUED | OUTPATIENT
Start: 2021-03-17 | End: 2021-03-20 | Stop reason: HOSPADM

## 2021-03-17 RX ORDER — LORAZEPAM 0.5 MG/1
0.5 TABLET ORAL EVERY 12 HOURS PRN
Status: DISCONTINUED | OUTPATIENT
Start: 2021-03-17 | End: 2021-03-20 | Stop reason: HOSPADM

## 2021-03-17 RX ORDER — NYSTATIN 100000 U/G
CREAM TOPICAL 2 TIMES DAILY
Status: DISCONTINUED | OUTPATIENT
Start: 2021-03-17 | End: 2021-03-20 | Stop reason: HOSPADM

## 2021-03-17 RX ORDER — ESCITALOPRAM OXALATE 10 MG/1
10 TABLET ORAL DAILY
Status: DISCONTINUED | OUTPATIENT
Start: 2021-03-18 | End: 2021-03-20 | Stop reason: HOSPADM

## 2021-03-17 RX ORDER — QUETIAPINE FUMARATE 100 MG/1
100 TABLET, FILM COATED ORAL NIGHTLY
Status: DISCONTINUED | OUTPATIENT
Start: 2021-03-17 | End: 2021-03-20 | Stop reason: HOSPADM

## 2021-03-17 RX ADMIN — VANCOMYCIN HYDROCHLORIDE 1250 MG: 10 INJECTION, POWDER, LYOPHILIZED, FOR SOLUTION INTRAVENOUS at 17:19

## 2021-03-17 RX ADMIN — LAMOTRIGINE 400 MG: 100 TABLET ORAL at 22:43

## 2021-03-17 RX ADMIN — PIPERACILLIN AND TAZOBACTAM 3375 MG: 3; .375 INJECTION, POWDER, LYOPHILIZED, FOR SOLUTION INTRAVENOUS at 23:09

## 2021-03-17 RX ADMIN — LEVETIRACETAM 1500 MG: 500 SOLUTION ORAL at 22:43

## 2021-03-17 RX ADMIN — SODIUM CHLORIDE: 9 INJECTION, SOLUTION INTRAVENOUS at 22:42

## 2021-03-17 RX ADMIN — IOPAMIDOL 75 ML: 755 INJECTION, SOLUTION INTRAVENOUS at 14:47

## 2021-03-17 RX ADMIN — CHLORHEXIDINE GLUCONATE 7.5 ML: 1.2 RINSE ORAL at 22:44

## 2021-03-17 RX ADMIN — CEFEPIME HYDROCHLORIDE 2000 MG: 2 INJECTION, POWDER, FOR SOLUTION INTRAVENOUS at 15:39

## 2021-03-17 NOTE — ED NOTES
Pt transported via bed, with pt family member, pt with mask on. Pt with belongings.       Gonzalez Goldstein RN  03/17/21 1785

## 2021-03-17 NOTE — ED PROVIDER NOTES
 Neuromuscular disorder (HCC)     spastic hemiplegia, MDS,neurogenic bladder    NS (nuclear sclerosis)     Nuclear sclerosis     Osteoarthritis     Periodontal disease     Pneumonia 1/17/2013    Primary optic atrophy     Primary optic atrophy     [facility]) [facility]     Seizure ([facility]     last seizure 9/20/19    Spastic hemiplegia     Tinea pedis     Unspecified diseases of blood and blood-forming organs     anemia    Urinary incontinence          SURGICAL HISTORY       Past Surgical History:   Procedure Laterality Date    ABDOMEN SURGERY      can see scar/details unknown, feeding tube    ABDOMINAL EXPLORATION SURGERY  9-28-14    LAPAROTOMY EXPLORATORY, ERIKA-EN-Y TUBE JEJUNOSTOMY, SMALL BOWEL RESECTION                                              COLONOSCOPY      COLONOSCOPY N/A 6/4/2019    COLONOSCOPY WITH BIOPSY performed by Heather Thompson MD at [address]  6/25/14    with dilitation    DILATATION, ESOPHAGUS      ENDOSCOPY, COLON, DIAGNOSTIC      OTHER SURGICAL HISTORY  4-30-13    JEJUNOSTOMY TUBE INSERTION    SIGMOIDOSCOPY N/A 5/2/2019    SIGMOIDOSCOPY DIAGNOSTIC FLEXIBLE performed by Heather Thompson MD at [facility] N/A 4/21/2020    REPLACEMENT, JEJUNOSTOMY TUBE performed by Heather Thompson MD at [address]  1/24/13    PEG placement    UPPER GASTROINTESTINAL ENDOSCOPY  4/26/13    UPPER GASTROINTESTINAL ENDOSCOPY N/A 10/11/2019    EGD DILATION BALLOON performed by Heather Thompson MD at [address] 8/22/2020    EGD BIOPSY performed by Marialuisa Degroot MD at [address] 2/17/2021    EGD DIAGNOSTIC ONLY performed by Heather Thomspon MD at [address]       Previous Medications    ACETAMINOPHEN (TYLENOL) 160 MG/5ML SOLUTION    20.3 mLs by Per G Tube route every 4 hours as needed for Fever or Pain    ASCORBIC ACID (VITAMIN C) 500 MG TABLET    500 mg by Per G Tube route 2 times daily. BACLOFEN (LIORESAL) 10 MG TABLET    15 mg by Per G Tube route 3 times daily     BISACODYL (DULCOLAX) 10 MG SUPPOSITORY    Place 10 mg rectally daily as needed If no BM in 48 hours    BISACODYL (DULCOLAX) 5 MG EC TABLET    Take 10 mg by mouth as needed (No bowel movement in 48 hours)    CHLORHEXIDINE (PERIDEX) 0.12 % SOLUTION    Take 7.5 mLs by mouth 2 times daily. CHOLECALCIFEROL (VITAMIN D3) 2000 UNITS CAPS    2,000 Units by Per G Tube route daily     DIAZEPAM (DIASTAT) 10 MG GEL    Place 20 mg rectally once as needed (seizures lasting over 5 minutes) for up to 1 dose. DOXAZOSIN (CARDURA) 4 MG TABLET    4 mg by Per G Tube route daily     ESCITALOPRAM (LEXAPRO) 10 MG TABLET    10 mg by Per G Tube route daily. FERROUS SULFATE 220 (44 FE) MG/5ML SOLUTION    Take 220 mg by mouth 2 times daily    IPRATROPIUM-ALBUTEROL (DUONEB) 0.5-2.5 (3) MG/3ML SOLN NEBULIZER SOLUTION    Inhale 1 vial into the lungs every 6 hours as needed     LACTULOSE (CHRONULAC) 10 GM/15ML SOLUTION    20 g by Per G Tube route daily     LAMOTRIGINE (LAMICTAL) 150 MG TABLET    400 mg by Per J Tube route 2 times daily     LEVETIRACETAM (KEPPRA) 100 MG/ML SOLUTION    15 mLs by Per G Tube route 2 times daily    LORAZEPAM (ATIVAN) 0.5 MG TABLET    Take 1 tablet by mouth every 12 hours as needed for Anxiety for up to 30 days.  For seizures up to 7 days    MULTIPLE VITAMINS-MINERALS (CERTA-KENTON) LIQUID    30 mLs by Per G Tube route daily     NYSTATIN (MYCOSTATIN) 635796 UNIT/GM CREAM    Apply topically 2 times daily as needed (yeast infections)    PANTOPRAZOLE (PROTONIX) 40 MG TABLET    Take 1 tablet by mouth 2 times daily (before meals)    POTASSIUM CHLORIDE (KAYCIEL) 20 MEQ/15ML (10%) SOLUTION    10 mEq by Per G Tube route 2 times daily     QUETIAPINE (SEROQUEL) 100 MG TABLET    50 mg by Per G Tube route 2 times daily Give 50 mg twice daily at 5:30 am/5:30 pm. Give 100 mg at bedtime. QUETIAPINE (SEROQUEL) 100 MG TABLET    Take 100 mg by mouth nightly Give 50 mg twice daily at 5:30 am/5:30 pm. Give 100 mg at bedtime. SENNA (SENOKOT) 8.6 MG TABLET    2 tablets by Per G Tube route daily     ZINC OXIDE 20 % OINTMENT    Apply topically 2 times daily        ALLERGIES     Biaxin [clarithromycin] and Invanz [ertapenem]    FAMILY HISTORY     History reviewed. No pertinent family history. SOCIAL HISTORY       Social History     Socioeconomic History    Marital status: Single     Spouse name: None    Number of children: None    Years of education: None    Highest education level: None   Occupational History    None   Social Needs    Financial resource strain: None    Food insecurity     Worry: None     Inability: None    Transportation needs     Medical: None     Non-medical: None   Tobacco Use    Smoking status: Never Smoker    Smokeless tobacco: Never Used   Substance and Sexual Activity    Alcohol use: No    Drug use: No     Comment: unknown    Sexual activity: Not Currently   Lifestyle    Physical activity     Days per week: None     Minutes per session: None    Stress: None   Relationships    Social connections     Talks on phone: None     Gets together: None     Attends [employer] service: None     Active member of club or organization: None     Attends meetings of clubs or organizations: None     Relationship status: None    Intimate partner violence     Fear of current or ex partner: None     Emotionally abused: None     Physically abused: None     Forced sexual activity: None   Other Topics Concern    None   Social History Narrative    None       SCREENINGS                        PHYSICAL EXAM    (up to 7 for level 4, 8 or more for level 5)     ED Triage Vitals   BP Temp Temp src Pulse Resp SpO2 Height Weight   -- -- -- -- -- -- -- --       Physical Exam  HENT:      Head: Normocephalic. Comments: 2 cm abrasion to the top of the head. No midline C-spine step-off  Eyes:      General:         Right eye: No discharge. Left eye: No discharge. Conjunctiva/sclera: Conjunctivae normal.   Cardiovascular:      Rate and Rhythm: Normal rate and regular rhythm. Heart sounds: Normal heart sounds. No murmur. Pulmonary:      Effort: Pulmonary effort is normal. No respiratory distress. Breath sounds: Normal breath sounds. Abdominal:      General: Abdomen is flat. Bowel sounds are normal.      Palpations: Abdomen is soft. Tenderness: There is no abdominal tenderness. Musculoskeletal:         General: No swelling or tenderness. Right lower leg: No edema. Left lower leg: No edema. Skin:     General: Skin is warm and dry. Capillary Refill: Capillary refill takes less than 2 seconds. Neurological:      Mental Status: He is alert. Comments: Alert, nods yes to questioning, can follow commands, spasticity noted to all 4 extremities         DIAGNOSTIC RESULTS           RADIOLOGY:   Non-plain film images such as CT, Ultrasound and MRI are read by the radiologist. Plain radiographic images are visualized and preliminarily interpreted by the emergency physician with the below findings:        Interpretation per the Radiologist below, if available at the time of this note:    CT CHEST PULMONARY EMBOLISM W CONTRAST   Preliminary Result   1. No evidence of pulmonary embolic disease. 2. Patchy ground-glass opacification, right greater than left. Findings may   be related to pulmonary edema but infiltrate including multifocal pneumonia   is not excluded. 3. Atherosclerotic calcification in the aorta and coronary circulation. 4. Large hiatal hernia. Distended thick-walled esophagus with fluid. Findings may be related to reflux and reflux esophagitis. Consider referral   to gastroenterology for further evaluation.          CT HEAD WO CONTRAST   Final Result   No acute intracranial abnormality.       Large right parieto-occipital scalp hematoma               LABS:  Labs Reviewed   CBC WITH AUTO DIFFERENTIAL - Abnormal; Notable for the following components:       Result Value    WBC 20.6 (*)     RBC 3.22 (*)     Hemoglobin 9.3 (*)     Hematocrit 28.9 (*)     RDW 19.1 (*)     Platelets 667 (*)     Neutrophils Absolute 19.2 (*)     Lymphocytes Absolute 0.8 (*)     Anisocytosis 1+ (*)     All other components within normal limits    Narrative:     Performed at:  [facility] [address], [place]   Phone [phone]   BASIC METABOLIC PANEL W/ REFLEX TO MG FOR LOW K - Abnormal; Notable for the following components:    Sodium 132 (*)     Chloride 97 (*)     Glucose 115 (*)     BUN 38 (*)     All other components within normal limits    Narrative:     Performed at:  [facility] [address], [facility] [employer]   Phone [phone]   BRAIN NATRIURETIC PEPTIDE - Abnormal; Notable for the following components:    Pro- (*)     All other components within normal limits    Narrative:     Performed at:  [facility] [address], [facility] [employer]   Phone [phone]   TROPONIN    Narrative:     Performed at:  [facility] [address]  [place], [facility] [place]   Phone [phone]   LACTATE, SEPSIS   LACTATE, SEPSIS       EMERGENCY DEPARTMENT COURSE and DIFFERENTIAL DIAGNOSIS/MDM:   Vitals:    Vitals:    03/17/21 1138 03/17/21 1327 03/17/21 1400   BP: (!) 115/58 134/66 (!) 134/52   Pulse: 87 81 91   Resp: (!) 32 24    Temp: 96 °F (35.6 °C)     TempSrc: Temporal     SpO2: 95% 94% 95%   Weight: 170 lb (77.1 kg)     Height: 5' 6\" (1.676 m)         Medications   cefepime (MAXIPIME) 2000 mg IVPB minibag (has no administration in time range)   vancomycin (VANCOCIN) 1,250 mg in dextrose 5 % 250 mL IVPB (has no administration in time range)   iopamidol (ISOVUE-370) 76 % injection 75 mL (75 mLs Intravenous Given 3/17/21 1447)     SEP-1 CORE MEASURE DATA    Classification: sepsis    Amount of fluids ordered: less than 30mL/kg because of concomitant acute pulmonary edema    Time at which sepsis was identified: 1245    Broad-spectrum antibiotics chosen: based on sepsis order-set for a suspected source of: Pulmonary - Nosocomial    Repeat lactate level: pending    On reassessment after fluid resuscitation:   pending, to be completed by inpatient team      Course and MDM:  Patient is 80-year-old male presenting from nursing facility for hypoxic episode. On arrival to the emergency room he does not appear acutely hypoxic though he does have mild tachypnea. History and exam is limited secondary to MRDD and spastic paraplegia. I did have concern for PE as he is not on anticoagulation therapy in the setting of a recent GI bleed. CT chest imaging today is not showing any large clot however there is extensive pneumonia change noted. Does appear that he has had both Covid vaccines per [employer] documentation. Does meet sepsis criteria at this time but is not hypotensive. Broad-spectrum antibiotics were started as above. He is full code and will admit to hospitalist for further treatment. The total Critical Care time is 40 minutes which excludes separately billable procedures. PROCEDURES:  Unless otherwise noted below, none     Procedures    FINAL IMPRESSION      1. Pneumonia due to organism    2. Hypoxia          DISPOSITION/PLAN   DISPOSITION        PATIENT REFERRED TO:  No follow-up provider specified. DISCHARGE MEDICATIONS:  New Prescriptions    No medications on file     Controlled Substances Monitoring:     No flowsheet data found.     (Please note that portions of this note were completed with a voice recognition program.  Efforts were made to edit the dictations but occasionally words are mis-transcribed.)    Melissa Gil Kandi Wade MD (electronically signed)  Attending Emergency Physician         Mary Lee MD  03/17/21 3602

## 2021-03-17 NOTE — ED NOTES
Pharmacy Medication History Note      List of current medications patient is taking is complete. Source of information: Zia Acevedo    Changes made to medication list:  Medications flagged for removal (include reason, ex. noncompliance):  none    Medications removed (include reason, ex. therapy complete or physician discontinued):  Duoneb- therapy completed    Medications added/doses adjusted:  none    Other notes (ex. Recent course of antibiotics, Coumadin dosing):  Denies use of other OTC or herbal medications. Last dose times updated. Kai Laguna, PharmD  ED Pharmacist [id number]  3/17/2021    No current facility-administered medications on file prior to encounter. Current Outpatient Medications on File Prior to Encounter   Medication Sig Dispense Refill    promethazine (PHENERGAN) 12.5 MG tablet Take 12.5 mg by mouth every 6 hours as needed for Nausea      LORazepam (ATIVAN) 0.5 MG tablet Take 1 tablet by mouth every 12 hours as needed for Anxiety for up to 30 days.  For seizures up to 7 days 10 tablet 0    ferrous sulfate 220 (44 Fe) MG/5ML solution Take 220 mg by mouth 2 times daily      pantoprazole (PROTONIX) 40 MG tablet Take 1 tablet by mouth 2 times daily (before meals) 180 tablet 1    bisacodyl (DULCOLAX) 5 MG EC tablet Take 10 mg by mouth as needed (No bowel movement in 48 hours)      nystatin (MYCOSTATIN) 083591 UNIT/GM cream Apply topically 2 times daily as needed (yeast infections)      senna (SENOKOT) 8.6 MG tablet 2 tablets by Per G Tube route daily       Cholecalciferol (VITAMIN D3) 2000 units CAPS 2,000 Units by Per G Tube route daily       zinc oxide 20 % ointment Apply topically 2 times daily       bisacodyl (DULCOLAX) 10 MG suppository Place 10 mg rectally daily as needed If no BM in 48 hours      baclofen (LIORESAL) 10 MG tablet 10 mg by Per G Tube route 3 times daily       lamoTRIgine (LAMICTAL) 150 MG tablet 400 mg by Per J Tube route 2 times daily       QUEtiapine (SEROQUEL) 100 MG tablet Take 100 mg by mouth nightly Give 50 mg twice daily at 5:30 am/5:30 pm. Give 100 mg at bedtime. acetaminophen (TYLENOL) 160 MG/5ML solution 20.3 mLs by Per G Tube route every 4 hours as needed for Fever or Pain 473 mL 3    levETIRAcetam (KEPPRA) 100 MG/ML solution 15 mLs by Per G Tube route 2 times daily 1 Bottle 3    lactulose (CHRONULAC) 10 GM/15ML solution 20 g by Per G Tube route daily       doxazosin (CARDURA) 4 MG tablet 4 mg by Per G Tube route daily       Multiple Vitamins-Minerals (CERTA-KENTON) liquid 30 mLs by Per G Tube route daily       potassium chloride (KAYCIEL) 20 MEQ/15ML (10%) solution 10 mEq by Per G Tube route 2 times daily       ascorbic acid (VITAMIN C) 500 MG tablet 500 mg by Per G Tube route 2 times daily. QUEtiapine (SEROQUEL) 100 MG tablet 50 mg by Per G Tube route 2 times daily Give 50 mg twice daily at 5:30 am/5:30 pm. Give 100 mg at bedtime. escitalopram (LEXAPRO) 10 MG tablet 10 mg by Per G Tube route daily. chlorhexidine (PERIDEX) 0.12 % solution Take 7.5 mLs by mouth 2 times daily. diazePAM (DIASTAT) 10 MG GEL Place 20 mg rectally once as needed (seizures lasting over 5 minutes) for up to 1 dose.  15 each 3    [DISCONTINUED] ipratropium-albuterol (DUONEB) 0.5-2.5 (3) MG/3ML SOLN nebulizer solution Inhale 1 vial into the lungs every 6 hours as needed

## 2021-03-18 PROBLEM — J96.01 ACUTE RESPIRATORY FAILURE WITH HYPOXIA (HCC): Status: ACTIVE | Noted: 2021-03-18

## 2021-03-18 PROBLEM — K92.2 GI BLEED: Status: RESOLVED | Noted: 2021-01-31 | Resolved: 2021-03-18

## 2021-03-18 PROBLEM — A41.9 SEPSIS (HCC): Status: ACTIVE | Noted: 2021-03-18

## 2021-03-18 PROBLEM — K92.2 UPPER GI HEMORRHAGE: Status: RESOLVED | Noted: 2020-08-21 | Resolved: 2021-03-18

## 2021-03-18 LAB
ANION GAP SERPL CALCULATED.3IONS-SCNC: 9 MMOL/L (ref 3–16)
BUN BLDV-MCNC: 42 MG/DL (ref 7–20)
CALCIUM SERPL-MCNC: 9.1 MG/DL (ref 8.3–10.6)
CHLORIDE BLD-SCNC: 102 MMOL/L (ref 99–110)
CO2: 24 MMOL/L (ref 21–32)
CREAT SERPL-MCNC: 0.6 MG/DL (ref 0.8–1.3)
GFR AFRICAN AMERICAN: >60
GFR NON-AFRICAN AMERICAN: >60
GLUCOSE BLD-MCNC: 108 MG/DL (ref 70–99)
HCT VFR BLD CALC: 26.1 % (ref 40.5–52.5)
HEMOGLOBIN: 8.4 G/DL (ref 13.5–17.5)
MCH RBC QN AUTO: 29.3 PG (ref 26–34)
MCHC RBC AUTO-ENTMCNC: 32.1 G/DL (ref 31–36)
MCV RBC AUTO: 91.1 FL (ref 80–100)
PDW BLD-RTO: 19.1 % (ref 12.4–15.4)
PLATELET # BLD: 447 K/UL (ref 135–450)
PMV BLD AUTO: 7.2 FL (ref 5–10.5)
POTASSIUM SERPL-SCNC: 4.3 MMOL/L (ref 3.5–5.1)
PROCALCITONIN: 0.45 NG/ML (ref 0–0.15)
RBC # BLD: 2.87 M/UL (ref 4.2–5.9)
SARS-COV-2, NAAT: NOT DETECTED
SODIUM BLD-SCNC: 135 MMOL/L (ref 136–145)
WBC # BLD: 14.9 K/UL (ref 4–11)

## 2021-03-18 PROCEDURE — 94761 N-INVAS EAR/PLS OXIMETRY MLT: CPT

## 2021-03-18 PROCEDURE — 1200000000 HC SEMI PRIVATE

## 2021-03-18 PROCEDURE — 6370000000 HC RX 637 (ALT 250 FOR IP): Performed by: INTERNAL MEDICINE

## 2021-03-18 PROCEDURE — 36415 COLL VENOUS BLD VENIPUNCTURE: CPT

## 2021-03-18 PROCEDURE — 80048 BASIC METABOLIC PNL TOTAL CA: CPT

## 2021-03-18 PROCEDURE — 2580000003 HC RX 258: Performed by: INTERNAL MEDICINE

## 2021-03-18 PROCEDURE — 94640 AIRWAY INHALATION TREATMENT: CPT

## 2021-03-18 PROCEDURE — 6360000002 HC RX W HCPCS: Performed by: INTERNAL MEDICINE

## 2021-03-18 PROCEDURE — 87635 SARS-COV-2 COVID-19 AMP PRB: CPT

## 2021-03-18 PROCEDURE — 85027 COMPLETE CBC AUTOMATED: CPT

## 2021-03-18 PROCEDURE — 84145 PROCALCITONIN (PCT): CPT

## 2021-03-18 RX ADMIN — IPRATROPIUM BROMIDE AND ALBUTEROL SULFATE 1 AMPULE: .5; 3 SOLUTION RESPIRATORY (INHALATION) at 14:30

## 2021-03-18 RX ADMIN — OXYCODONE HYDROCHLORIDE AND ACETAMINOPHEN 500 MG: 500 TABLET ORAL at 21:43

## 2021-03-18 RX ADMIN — QUETIAPINE FUMARATE 50 MG: 25 TABLET ORAL at 21:44

## 2021-03-18 RX ADMIN — BACLOFEN 10 MG: 10 TABLET ORAL at 14:52

## 2021-03-18 RX ADMIN — BACLOFEN 10 MG: 10 TABLET ORAL at 21:43

## 2021-03-18 RX ADMIN — ESCITALOPRAM OXALATE 10 MG: 10 TABLET ORAL at 14:51

## 2021-03-18 RX ADMIN — SENNOSIDES 17.2 MG: 8.6 TABLET, FILM COATED ORAL at 14:51

## 2021-03-18 RX ADMIN — IPRATROPIUM BROMIDE AND ALBUTEROL SULFATE 1 AMPULE: .5; 3 SOLUTION RESPIRATORY (INHALATION) at 21:02

## 2021-03-18 RX ADMIN — QUETIAPINE FUMARATE 50 MG: 25 TABLET ORAL at 14:51

## 2021-03-18 RX ADMIN — PIPERACILLIN AND TAZOBACTAM 3375 MG: 3; .375 INJECTION, POWDER, LYOPHILIZED, FOR SOLUTION INTRAVENOUS at 21:25

## 2021-03-18 RX ADMIN — PIPERACILLIN AND TAZOBACTAM 3375 MG: 3; .375 INJECTION, POWDER, LYOPHILIZED, FOR SOLUTION INTRAVENOUS at 14:50

## 2021-03-18 RX ADMIN — LACTULOSE 20 G: 20 SOLUTION ORAL at 14:50

## 2021-03-18 RX ADMIN — PANTOPRAZOLE SODIUM 40 MG: 40 TABLET, DELAYED RELEASE ORAL at 14:52

## 2021-03-18 RX ADMIN — LAMOTRIGINE 400 MG: 100 TABLET ORAL at 14:51

## 2021-03-18 RX ADMIN — Medication: at 00:06

## 2021-03-18 RX ADMIN — PIPERACILLIN AND TAZOBACTAM 3375 MG: 3; .375 INJECTION, POWDER, LYOPHILIZED, FOR SOLUTION INTRAVENOUS at 05:39

## 2021-03-18 RX ADMIN — NYSTATIN: 100000 CREAM TOPICAL at 21:44

## 2021-03-18 RX ADMIN — Medication: at 16:38

## 2021-03-18 RX ADMIN — MINERAL SUPPLEMENT IRON 300 MG / 5 ML STRENGTH LIQUID 100 PER BOX UNFLAVORED 300 MG: at 21:44

## 2021-03-18 RX ADMIN — CHLORHEXIDINE GLUCONATE 7.5 ML: 1.2 RINSE ORAL at 09:30

## 2021-03-18 RX ADMIN — Medication: at 15:28

## 2021-03-18 RX ADMIN — LORAZEPAM 0.5 MG: 0.5 TABLET ORAL at 14:51

## 2021-03-18 RX ADMIN — QUETIAPINE FUMARATE 100 MG: 100 TABLET ORAL at 21:43

## 2021-03-18 RX ADMIN — NYSTATIN: 100000 CREAM TOPICAL at 15:29

## 2021-03-18 RX ADMIN — OXYCODONE HYDROCHLORIDE AND ACETAMINOPHEN 500 MG: 500 TABLET ORAL at 14:51

## 2021-03-18 RX ADMIN — IPRATROPIUM BROMIDE AND ALBUTEROL SULFATE 1 AMPULE: .5; 3 SOLUTION RESPIRATORY (INHALATION) at 09:43

## 2021-03-18 RX ADMIN — MINERAL SUPPLEMENT IRON 300 MG / 5 ML STRENGTH LIQUID 100 PER BOX UNFLAVORED 300 MG: at 14:51

## 2021-03-18 RX ADMIN — LAMOTRIGINE 400 MG: 100 TABLET ORAL at 21:43

## 2021-03-18 RX ADMIN — Medication 2000 UNITS: at 14:51

## 2021-03-18 RX ADMIN — LEVETIRACETAM 500 MG: 5 INJECTION INTRAVENOUS at 00:01

## 2021-03-18 RX ADMIN — LEVETIRACETAM 500 MG: 5 INJECTION INTRAVENOUS at 16:50

## 2021-03-18 RX ADMIN — DOXAZOSIN 4 MG: 4 TABLET ORAL at 14:52

## 2021-03-18 RX ADMIN — ENOXAPARIN SODIUM 40 MG: 40 INJECTION SUBCUTANEOUS at 14:49

## 2021-03-18 NOTE — H&P
[place] [address]                     [facility], [address]                              HISTORY AND PHYSICAL    PATIENT NAME: Giana Moore                     :        1950  MED REC NO:   [id number]                          ROOM:       0238  ACCOUNT NO:   [de-identified]                           ADMIT DATE: 2021  PROVIDER:     Hua Chi MD    HISTORY OF PRESENT ILLNESS:  The patient is a 70-year-old intellectually  disabled man with nuclear sclerosis and cerebral palsy who has been an  MRDD lifelong came to the emergency room with history of elevated  temperature, increased respiratory distress, somewhat hypotensive from  [address] [place] [address]. The patient has been admitted to this hospital multiple,  multiple times over the last 12 months. The patient is unable to  provide any history. He does not verbalize. PAST MEDICAL HISTORY:  Pertinent for chronic recurrent seizure disorder,  nuclear sclerosis, severe intellectual disability, spastic hemiplegia,  drug-induced hepatitis, urinary incontinence, neurogenic bladder,  osteoarthritis, gastroesophageal reflux disease, periodontal disease,  tenia pedis macrocytic anemia, peripheral vascular disease, recurrent  aspiration, history of pneumonia and urinary tract infection, nuclear  sclerosis, anhydrosis, primary optic atrophy, impulse control disorder,  bipolar affective disorder, peripheral vascular disease, bilateral  cataract, movement disorder, hard of hearing. PAST SURGICAL HISTORY:  Pertinent for esophageal dilation, EGD and  colonoscopy, abdominal surgery, multiple endoscopies, cystoscopies,  abdominal exploratory surgery, sigmoidoscopy, colonoscopy, upper GI  endoscopy.     MEDICATIONS:  The patient is on ascorbic acid, baclofen, cefixime,  Peridex, Cardura, Lovenox, Lexapro, ferrous sulfate, DuoNeb, Chronulac,  lactulose, Lamictal, Keppra, Mycostatin cream, pantoprazole, quetiapine,  Senokot, vitamin D, zinc oxide paste. ALLERGIES:  The patient is allergic to [facility] and ERTAPENEM. SOCIAL HISTORY:  Single man, never , never had any children. Never had any education. Never had any occupation. No history of  tobacco abuse. No history of alcohol abuse. No history of drug and  substance abuse. REVIEW OF SYSTEMS:  Unobtainable. The patient is aphasic. The patient  does not respond to simple commands. The patient is in a chronic  advanced age of mental retardation. Has severe oropharyngeal dysphagia  being fed with a gastrostomy tube. There is nothing to indicate any  recent angina pectoris. There is obvious gross resting shortness of  breath. There is no orthopnea. No paroxysmal nocturnal dyspnea. There  may be some nonspecified abdominal pain. He has had prior history of  coffee-ground emesis but presently not active. He is nonambulatory. He  is chronically contracted. He is also incontinent to urine and feces. He is total care. He does have recurrent bouts of seizure. PHYSICAL EXAMINATION:  GENERAL:  A lethargic 20-year-old, undernourished, intellectually  disabled, chronically contracted man. VITAL SIGNS:  His temperature was 96, blood pressure is 115/58,  respirations are 32,heart rate is 87, O2 sat is 92% on 3 liters. HEENT:  Oral mucosa dry. SKIN:  Warm and dry. NECK:  Neck is supple. Faint carotid bruit. CHEST:  Lungs had diminished breath sounds bilaterally with extensive  crackles, few wheezes. CARDIOVASCULAR:  Heart is regular rate and rhythm. S1, S2, somewhat  tachycardic. No gallop rhythm. ABDOMEN:  Abdomen is soft. There is a jejunostomy tube in place. EXTREMITIES:  Contracted and paralyzed. NEUROLOGIC:  The patient cannot participate in active sensorimotor  evaluation. Babinski is present on the right hand side. LABORATORY DATA:  Lab evaluation shows sodium 135, potassium 4.3,  chloride 102, CO2 24, BUN 42, creatinine 0.6.   White blood cell count  20.6, hemoglobin/hematocrit 9.3 and 28.9, platelet count is 265. Blood  cultures have been drawn. Urine cultures are also ordered. The patient  had a CT scan of the chest with pulmonary vasculature that shows no  evidence of acute embolism. The patient had patchy ground-glass  opacification, right greater than left consistent with pulmonary edema  and multifocal pneumonia. The patient has a large hiatal hernia,  distended, thick-walled esophagus with fluids, may be related to reflux  esophagitis. The patient needed gastroenterology evaluation. The  patient also had a CT scan of the head which I wonder why was it done to  begin with in this man with advanced neurologic disease. It showed no  acute intracranial abnormality. Large right parietal occipital scalp  hematoma. ASSESSMENT:  Sepsis, acute respiratory failure, acute aspiration  pneumonia, severe oropharyngeal dysphagia, nuclear sclerosis,  complicated seizure disorder, severe intellectual disability, cerebral  palsy. PLAN:  Get him admitted. Treat him with IV hydration, IV Zosyn,  nebulized aerosol, supplemental oxygen. While I am dictating this, I  also had a very rewarding communication with his brother. He agrees  that his brother has been intellectually disabled lifelong, has never  had any quality of life and at this time, he just wants to keep him  comfortable without any aggressive heroics. He is in fact also  welcoming a hospice consultation which will be arranged. As always, it  is not only a pleasure but matter of privilege to take care of your  patients at [facility], [facility].         Kaylah Redd MD    D: 03/18/2021 8:25:22       T: 03/18/2021 8:34:31     [id number]  Job#: [id number]     Doc#: [id number]    CC:  MD Keith Sr

## 2021-03-18 NOTE — PROGRESS NOTES
Comprehensive Nutrition Assessment    Type and Reason for Visit:  Positive Nutrition Screen(Home tube feeding)    Nutrition Recommendations/Plan:   1. Recommend 2 Gagan HN (fluid restricted formula) at goal rate 40 mL per hour to provide 960 mL total volume, 1920 calories, 80 grams protein, 672 mL free water. 2. Recommend water flush 30 mL q 4 hours for tube maintenance. 3. It is not recommended to check residuals with a J-tube. The jejunum does not have a reservoir like the stomach and checking residuals through the small J-tube increases risk for occlusion. To assess intolerance check for abdominal distention. Nutrition Assessment:  Pt's nutrition status is stable upon admission as tube feeds have been meeting nutrition needs prior to admission. Pt typically receives Nutren 2.0 at 65 mL per hour x 15 hours via J tube. Weight appears stable overall per hx in EMR. Recommend 2 Gagan HN during admission which is hospital equivalent to Nutren 2.0. Malnutrition Assessment:  Malnutrition Status:  No malnutrition      Estimated Daily Nutrient Needs:  Energy (kcal):  5560-7072; Weight Used for Energy Requirements:  Current(73 kg)     Protein (g):  77-97 grams; Weight Used for Protein Requirements:  Ideal(64.5 kg; 1.2-1.5 grams per kg)        Fluid (ml/day):   ; Method Used for Fluid Requirements:  1 ml/kcal      Nutrition Related Findings:  Disoriented x 4. No edema noted. Na+ 135. Normal saline at 50 mL/hr. Wounds:  None       Current Nutrition Therapies:    Diet NPO Effective Now    Anthropometric Measures:  · Height: 5' 6\" (167.6 cm)  · Current Body Weight: 161 lb (73 kg)   · Ideal Body Weight: 142 lbs  · BMI: 26  · BMI Categories: Overweight (BMI 25.0-29. 9)       Nutrition Diagnosis:   · Inadequate oral intake related to inadequate protein-energy intake as evidenced by NPO or clear liquid status due to medical condition, nutrition support - enteral nutrition      Nutrition Interventions:   Food and/or Nutrient Delivery:  Start Tube Feeding  Nutrition Education/Counseling:  Education not indicated   Coordination of Nutrition Care:  Continue to monitor while inpatient    Goals:  Pt will tolerate EN at goal       Nutrition Monitoring and Evaluation:   Behavioral-Environmental Outcomes:  None Identified   Food/Nutrient Intake Outcomes:  Enteral Nutrition Intake/Tolerance  Physical Signs/Symptoms Outcomes:  Biochemical Data, GI Status     Discharge Planning:    Enteral Nutrition     Electronically signed by Ryan Mancini RD, LD on 3/18/21 at 11:35 AM EDT    Contact: [phone]

## 2021-03-18 NOTE — PROGRESS NOTES
Patient Active Problem List   Diagnosis    Altered mental status    [facility]    Seizure disorder ([facility]    Nuclear sclerosis    Anemia, chronic disease    Partial epilepsy with impairment of consciousness, intractable ([address]    Mental retardation    Malfunction of gastrostomy tube ([facility]    Hypoxemia    Hypotension    Recurrent seizures ([facility]    Acute aspiration pneumonia ([facility] [address] [facility]    Sepsis ([facility] [address] [facility]   H&P dictated

## 2021-03-18 NOTE — CARE COORDINATION
CM spoke with brother and POA at bedside and agree to referral sent to [facility].  TAWNYA faxed to [address]- referral order, demographics, progress note and H& Fredo Fernández RN, BSN  [phone]

## 2021-03-18 NOTE — CONSULTS
Gastroenterology Consult Note        Patient: Power Navarro  : 1950  Acct#:      Date:  3/18/2021    Subjective:       History of Present Illness  Patient is a 79 y.o.  male admitted with Acute aspiration pneumonia ([address] [J69.0] who is seen in consult for clogged J-tube. H/o mental disability and lives at an F. He is unable to provide history. He has a J-tube in place. H/o gini-en-y jejunojejunostomy. He has been seen by our group several times in the past for hematemesis and ulcerative esophagitis. He was seen most recently in February of this year. EGD with large hiatal hernia and no bleeding lesions. He was brought to the ER from [facility] for hypoxia. He was diagnosed with pneumonia felt to be from aspiration. Brother is at bedside and reports that he has decided on comfort care and hospice. Per RN, the J-tube is flushing fine at bedside.     Past Medical History:   Diagnosis Date    Anemia, macrocytic     Anhidrosis     Anhidrosis     Bilateral cataracts     Bipolar affective (HCC)     Blood circulation, collateral     unspecified PVD    Cataract     Clostridium difficile diarrhea 3/7/15    Depression     Dermatophytosis     Drug induced hepatitis     GERD (gastroesophageal reflux disease)     Hard of hearing     Impulse control disorder     Liver disease     drug induced    Mental handicap     Movement disorder     Neurogenic bladder     Neuromuscular disorder (HCC)     spastic hemiplegia, MDS,neurogenic bladder    NS (nuclear sclerosis)     Nuclear sclerosis     Osteoarthritis     Periodontal disease     Pneumonia 2013    Primary optic atrophy     Primary optic atrophy     PVD (peripheral vascular disease) ([address]     Seizure ([address]     last seizure 19    Spastic hemiplegia     Tinea pedis     Unspecified diseases of blood and blood-forming organs     anemia    Urinary incontinence       Past Surgical History:   Procedure Laterality Date    ABDOMEN SURGERY      can see scar/details unknown, feeding tube    ABDOMINAL EXPLORATION SURGERY  9-28-14    LAPAROTOMY EXPLORATORY, ERIKA-EN-Y TUBE JEJUNOSTOMY, SMALL BOWEL RESECTION                                              COLONOSCOPY      COLONOSCOPY N/A 6/4/2019    COLONOSCOPY WITH BIOPSY performed by Precious Hugo MD at [facility]  6/25/14    with dilitation    DILATATION, ESOPHAGUS      ENDOSCOPY, COLON, DIAGNOSTIC      OTHER SURGICAL HISTORY  4-30-13    JEJUNOSTOMY TUBE INSERTION    SIGMOIDOSCOPY N/A 5/2/2019    SIGMOIDOSCOPY DIAGNOSTIC FLEXIBLE performed by Precious Hugo MD at [facility] N/A 4/21/2020    REPLACEMENT, JEJUNOSTOMY TUBE performed by Precious Hugo MD at [address] [place] [address]  1/24/13    PEG placement    UPPER GASTROINTESTINAL ENDOSCOPY  4/26/13    UPPER GASTROINTESTINAL ENDOSCOPY N/A 10/11/2019    EGD DILATION BALLOON performed by Precious Hugo MD at [address] [place] [address] N/A 8/22/2020    EGD BIOPSY performed by Dayan Hidalgo MD at [address] [place] [address] N/A 2/17/2021    EGD DIAGNOSTIC ONLY performed by Precious Hugo MD at [address] [place] [address]      Past Endoscopic History  EGD with Dr Jose Necessary 2/2021  IMPRESSION : Narrow caliber esophagus  Benign distal esophageal ring  12cm hiatal hernia  distal esophageal diverticulum. .  The esophagus was dilated by the endoscope passage  Otherwise normal EGD. no source of potential GI  bleeding seen. Admission Meds  No current facility-administered medications on file prior to encounter.       Current Outpatient Medications on File Prior to Encounter   Medication Sig Dispense Refill    promethazine (PHENERGAN) 12.5 MG tablet Take 12.5 mg by mouth every 6 hours as needed for Nausea      LORazepam (ATIVAN) 0.5 MG tablet Take 1 tablet by mouth every 12 hours as needed for Anxiety for up to 30 days. For seizures up to 7 days 10 tablet 0    ferrous sulfate 220 (44 Fe) MG/5ML solution Take 220 mg by mouth 2 times daily      pantoprazole (PROTONIX) 40 MG tablet Take 1 tablet by mouth 2 times daily (before meals) 180 tablet 1    bisacodyl (DULCOLAX) 5 MG EC tablet Take 10 mg by mouth as needed (No bowel movement in 48 hours)      nystatin (MYCOSTATIN) 730017 UNIT/GM cream Apply topically 2 times daily as needed (yeast infections)      senna (SENOKOT) 8.6 MG tablet 2 tablets by Per G Tube route daily       Cholecalciferol (VITAMIN D3) 2000 units CAPS 2,000 Units by Per G Tube route daily       zinc oxide 20 % ointment Apply topically 2 times daily       bisacodyl (DULCOLAX) 10 MG suppository Place 10 mg rectally daily as needed If no BM in 48 hours      baclofen (LIORESAL) 10 MG tablet 10 mg by Per G Tube route 3 times daily       lamoTRIgine (LAMICTAL) 150 MG tablet 400 mg by Per J Tube route 2 times daily       QUEtiapine (SEROQUEL) 100 MG tablet Take 100 mg by mouth nightly Give 50 mg twice daily at 5:30 am/5:30 pm. Give 100 mg at bedtime.  acetaminophen (TYLENOL) 160 MG/5ML solution 20.3 mLs by Per G Tube route every 4 hours as needed for Fever or Pain 473 mL 3    levETIRAcetam (KEPPRA) 100 MG/ML solution 15 mLs by Per G Tube route 2 times daily 1 Bottle 3    lactulose (CHRONULAC) 10 GM/15ML solution 20 g by Per G Tube route daily       doxazosin (CARDURA) 4 MG tablet 4 mg by Per G Tube route daily       Multiple Vitamins-Minerals (CERTA-KENTON) liquid 30 mLs by Per G Tube route daily       potassium chloride (KAYCIEL) 20 MEQ/15ML (10%) solution 10 mEq by Per G Tube route 2 times daily       ascorbic acid (VITAMIN C) 500 MG tablet 500 mg by Per G Tube route 2 times daily.  QUEtiapine (SEROQUEL) 100 MG tablet 50 mg by Per G Tube route 2 times daily Give 50 mg twice daily at 5:30 am/5:30 pm. Give 100 mg at bedtime.       escitalopram (LEXAPRO) 10 MG tablet 10 mg by Per G free of esophagitis. It is narrowed diffusely. D/w pt's brother and he is planning comfort measures.   Will sign off       Adonis Hickman MD  [address]

## 2021-03-18 NOTE — PROGRESS NOTES
Shift assessment complete. VSS. Patient on 3L nasal cannula O2. No signs of distress. Family at bedside. Bed alarm on. Call light within reach. Video monitoring at bedside. No further needs at this time.

## 2021-03-18 NOTE — DISCHARGE INSTR - COC
Continuity of Care Form    Patient Name: Jeremy Olivo   :  1950  MRN:  [id number]    Admit date:  3/17/2021  Discharge date:  3/20/21    Code Status Order: [facility]   Advance Directives:   [facility] Documentation       Date/Time Healthcare Directive Type of Healthcare Directive Copy in [facility] [address] Agent's Name Healthcare Agent's Phone Number    21  Yes, patient has an advance directive for healthcare treatment  Durable power of  for health care  No, copy requested from family  Healthcare power of   Houseaarti Lewising  [phone]            Admitting Physician:  Romayne Dexter, MD  PCP: Rizwan Jensen    Discharging Nurse: ALBERTO [facility] Unit/Room#: 0VT-2509/2994-80  Discharging Unit Phone Number: [phone]    Emergency Contact:   Extended Emergency Contact Information  Primary Emergency Contact: Gavin Caldwell, [address] [place] [address] Phone: [phone]  Relation: Legal Guardian  Secondary Emergency Contact: Demian Escalante [address] Phone: [phone]  Relation: None    Past Surgical History:  Past Surgical History:   Procedure Laterality Date    ABDOMEN SURGERY      can see scar/details unknown, feeding tube    ABDOMINAL EXPLORATION SURGERY  14    LAPAROTOMY EXPLORATORY, ERIKA-EN-Y TUBE JEJUNOSTOMY, SMALL BOWEL RESECTION                                              COLONOSCOPY      COLONOSCOPY N/A 2019    COLONOSCOPY WITH BIOPSY performed by Ruslan Hernandez MD at [address] [place] [address]  14    with dilitation    DILATATION, ESOPHAGUS      ENDOSCOPY, COLON, DIAGNOSTIC      OTHER SURGICAL HISTORY  13    JEJUNOSTOMY TUBE INSERTION    SIGMOIDOSCOPY N/A 2019    SIGMOIDOSCOPY DIAGNOSTIC FLEXIBLE performed by Ruslan Hernandez MD at [address]  N/A 2020    REPLACEMENT, JEJUNOSTOMY TUBE performed by Neil Velasquez Jim Lang MD at [place] [address]  1/24/13    PEG placement    UPPER GASTROINTESTINAL ENDOSCOPY  4/26/13    UPPER GASTROINTESTINAL ENDOSCOPY N/A 10/11/2019    EGD DILATION BALLOON performed by Bonnie Vargas MD at [place] [address] 8/22/2020    EGD BIOPSY performed by Denise Aguilera MD at [place] [address] N/A 2/17/2021    EGD DIAGNOSTIC ONLY performed by Bonnie Vargas MD at [address] ENDOSCOPY       Immunization History:   Immunization History   Administered Date(s) Administered    Influenza Vaccine, unspecified formulation 10/02/2016    Influenza Virus Vaccine 10/16/2009, 09/17/2012, 11/30/2014    Influenza Whole 09/30/2013    Pneumococcal Conjugate 13-valent (Qkgidnc45) 10/02/2016    Pneumococcal Conjugate 7-valent (Doylene Yusuf) 11/15/2007, 12/02/2013       Active Problems:  Patient Active Problem List   Diagnosis Code    Altered mental status R41.82    Dehydration E86.0    Cerebral palsy ([address] G80.9    Seizure disorder ([address] G40.909    Nuclear sclerosis H25.10    Anemia, chronic disease D63.8    Partial epilepsy with impairment of consciousness, intractable ([address] G40.219    Mental retardation F79    Malfunction of gastrostomy tube ([address] K94.23    Hypoxemia R09.02    Hypotension I95.9    Recurrent seizures ([address] G40.909    Acute aspiration pneumonia ([address] J69.0    Sepsis ([address] A41.9    Acute respiratory failure with hypoxia ([address] J96.01       Isolation/Infection:   Isolation            No Isolation          Patient Infection Status       Infection Onset Added Last Indicated Last Indicated By Review Planned Expiration Resolved Resolved By    None active    Resolved    COVID-19 Rule Out 03/18/21 03/18/21 03/18/21 COVID-19, Rapid (Ordered)   03/18/21 Rule-Out Test Resulted    COVID-19 Rule Out 02/16/21 02/16/21 02/16/21 COVID-19 (Ordered)   02/17/21 Rule-Out Test Resulted    COVID-19 Rule Out 01/31/21 01/31/21 01/31/21 COVID-19 (Ordered)   02/01/21 Rule-Out Test Resulted    COVID-19 Rule Out 08/21/20 08/21/20 08/21/20 COVID-19 (Ordered)   08/22/20 Rule-Out Test Resulted            Nurse Assessment:  Last Vital Signs: BP (!) 169/59   Pulse 95   Temp 98.3 °F (36.8 °C) (Axillary)   Resp 18   Ht 5' 6\" (1.676 m)   Wt 161 lb (73 kg)   SpO2 92%   BMI 25.99 kg/m²     Last documented pain score (0-10 scale):    Last Weight:   Wt Readings from Last 1 Encounters:   03/18/21 161 lb (73 kg)     Mental Status:  alert    IV Access:  - None    Nursing Mobility/ADLs:  Walking   Dependent  Transfer  Dependent  Bathing  Dependent  Dressing  Dependent  Toileting  Dependent  Feeding  Dependent  Med Admin  Dependent  Med Delivery    all meds through Gtube    Wound Care Documentation and Therapy:        Elimination:  Continence: Bowel: No  Bladder: No  Urinary Catheter: None   Colostomy/Ileostomy/Ileal Conduit: No       Date of Last BM: ***    Intake/Output Summary (Last 24 hours) at 3/18/2021 1529  Last data filed at 3/18/2021 1026  Gross per 24 hour   Intake 1030 ml   Output 0 ml   Net 1030 ml     I/O last 3 completed shifts: In: 1030 [NG/GT:30; IV Piggyback:1000]  Out: 0     Safety Concerns:     History of Seizures    Impairments/Disabilities:      Contractures - Left arm    Nutrition Therapy:  Current Nutrition Therapy:   - Tube Feedings:  2cal     Routes of Feeding: Gastrostomy Tube  Liquids: NPO  Daily Fluid Restriction: no  Last Modified Barium Swallow with Video (Video Swallowing Test): not done    Treatments at the Time of Hospital Discharge:   Respiratory Treatments: see orders  Oxygen Therapy:  is not on home oxygen therapy.   Ventilator:    - No ventilator support    Rehab Therapies: na  Weight Bearing Status/Restrictions: No weight bearing restirctions  Other Medical Equipment (for information only, NOT a DME order):  wheelchair  Other Treatments: na    Patient's personal belongings (please select all that are sent with patient):  bipap machine    RN SIGNATURE:  Electronically signed by Derik Garcia RN on 3/20/21 at 8:26 AM EDT    CASE MANAGEMENT/SOCIAL WORK SECTION    Inpatient Status Date: 3/17/21    Readmission Risk Assessment Score:  Readmission Risk              Risk of Unplanned Readmission:        34           Discharging to Facility/ [address]  [address]  Phone [phone]  [phone]    / signature: Jb Pascual RN, BSN  [phone]       PHYSICIAN SECTION    Prognosis: Poor    Condition at Discharge: Stable    Rehab Potential (if transferring to Rehab): Poor    Recommended Labs or Other Treatments After Discharge: Patient is DNR CC , frequent , recurrent hospitalization in this patient is only going to be detrimental to his well being and comfort     Physician Certification: I certify the above information and transfer of Sandra Schofield  is necessary for the continuing treatment of the diagnosis listed and that he requires Intermediate/Mental Retardation/Developmental Disabilities Care for greater 30 days.      Update Admission H&P: No change in H&P    PHYSICIAN SIGNATURE:  Electronically signed by Vicente Chandler MD on 3/20/21 at 11:32 AM EDT

## 2021-03-18 NOTE — CARE COORDINATION
Discharge Planning Assessment  Discharge Planning Assessment  RN/SW discharge planner met with patient/ (and family member) to discuss reason for admission, current living situation, and potential needs at the time of discharge    Demographics/Insurance verified Yes    Current type of dwelling: LTC resident at [address]     Patient from ECF/SW confirmed with: Alexey Valera [phone]    Living arrangements:LTC    Level of function/Support: Dependent, MRDD    PCP:Xavier Christianson     Last Visit to PCP:Per facility     DME:using 3 liters O2 here none baseline     Active with any community resources/agencies/skilled home care:    Medication compliance issues:per facility     Financial issues that could impact healthcare:none         Tentative discharge plan:Return to [place] [address] and provided facilities of choice if transition to a skilled nursing facility is required at the time of discharge    N/A      Discussed with patient and/or family that on the day of discharge home tentative time of discharge will be between 10 AM and noon.     Transportation at the time of discharge:Transportation service    Treasure Cox RN, BSN  [phone]

## 2021-03-18 NOTE — PROGRESS NOTES
Routine vitals stable. Patient on 3L nasal canula. Scheduled medications given. No signs of distress. Brother at bedside. Tube feeding started at this time. Bed alarm on. Camera at bedside. Call light within reach. No further needs at this time.

## 2021-03-18 NOTE — PROGRESS NOTES
[facility]                                            Advanced Care Planning Note. Purpose of Encounter: Advanced care planning in light of advanced MRDD , Neurologic oropharyngeal dysphagia , recurrent aspiration , Failure to thrive   Parties In Attendance: Patient's brother and POA Enedina Cortez     Decisional Capacity: No  Subjective: Patient/family understand that this conversation is to address long term care goal  Objective: NO CPR , NO Mechanical Vent , No defibrillation, No Hemodialysis , may continue G tube already in Place   Goals of Care Determination: Patient/POA   Code Status: DNR CC   Time spent on Advanced care Plannin minutes   Advanced Care Planning Documents: Completed advanced directives on chart, Enedina Cortez the brother  is the POA.     Ana Booth MD  3/18/2021 8:13 AM

## 2021-03-18 NOTE — CARE COORDINATION
TAWNYA notified by Win Harrison RN that brother spoke to DON at [facility] and has decided not to have hospice consult at this time. CM spoke to brother and with Marco A Rivera NP at [facility] and he agrees with her if pt does not improve here then he may consider hospice consult. Or after pt returns to facility if he declines they can refer to their partner [facility]. TAWNYA notified Dr Chery Mary and Karissa Saravia with [facility].     Jb Pascual, RN, BSN  [phone]

## 2021-03-18 NOTE — PROGRESS NOTES
Patient up from the ED in stable condition. Brother at the bedside and states that he is the legal guardian and POA. Patients brief changed and patient repositioned. Fall precautions are in place and the call light is within reach. Will continue to monitor.

## 2021-03-18 NOTE — CARE COORDINATION
CM noted hospice consult and left vm for pt's brother and POA. Cm called Avel Goldberg at [facility] [phone] and updated her on pt status. She states that pt has a J tube not G- tube and she flushed it before sending him to the hospital with 40cc of water and it was working fine. They sent pt in d/t low oxygen level. Informed hospice consult was placed. Pt uses no oxygen baseline and is on 3 Liters here. TAWNYA ordered rapid Covid for when pt is ready to return to facility per facility request. CM notified RN test ordered and I called lab to have rapid kit sent to the floor.      Jude Tirado RN, BSN  [phone]

## 2021-03-18 NOTE — PROGRESS NOTES
4 Eyes Skin Assessment     NAME:  Miko Dominguez  YOB: 1950  MEDICAL RECORD NUMBER:  [id number]    The patient is being assess for  Admission    I agree that 2 RN's have performed a thorough Head to Toe Skin Assessment on the patient. ALL assessment sites listed below have been assessed. Areas assessed by both nurses:    Head, Face, Ears, Shoulders, Back, Chest, Arms, Elbows, Hands, Sacrum. Buttock, Coccyx, Ischium and Legs. Feet and Heels        Does the Patient have a Wound?  No noted wound(s)       Cheikh Prevention initiated:  Yes   Wound Care Orders initiated:  No    Pressure Injury (Stage 3,4, Unstageable, DTI, NWPT, and Complex wounds) if present place consult order under [de-identified] No    New and Established Ostomies if present place consult order under : No      Nurse 1 eSignature: Electronically signed by Antonia Nails RN on 3/18/21 at 5:41 AM EDT    **SHARE this note so that the co-signing nurse is able to place an eSignature**    Nurse 2 eSignature: Electronically signed by Jo Lowery RN on 3/18/21 at 5:59 AM EDT

## 2021-03-19 LAB
ANION GAP SERPL CALCULATED.3IONS-SCNC: 10 MMOL/L (ref 3–16)
BUN BLDV-MCNC: 32 MG/DL (ref 7–20)
CALCIUM SERPL-MCNC: 8.8 MG/DL (ref 8.3–10.6)
CHLORIDE BLD-SCNC: 106 MMOL/L (ref 99–110)
CO2: 23 MMOL/L (ref 21–32)
CREAT SERPL-MCNC: <0.5 MG/DL (ref 0.8–1.3)
GFR AFRICAN AMERICAN: >60
GFR NON-AFRICAN AMERICAN: >60
GLUCOSE BLD-MCNC: 121 MG/DL (ref 70–99)
HCT VFR BLD CALC: 23.4 % (ref 40.5–52.5)
HEMOGLOBIN: 7.3 G/DL (ref 13.5–17.5)
MCH RBC QN AUTO: 28.7 PG (ref 26–34)
MCHC RBC AUTO-ENTMCNC: 31.3 G/DL (ref 31–36)
MCV RBC AUTO: 91.7 FL (ref 80–100)
PDW BLD-RTO: 19.2 % (ref 12.4–15.4)
PLATELET # BLD: 433 K/UL (ref 135–450)
PMV BLD AUTO: 7.4 FL (ref 5–10.5)
POTASSIUM SERPL-SCNC: 4.3 MMOL/L (ref 3.5–5.1)
RBC # BLD: 2.55 M/UL (ref 4.2–5.9)
SODIUM BLD-SCNC: 139 MMOL/L (ref 136–145)
WBC # BLD: 8.3 K/UL (ref 4–11)

## 2021-03-19 PROCEDURE — 6370000000 HC RX 637 (ALT 250 FOR IP): Performed by: INTERNAL MEDICINE

## 2021-03-19 PROCEDURE — 80048 BASIC METABOLIC PNL TOTAL CA: CPT

## 2021-03-19 PROCEDURE — 6360000002 HC RX W HCPCS: Performed by: INTERNAL MEDICINE

## 2021-03-19 PROCEDURE — 94761 N-INVAS EAR/PLS OXIMETRY MLT: CPT

## 2021-03-19 PROCEDURE — 1200000000 HC SEMI PRIVATE

## 2021-03-19 PROCEDURE — 2580000003 HC RX 258: Performed by: INTERNAL MEDICINE

## 2021-03-19 PROCEDURE — 94640 AIRWAY INHALATION TREATMENT: CPT

## 2021-03-19 PROCEDURE — 6370000000 HC RX 637 (ALT 250 FOR IP): Performed by: NURSE PRACTITIONER

## 2021-03-19 PROCEDURE — 36415 COLL VENOUS BLD VENIPUNCTURE: CPT

## 2021-03-19 PROCEDURE — 85027 COMPLETE CBC AUTOMATED: CPT

## 2021-03-19 RX ORDER — LANSOPRAZOLE 30 MG/1
30 TABLET, ORALLY DISINTEGRATING, DELAYED RELEASE ORAL
Status: DISCONTINUED | OUTPATIENT
Start: 2021-03-19 | End: 2021-03-20 | Stop reason: HOSPADM

## 2021-03-19 RX ORDER — BISACODYL 10 MG
10 SUPPOSITORY, RECTAL RECTAL ONCE
Status: COMPLETED | OUTPATIENT
Start: 2021-03-19 | End: 2021-03-19

## 2021-03-19 RX ADMIN — IPRATROPIUM BROMIDE AND ALBUTEROL SULFATE 1 AMPULE: .5; 3 SOLUTION RESPIRATORY (INHALATION) at 16:12

## 2021-03-19 RX ADMIN — OXYCODONE HYDROCHLORIDE AND ACETAMINOPHEN 500 MG: 500 TABLET ORAL at 08:52

## 2021-03-19 RX ADMIN — IPRATROPIUM BROMIDE AND ALBUTEROL SULFATE 1 AMPULE: .5; 3 SOLUTION RESPIRATORY (INHALATION) at 08:22

## 2021-03-19 RX ADMIN — NYSTATIN: 100000 CREAM TOPICAL at 22:47

## 2021-03-19 RX ADMIN — BACLOFEN 10 MG: 10 TABLET ORAL at 08:52

## 2021-03-19 RX ADMIN — PIPERACILLIN AND TAZOBACTAM 3375 MG: 3; .375 INJECTION, POWDER, LYOPHILIZED, FOR SOLUTION INTRAVENOUS at 22:36

## 2021-03-19 RX ADMIN — LAMOTRIGINE 400 MG: 100 TABLET ORAL at 08:51

## 2021-03-19 RX ADMIN — BACLOFEN 10 MG: 10 TABLET ORAL at 14:24

## 2021-03-19 RX ADMIN — BACLOFEN 10 MG: 10 TABLET ORAL at 22:37

## 2021-03-19 RX ADMIN — MINERAL SUPPLEMENT IRON 300 MG / 5 ML STRENGTH LIQUID 100 PER BOX UNFLAVORED 300 MG: at 22:47

## 2021-03-19 RX ADMIN — SENNOSIDES 17.2 MG: 8.6 TABLET, FILM COATED ORAL at 08:52

## 2021-03-19 RX ADMIN — SODIUM CHLORIDE: 9 INJECTION, SOLUTION INTRAVENOUS at 09:02

## 2021-03-19 RX ADMIN — BISACODYL 10 MG: 10 SUPPOSITORY RECTAL at 14:24

## 2021-03-19 RX ADMIN — QUETIAPINE FUMARATE 50 MG: 25 TABLET ORAL at 22:37

## 2021-03-19 RX ADMIN — PIPERACILLIN AND TAZOBACTAM 3375 MG: 3; .375 INJECTION, POWDER, LYOPHILIZED, FOR SOLUTION INTRAVENOUS at 04:21

## 2021-03-19 RX ADMIN — LEVETIRACETAM 500 MG: 5 INJECTION INTRAVENOUS at 01:15

## 2021-03-19 RX ADMIN — PIPERACILLIN AND TAZOBACTAM 3375 MG: 3; .375 INJECTION, POWDER, LYOPHILIZED, FOR SOLUTION INTRAVENOUS at 12:46

## 2021-03-19 RX ADMIN — IPRATROPIUM BROMIDE AND ALBUTEROL SULFATE 1 AMPULE: .5; 3 SOLUTION RESPIRATORY (INHALATION) at 12:32

## 2021-03-19 RX ADMIN — NYSTATIN: 100000 CREAM TOPICAL at 08:53

## 2021-03-19 RX ADMIN — ESCITALOPRAM OXALATE 10 MG: 10 TABLET ORAL at 08:52

## 2021-03-19 RX ADMIN — OXYCODONE HYDROCHLORIDE AND ACETAMINOPHEN 500 MG: 500 TABLET ORAL at 22:38

## 2021-03-19 RX ADMIN — Medication: at 08:53

## 2021-03-19 RX ADMIN — LANSOPRAZOLE 30 MG: 30 TABLET, ORALLY DISINTEGRATING, DELAYED RELEASE ORAL at 17:07

## 2021-03-19 RX ADMIN — Medication 2000 UNITS: at 08:52

## 2021-03-19 RX ADMIN — CHLORHEXIDINE GLUCONATE 7.5 ML: 1.2 RINSE ORAL at 08:53

## 2021-03-19 RX ADMIN — LACTULOSE 20 G: 20 SOLUTION ORAL at 08:51

## 2021-03-19 RX ADMIN — DOXAZOSIN 4 MG: 4 TABLET ORAL at 08:52

## 2021-03-19 RX ADMIN — Medication: at 17:14

## 2021-03-19 RX ADMIN — ENOXAPARIN SODIUM 40 MG: 40 INJECTION SUBCUTANEOUS at 08:51

## 2021-03-19 RX ADMIN — LEVETIRACETAM 500 MG: 5 INJECTION INTRAVENOUS at 10:56

## 2021-03-19 RX ADMIN — IPRATROPIUM BROMIDE AND ALBUTEROL SULFATE 1 AMPULE: .5; 3 SOLUTION RESPIRATORY (INHALATION) at 19:36

## 2021-03-19 RX ADMIN — MINERAL SUPPLEMENT IRON 300 MG / 5 ML STRENGTH LIQUID 100 PER BOX UNFLAVORED 300 MG: at 08:51

## 2021-03-19 RX ADMIN — QUETIAPINE FUMARATE 50 MG: 25 TABLET ORAL at 08:52

## 2021-03-19 RX ADMIN — LAMOTRIGINE 400 MG: 100 TABLET ORAL at 22:43

## 2021-03-19 NOTE — PROGRESS NOTES
Department of Internal Medicine  General Internal Medicine   Progress Note      SUBJECTIVE: appears to be in no acute distress  But nursing staff reported some agitation earlier        History obtained from chart review and the patient's nursing staff   General ROS: positive for  - fatigue and malaise  negative for - chills, fever or night sweats  Psychological ROS: negative  Respiratory ROS: no cough, shortness of breath, or wheezing  Cardiovascular ROS: positive for - , dyspnea on exertion and shortness of breath  negative for - chest pain  Gastrointestinal ROS: no abdominal pain, change in bowel habits, or black or bloody stools    OBJECTIVE      Medications      Current Facility-Administered Medications: acetaminophen (TYLENOL) 160 MG/5ML solution 650 mg, 650 mg, Per G Tube, Q4H PRN  ascorbic acid (VITAMIN C) tablet 500 mg, 500 mg, Per G Tube, BID  baclofen (LIORESAL) tablet 10 mg, 10 mg, Per G Tube, TID  bisacodyl (DULCOLAX) suppository 10 mg, 10 mg, Rectal, Daily PRN  bisacodyl (DULCOLAX) EC tablet 10 mg, 10 mg, Oral, PRN  chlorhexidine (PERIDEX) 0.12 % solution 7.5 mL, 7.5 mL, Mouth/Throat, BID  Vitamin D (CHOLECALCIFEROL) tablet 2,000 Units, 2,000 Units, Per G Tube, Daily  doxazosin (CARDURA) tablet 4 mg, 4 mg, Per G Tube, Daily  escitalopram (LEXAPRO) tablet 10 mg, 10 mg, Per G Tube, Daily  ferrous sulfate 300 (60 Fe) MG/5ML syrup 300 mg, 300 mg, Oral, BID  lactulose (CHRONULAC) 10 GM/15ML solution 20 g, 20 g, Per G Tube, Daily  lamoTRIgine (LAMICTAL) tablet 400 mg, 400 mg, Per J Tube, BID  LORazepam (ATIVAN) tablet 0.5 mg, 0.5 mg, Oral, Q12H PRN  nystatin (MYCOSTATIN) cream, , Topical, BID  pantoprazole (PROTONIX) tablet 40 mg, 40 mg, Oral, BID AC  promethazine (PHENERGAN) tablet 12.5 mg, 12.5 mg, Oral, Q6H PRN  QUEtiapine (SEROQUEL) tablet 50 mg, 50 mg, Per G Tube, BID  QUEtiapine (SEROQUEL) tablet 100 mg, 100 mg, Oral, Nightly  senna (SENOKOT) tablet 17.2 mg, 2 tablet, Per G Tube, Daily  zinc oxide 40 % paste, , Topical, BID  piperacillin-tazobactam (ZOSYN) 3,375 mg in dextrose 5 % 50 mL IVPB extended infusion (mini-bag), 3,375 mg, Intravenous, Q8H  ipratropium-albuterol (DUONEB) nebulizer solution 1 ampule, 1 ampule, Inhalation, Q4H WA  0.9 % sodium chloride infusion, , Intravenous, Continuous  enoxaparin (LOVENOX) injection 40 mg, 40 mg, Subcutaneous, Daily  levetiracetam (KEPPRA) 500 mg/100 mL IVPB, 500 mg, Intravenous, Q12H    Physical      VITALS:  BP (!) 144/74   Pulse 81   Temp 97.9 °F (36.6 °C) (Axillary)   Resp 20   Ht 5' 6\" (1.676 m)   Wt 161 lb (73 kg)   SpO2 97%   BMI 25.99 kg/m²   TEMPERATURE:  Current - Temp: 97.9 °F (36.6 °C);  Max - Temp  Av.4 °F (36.9 °C)  Min: 97.9 °F (36.6 °C)  Max: 98.8 °F (37.1 °C)  RESPIRATIONS RANGE: Resp  Av.5  Min: 18  Max: 22  PULSE RANGE: Pulse  Av.3  Min: 80  Max: 95  BLOOD PRESSURE RANGE:  Systolic (88WIP), NSF:794 , Min:120 , WKR:655   ; Diastolic (66WVQ), RCR:79, Min:59, Max:77    PULSE OXIMETRY RANGE: SpO2  Av.4 %  Min: 92 %  Max: 97 %  24HR INTAKE/OUTPUT:      Intake/Output Summary (Last 24 hours) at 3/19/2021 5907  Last data filed at 3/19/2021 8594  Gross per 24 hour   Intake 2635 ml   Output 0 ml   Net 2635 ml     CONSTITUTIONAL:  fatigued, somnolent, uncooperative, distracted, severe distress and appears older than stated age  NECK:  supple, symmetrical, trachea midline, skin normal and no stridor  BACK:  symmetric  LUNGS:  Decreased BS taras crackles , few wheezes   CARDIOVASCULAR:  normal apical pulses, tachycardic with regular rhythm and normal S1 and S2  ABDOMEN:  Soft BS + non tender   MUSCULOSKELETAL:  Contracted      NEUROLOGIC:  Right hemiplegia   SKIN:  Warm and dry  and no bruising or bleeding    Data      No results found for: PHART, PO2ART, SAW2OBE, OMB1XOK, BEART, P2LDIPJB    Lab Results   Component Value Date     2021    K 4.3 2021    K 5.0 2021     2021    CO2 24 2021    BUN 42 03/18/2021    CREATININE 0.6 03/18/2021    GLUCOSE 108 03/18/2021    CALCIUM 9.1 03/18/2021     Lab Results   Component Value Date    WBC 14.9 03/18/2021    HGB 8.4 03/18/2021    HCT 26.1 03/18/2021    MCV 91.1 03/18/2021     03/18/2021         Lab Results   Component Value Date    INR 1.01 02/16/2021    PROTIME 11.7 02/16/2021       ASSESSMENT AND PLAN      Patient Active Problem List   Diagnosis    Altered mental status    Dehydration    Cerebral palsy (HCC)    Seizure disorder (HCC)    Nuclear sclerosis    Anemia, chronic disease    Partial epilepsy with impairment of consciousness, intractable ([address]    Mental retardation    Malfunction of gastrostomy tube ([address]    Hypoxemia    Hypotension    Recurrent seizures ([address]    Acute aspiration pneumonia ([address]    Sepsis ([address]    Acute respiratory failure with hypoxia ([address]     Spoke with brother x2 today     he is unequivocal about DNR CC   he wants to wait a couple more days before hospice   I explained to the brother over last 5-7 years I have admitted him a dozen times  And each time he remains prone to recurrent admission , this level of care is not as beneficial to him as it is to Care providers at multiple facilities

## 2021-03-19 NOTE — PROGRESS NOTES
J-Tube successfully flushed at this time with 30mL water. Tube feeds restarted at this time. Will monitor.

## 2021-03-19 NOTE — PROGRESS NOTES
Shift assessment complete. VSS. No signs of distress. Patient on 3L nasal cannula. Family at bedside. Bed alarm on. Camera at bedside. Call light within reach. No further needs at this time.

## 2021-03-19 NOTE — PLAN OF CARE
Problem: Falls - Risk of:  Goal: Will remain free from falls  Description: Will remain free from falls  Outcome: Ongoing  Goal: Absence of physical injury  Description: Absence of physical injury  Outcome: Ongoing     Problem: Nutrition  Goal: Optimal nutrition therapy  Outcome: Ongoing     Problem: Skin Integrity:  Goal: Will show no infection signs and symptoms  Description: Will show no infection signs and symptoms  Outcome: Ongoing  Goal: Absence of new skin breakdown  Description: Absence of new skin breakdown  Outcome: Ongoing

## 2021-03-19 NOTE — PROGRESS NOTES
Assisted patient with set-up of home [x]CPAP / []Bi-PAP unit without Oxygen bleed-in. [employer] contacted for equipment safety verification [phone]. Leave message with room number if [employer] is not available.

## 2021-03-19 NOTE — PROGRESS NOTES
This RN checked on patient and patient J-tube was leaking. J-tube was flush and there was no resistant. Feeding rate decreased to 20 ml/hr. Dressing change.  Will continue to monitor

## 2021-03-19 NOTE — PROGRESS NOTES
Routine vitals stable. Scheduled medications given. No complaints of pain. No signs of distress. Bed alarm on. Camera at bedside. Call light within reach. Will continue to monitor.

## 2021-03-19 NOTE — PROGRESS NOTES
Patient's abdomen appears distended. No complaints of nausea, vomiting or pain. Tube feeds stopped at this time. RN notified Brigitte Hayes MD. J-Tube will not flush medications at this time. RN notified charge RN and GI team. Keagan Hose flush performed at this time in attempt to unclog J-Tube. Patient tolerated well. Will monitor.

## 2021-03-19 NOTE — PROGRESS NOTES
Patients head to toe assessment completed. Vital signs WNL. Bed alarm engaged, call light within reach. Scheduled medications given per MAR. Patient denies any pain at the moment. Camera at bedside. Patient resting in bed. Will continue to monitor.

## 2021-03-19 NOTE — PROGRESS NOTES
Gastroenterology Progress Note    Omar Andrade is a 79 y.o. male patient. Active Problems:    Altered mental status    Dehydration    Cerebral palsy (HCC)    Seizure disorder (HCC)    Nuclear sclerosis    Anemia, chronic disease    Partial epilepsy with impairment of consciousness, intractable ([address]    Mental retardation    Malfunction of gastrostomy tube ([address]    Hypoxemia    Recurrent seizures ([address]    Acute aspiration pneumonia ([address]    Sepsis ([address]    Acute respiratory failure with hypoxia ([address]  Resolved Problems:    * No resolved hospital problems. *      SUBJECTIVE:  History of Present Illness  Patient is a 79 y.o.  male admitted with Acute aspiration pneumonia ([address] [J69.0] who is seen in consult for clogged J-tube. Brother is at bedside and reports and reports that he has decided to continue treatment for pneumonia for now. Per RN report 20f J tube clogged and nursing staff is attempting to unclog. ROS:  No fever, chills  No chest pain, palpitations  No SOB, cough  Gastrointestinal ROS: see above    Physical    VITALS:  /75   Pulse 76   Temp 98.6 °F (37 °C) (Axillary)   Resp 20   Ht 5' 6\" (1.676 m)   Wt 161 lb (73 kg)   SpO2 98%   BMI 25.99 kg/m²   TEMPERATURE:  Current - Temp: 98.6 °F (37 °C); Max - Temp  Av.4 °F (36.9 °C)  Min: 97.9 °F (36.6 °C)  Max: 98.8 °F (37.1 °C)    NAD  Regular rate   Lungs CTA Bilaterally  Abdomen soft, ND, NT,  Bowel sounds normal.    Data    Data Review:    Recent Labs     21  1330 21  0432 21  0902   WBC 20.6* 14.9* 8.3   HGB 9.3* 8.4* 7.3*   HCT 28.9* 26.1* 23.4*   MCV 89.8 91.1 91.7   * 447 433     Recent Labs     21  1329 21  0432 21  0902   * 135* 139   K 5.0 4.3 4.3   CL 97* 102 106   CO2 25 24 23   BUN 38* 42* 32*   CREATININE 0.9 0.6* <0.5*     No results for input(s): AST, ALT, ALB, BILIDIR, BILITOT, ALKPHOS in the last 72 hours.   No results for input(s): LIPASE, AMYLASE in the last 72 hours. No results for input(s): PROTIME, INR in the last 72 hours. No results for input(s): PTT in the last 72 hours. ASSESSMENT :   Abnormal esophagus on CT -CT with large hiatal hernia with distended thick-walled esophagus. On last EGD he was noted to have narrow caliber esophagus, benign distal esophageal ring, distal esophageal diverticulum, and large hiatal hernia. The esophagus was dilated at that time by endoscope passage. Aspiration pneumonia  Clogged J tube: Unable to flush per nursing staff. Zapper flush administered. PLAN :  - J tube flushed after some resistance.   -Will give Doculax suppository to stimulate bowel function  -Will sign off    Discussed with Dr. Anitra Rossi, [address] [place] [address]       I have personally performed a face to face diagnostic evaluation on this patient. I have interviewed and examined the patient and I agree with the findings and recommended plan of care. In summary, my findings and plan are the following:   j tube was occluded. Was able to flush it with water/syringe. Ab exam stable. Can give laxative for constipation. Call with questions.        Lisa Jean-Baptiste MD  [address]

## 2021-03-20 VITALS
DIASTOLIC BLOOD PRESSURE: 73 MMHG | OXYGEN SATURATION: 96 % | WEIGHT: 161 LBS | BODY MASS INDEX: 25.88 KG/M2 | RESPIRATION RATE: 18 BRPM | SYSTOLIC BLOOD PRESSURE: 122 MMHG | TEMPERATURE: 97.9 F | HEIGHT: 66 IN | HEART RATE: 75 BPM

## 2021-03-20 LAB
ANION GAP SERPL CALCULATED.3IONS-SCNC: 9 MMOL/L (ref 3–16)
BUN BLDV-MCNC: 23 MG/DL (ref 7–20)
CALCIUM SERPL-MCNC: 8.5 MG/DL (ref 8.3–10.6)
CHLORIDE BLD-SCNC: 106 MMOL/L (ref 99–110)
CO2: 22 MMOL/L (ref 21–32)
CREAT SERPL-MCNC: <0.5 MG/DL (ref 0.8–1.3)
GFR AFRICAN AMERICAN: >60
GFR NON-AFRICAN AMERICAN: >60
GLUCOSE BLD-MCNC: 107 MG/DL (ref 70–99)
HCT VFR BLD CALC: 23.8 % (ref 40.5–52.5)
HEMOGLOBIN: 7.6 G/DL (ref 13.5–17.5)
MCH RBC QN AUTO: 29.4 PG (ref 26–34)
MCHC RBC AUTO-ENTMCNC: 31.9 G/DL (ref 31–36)
MCV RBC AUTO: 92.1 FL (ref 80–100)
PDW BLD-RTO: 19.4 % (ref 12.4–15.4)
PLATELET # BLD: 446 K/UL (ref 135–450)
PMV BLD AUTO: 7.1 FL (ref 5–10.5)
POTASSIUM SERPL-SCNC: 3.9 MMOL/L (ref 3.5–5.1)
PROCALCITONIN: 0.19 NG/ML (ref 0–0.15)
RBC # BLD: 2.59 M/UL (ref 4.2–5.9)
SODIUM BLD-SCNC: 137 MMOL/L (ref 136–145)
WBC # BLD: 6.7 K/UL (ref 4–11)

## 2021-03-20 PROCEDURE — 6370000000 HC RX 637 (ALT 250 FOR IP): Performed by: INTERNAL MEDICINE

## 2021-03-20 PROCEDURE — 94640 AIRWAY INHALATION TREATMENT: CPT

## 2021-03-20 PROCEDURE — 2580000003 HC RX 258: Performed by: INTERNAL MEDICINE

## 2021-03-20 PROCEDURE — 6360000002 HC RX W HCPCS: Performed by: INTERNAL MEDICINE

## 2021-03-20 PROCEDURE — 84145 PROCALCITONIN (PCT): CPT

## 2021-03-20 PROCEDURE — 36415 COLL VENOUS BLD VENIPUNCTURE: CPT

## 2021-03-20 PROCEDURE — 85027 COMPLETE CBC AUTOMATED: CPT

## 2021-03-20 PROCEDURE — 94761 N-INVAS EAR/PLS OXIMETRY MLT: CPT

## 2021-03-20 PROCEDURE — 80048 BASIC METABOLIC PNL TOTAL CA: CPT

## 2021-03-20 RX ORDER — AMOXICILLIN AND CLAVULANATE POTASSIUM 875; 125 MG/1; MG/1
1 TABLET, FILM COATED ORAL 2 TIMES DAILY
Qty: 20 TABLET | Refills: 0 | Status: ON HOLD | OUTPATIENT
Start: 2021-03-20 | End: 2021-03-29

## 2021-03-20 RX ORDER — DIAZEPAM 10 MG/2ML
20 GEL RECTAL
Qty: 15 EACH | Refills: 0 | Status: ON HOLD | OUTPATIENT
Start: 2021-03-20 | End: 2021-03-30

## 2021-03-20 RX ORDER — LANSOPRAZOLE 30 MG/1
30 TABLET, ORALLY DISINTEGRATING, DELAYED RELEASE ORAL
Qty: 30 TABLET | Refills: 0 | Status: ON HOLD | OUTPATIENT
Start: 2021-03-20 | End: 2021-03-29

## 2021-03-20 RX ORDER — LORAZEPAM 0.5 MG/1
0.5 TABLET ORAL EVERY 12 HOURS PRN
Qty: 10 TABLET | Refills: 0 | Status: ON HOLD | OUTPATIENT
Start: 2021-03-20 | End: 2021-03-31 | Stop reason: SDUPTHER

## 2021-03-20 RX ORDER — IPRATROPIUM BROMIDE AND ALBUTEROL SULFATE 2.5; .5 MG/3ML; MG/3ML
3 SOLUTION RESPIRATORY (INHALATION)
Qty: 360 ML | Refills: 0 | Status: ON HOLD | OUTPATIENT
Start: 2021-03-20 | End: 2021-03-30

## 2021-03-20 RX ADMIN — LACTULOSE 20 G: 20 SOLUTION ORAL at 13:06

## 2021-03-20 RX ADMIN — OXYCODONE HYDROCHLORIDE AND ACETAMINOPHEN 500 MG: 500 TABLET ORAL at 13:05

## 2021-03-20 RX ADMIN — LEVETIRACETAM 500 MG: 5 INJECTION INTRAVENOUS at 02:44

## 2021-03-20 RX ADMIN — LEVETIRACETAM 500 MG: 5 INJECTION INTRAVENOUS at 13:09

## 2021-03-20 RX ADMIN — PIPERACILLIN AND TAZOBACTAM 3375 MG: 3; .375 INJECTION, POWDER, LYOPHILIZED, FOR SOLUTION INTRAVENOUS at 06:59

## 2021-03-20 RX ADMIN — SODIUM CHLORIDE: 9 INJECTION, SOLUTION INTRAVENOUS at 02:52

## 2021-03-20 RX ADMIN — Medication 2000 UNITS: at 13:04

## 2021-03-20 RX ADMIN — IPRATROPIUM BROMIDE AND ALBUTEROL SULFATE 1 AMPULE: .5; 3 SOLUTION RESPIRATORY (INHALATION) at 09:23

## 2021-03-20 RX ADMIN — SENNOSIDES 17.2 MG: 8.6 TABLET, FILM COATED ORAL at 13:04

## 2021-03-20 RX ADMIN — MINERAL SUPPLEMENT IRON 300 MG / 5 ML STRENGTH LIQUID 100 PER BOX UNFLAVORED 300 MG: at 13:05

## 2021-03-20 RX ADMIN — LAMOTRIGINE 400 MG: 100 TABLET ORAL at 13:04

## 2021-03-20 RX ADMIN — QUETIAPINE FUMARATE 50 MG: 25 TABLET ORAL at 13:04

## 2021-03-20 RX ADMIN — BACLOFEN 10 MG: 10 TABLET ORAL at 13:03

## 2021-03-20 RX ADMIN — ESCITALOPRAM OXALATE 10 MG: 10 TABLET ORAL at 13:04

## 2021-03-25 NOTE — DISCHARGE SUMMARY
[facility]                     [facility], [address]                               DISCHARGE SUMMARY    PATIENT NAME: Jyoti Morrow                     :        1950  MED REC NO:   [id number]                          ROOM:       5902  ACCOUNT NO:   [de-identified]                           ADMIT DATE: 2021  PROVIDER:     Merry Craven MD                  DISCHARGE DATE:  2021    FINAL DIAGNOSES:  1. Possible upper GI hemorrhage. 2.  Chronic anemia. 3.  Intellectual debility. 4.  Functional quadriplegia. 5.  Hypertension. 6.  Seizure disorder. 7.  Schizoaffective disorder. DISCHARGE MEDICATIONS:  1. Ativan 0.5 mg every 12 hours p.r.n.  2.  Diastat gel p.r.n. rectally for seizures. 3.  Ferrous sulfate 220 mg twice a day. 4.  Protonix 40 mg twice a day. 5.  Dulcolax 5 mg daily. 6.  Mycostatin cream b.i.d.  7.  Senokot-S two tablets daily. 8.  Vitamin D3 2000 units daily. 9.  Zinc oxide ointment as directed. 10.  Dulcolax suppository 10 mg rectally. 11.  Baclofen 10 mg once a day. 12.  Lamictal 400 mg twice a day. 13.  Seroquel 100 mg nightly. 14.  Tylenol 650 q.4 p.r.n. 15.  Keppra 1500 mg twice a day. 16.  Chronulac suspension daily. 17.  Cardura 4 mg nightly. 18.  Vytorin once a day. 19.  Potassium chloride 20 mEq twice a day. 20.  Vitamin C 500 mg daily. 21.  Seroquel 50 mg twice a day. 22.  Lexapro 10 mg once a day. 23.  Peridex oral solution b.i.d. HOSPITAL COURSE:  This elderly man with profound intellectual  disability, spastic quadriparesis, seizure disorder with some chronic  psychiatric problem came to the emergency room. He has been lifelong  MRDD. He had a coffee ground emesis. The patient was having stable  vital signs. Temperature 97.7, blood pressure 130/78, respirations 20,  heart rate 78. Hemoglobin and hematocrit was stable at 9.1 and 29.1,  platelet count 807.   Sodium 134, potassium 4.3, BUN 36 and creatinine  0.6. The patient was treated with IV hydration, IV proton-pump  inhibitor infusion, GI consultation who replaced and repositioned his  jejunostomy tube. The patient did not have any active source of  bleeding that needed to be cauterized or undergo similar treatment. The  patient has been frequently active with GI service over here and they  have even done endoscopies on him. On 02/17/2021, the patient was  discharged in stable condition. No concern for active GI blood loss. Dr. Zoe Sorenson did an endoscopy. He did not find any bleeding lesion. He found a large hiatal hernia. The patient was discharged in stable  condition back to [address] [place] [address]. As always, it is a pleasure and privilege to take care of your patients  at [facility].         Denzel Lehman MD    D: 03/24/2021 11:49:30       T: 03/24/2021 14:04:41     [id number]  Job#: [id number]     Doc#: [id number]    CC:  Duarte Hilton MD       [address] [place] [address]

## 2021-03-28 ENCOUNTER — APPOINTMENT (OUTPATIENT)
Dept: CT IMAGING | Age: 71
DRG: 377 | End: 2021-03-28
Payer: MEDICARE

## 2021-03-28 ENCOUNTER — HOSPITAL ENCOUNTER (INPATIENT)
Age: 71
LOS: 3 days | Discharge: NURSING, REHAB OR LONG-TERM CARE | DRG: 377 | End: 2021-03-31
Attending: INTERNAL MEDICINE | Admitting: INTERNAL MEDICINE
Payer: MEDICARE

## 2021-03-28 DIAGNOSIS — K52.9 COLITIS: ICD-10-CM

## 2021-03-28 DIAGNOSIS — K92.0 HEMATEMESIS, PRESENCE OF NAUSEA NOT SPECIFIED: Primary | ICD-10-CM

## 2021-03-28 DIAGNOSIS — J69.0 ASPIRATION PNEUMONIA OF RIGHT LUNG, UNSPECIFIED ASPIRATION PNEUMONIA TYPE, UNSPECIFIED PART OF LUNG (HCC): ICD-10-CM

## 2021-03-28 DIAGNOSIS — G40.909 RECURRENT SEIZURES (HCC): ICD-10-CM

## 2021-03-28 PROBLEM — Z20.822 SUSPECTED COVID-19 VIRUS INFECTION: Status: ACTIVE | Noted: 2021-03-28

## 2021-03-28 PROBLEM — K92.2 GI BLEED: Status: ACTIVE | Noted: 2021-03-28

## 2021-03-28 PROBLEM — K29.90 GASTRODUODENITIS: Status: ACTIVE | Noted: 2021-03-28

## 2021-03-28 LAB
A/G RATIO: 1 (ref 1.1–2.2)
ABO/RH: NORMAL
ALBUMIN SERPL-MCNC: 4 G/DL (ref 3.4–5)
ALP BLD-CCNC: 142 U/L (ref 40–129)
ALT SERPL-CCNC: 17 U/L (ref 10–40)
ANION GAP SERPL CALCULATED.3IONS-SCNC: 10 MMOL/L (ref 3–16)
ANTIBODY SCREEN: NORMAL
APTT: 32.5 SEC (ref 24.2–36.2)
AST SERPL-CCNC: 22 U/L (ref 15–37)
BASOPHILS ABSOLUTE: 0.1 K/UL (ref 0–0.2)
BASOPHILS RELATIVE PERCENT: 1 %
BILIRUB SERPL-MCNC: <0.2 MG/DL (ref 0–1)
BUN BLDV-MCNC: 38 MG/DL (ref 7–20)
C DIFF TOXIN/ANTIGEN: NORMAL
CALCIUM SERPL-MCNC: 9.3 MG/DL (ref 8.3–10.6)
CHLORIDE BLD-SCNC: 102 MMOL/L (ref 99–110)
CO2: 27 MMOL/L (ref 21–32)
CREAT SERPL-MCNC: 0.6 MG/DL (ref 0.8–1.3)
EOSINOPHILS ABSOLUTE: 0.3 K/UL (ref 0–0.6)
EOSINOPHILS RELATIVE PERCENT: 3.5 %
GFR AFRICAN AMERICAN: >60
GFR NON-AFRICAN AMERICAN: >60
GLOBULIN: 4.1 G/DL
GLUCOSE BLD-MCNC: 121 MG/DL (ref 70–99)
HCT VFR BLD CALC: 30.6 % (ref 40.5–52.5)
HEMOGLOBIN: 9.5 G/DL (ref 13.5–17.5)
INR BLD: 0.96 (ref 0.86–1.14)
LIPASE: 52 U/L (ref 13–60)
LYMPHOCYTES ABSOLUTE: 1.4 K/UL (ref 1–5.1)
LYMPHOCYTES RELATIVE PERCENT: 16.5 %
MCH RBC QN AUTO: 28 PG (ref 26–34)
MCHC RBC AUTO-ENTMCNC: 31 G/DL (ref 31–36)
MCV RBC AUTO: 90.3 FL (ref 80–100)
MONOCYTES ABSOLUTE: 0.5 K/UL (ref 0–1.3)
MONOCYTES RELATIVE PERCENT: 6.2 %
NEUTROPHILS ABSOLUTE: 6 K/UL (ref 1.7–7.7)
NEUTROPHILS RELATIVE PERCENT: 72.8 %
OCCULT BLOOD DIAGNOSTIC: NORMAL
PDW BLD-RTO: 18.1 % (ref 12.4–15.4)
PLATELET # BLD: 700 K/UL (ref 135–450)
PMV BLD AUTO: 7.4 FL (ref 5–10.5)
POTASSIUM REFLEX MAGNESIUM: 4.5 MMOL/L (ref 3.5–5.1)
PROTHROMBIN TIME: 11.1 SEC (ref 10–13.2)
RBC # BLD: 3.39 M/UL (ref 4.2–5.9)
SARS-COV-2, NAAT: NOT DETECTED
SODIUM BLD-SCNC: 139 MMOL/L (ref 136–145)
TOTAL PROTEIN: 8.1 G/DL (ref 6.4–8.2)
WBC # BLD: 8.3 K/UL (ref 4–11)

## 2021-03-28 PROCEDURE — C9113 INJ PANTOPRAZOLE SODIUM, VIA: HCPCS | Performed by: PHYSICIAN ASSISTANT

## 2021-03-28 PROCEDURE — 87324 CLOSTRIDIUM AG IA: CPT

## 2021-03-28 PROCEDURE — 86901 BLOOD TYPING SEROLOGIC RH(D): CPT

## 2021-03-28 PROCEDURE — 99284 EMERGENCY DEPT VISIT MOD MDM: CPT

## 2021-03-28 PROCEDURE — 96375 TX/PRO/DX INJ NEW DRUG ADDON: CPT

## 2021-03-28 PROCEDURE — 96365 THER/PROPH/DIAG IV INF INIT: CPT

## 2021-03-28 PROCEDURE — 85730 THROMBOPLASTIN TIME PARTIAL: CPT

## 2021-03-28 PROCEDURE — 87635 SARS-COV-2 COVID-19 AMP PRB: CPT

## 2021-03-28 PROCEDURE — 86900 BLOOD TYPING SEROLOGIC ABO: CPT

## 2021-03-28 PROCEDURE — 74176 CT ABD & PELVIS W/O CONTRAST: CPT

## 2021-03-28 PROCEDURE — 85025 COMPLETE CBC W/AUTO DIFF WBC: CPT

## 2021-03-28 PROCEDURE — 1200000000 HC SEMI PRIVATE

## 2021-03-28 PROCEDURE — 85610 PROTHROMBIN TIME: CPT

## 2021-03-28 PROCEDURE — 83690 ASSAY OF LIPASE: CPT

## 2021-03-28 PROCEDURE — G0328 FECAL BLOOD SCRN IMMUNOASSAY: HCPCS

## 2021-03-28 PROCEDURE — 87449 NOS EACH ORGANISM AG IA: CPT

## 2021-03-28 PROCEDURE — 2580000003 HC RX 258: Performed by: PHYSICIAN ASSISTANT

## 2021-03-28 PROCEDURE — 6360000002 HC RX W HCPCS: Performed by: PHYSICIAN ASSISTANT

## 2021-03-28 PROCEDURE — 87505 NFCT AGENT DETECTION GI: CPT

## 2021-03-28 PROCEDURE — 86850 RBC ANTIBODY SCREEN: CPT

## 2021-03-28 PROCEDURE — 80053 COMPREHEN METABOLIC PANEL: CPT

## 2021-03-28 RX ORDER — LAMOTRIGINE 100 MG/1
400 TABLET ORAL 2 TIMES DAILY
Status: DISCONTINUED | OUTPATIENT
Start: 2021-03-29 | End: 2021-03-31 | Stop reason: HOSPADM

## 2021-03-28 RX ORDER — ACETAMINOPHEN 325 MG/1
650 TABLET ORAL EVERY 6 HOURS PRN
Status: DISCONTINUED | OUTPATIENT
Start: 2021-03-28 | End: 2021-03-31 | Stop reason: HOSPADM

## 2021-03-28 RX ORDER — QUETIAPINE FUMARATE 100 MG/1
100 TABLET, FILM COATED ORAL NIGHTLY
Status: DISCONTINUED | OUTPATIENT
Start: 2021-03-29 | End: 2021-03-31 | Stop reason: HOSPADM

## 2021-03-28 RX ORDER — SENNA PLUS 8.6 MG/1
2 TABLET ORAL DAILY
Status: DISCONTINUED | OUTPATIENT
Start: 2021-03-29 | End: 2021-03-31 | Stop reason: HOSPADM

## 2021-03-28 RX ORDER — DOXAZOSIN MESYLATE 1 MG/1
4 TABLET ORAL DAILY
Status: DISCONTINUED | OUTPATIENT
Start: 2021-03-29 | End: 2021-03-31 | Stop reason: HOSPADM

## 2021-03-28 RX ORDER — 0.9 % SODIUM CHLORIDE 0.9 %
500 INTRAVENOUS SOLUTION INTRAVENOUS ONCE
Status: COMPLETED | OUTPATIENT
Start: 2021-03-28 | End: 2021-03-29

## 2021-03-28 RX ORDER — SODIUM CHLORIDE 9 MG/ML
25 INJECTION, SOLUTION INTRAVENOUS PRN
Status: DISCONTINUED | OUTPATIENT
Start: 2021-03-28 | End: 2021-03-31 | Stop reason: HOSPADM

## 2021-03-28 RX ORDER — FERROUS SULFATE 300 MG/5ML
300 LIQUID (ML) ORAL 2 TIMES DAILY
Status: DISCONTINUED | OUTPATIENT
Start: 2021-03-29 | End: 2021-03-31 | Stop reason: HOSPADM

## 2021-03-28 RX ORDER — PROMETHAZINE HYDROCHLORIDE 12.5 MG/1
12.5 TABLET ORAL EVERY 6 HOURS PRN
Status: DISCONTINUED | OUTPATIENT
Start: 2021-03-28 | End: 2021-03-28 | Stop reason: SDUPTHER

## 2021-03-28 RX ORDER — VITAMIN B COMPLEX
2000 TABLET ORAL DAILY
Status: DISCONTINUED | OUTPATIENT
Start: 2021-03-29 | End: 2021-03-31 | Stop reason: HOSPADM

## 2021-03-28 RX ORDER — SODIUM CHLORIDE 9 MG/ML
INJECTION, SOLUTION INTRAVENOUS CONTINUOUS
Status: DISCONTINUED | OUTPATIENT
Start: 2021-03-29 | End: 2021-03-29

## 2021-03-28 RX ORDER — QUETIAPINE FUMARATE 25 MG/1
50 TABLET, FILM COATED ORAL 2 TIMES DAILY
Status: DISCONTINUED | OUTPATIENT
Start: 2021-03-29 | End: 2021-03-31 | Stop reason: HOSPADM

## 2021-03-28 RX ORDER — ONDANSETRON 2 MG/ML
4 INJECTION INTRAMUSCULAR; INTRAVENOUS EVERY 6 HOURS PRN
Status: DISCONTINUED | OUTPATIENT
Start: 2021-03-28 | End: 2021-03-31 | Stop reason: HOSPADM

## 2021-03-28 RX ORDER — LACTULOSE 10 G/15ML
20 SOLUTION ORAL DAILY
Status: DISCONTINUED | OUTPATIENT
Start: 2021-03-29 | End: 2021-03-31 | Stop reason: HOSPADM

## 2021-03-28 RX ORDER — LEVETIRACETAM 100 MG/ML
1500 SOLUTION ORAL 2 TIMES DAILY
Status: DISCONTINUED | OUTPATIENT
Start: 2021-03-29 | End: 2021-03-31 | Stop reason: HOSPADM

## 2021-03-28 RX ORDER — CIPROFLOXACIN 2 MG/ML
400 INJECTION, SOLUTION INTRAVENOUS EVERY 12 HOURS
Status: DISCONTINUED | OUTPATIENT
Start: 2021-03-29 | End: 2021-03-31 | Stop reason: HOSPADM

## 2021-03-28 RX ORDER — IPRATROPIUM BROMIDE AND ALBUTEROL SULFATE 2.5; .5 MG/3ML; MG/3ML
3 SOLUTION RESPIRATORY (INHALATION)
Status: DISCONTINUED | OUTPATIENT
Start: 2021-03-29 | End: 2021-03-31 | Stop reason: HOSPADM

## 2021-03-28 RX ORDER — CHLORHEXIDINE GLUCONATE 0.12 MG/ML
7.5 RINSE ORAL 2 TIMES DAILY
Status: DISCONTINUED | OUTPATIENT
Start: 2021-03-29 | End: 2021-03-31 | Stop reason: HOSPADM

## 2021-03-28 RX ORDER — ACETAMINOPHEN 650 MG/1
650 SUPPOSITORY RECTAL EVERY 6 HOURS PRN
Status: DISCONTINUED | OUTPATIENT
Start: 2021-03-28 | End: 2021-03-31 | Stop reason: HOSPADM

## 2021-03-28 RX ORDER — ACETAMINOPHEN 160 MG/5ML
650 SOLUTION ORAL EVERY 4 HOURS PRN
Status: DISCONTINUED | OUTPATIENT
Start: 2021-03-28 | End: 2021-03-31 | Stop reason: HOSPADM

## 2021-03-28 RX ORDER — ASCORBIC ACID 500 MG
500 TABLET ORAL 2 TIMES DAILY
Status: DISCONTINUED | OUTPATIENT
Start: 2021-03-29 | End: 2021-03-31 | Stop reason: HOSPADM

## 2021-03-28 RX ORDER — SODIUM CHLORIDE 0.9 % (FLUSH) 0.9 %
10 SYRINGE (ML) INJECTION EVERY 12 HOURS SCHEDULED
Status: DISCONTINUED | OUTPATIENT
Start: 2021-03-29 | End: 2021-03-31 | Stop reason: HOSPADM

## 2021-03-28 RX ORDER — SODIUM CHLORIDE 0.9 % (FLUSH) 0.9 %
10 SYRINGE (ML) INJECTION PRN
Status: DISCONTINUED | OUTPATIENT
Start: 2021-03-28 | End: 2021-03-31 | Stop reason: HOSPADM

## 2021-03-28 RX ORDER — PROMETHAZINE HYDROCHLORIDE 25 MG/1
12.5 TABLET ORAL EVERY 6 HOURS PRN
Status: DISCONTINUED | OUTPATIENT
Start: 2021-03-28 | End: 2021-03-31 | Stop reason: HOSPADM

## 2021-03-28 RX ORDER — ESCITALOPRAM OXALATE 10 MG/1
10 TABLET ORAL DAILY
Status: DISCONTINUED | OUTPATIENT
Start: 2021-03-29 | End: 2021-03-31 | Stop reason: HOSPADM

## 2021-03-28 RX ORDER — LANSOPRAZOLE 30 MG/1
30 TABLET, ORALLY DISINTEGRATING, DELAYED RELEASE ORAL
Status: DISCONTINUED | OUTPATIENT
Start: 2021-03-29 | End: 2021-03-29

## 2021-03-28 RX ORDER — BACLOFEN 10 MG/1
10 TABLET ORAL 3 TIMES DAILY
Status: DISCONTINUED | OUTPATIENT
Start: 2021-03-29 | End: 2021-03-31 | Stop reason: HOSPADM

## 2021-03-28 RX ORDER — DIAZEPAM 10 MG/2ML
20 GEL RECTAL
Status: ACTIVE | OUTPATIENT
Start: 2021-03-29 | End: 2021-03-29

## 2021-03-28 RX ORDER — LORAZEPAM 0.5 MG/1
0.5 TABLET ORAL EVERY 12 HOURS PRN
Status: DISCONTINUED | OUTPATIENT
Start: 2021-03-28 | End: 2021-03-31 | Stop reason: HOSPADM

## 2021-03-28 RX ADMIN — SODIUM CHLORIDE 8 MG/HR: 9 INJECTION, SOLUTION INTRAVENOUS at 20:19

## 2021-03-28 RX ADMIN — SODIUM CHLORIDE 500 ML: 9 INJECTION, SOLUTION INTRAVENOUS at 21:58

## 2021-03-28 RX ADMIN — SODIUM CHLORIDE 80 MG: 9 INJECTION, SOLUTION INTRAVENOUS at 19:51

## 2021-03-28 RX ADMIN — Medication 1000 MG: at 21:56

## 2021-03-28 ASSESSMENT — ENCOUNTER SYMPTOMS
NAUSEA: 0
ABDOMINAL PAIN: 0
SHORTNESS OF BREATH: 0
DIARRHEA: 1
VOMITING: 1

## 2021-03-28 NOTE — ED PROVIDER NOTES
[address] [facility]        Pt Name: Jeremy Olivo  MRN: [id number]  [place] 1950  Date of evaluation: 3/28/2021  Provider: Casey Portillo PA-C  PCP: Rizwan JANE. I have evaluated this patient. My supervising physician was available for consultation. CHIEF COMPLAINT       Chief Complaint   Patient presents with    Rectal Bleeding     in from [facility] by EMS, for GI bleed, was recently here for same thing        HISTORY OF PRESENT ILLNESS   (Location, Timing/Onset, Context/Setting, Quality, Duration, Modifying Factors, Severity, Associated Signs and Symptoms)  Note limiting factors. Jeremy Olivo is a 79 y.o. male patient presents emergency department for evaluation of possible GI bleed. Patient was noted to have coffee-ground emesis at [facility] where he lives earlier today. Patient does have evidence of this on the sheet that comes with him. Patient also has copious liquid diarrhea. Patient has no abdominal complaints at this time. Patient has a feeding tube in place. Patient is able to answer my questions. No further report was given by to go to [facility]. Patient has had multiple recent admissions for upper GI bleed in the past few months. Nursing Notes were all reviewed and agreed with or any disagreements were addressed in the HPI. REVIEW OF SYSTEMS    (2-9 systems for level 4, 10 or more for level 5)     Review of Systems   Constitutional: Negative for fatigue and fever. HENT: Negative. Eyes: Negative for visual disturbance. Respiratory: Negative for shortness of breath. Cardiovascular: Negative for chest pain. Gastrointestinal: Positive for diarrhea and vomiting. Negative for abdominal pain and nausea. Genitourinary: Negative. Musculoskeletal: Negative. Skin: Negative. Neurological: Negative. Positives and Pertinent negatives as per HPI.   Except as noted above in the ROS, all other systems were reviewed and negative.        PAST MEDICAL HISTORY     Past Medical History:   Diagnosis Date    Anemia, macrocytic     Anhidrosis     Anhidrosis     Bilateral cataracts     Bipolar affective ([address]     Blood circulation, collateral     unspecified PVD    Cataract     Clostridium difficile diarrhea 3/7/15    Depression     Dermatophytosis     Drug induced hepatitis     GERD (gastroesophageal reflux disease)     Hard of hearing     Impulse control disorder     Liver disease     drug induced    Mental handicap     Movement disorder     Neurogenic bladder     Neuromuscular disorder (HCC)     spastic hemiplegia, MDS,neurogenic bladder    NS (nuclear sclerosis)     Nuclear sclerosis     Osteoarthritis     Periodontal disease     Pneumonia 1/17/2013    Primary optic atrophy     Primary optic atrophy     PVD (peripheral vascular disease) ([address]     Seiz[facility] [address]     last seizure 9/20/19    Spastic hemiplegia     Tinea pedis     Unspecified diseases of blood and blood-forming organs     anemia    Urinary incontinence          SURGICAL HISTORY     Past Surgical History:   Procedure Laterality Date    ABDOMEN SURGERY      can see scar/details unknown, feeding tube    ABDOMINAL EXPLORATION SURGERY  9-28-14    LAPAROTOMY EXPLORATORY, ERIKA-EN-Y TUBE JEJUNOSTOMY, SMALL BOWEL RESECTION                                              COLONOSCOPY      COLONOSCOPY N/A 6/4/2019    COLONOSCOPY WITH BIOPSY performed by Bernard Lau MD at [address]  6/25/14    with dilitation    DILATATION, ESOPHAGUS      ENDOSCOPY, COLON, DIAGNOSTIC      OTHER SURGICAL HISTORY  4-30-13    JEJUNOSTOMY TUBE INSERTION    SIGMOIDOSCOPY N/A 5/2/2019    SIGMOIDOSCOPY DIAGNOSTIC FLEXIBLE performed by Bernard Lau MD at [facility] N/A 4/21/2020    REPLACEMENT, JEJUNOSTOMY TUBE performed by Bernard Lau MD at [address] GASTROINTESTINAL ENDOSCOPY  1/24/13    PEG placement    UPPER GASTROINTESTINAL ENDOSCOPY  4/26/13    UPPER GASTROINTESTINAL ENDOSCOPY N/A 10/11/2019    EGD DILATION BALLOON performed by Azalea Dominguez MD at [address] 8/22/2020    EGD BIOPSY performed by Sven Mark MD at [address] 2/17/2021    EGD DIAGNOSTIC ONLY performed by Azalea Dominguez MD at [phone]       Previous Medications    ACETAMINOPHEN (TYLENOL) 160 MG/5ML SOLUTION    20.3 mLs by Per G Tube route every 4 hours as needed for Fever or Pain    AMOXICILLIN-CLAVULANATE (AUGMENTIN) 875-125 MG PER TABLET    Take 1 tablet by mouth 2 times daily for 10 days    ASCORBIC ACID (VITAMIN C) 500 MG TABLET    500 mg by Per G Tube route 2 times daily. BACLOFEN (LIORESAL) 10 MG TABLET    10 mg by Per G Tube route 3 times daily     BISACODYL (DULCOLAX) 10 MG SUPPOSITORY    Place 10 mg rectally daily as needed If no BM in 48 hours    BISACODYL (DULCOLAX) 5 MG EC TABLET    Take 10 mg by mouth as needed (No bowel movement in 48 hours)    CHLORHEXIDINE (PERIDEX) 0.12 % SOLUTION    Take 7.5 mLs by mouth 2 times daily. CHOLECALCIFEROL (VITAMIN D3) 2000 UNITS CAPS    2,000 Units by Per G Tube route daily     DIAZEPAM (DIASTAT) 10 MG GEL    Place 20 mg rectally once as needed (seizures lasting over 5 minutes) for up to 1 dose. DOXAZOSIN (CARDURA) 4 MG TABLET    4 mg by Per G Tube route daily     ESCITALOPRAM (LEXAPRO) 10 MG TABLET    10 mg by Per G Tube route daily.     FERROUS SULFATE 220 (44 FE) MG/5ML SOLUTION    Take 220 mg by mouth 2 times daily    IPRATROPIUM-ALBUTEROL (DUONEB) 0.5-2.5 (3) MG/3ML SOLN NEBULIZER SOLUTION    Inhale 3 mLs into the lungs every 4 hours (while awake)    LACTULOSE (CHRONULAC) 10 GM/15ML SOLUTION    20 g by Per G Tube route daily     LAMOTRIGINE (LAMICTAL) 150 MG TABLET    400 mg by Per J Tube route 2 times daily     LANSOPRAZOLE (PREVACID SOLUTAB) 30 MG DISINTEGRATING TABLET    1 tablet by Per J Tube route 2 times daily (before meals)    LEVETIRACETAM (KEPPRA) 100 MG/ML SOLUTION    15 mLs by Per G Tube route 2 times daily    LORAZEPAM (ATIVAN) 0.5 MG TABLET    Take 1 tablet by mouth every 12 hours as needed for Anxiety for up to 30 days. For seizures up to 7 days    MULTIPLE VITAMINS-MINERALS (CERTA-KENTON) LIQUID    30 mLs by Per G Tube route daily     NYSTATIN (MYCOSTATIN) 668149 UNIT/GM CREAM    Apply topically 2 times daily as needed (yeast infections)    POTASSIUM CHLORIDE (KAYCIEL) 20 MEQ/15ML (10%) SOLUTION    10 mEq by Per G Tube route 2 times daily     PROMETHAZINE (PHENERGAN) 12.5 MG TABLET    Take 12.5 mg by mouth every 6 hours as needed for Nausea    QUETIAPINE (SEROQUEL) 100 MG TABLET    50 mg by Per G Tube route 2 times daily Give 50 mg twice daily at 5:30 am/5:30 pm. Give 100 mg at bedtime. QUETIAPINE (SEROQUEL) 100 MG TABLET    Take 100 mg by mouth nightly Give 50 mg twice daily at 5:30 am/5:30 pm. Give 100 mg at bedtime. SENNA (SENOKOT) 8.6 MG TABLET    2 tablets by Per G Tube route daily     ZINC OXIDE 20 % OINTMENT    Apply topically 2 times daily          ALLERGIES     Biaxin [clarithromycin] and Invanz [ertapenem]    FAMILYHISTORY     History reviewed. No pertinent family history. SOCIAL HISTORY       Social History     Tobacco Use    Smoking status: Never Smoker    Smokeless tobacco: Never Used   Substance Use Topics    Alcohol use: No    Drug use: No     Comment: unknown       SCREENINGS             PHYSICAL EXAM    (up to 7 for level 4, 8 or more for level 5)     ED Triage Vitals [03/28/21 1914]   BP Temp Temp Source Pulse Resp SpO2 Height Weight   (!) 150/68 97.9 °F (36.6 °C) Oral 88 17 100 % -- --       Physical Exam  Vitals signs and nursing note reviewed. Constitutional:       General: He is not in acute distress. Appearance: Normal appearance.  He is well-developed. He is not ill-appearing, toxic-appearing or diaphoretic. HENT:      Head: Normocephalic and atraumatic. Nose: Nose normal.      Mouth/Throat:      Mouth: Mucous membranes are moist.      Pharynx: Oropharynx is clear. Eyes:      General:         Right eye: No discharge. Left eye: No discharge. Conjunctiva/sclera: Conjunctivae normal.      Pupils: Pupils are equal, round, and reactive to light. Neck:      Musculoskeletal: Normal range of motion and neck supple. Cardiovascular:      Rate and Rhythm: Normal rate and regular rhythm. Heart sounds: Normal heart sounds. No murmur. No gallop. Pulmonary:      Effort: Pulmonary effort is normal. No respiratory distress. Breath sounds: Normal breath sounds. No wheezing, rhonchi or rales. Abdominal:      General: Abdomen is flat. Bowel sounds are normal.      Palpations: Abdomen is soft. Tenderness: There is no abdominal tenderness. There is no guarding or rebound. Comments: Multiple surgical scars across abdomen are normal in appearance. Feeding tube insertion site appears normal.  No discharge. Genitourinary:     Comments: Copious watery green diarrhea noted on examination. Musculoskeletal: Normal range of motion. Right lower leg: No edema. Left lower leg: No edema. Skin:     General: Skin is warm and dry. Capillary Refill: Capillary refill takes less than 2 seconds. Coloration: Skin is not jaundiced or pale. Findings: No rash. Neurological:      General: No focal deficit present. Mental Status: He is alert and oriented to person, place, and time.    Psychiatric:         Behavior: Behavior normal.         DIAGNOSTIC RESULTS   LABS:    Labs Reviewed   CBC WITH AUTO DIFFERENTIAL - Abnormal; Notable for the following components:       Result Value    RBC 3.39 (*)     Hemoglobin 9.5 (*)     Hematocrit 30.6 (*)     RDW 18.1 (*)     Platelets 794 (*)     All other components within normal limits    Narrative:     Performed at:  [facility] [address] [place]   Phone [phone]   COMPREHENSIVE METABOLIC PANEL W/ REFLEX TO MG FOR LOW K - Abnormal; Notable for the following components:    Glucose 121 (*)     BUN 38 (*)     CREATININE 0.6 (*)     Albumin/Globulin Ratio 1.0 (*)     Alkaline Phosphatase 142 (*)     All other components within normal limits    Narrative:     Performed at:  [facility] [address], [place]   Phone [phone]   C DIFF TOXIN/ANTIGEN    Narrative:     ORDER#: [id number]                          ORDERED BY: Brando Jaimes  SOURCE: Stool                              COLLECTED:  03/28/21 19:27  ANTIBIOTICS AT KIM.:                      RECEIVED :  03/28/21 19:41  Collect White vial (sterile container)  Performed at:  [facility] [address]  [place]   Phone [phone], RAPID    Narrative:     Performed at:  [facility] [address] [place]   Phone [phone]   GASTROINTESTINAL PANEL, MOLECULAR   BLOOD OCCULT STOOL DIAGNOSTIC    Narrative:     ORDER#: [id number]                          ORDERED BY: Brando Jaimes  SOURCE: Stool                              COLLECTED:  03/28/21 19:27  ANTIBIOTICS AT KIM.:                      RECEIVED :  03/28/21 19:40  Performed at:  [facility] [address], [place]   Phone [phone]   LIPASE    Narrative:     Performed at:  [facility] [address], [place]   Phone [phone]   PROTIME-INR    Narrative:     Performed at:  [facility] [address]  [place]   Phone [phone]   APTT    Narrative:     Performed at:  [facility]  [address] [facility], [address]   Phone [phone]   TYPE AND SCREEN    Narrative:     Performed at:  [facility]  [address] [facility], [address]   Phone [phone]       All other labs were within normal range or not returned as of this dictation. EKG: All EKG's are interpreted by the Emergency Department Physician in the absence of a cardiologist.  Please see their note for interpretation of EKG. RADIOLOGY:   Non-plain film images such as CT, Ultrasound and MRI are read by the radiologist. Plain radiographic images are visualized and preliminarily interpreted by the ED Provider with the below findings:        Interpretation per the Radiologist below, if available at the time of this note:    CT ABDOMEN PELVIS WO CONTRAST Additional Contrast? None   Final Result   1. Wall thickening of the gastric antrum and proximal duodenum. Correlate   with presentation for gastroduodenitis. 2. Wall thickening versus incomplete distention of the descending colon. Colitis remains in the differential.   3. Ground-glass opacities in the right middle and lower lobes. Correlate   with presentation for aspiration or pneumonia. 4. Other findings as described. No results found.         PROCEDURES   Unless otherwise noted below, none     Procedures    CRITICAL CARE TIME   N/A    CONSULTS:  IP CONSULT TO GI  IP CONSULT TO INTERNAL MEDICINE  IP CONSULT TO GI      EMERGENCY DEPARTMENT COURSE and DIFFERENTIAL DIAGNOSIS/MDM:   Vitals:    Vitals:    03/28/21 1914   BP: (!) 150/68   Pulse: 88   Resp: 17   Temp: 97.9 °F (36.6 °C)   TempSrc: Oral   SpO2: 100%       Patient was given the following medications:  Medications   pantoprazole (PROTONIX) 80 mg in sodium chloride 0.9 % 100 mL infusion (8 mg/hr Intravenous New Bag 3/28/21 2019)   0.9 % sodium chloride bolus (500 mLs Intravenous New Bag 3/28/21 2158) metronidazole (FLAGYL) 500 mg in NaCl 100 mL IVPB premix (has no administration in time range)   pantoprazole (PROTONIX) 80 mg in sodium chloride 0.9 % 50 mL bolus (0 mg Intravenous Stopped 3/28/21 2019)   cefTRIAXone (ROCEPHIN) 1000 mg in sterile water 10 mL IV syringe (1,000 mg Intravenous Given 3/28/21 2156)         Patient presents emergency department for evaluation of vomiting coffee-ground emesis today at nursing facility. Patient lives at [facility]. He has had a recent admission for coffee-ground emesis and had a negative EGD in February. Patient is awake and alert and is able to speak with this provider. He has a G-tube in his abdomen which appears normal.  Abdomen is soft nontender without rebound or guarding. Bowel sounds normal.  Patient has copious green liquid diarrhea on examination. Occult is negative. C. difficile is negative. Stool pathogen tests are pending. Patient has evidence of coffee-ground emesis on the sheet around him and his skin however he has not had any active vomiting here in the emergency department. He is given Protonix bolus and drip. Patient's hemoglobin is 9.5 which is actually increased from his last blood draw in February. Patient's BUN is 38 and creatinine is 0.6. CT shows thickening of the gastric antrum and proximal duodenum as well as wall thickening of the descending colon. Patient additionally has groundglass opacities in the right middle and lower lobes. Given that patient has had emesis this likely represents aspiration pneumonia. Patient will be treated with Rocephin and Flagyl both for the aspiration pneumonia as well as the colitis. I spoke with Dr. Clotilde Carrington with GI who states patient should be n.p.o. for possible procedure tomorrow. Patient will be admitted to Dr. Rashaun Renee who is covering admissions for Dr. Enrike Aaron. He accepts this patient for admission. FINAL IMPRESSION      1. Hematemesis, presence of nausea not specified    2. Colitis    3. Aspiration pneumonia of right lung, unspecified aspiration pneumonia type, unspecified part of lung [facility])          DISPOSITION/PLAN   DISPOSITION Admitted 03/28/2021 10:04:15 PM      PATIENT REFERREDTO:  No follow-up provider specified.     DISCHARGE MEDICATIONS:  New Prescriptions    No medications on file       DISCONTINUED MEDICATIONS:  Discontinued Medications    No medications on file              (Please note that portions of this note were completed with a voice recognition program.  Efforts were made to edit the dictations but occasionally words are mis-transcribed.)    Pj Mcdaniel PA-C (electronically signed)            Pj Mcdaniel PA-C  03/28/21 9390

## 2021-03-29 LAB
A/G RATIO: 1 (ref 1.1–2.2)
ALBUMIN SERPL-MCNC: 3.3 G/DL (ref 3.4–5)
ALP BLD-CCNC: 118 U/L (ref 40–129)
ALT SERPL-CCNC: 14 U/L (ref 10–40)
ANION GAP SERPL CALCULATED.3IONS-SCNC: 8 MMOL/L (ref 3–16)
AST SERPL-CCNC: 16 U/L (ref 15–37)
BILIRUB SERPL-MCNC: <0.2 MG/DL (ref 0–1)
BUN BLDV-MCNC: 34 MG/DL (ref 7–20)
C DIFF TOXIN/ANTIGEN: NORMAL
CALCIUM SERPL-MCNC: 8.6 MG/DL (ref 8.3–10.6)
CHLORIDE BLD-SCNC: 109 MMOL/L (ref 99–110)
CO2: 25 MMOL/L (ref 21–32)
CREAT SERPL-MCNC: <0.5 MG/DL (ref 0.8–1.3)
GFR AFRICAN AMERICAN: >60
GFR NON-AFRICAN AMERICAN: >60
GI BACTERIAL PATHOGENS BY PCR: NORMAL
GLOBULIN: 3.4 G/DL
GLUCOSE BLD-MCNC: 104 MG/DL (ref 70–99)
HCT VFR BLD CALC: 24.6 % (ref 40.5–52.5)
HCT VFR BLD CALC: 25.2 % (ref 40.5–52.5)
HCT VFR BLD CALC: 26.1 % (ref 40.5–52.5)
HCT VFR BLD CALC: 28.4 % (ref 40.5–52.5)
HEMOGLOBIN: 7.7 G/DL (ref 13.5–17.5)
HEMOGLOBIN: 8 G/DL (ref 13.5–17.5)
HEMOGLOBIN: 8.1 G/DL (ref 13.5–17.5)
HEMOGLOBIN: 8.8 G/DL (ref 13.5–17.5)
IRON SATURATION: 67 % (ref 20–50)
IRON: 256 UG/DL (ref 59–158)
POTASSIUM REFLEX MAGNESIUM: 4.2 MMOL/L (ref 3.5–5.1)
SODIUM BLD-SCNC: 142 MMOL/L (ref 136–145)
TOTAL IRON BINDING CAPACITY: 381 UG/DL (ref 260–445)
TOTAL PROTEIN: 6.7 G/DL (ref 6.4–8.2)

## 2021-03-29 PROCEDURE — 2580000003 HC RX 258: Performed by: INTERNAL MEDICINE

## 2021-03-29 PROCEDURE — 2500000003 HC RX 250 WO HCPCS: Performed by: INTERNAL MEDICINE

## 2021-03-29 PROCEDURE — 6360000002 HC RX W HCPCS: Performed by: INTERNAL MEDICINE

## 2021-03-29 PROCEDURE — 83540 ASSAY OF IRON: CPT

## 2021-03-29 PROCEDURE — 94660 CPAP INITIATION&MGMT: CPT

## 2021-03-29 PROCEDURE — 85018 HEMOGLOBIN: CPT

## 2021-03-29 PROCEDURE — 1200000000 HC SEMI PRIVATE

## 2021-03-29 PROCEDURE — 87505 NFCT AGENT DETECTION GI: CPT

## 2021-03-29 PROCEDURE — 2700000000 HC OXYGEN THERAPY PER DAY

## 2021-03-29 PROCEDURE — 6360000002 HC RX W HCPCS: Performed by: PHYSICIAN ASSISTANT

## 2021-03-29 PROCEDURE — 80053 COMPREHEN METABOLIC PANEL: CPT

## 2021-03-29 PROCEDURE — 87324 CLOSTRIDIUM AG IA: CPT

## 2021-03-29 PROCEDURE — 85014 HEMATOCRIT: CPT

## 2021-03-29 PROCEDURE — 6370000000 HC RX 637 (ALT 250 FOR IP): Performed by: INTERNAL MEDICINE

## 2021-03-29 PROCEDURE — 87328 CRYPTOSPORIDIUM AG IA: CPT

## 2021-03-29 PROCEDURE — C9113 INJ PANTOPRAZOLE SODIUM, VIA: HCPCS | Performed by: PHYSICIAN ASSISTANT

## 2021-03-29 PROCEDURE — 94761 N-INVAS EAR/PLS OXIMETRY MLT: CPT

## 2021-03-29 PROCEDURE — 94640 AIRWAY INHALATION TREATMENT: CPT

## 2021-03-29 PROCEDURE — C9113 INJ PANTOPRAZOLE SODIUM, VIA: HCPCS | Performed by: INTERNAL MEDICINE

## 2021-03-29 PROCEDURE — 87449 NOS EACH ORGANISM AG IA: CPT

## 2021-03-29 PROCEDURE — 36415 COLL VENOUS BLD VENIPUNCTURE: CPT

## 2021-03-29 PROCEDURE — 87336 ENTAMOEB HIST DISPR AG IA: CPT

## 2021-03-29 PROCEDURE — 83550 IRON BINDING TEST: CPT

## 2021-03-29 RX ORDER — PANTOPRAZOLE SODIUM 40 MG/10ML
40 INJECTION, POWDER, LYOPHILIZED, FOR SOLUTION INTRAVENOUS 2 TIMES DAILY
Status: DISCONTINUED | OUTPATIENT
Start: 2021-03-29 | End: 2021-03-31 | Stop reason: HOSPADM

## 2021-03-29 RX ADMIN — PANTOPRAZOLE SODIUM 40 MG: 40 INJECTION, POWDER, FOR SOLUTION INTRAVENOUS at 22:04

## 2021-03-29 RX ADMIN — LANSOPRAZOLE 30 MG: 30 TABLET, ORALLY DISINTEGRATING ORAL at 05:45

## 2021-03-29 RX ADMIN — METRONIDAZOLE 500 MG: 500 INJECTION, SOLUTION INTRAVENOUS at 09:05

## 2021-03-29 RX ADMIN — BACLOFEN 10 MG: 10 TABLET ORAL at 22:03

## 2021-03-29 RX ADMIN — CIPROFLOXACIN 400 MG: 2 INJECTION, SOLUTION INTRAVENOUS at 00:40

## 2021-03-29 RX ADMIN — LAMOTRIGINE 400 MG: 100 TABLET ORAL at 01:12

## 2021-03-29 RX ADMIN — QUETIAPINE FUMARATE 100 MG: 100 TABLET ORAL at 01:12

## 2021-03-29 RX ADMIN — QUETIAPINE FUMARATE 50 MG: 25 TABLET ORAL at 01:14

## 2021-03-29 RX ADMIN — SODIUM CHLORIDE: 9 INJECTION, SOLUTION INTRAVENOUS at 00:39

## 2021-03-29 RX ADMIN — METRONIDAZOLE 500 MG: 500 INJECTION, SOLUTION INTRAVENOUS at 01:58

## 2021-03-29 RX ADMIN — BACLOFEN 10 MG: 10 TABLET ORAL at 01:12

## 2021-03-29 RX ADMIN — LEVETIRACETAM 1500 MG: 500 SOLUTION ORAL at 01:12

## 2021-03-29 RX ADMIN — OXYCODONE HYDROCHLORIDE AND ACETAMINOPHEN 500 MG: 500 TABLET ORAL at 01:12

## 2021-03-29 RX ADMIN — LAMOTRIGINE 400 MG: 100 TABLET ORAL at 22:01

## 2021-03-29 RX ADMIN — IPRATROPIUM BROMIDE AND ALBUTEROL SULFATE 3 ML: .5; 3 SOLUTION RESPIRATORY (INHALATION) at 16:42

## 2021-03-29 RX ADMIN — POTASSIUM BICARBONATE 10 MEQ: 782 TABLET, EFFERVESCENT ORAL at 21:59

## 2021-03-29 RX ADMIN — METRONIDAZOLE 500 MG: 500 INJECTION, SOLUTION INTRAVENOUS at 16:41

## 2021-03-29 RX ADMIN — LEVETIRACETAM 1500 MG: 500 SOLUTION ORAL at 21:55

## 2021-03-29 RX ADMIN — PANTOPRAZOLE SODIUM 40 MG: 40 INJECTION, POWDER, FOR SOLUTION INTRAVENOUS at 10:30

## 2021-03-29 RX ADMIN — CIPROFLOXACIN 400 MG: 2 INJECTION, SOLUTION INTRAVENOUS at 12:05

## 2021-03-29 RX ADMIN — QUETIAPINE FUMARATE 50 MG: 25 TABLET ORAL at 22:02

## 2021-03-29 RX ADMIN — Medication 10 ML: at 22:04

## 2021-03-29 RX ADMIN — IPRATROPIUM BROMIDE AND ALBUTEROL SULFATE 3 ML: .5; 3 SOLUTION RESPIRATORY (INHALATION) at 20:23

## 2021-03-29 RX ADMIN — SODIUM CHLORIDE 8 MG/HR: 9 INJECTION, SOLUTION INTRAVENOUS at 02:56

## 2021-03-29 RX ADMIN — OXYCODONE HYDROCHLORIDE AND ACETAMINOPHEN 500 MG: 500 TABLET ORAL at 22:02

## 2021-03-29 RX ADMIN — IPRATROPIUM BROMIDE AND ALBUTEROL SULFATE 3 ML: .5; 3 SOLUTION RESPIRATORY (INHALATION) at 09:00

## 2021-03-29 RX ADMIN — QUETIAPINE FUMARATE 100 MG: 100 TABLET ORAL at 22:03

## 2021-03-29 RX ADMIN — MINERAL SUPPLEMENT IRON 300 MG / 5 ML STRENGTH LIQUID 100 PER BOX UNFLAVORED 300 MG: at 21:55

## 2021-03-29 RX ADMIN — POTASSIUM BICARBONATE 10 MEQ: 782 TABLET, EFFERVESCENT ORAL at 01:12

## 2021-03-29 RX ADMIN — SODIUM CHLORIDE: 9 INJECTION, SOLUTION INTRAVENOUS at 09:17

## 2021-03-29 RX ADMIN — SODIUM CHLORIDE 8 MG/HR: 9 INJECTION, SOLUTION INTRAVENOUS at 05:43

## 2021-03-29 RX ADMIN — IPRATROPIUM BROMIDE AND ALBUTEROL SULFATE 3 ML: .5; 3 SOLUTION RESPIRATORY (INHALATION) at 12:39

## 2021-03-29 RX ADMIN — MINERAL SUPPLEMENT IRON 300 MG / 5 ML STRENGTH LIQUID 100 PER BOX UNFLAVORED 300 MG: at 01:12

## 2021-03-29 NOTE — PLAN OF CARE
Problem: Non-Violent Restraints  Goal: Removal from restraints as soon as assessed to be safe  Outcome: Ongoing  Goal: No harm/injury to patient while restraints in use  Outcome: Ongoing  Goal: Patient's dignity will be maintained  Outcome: Ongoing     Problem: Skin Integrity:  Goal: Will show no infection signs and symptoms  Description: Will show no infection signs and symptoms  Outcome: Ongoing  Goal: Absence of new skin breakdown  Description: Absence of new skin breakdown  Outcome: Ongoing

## 2021-03-29 NOTE — CARE COORDINATION
Discharge Planning Assessment    RN/SW discharge planner met with patient/ (and family member) to discuss reason for admission, current living situation, and potential needs at the time of discharge     Demographics/Insurance verified Yes     Current type of dwelling: LTC resident at [place] [address]  Patient from ECF/SW confirmed with: Adama Tuttle [phone] to [address]     Living arrangements:LT     Level of function/Support: Dependent, MRDD     PCP:Xavier Christianson      Last Visit to PCP:Per facility      DME:none     Active with any community resources/agencies/skilled home care:     Medication compliance issues:per facility      Financial issues that could impact healthcare:none         Tentative discharge plan:Return to [place] [address]  Discussed and provided facilities of choice if transition to a skilled nursing facility is required at the time of discharge     N/A        Discussed with patient and/or family that on the day of discharge home tentative time of discharge will be between 10 AM and noon.     Transportation at the time of discharge:Transportation service    Slim HUBER, [address]

## 2021-03-29 NOTE — PROGRESS NOTES
Progress Note - Dr. Childress [facility] - Internal Medicine  PCP: Eliza Eller No address on file None    Hospital Day: 1  Code Status: Full Code  Current Diet: Diet NPO Effective Now Exceptions are: Ice Chips        CC: follow up on medical issues    Subjective:   Maricarmen Mills is a 79 y.o. male. He denies problems    Doing ok  hgb lower today,now 7.7 (was 9.5 yst)  Remains on iv abx  Awaiting gi eval      He does not give interval hx nor ros    I have reviewed the patient's medical and social history in detail and updated the computerized patient record. To recap: He  has a past medical history of Anemia, macrocytic, Anhidrosis, Anhidrosis, Bilateral cataracts, Bipolar affective ([address] Blood circulation, collateral, Cataract, Clostridium difficile diarrhea, Depression, Dermatophytosis, Drug induced hepatitis, GERD (gastroesophageal reflux disease), Hard of hearing, Impulse control disorder, Liver disease, Mental handicap, Movement disorder, Neurogenic bladder, Neuromuscular disorder ([address] NS (nuclear sclerosis), Nuclear sclerosis, Osteoarthritis, Periodontal disease, Pneumonia, Primary optic atrophy, Primary optic atrophy, PVD (peripheral vascular disease) ([address] Seizure ([address] Spastic hemiplegia, Tinea pedis, Unspecified diseases of blood and blood-forming organs, and Urinary incontinence. . He  has a past surgical history that includes Upper gastrointestinal endoscopy (1/24/13); Upper gastrointestinal endoscopy (4/26/13); other surgical history (4-30-13); Dilatation, esophagus; Endoscopy, colon, diagnostic; Colonoscopy; Abdomen surgery; Cystocopy (6/25/14); Abdominal exploration surgery (9-28-14); sigmoidoscopy (N/A, 5/2/2019); Colonoscopy (N/A, 6/4/2019); Upper gastrointestinal endoscopy (N/A, 10/11/2019); Stomach surgery (N/A, 4/21/2020); Upper gastrointestinal endoscopy (N/A, 8/22/2020); and Upper gastrointestinal endoscopy (N/A, 2/17/2021). Regulo Tavarez He  reports that he has never smoked.  He has never used smokeless tobacco. He reports that he does not drink alcohol or use drugs. .        Active Hospital Problems    Diagnosis Date Noted    Coffee ground emesis [K92.0] 03/28/2021    Suspected COVID-19 virus infection [Z20.822] 03/28/2021    Gastroduodenitis [K29.90] 03/28/2021    GI bleed [K92.2] 03/28/2021    Mental retardation [F79]     C[facility]bral [facility]y ([facility] [G80.9] 09/28/2014    Seizure disorder ([facility] [G40.909] 09/28/2014       Current Facility-Administered Medications: CertaVite/Antioxidants solution 30 mL, 30 mL, Per G Tube, Daily  potassium bicarb-citric acid (EFFER-K) effervescent tablet 10 mEq, 10 mEq, Per G Tube, BID  ascorbic acid (VITAMIN C) tablet 500 mg, 500 mg, Per G Tube, BID  QUEtiapine (SEROQUEL) tablet 50 mg, 50 mg, Per G Tube, BID  escitalopram (LEXAPRO) tablet 10 mg, 10 mg, Per G Tube, Daily  chlorhexidine (PERIDEX) 0.12 % solution 7.5 mL, 7.5 mL, Mouth/Throat, BID  doxazosin (CARDURA) tablet 4 mg, 4 mg, Per G Tube, Daily  lactulose (CHRONULAC) 10 GM/15ML solution 20 g, 20 g, Per G Tube, Daily  acetaminophen (TYLENOL) 160 MG/5ML solution 650 mg, 650 mg, Per G Tube, Q4H PRN  levETIRAcetam (KEPPRA) 100 MG/ML solution 1,500 mg, 1,500 mg, Per G Tube, BID  lamoTRIgine (LAMICTAL) tablet 400 mg, 400 mg, Per J Tube, BID  QUEtiapine (SEROQUEL) tablet 100 mg, 100 mg, Oral, Nightly  baclofen (LIORESAL) tablet 10 mg, 10 mg, Per G Tube, TID  Vitamin D (CHOLECALCIFEROL) tablet 2,000 Units, 2,000 Units, Per G Tube, Daily  senna (SENOKOT) tablet 17.2 mg, 2 tablet, Per G Tube, Daily  bisacodyl (DULCOLAX) EC tablet 10 mg, 10 mg, Oral, PRN  ferrous sulfate 300 (60 Fe) MG/5ML syrup 300 mg, 300 mg, Oral, BID  LORazepam (ATIVAN) tablet 0.5 mg, 0.5 mg, Oral, Q12H PRN  ipratropium-albuterol (DUONEB) nebulizer solution 3 mL, 3 mL, Inhalation, Q4H WA  diazePAM (DIASTAT) rectal gel 20 mg, 20 mg, Rectal, Once PRN  lansoprazole (PREVACID SOLUTAB) disintegrating tablet 30 mg, 30 mg, Per J Tube, BID AC  0.9 % sodium chloride infusion, , Intravenous, Continuous  sodium chloride flush 0.9 % injection 10 mL, 10 mL, Intravenous, 2 times per day  sodium chloride flush 0.9 % injection 10 mL, 10 mL, Intravenous, PRN  0.9 % sodium chloride infusion, 25 mL, Intravenous, PRN  promethazine (PHENERGAN) tablet 12.5 mg, 12.5 mg, Oral, Q6H PRN **OR** ondansetron (ZOFRAN) injection 4 mg, 4 mg, Intravenous, Q6H PRN  acetaminophen (TYLENOL) tablet 650 mg, 650 mg, Oral, Q6H PRN **OR** acetaminophen (TYLENOL) suppository 650 mg, 650 mg, Rectal, Q6H PRN  pantoprazole (PROTONIX) 80 mg in sodium chloride 0.9 % 100 mL infusion, 8 mg/hr, Intravenous, Continuous  ciprofloxacin (CIPRO) IVPB 400 mg, 400 mg, Intravenous, Q12H  metronidazole (FLAGYL) 500 mg in NaCl 100 mL IVPB premix, 500 mg, Intravenous, Q8H         Objective:  /63   Pulse 74   Temp 98.4 °F (36.9 °C) (Axillary)   Resp 14   Ht 5' 6\" (1.676 m)   Wt 159 lb 8 oz (72.3 kg)   SpO2 90%   BMI 25.74 kg/m²      Patient Vitals for the past 24 hrs:   BP Temp Temp src Pulse Resp SpO2 Height Weight   03/29/21 0405 -- -- -- -- 14 -- -- --   03/29/21 0324 105/63 98.4 °F (36.9 °C) Axillary 74 18 90 % -- --   03/29/21 0014 -- -- -- -- -- -- 5' 6\" (1.676 m) --   03/28/21 2337 (!) 157/72 98.3 °F (36.8 °C) Axillary 81 16 98 % -- 159 lb 8 oz (72.3 kg)   03/28/21 2115 134/83 98.5 °F (36.9 °C) -- 68 15 97 % -- --   03/28/21 1914 (!) 150/68 97.9 °F (36.6 °C) Oral 88 17 100 % -- --     Patient Vitals for the past 96 hrs (Last 3 readings):   Weight   03/28/21 2337 159 lb 8 oz (72.3 kg)         No intake or output data in the 24 hours ending 03/29/21 0839      Physical Exam:   /63   Pulse 74   Temp 98.4 °F (36.9 °C) (Axillary)   Resp 14   Ht 5' 6\" (1.676 m)   Wt 159 lb 8 oz (72.3 kg)   SpO2 90%   BMI 25.74 kg/m²   General appearance: alert, appears stated age and cooperative  Head: Normocephalic, without obvious abnormality, atraumatic  Lungs: clear to auscultation bilaterally  Heart: regular rate and rhythm, S1, S2 normal, no murmur, click, rub or gallop  Abdomen: soft, non-tender; bowel sounds normal; no masses,  no organomegaly  Extremities: extremities normal, atraumatic, no cyanosis or edema    Labs:  Lab Results   Component Value Date    WBC 8.3 03/28/2021    HGB 7.7 (L) 03/29/2021    HCT 24.6 (L) 03/29/2021     (H) 03/28/2021    TRIG 49 02/03/2021    ALT 14 03/29/2021    AST 16 03/29/2021     03/29/2021    K 4.2 03/29/2021     03/29/2021    CREATININE <0.5 (L) 03/29/2021    BUN 34 (H) 03/29/2021    CO2 25 03/29/2021    TSH 2.35 04/26/2013    INR 0.96 03/28/2021    LABMICR YES 02/17/2021     Lab Results   Component Value Date    TROPONINI <0.01 03/17/2021       Recent Imaging Results are Reviewed:  Ct Abdomen Pelvis Wo Contrast Additional Contrast? None    Result Date: 3/28/2021  EXAMINATION: CT OF THE ABDOMEN AND PELVIS WITHOUT CONTRAST 3/28/2021 7:28 pm TECHNIQUE: CT of the abdomen and pelvis was performed without the administration of intravenous contrast. Multiplanar reformatted images are provided for review. Dose modulation, iterative reconstruction, and/or weight based adjustment of the mA/kV was utilized to reduce the radiation dose to as low as reasonably achievable. COMPARISON: CT abdomen pelvis 02/16/2021. HISTORY: ORDERING SYSTEM PROVIDED HISTORY: coffee ground emesis, diarrhea TECHNOLOGIST PROVIDED HISTORY: Reason for exam:->coffee ground emesis, diarrhea Additional Contrast?->None Decision Support Exception->Emergency Medical Condition (MA) Reason for Exam: [facility]al Ble[facility], was recently here for same thing ) Acuity: Acute Type of Exam: Initial FINDINGS: Lower Chest: Ground-glass opacities in the right lower and middle lobes with suboptimal evaluation due to motion artifact. .  Coronary artery calcifications noted.  Organs: Evaluation of the parenchymal organs is limited due to lack of intravenous contrast. Liver: Unremarkable on this 3/17/2021  EXAMINATION: CT OF THE HEAD WITHOUT CONTRAST  3/17/2021 1:35 pm TECHNIQUE: CT of the head was performed without the administration of intravenous contrast. Dose modulation, iterative reconstruction, and/or weight based adjustment of the mA/kV was utilized to reduce the radiation dose to as low as reasonably achievable. COMPARISON: 03/20/2018 HISTORY: ORDERING SYSTEM PROVIDED HISTORY: head trauma suspected TECHNOLOGIST PROVIDED HISTORY: Reason for exam:->head trauma suspected Has a \"code stroke\" or \"stroke alert\" been called? ->No Decision Support Exception->Emergency Medical Condition (MA) Reason for Exam: head trauma suspected Acuity: Unknown Type of Exam: Unknown FINDINGS: BRAIN/VENTRICLES: Large area encephalomalacia right cerebral hemisphere is similar to previous exams. There is no evidence of intracranial hemorrhage, focal mass lesion, or acute ischemia. ORBITS: The visualized portion of the orbits demonstrate no acute abnormality. SINUSES: The visualized paranasal sinuses and mastoid air cells demonstrate no acute abnormality. SOFT TISSUES/SKULL:  There is a large right parieto-occipital scalp hematoma without underlying fracture. No acute intracranial abnormality. Large right parieto-occipital scalp hematoma     Ct Chest Pulmonary Embolism W Contrast    Result Date: 3/17/2021  EXAMINATION: CTA OF THE CHEST 3/17/2021 2:35 pm TECHNIQUE: CTA of the chest was performed after the administration of intravenous contrast.  Multiplanar reformatted images are provided for review. MIP images are provided for review. Dose modulation, iterative reconstruction, and/or weight based adjustment of the mA/kV was utilized to reduce the radiation dose to as low as reasonably achievable. COMPARISON: 10/17/2018.  HISTORY: ORDERING SYSTEM PROVIDED HISTORY: hypoxia shortness of breath TECHNOLOGIST PROVIDED HISTORY: Reason for exam:->hypoxia shortness of breath Decision Support Exception->Emergency Medical Condition (MA) Reason for Exam: hypoxia shortness of breath Acuity: Unknown Type of Exam: Unknown FINDINGS: Pulmonary Arteries: Pulmonary arteries are adequately opacified for evaluation. No evidence of intraluminal filling defect to suggest pulmonary embolism. Main pulmonary artery is normal in caliber. Mediastinum: The thoracic aorta is normal in course and caliber. 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 Mild cardiomegaly with coronary vascular calcification and no pericardial effusion. There is a large hiatal hernia. The esophagus is distended, with fluid and thickened esophageal wall. No pathologic mediastinal adenopathy. Lungs/pleura: There is patchy ground-glass opacification particularly in the dependent portions of the right upper and lower lobes as well as the right middle lobe. More mild changes are noted in the left perihilar region. Mild dependent atelectasis on the right. There is no pleural fluid or pneumothorax. The central airways are patent. Upper Abdomen: Left adrenal adenoma measuring 1.4 x 2.0 cm. The visualized upper abdominal viscera are otherwise unremarkable. Soft Tissues/Bones: No acute osseous or soft tissue abnormality. Bilateral gynecomastia. 1. No evidence of pulmonary embolic disease. 2. Patchy ground-glass opacification, right greater than left. Findings may be related to pulmonary edema but infiltrate including multifocal pneumonia is not excluded. 3. Atherosclerotic calcification in the aorta and coronary circulation. 4. Large hiatal hernia. Distended thick-walled esophagus with fluid. Findings may be related to reflux and reflux esophagitis. Consider referral to gastroenterology for further evaluation. Assessment and Plan:  Principal Problem:    Coffee ground emesis -Established problem. Stable. No recurrence noted by RN; h/h trending down  Plan: cont to trend h/h. Cont on ppi.  Gi to see  Active Problems:

## 2021-03-29 NOTE — PROGRESS NOTES
Shift assessment complete. Vital signs obtained. Pt NPO, meds held at this time. Flagyl currently infusing through IV. Pt does not appear to be in distress. Non-violent restraint / mitten in place on R hand, no sign of redness or trauma noted. Pt repositioned in bed, brief checked and dry. Pt's brother Sharma Mcardle at bedside. Pt expresses no further needs at this time. Call light in reach. Fall precautions in place.

## 2021-03-29 NOTE — CONSULTS
Gastroenterology Consult Note        Patient: Courtney Sanchez  : 1950  Acct#:      Date:  3/29/2021    Subjective:       History of Present Illness  Patient is a 79 y.o.  male admitted with GI bleed [K92.2] who is seen in consult for coffee ground emesis. H/o mental disability and lives at an [facility]. He is unable to provide history. He has a J-tube in place. He has been seen by our group several times in the past for coffee ground emesis and ulcerative esophagitis. Multiple admissions this year for aspiration pneumonia and coffee ground emesis. Had EGD 2021 with large hiatal hernia and no bleeding lesions. Admitted again 2 weeks ago for the same. His brother initially decided on hospice and then changed mind and pt is full code. He was brought to the ED for coffee ground emesis. Dx with pneumonia ( RML and RLL per CT). No vomiting here. Per report, he had diarrhea at [facility] and was noted to have green stool in the ED. No diarrhea after admission per RN.    No PPI noted on med list.       Past Medical History:   Diagnosis Date    Anemia, macrocytic     Anhidrosis     Anhidrosis     Bilateral cataracts     Bipolar affective ([address]     Blood circulation, collateral     unspecified PVD    Cataract     Clostridium difficile diarrhea 3/7/15    Depression     Dermatophytosis     Drug induced hepatitis     GERD (gastroesophageal reflux disease)     Hard of hearing     Impulse control disorder     Liver disease     drug induced    Mental handicap     Movement disorder     Neurogenic bladder     Neuromuscular disorder (HCC)     spastic hemiplegia, MDS,neurogenic bladder    NS (nuclear sclerosis)     Nuclear sclerosis     Osteoarthritis     Periodontal disease     Pneumonia 2013    Primary optic atrophy     Primary optic atrophy     PVD (peripheral vascular disease) ([address]     Seizure ([address]     last seizure 19    Spastic hemiplegia     Tinea pedis     Unspecified diseases of blood and blood-forming organs     anemia    Urinary incontinence       Past Surgical History:   Procedure Laterality Date    ABDOMEN SURGERY      can see scar/details unknown, feeding tube    ABDOMINAL EXPLORATION SURGERY  14    LAPAROTOMY EXPLORATORY, ERIKA-EN-Y TUBE JEJUNOSTOMY, SMALL BOWEL RESECTION                                              COLONOSCOPY      COLONOSCOPY N/A 2019    COLONOSCOPY WITH BIOPSY performed by Claudene Bloodgood, MD at [address]  14    with dilitation    DILATATION, ESOPHAGUS      ENDOSCOPY, COLON, DIAGNOSTIC      OTHER SURGICAL HISTORY  13    JEJUNOSTOMY TUBE INSERTION    SIGMOIDOSCOPY N/A 2019    SIGMOIDOSCOPY DIAGNOSTIC FLEXIBLE performed by Claudene Bloodgood, MD at [facility] N/A 2020    REPLACEMENT, JEJUNOSTOMY TUBE performed by Claudene Bloodgood, MD at [address] [place] [address]  13    PEG placement    UPPER GASTROINTESTINAL ENDOSCOPY  13    UPPER GASTROINTESTINAL ENDOSCOPY N/A 10/11/2019    EGD DILATION BALLOON performed by Claudene Bloodgood, MD at [address] [place] [address] N/A 2020    EGD BIOPSY performed by Jacobo Landis MD at [address] [place] [address] N/A 2021    EGD DIAGNOSTIC ONLY performed by Claudene Bloodgood, MD at [address] [place] [address]      Past Endoscopic History  EGD with Dr Estefania Ochoa 2021 for coffee ground emesis  IMPRESSION : Narrow caliber esophagus  Benign distal esophageal ring  12cm hiatal hernia  distal esophageal diverticulum. .  The esophagus was dilated by the endoscope passage  Otherwise normal EGD. no source of potential GI  bleeding seen.       Admission Meds  No current facility-administered medications on file prior to encounter.       Current Outpatient Medications on File Prior to Encounter   Medication Sig Dispense Refill    LORazepam (ATIVAN) 0.5 MG tablet Take 1 tablet by mouth every 12 hours as needed for Anxiety for up to 30 days. For seizures up to 7 days 10 tablet 0    ipratropium-albuterol (DUONEB) 0.5-2.5 (3) MG/3ML SOLN nebulizer solution Inhale 3 mLs into the lungs every 4 hours (while awake) 360 mL 0    diazePAM (DIASTAT) 10 MG GEL Place 20 mg rectally once as needed (seizures lasting over 5 minutes) for up to 1 dose. 15 each 0    promethazine (PHENERGAN) 12.5 MG tablet Take 12.5 mg by mouth every 6 hours as needed for Nausea      ferrous sulfate 220 (44 Fe) MG/5ML solution Take 220 mg by mouth 2 times daily      bisacodyl (DULCOLAX) 5 MG EC tablet Take 10 mg by mouth as needed (No bowel movement in 48 hours)      nystatin (MYCOSTATIN) 762199 UNIT/GM cream Apply topically 2 times daily as needed (yeast infections)      senna (SENOKOT) 8.6 MG tablet 2 tablets by Per G Tube route daily       Cholecalciferol (VITAMIN D3) 2000 units CAPS 2,000 Units by Per G Tube route daily       zinc oxide 20 % ointment Apply topically 2 times daily       bisacodyl (DULCOLAX) 10 MG suppository Place 10 mg rectally daily as needed If no BM in 48 hours      baclofen (LIORESAL) 10 MG tablet 15 mg by Per G Tube route 3 times daily       lamoTRIgine (LAMICTAL) 150 MG tablet 400 mg by Per J Tube route 2 times daily       QUEtiapine (SEROQUEL) 100 MG tablet Take 100 mg by mouth nightly Give 50 mg twice daily at 5:30 am/5:30 pm. Give 100 mg at bedtime.       acetaminophen (TYLENOL) 160 MG/5ML solution 20.3 mLs by Per G Tube route every 4 hours as needed for Fever or Pain 473 mL 3    levETIRAcetam (KEPPRA) 100 MG/ML solution 15 mLs by Per G Tube route 2 times daily 1 Bottle 3    lactulose (CHRONULAC) 10 GM/15ML solution 20 g by Per G Tube route daily       doxazosin (CARDURA) 4 MG tablet 4 mg by Per G Tube route daily       Multiple Vitamins-Minerals (CERTA-KENTON) liquid 30 mLs by Per G Tube route daily       potassium chloride (KAYCIEL) 20 MEQ/15ML (10%) solution 10 mEq by Per G Tube route 2 times daily       ascorbic acid (VITAMIN C) 500 MG tablet 500 mg by Per G Tube route 2 times daily.  QUEtiapine (SEROQUEL) 100 MG tablet 50 mg by Per G Tube route 2 times daily Give 50 mg twice daily at 5:30 am/5:30 pm. Give 100 mg at bedtime.  escitalopram (LEXAPRO) 10 MG tablet 10 mg by Per G Tube route daily.  chlorhexidine (PERIDEX) 0.12 % solution Take 7.5 mLs by mouth 2 times daily. Allergies  Allergies   Allergen Reactions    Biaxin [Clarithromycin] Other (See Comments)     Unknown reaction    Invanz [Ertapenem] Other (See Comments)     Caused SEIZURES  Caused SEIZURES      Social   Social History     Tobacco Use    Smoking status: Never Smoker    Smokeless tobacco: Never Used   Substance Use Topics    Alcohol use: No        History reviewed. No pertinent family history. Review of Systems  Review of systems not obtained due to patient factors. Physical Exam  Blood pressure (!) 156/82, pulse 61, temperature 97.9 °F (36.6 °C), temperature source Axillary, resp. rate 18, height 5' 6\" (1.676 m), weight 159 lb 8 oz (72.3 kg), SpO2 97 %. General appearance: pale, alert, cooperative, no distress, appears stated age  Eyes: Anicteric  Head: Normocephalic, without obvious abnormality  Lungs: diminished right base  Heart: regular rate and rhythm, normal S1 and S2, no murmurs or rubs  Abdomen: soft, non-tender. Bowel sounds normal. No masses,  no organomegaly. multiple scars. J-tube in left abdomen.    Extremities: atraumatic, no cyanosis or edema  Skin: warm and dry, no jaundice  Neuro: Grossly intact, A&OX3  Musculoskeletal: 5/5  strength BUE      Data Review:    Recent Labs     03/28/21 1940 03/29/21  0433   WBC 8.3  --    HGB 9.5* 7.7*   HCT 30.6* 24.6*   MCV 90.3  --    *  --      Recent Labs     03/28/21 1940 03/29/21  0623    142   K 4.5 4.2    109   CO2 27 25   BUN 38* 34*   CREATININE 0.6* <0.5* Recent Labs     03/28/21 1940 03/29/21  0623   AST 22 16   ALT 17 14   BILITOT <0.2 <0.2   ALKPHOS 142* 118     Recent Labs     03/28/21 1940   LIPASE 52.0     Recent Labs     03/28/21 1940   PROTIME 11.1   INR 0.96     No results for input(s): PTT in the last 72 hours. No results for input(s): OCCULTBLD in the last 72 hours. Imaging Studies:                 CT-scan of abdomen and pelvis wo contrast 3/28/21  Impression   1. Wall thickening of the gastric antrum and proximal duodenum.  Correlate   with presentation for gastroduodenitis. 2. Wall thickening versus incomplete distention of the descending colon. Colitis remains in the differential.   3. Ground-glass opacities in the right middle and lower lobes.  Correlate   with presentation for aspiration or pneumonia. 4. Other findings as described.                          Assessment:     Principal Problem:    Coffee ground emesis  Active Problems:    Cerebral palsy (HCC)    Seizure disorder ([address]    Mental retardation    Suspected COVID-19 virus infection    Gastroduodenitis    GI bleed  Resolved Problems:    * No resolved hospital problems. *    Coffee ground emesis - h/o ulcerative esophagitis (none at last EGD 2/2021) and large hiatal hernia. CT with wall thickening of the gastric antrum and proximal duodenum but motion artifact. Acute on chronic anemia - hgb stable from recent admission at 7.7. Pneumonia  Diarrhea - noted at [facility]. None here. CT with wall thickening of the descending colon which could indicate colitis vs incomplete distention. Recommendations:   - PPI 40 mg IV BID  - monitor hgb  - hold off on endoscopic eval with recent EGD and no gross GI bleeding currently. - Check stool for C.diff, GI bacterial pathogens PCR, and EIA for parasites if has diarrhea. Discussed with Dr. Pallavi Luna PA-C  [facility]    I have personally performed a face to face diagnostic evaluation on this patient.   I have interviewed and

## 2021-03-29 NOTE — PROGRESS NOTES
Admission assessment complete. VSS. Pt. oriented to the room. Call light within reach. Pt.'s brother/HPOA at bedside. Fall precautions in place, bed alarm engaged. No further needs expressed at this time.

## 2021-03-29 NOTE — PROGRESS NOTES
Comprehensive Nutrition Assessment    Type and Reason for Visit:  Initial    Nutrition Recommendations/Plan:   1. Recommend 2 Gagan HN (fluid restricted formula) at goal rate 40 mL per hour to provide 960 mL total volume, 1920 calories, 80 grams protein, 672 mL free water. 2. Recommend water bolus 135 mL q 4 hours. 3. It is not recommended to check residuals with a J-tube. The jejunum does not have a reservoir like the stomach and checking residuals through the small J-tube increases risk for occlusion. To assess intolerance check for abdominal distention. Nutrition Assessment:  Pt is adequately nourished upon admission as tube feeds were meeting pt's nutrition needs prior to admission. Pt typically receives Nutren 2.0 at goal rate 65 mL per hour x 15 hours. Weight has been stable per hx in EMR. Pt admitted with coffee ground emesis; no plans for intervention at this time per GI. OK to start tube feeds per Dr. Samira Marquis. Will order 2 gagan HN, which is the hospital equivalent; pt has tolerated this well at previous admissions. Malnutrition Assessment:  Malnutrition Status:  No malnutrition      Estimated Daily Nutrient Needs:  Energy (kcal):  6778-8220; Weight Used for Energy Requirements:  Current(72 kg)     Protein (g):  78-98 grams; Weight Used for Protein Requirements:  Ideal(65 kg; 1.2-1.5 grams per kg)        Fluid (ml/day):   ; Method Used for Fluid Requirements:  1 ml/kcal      Nutrition Related Findings:  Generalized trace edema. Wounds:  None       Current Nutrition Therapies:    Diet NPO Effective Now    Anthropometric Measures:  · Height: 5' 6\" (167.6 cm)  · Current Body Weight: 159 lb (72.1 kg)   · Ideal Body Weight: 142 lbs  · BMI: 25.7  · BMI Categories: Overweight (BMI 25.0-29. 9)       Nutrition Diagnosis:   · Inadequate oral intake related to swallowing difficulty as evidenced by NPO or clear liquid status due to medical condition, nutrition support - enteral nutrition      Nutrition Interventions:   Food and/or Nutrient Delivery:  Start Tube Feeding  Nutrition Education/Counseling:  Education not indicated   Coordination of Nutrition Care:  Continue to monitor while inpatient    Goals:  Pt will tolerate EN at goal       Nutrition Monitoring and Evaluation:   Behavioral-Environmental Outcomes:  None Identified   Food/Nutrient Intake Outcomes:  Enteral Nutrition Intake/Tolerance  Physical Signs/Symptoms Outcomes:  Nausea or Vomiting, GI Status     Discharge Planning:    Enteral Nutrition     Electronically signed by Mary Rose RD, LD on 3/29/21 at 2:32 PM EDT    Contact: [phone]

## 2021-03-29 NOTE — PROGRESS NOTES
4 Eyes Skin Assessment     NAME:  Sheila Calabrese  YOB: 1950  MEDICAL RECORD NUMBER:  [id number]    The patient is being assess for  Admission    I agree that 2 RN's have performed a thorough Head to Toe Skin Assessment on the patient. ALL assessment sites listed below have been assessed. Areas assessed by both nurses:    Head, Face, Ears, Shoulders, Back, Chest, Arms, Elbows, Hands, Sacrum. Buttock, Coccyx, Ischium and Legs. Feet and Heels        Does the Patient have a Wound?  No noted wound(s)       Cheikh Prevention initiated:  YES   Wound Care Orders initiated:  NO  Pressure Injury (Stage 3,4, Unstageable, DTI, NWPT, and Complex wounds) if present place consult order under [de-identified] NO    New and Established Ostomies if present place consult order under : NO    Nurse 1 eSignature: Electronically signed by Carolina De La Fuente RN on 3/29/21 at 12:20 AM EDT    **SHARE this note so that the co-signing nurse is able to place an eSignature**    Nurse 2 eSignature: Electronically signed by Heidi Correa RN on 3/29/21 at 2:13 AM EDT

## 2021-03-29 NOTE — H&P
History and Physical  Dr. Roxanna Post  3/28/2021    PCP: Remington Ham    Cc:   Chief Complaint   Patient presents with    Rectal Bleeding     in from [facility] by EMS, for GI bleed, was recently here for same thing        HPI:  Omar Andrade is a 79 y.o. male who has a past medical history of Anemia, macrocytic, Anhidrosis, Anhidrosis, Bilateral cataracts, Bipolar affective ([address] Blood circulation, collateral, Cataract, Clostridium difficile diarrhea, Depression, Dermatophytosis, Drug induced hepatitis, GERD (gastroesophageal reflux disease), Hard of hearing, Impulse control disorder, Liver disease, Mental handicap, Movement disorder, Neurogenic bladder, Neuromuscular disorder ([address] NS (nuclear sclerosis), Nuclear sclerosis, Osteoarthritis, Periodontal disease, Pneumonia, Primary optic atrophy, Primary optic atrophy, PVD (peripheral vascular disease) ([address] Seizure ([address] Spastic hemiplegia, Tinea pedis, Unspecified diseases of blood and blood-forming organs, and Urinary incontinence. Patient presents with Coffee ground emesis. HPI     79 y.o. male patient presents emergency department for evaluation of possible GI bleed. Patient was noted to have coffee-ground emesis at [facility] where he lives earlier today. Patient does have evidence of this on the sheet that comes with him. Patient also has copious liquid diarrhea. Patient has no abdominal complaints at this time. Patient has a feeding tube in place. Cannot get further hx / ROS from Pt. Call placed to [address] [place] [address] for more info. Patient has had multiple recent admissions for upper GI bleed in the past few months. Chart in Epic reviewed         Problem list of hospitalization thus far:   Active Hospital Problems    Diagnosis    Coffee ground emesis [K92.0]    Suspected COVID-19 virus infection [Z20.822]    Gastroduodenitis [K29.90]    Mental retardation [F79]    Cerebral palsy ([address] [G80.9]    Seizure disorder ([address] [R85.452] 4/21/2020    REPLACEMENT, JEJUNOSTOMY TUBE performed by Precious Hugo MD at [address]  1/24/13    PEG placement    UPPER GASTROINTESTINAL ENDOSCOPY  4/26/13    UPPER GASTROINTESTINAL ENDOSCOPY N/A 10/11/2019    EGD DILATION BALLOON performed by Precious Hugo MD at [facility] ENDOSCOPY N/A 8/22/2020    EGD BIOPSY performed by Dayan Hidalgo MD at [facility] ENDOSCOPY N/A 2/17/2021    EGD DIAGNOSTIC ONLY performed by Precious Hugo MD at [address] History:   Social History     Tobacco History     Smoking Status  Never Smoker    Smokeless Tobacco Use  Never Used          Alcohol History     Alcohol Use Status  No          Drug Use     Drug Use Status  No Comment  unknown          Sexual Activity     Sexually Active  Not Currently                Fam History: History reviewed. No pertinent family history. PFSH: The above PMHx, PSHx, SocHx, FamHx has been reviewed by myself. (1 area for detailed, 2-3 for comprehensive)      Code Status: Prior    Meds - following list of home medications fromelectronic chart has been reviewed by myself  Prior to Admission medications    Medication Sig Start Date End Date Taking? Authorizing Provider   LORazepam (ATIVAN) 0.5 MG tablet Take 1 tablet by mouth every 12 hours as needed for Anxiety for up to 30 days. For seizures up to 7 days 3/20/21 4/19/21  Osmani Montoya MD   ipratropium-albuterol (DUONEB) 0.5-2.5 (3) MG/3ML SOLN nebulizer solution Inhale 3 mLs into the lungs every 4 hours (while awake) 3/20/21   Osmani Montoya MD   diazePAM (DIASTAT) 10 MG GEL Place 20 mg rectally once as needed (seizures lasting over 5 minutes) for up to 1 dose.  3/20/21 3/20/21  Osmani Montoya MD   lansoprazole (PREVACID SOLUTAB) 30 MG disintegrating tablet 1 tablet by Per J Tube route 2 times daily (before meals) 3/20/21   Osmani Montoya MD G Tube route daily     Historical Provider, MD   potassium chloride (KAYCIEL) 20 MEQ/15ML (10%) solution 10 mEq by Per G Tube route 2 times daily     Historical Provider, MD   ascorbic acid (VITAMIN C) 500 MG tablet 500 mg by Per G Tube route 2 times daily. Historical Provider, MD   QUEtiapine (SEROQUEL) 100 MG tablet 50 mg by Per G Tube route 2 times daily Give 50 mg twice daily at 5:30 am/5:30 pm. Give 100 mg at bedtime. Historical Provider, MD   escitalopram (LEXAPRO) 10 MG tablet 10 mg by Per G Tube route daily. Historical Provider, MD   chlorhexidine (PERIDEX) 0.12 % solution Take 7.5 mLs by mouth 2 times daily. Historical Provider, MD         Allergies   Allergen Reactions    Biaxin [Clarithromycin] Other (See Comments)     Unknown reaction    Invanz [Ertapenem] Other (See Comments)     Caused SEIZURES  Caused SEIZURES             EXAM: (2-7 system for EPF/Detailed, ?8 for Comprehensive)  BP (!) 150/68   Pulse 88   Temp 97.9 °F (36.6 °C) (Oral)   Resp 17   SpO2 100%   Constitutional: vitals as above: appears stated age  Head: Normocephalic, without obvious abnormality, atraumatic  Eyes:lids and lashes normal, conjunctivae and sclerae normal and pupils equal, round, reactive to light and accomodation  EMNT: external ears normal, lps normal  Neck: no JVD, supple, symmetrical, trachea midline and thyroid not enlarged, symmetric, no tenderness/mass/nodules   Respiratory: clear to auscultation without wheezes or rales  Cardiovascular: normal rate, regular rhythm, normal S1 and S2 and no carotid bruits  Gastrointestinal: soft, non-tender, non-distended, normal bowel sounds, no masses or organomegaly  Lymphatic:   Extremities: no edema, no clubbing   Skin:No rashes or nodules noted.   Neurologic:    LABS:  Labs Reviewed   CBC WITH AUTO DIFFERENTIAL - Abnormal; Notable for the following components:       Result Value    RBC 3.39 (*)     Hemoglobin 9.5 (*)     Hematocrit 30.6 (*)     RDW 18.1 (*) Performed at:  [facility]  [address],  [place], [place]   Phone [phone]   TYPE AND SCREEN    Narrative:     Performed at:  [facility]  [address], [address] [place]   Phone [phone]         IMAGING:  Imaging results from the ER have been reviewed in the computerized chart. Ct Abdomen Pelvis Wo Contrast Additional Contrast? None    Result Date: 3/28/2021  EXAMINATION: CT OF THE ABDOMEN AND PELVIS WITHOUT CONTRAST 3/28/2021 7:28 pm TECHNIQUE: CT of the abdomen and pelvis was performed without the administration of intravenous contrast. Multiplanar reformatted images are provided for review. Dose modulation, iterative reconstruction, and/or weight based adjustment of the mA/kV was utilized to reduce the radiation dose to as low as reasonably achievable. COMPARISON: CT abdomen pelvis 02/16/2021. HISTORY: ORDERING SYSTEM PROVIDED HISTORY: coffee ground emesis, diarrhea TECHNOLOGIST PROVIDED HISTORY: Reason for exam:->coffee ground emesis, diarrhea Additional Contrast?->None Decision Support Exception->Emergency Medical Condition (MA) Reason for Exam: Rectal Bleeding (in from [facility] by [facility], for GI bleed, was recently here for same thing ) Acuity: Acute Type of Exam: Initial FINDINGS: Lower Chest: Ground-glass opacities in the right lower and middle lobes with suboptimal evaluation due to motion artifact. .  Coronary artery calcifications noted. Organs: Evaluation of the parenchymal organs is limited due to lack of intravenous contrast. Liver: Unremarkable on this unenhanced exam. Spleen: Unremarkable on this unenhanced exam. Granulomas. Pancreas: No inflammatory changes appreciated on this unenhanced exam. Adrenal glands: Nodular adrenal glands with suboptimal evaluation due to motion artifact.  Kidneys: Unremarkable on this unenhanced exam. No renal, ureteral, or bladder calculi. Gallbladder: Incompletely distended. GI/Bowel: Evaluation of the bowel and mesentery is limited due to lack of enteric contrast. Visualized esophagus/stomach: Large hiatal hernia containing the majority of the stomach and a portion of pancreas. Wall thickening in the region of the gastric antrum and proximal duodenum with suboptimal evaluation due to motion artifact and lack of contrast. Small bowel: No dilated small bowel. Prominent loops of small bowel in the upper abdomen may be due to peristalsis. Large bowel: Scattered colonic diverticula without adjacent stranding. Wall thickening versus incomplete distention of the descending colon. Appendix: Normal. Pelvis: Bladder is incompletely distended. Prostate and seminal vesicles appear unremarkable. Peritoneum/Retroperitoneum: Adenopathy: Suboptimal evaluation for adenopathy due to lack of intravenous and enteric contrast. Abdominal aorta: No abdominal aortic aneurysm. Free fluid/air: No free fluid. No free air. Bones/Soft Tissues: Percutaneous tube with tip in the mid to distal small bowel. Scattered degenerative changes noted in the visualized spine without spondylolisthesis. Osteonecrosis of the femoral heads. 1. Wall thickening of the gastric antrum and proximal duodenum. Correlate with presentation for gastroduodenitis. 2. Wall thickening versus incomplete distention of the descending colon. Colitis remains in the differential. 3. Ground-glass opacities in the right middle and lower lobes. Correlate with presentation for aspiration or pneumonia. 4. Other findings as described. Ct Head Wo Contrast    Result Date: 3/17/2021  EXAMINATION: CT OF THE HEAD WITHOUT CONTRAST  3/17/2021 1:35 pm TECHNIQUE: CT of the head was performed without the administration of intravenous contrast. Dose modulation, iterative reconstruction, and/or weight based adjustment of the mA/kV was utilized to reduce the radiation dose to as low as reasonably achievable. normal in course and caliber. 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 Mild cardiomegaly with coronary vascular calcification and no pericardial effusion. There is a large hiatal hernia. The esophagus is distended, with fluid and thickened esophageal wall. No pathologic mediastinal adenopathy. Lungs/pleura: There is patchy ground-glass opacification particularly in the dependent portions of the right upper and lower lobes as well as the right middle lobe. More mild changes are noted in the left perihilar region. Mild dependent atelectasis on the right. There is no pleural fluid or pneumothorax. The central airways are patent. Upper Abdomen: Left adrenal adenoma measuring 1.4 x 2.0 cm. The visualized upper abdominal viscera are otherwise unremarkable. Soft Tissues/Bones: No acute osseous or soft tissue abnormality. Bilateral gynecomastia. 1. No evidence of pulmonary embolic disease. 2. Patchy ground-glass opacification, right greater than left. Findings may be related to pulmonary edema but infiltrate including multifocal pneumonia is not excluded. 3. Atherosclerotic calcification in the aorta and coronary circulation. 4. Large hiatal hernia. Distended thick-walled esophagus with fluid. Findings may be related to reflux and reflux esophagitis. Consider referral to gastroenterology for further evaluation. MEDICAL DECISION MAKING:    Principal Problem:    Coffee ground emesis -Established problem. Stable. Has had hx of recurrent admits for similar. Plan: admit. Iv ppi. ivf ordered. Ask gi to see. Trend h/h  Active Problems:    Cerebral palsy ([address] -Established problem. Stable. Plan: Continue present orders/plan. Seizure disorder ([address] -Established problem. Stable. No recurrence while here  Plan: Continue present orders/plan. Mental retardation    Suspected COVID-19 virus infection -Established problem. Stable.     Plan:

## 2021-03-30 LAB
ANION GAP SERPL CALCULATED.3IONS-SCNC: 6 MMOL/L (ref 3–16)
BASOPHILS ABSOLUTE: 0.1 K/UL (ref 0–0.2)
BASOPHILS RELATIVE PERCENT: 1.3 %
BUN BLDV-MCNC: 25 MG/DL (ref 7–20)
CALCIUM SERPL-MCNC: 8.2 MG/DL (ref 8.3–10.6)
CHLORIDE BLD-SCNC: 106 MMOL/L (ref 99–110)
CO2: 24 MMOL/L (ref 21–32)
CREAT SERPL-MCNC: <0.5 MG/DL (ref 0.8–1.3)
CRYPTOSPORIDIUM ANTIGEN STOOL: NORMAL
E HISTOLYTICA ANTIGEN STOOL: NORMAL
EOSINOPHILS ABSOLUTE: 0.3 K/UL (ref 0–0.6)
EOSINOPHILS RELATIVE PERCENT: 4.9 %
GFR AFRICAN AMERICAN: >60
GFR NON-AFRICAN AMERICAN: >60
GIARDIA ANTIGEN STOOL: NORMAL
GLUCOSE BLD-MCNC: 131 MG/DL (ref 70–99)
GLUCOSE BLD-MCNC: 98 MG/DL (ref 70–99)
HCT VFR BLD CALC: 24.7 % (ref 40.5–52.5)
HCT VFR BLD CALC: 25.1 % (ref 40.5–52.5)
HCT VFR BLD CALC: 25.2 % (ref 40.5–52.5)
HCT VFR BLD CALC: 26.6 % (ref 40.5–52.5)
HEMOGLOBIN: 7.8 G/DL (ref 13.5–17.5)
HEMOGLOBIN: 7.9 G/DL (ref 13.5–17.5)
HEMOGLOBIN: 8 G/DL (ref 13.5–17.5)
HEMOGLOBIN: 8.3 G/DL (ref 13.5–17.5)
LYMPHOCYTES ABSOLUTE: 1.2 K/UL (ref 1–5.1)
LYMPHOCYTES RELATIVE PERCENT: 22 %
MCH RBC QN AUTO: 27.8 PG (ref 26–34)
MCHC RBC AUTO-ENTMCNC: 31.3 G/DL (ref 31–36)
MCV RBC AUTO: 89 FL (ref 80–100)
MONOCYTES ABSOLUTE: 0.6 K/UL (ref 0–1.3)
MONOCYTES RELATIVE PERCENT: 10.5 %
NEUTROPHILS ABSOLUTE: 3.3 K/UL (ref 1.7–7.7)
NEUTROPHILS RELATIVE PERCENT: 61.3 %
PDW BLD-RTO: 17.9 % (ref 12.4–15.4)
PERFORMED ON: ABNORMAL
PLATELET # BLD: 548 K/UL (ref 135–450)
PMV BLD AUTO: 7.1 FL (ref 5–10.5)
POTASSIUM SERPL-SCNC: 3.9 MMOL/L (ref 3.5–5.1)
RBC # BLD: 2.82 M/UL (ref 4.2–5.9)
SODIUM BLD-SCNC: 136 MMOL/L (ref 136–145)
WBC # BLD: 5.4 K/UL (ref 4–11)

## 2021-03-30 PROCEDURE — C9113 INJ PANTOPRAZOLE SODIUM, VIA: HCPCS | Performed by: PHYSICIAN ASSISTANT

## 2021-03-30 PROCEDURE — 85014 HEMATOCRIT: CPT

## 2021-03-30 PROCEDURE — 85018 HEMOGLOBIN: CPT

## 2021-03-30 PROCEDURE — 1200000000 HC SEMI PRIVATE

## 2021-03-30 PROCEDURE — 2580000003 HC RX 258: Performed by: INTERNAL MEDICINE

## 2021-03-30 PROCEDURE — 80048 BASIC METABOLIC PNL TOTAL CA: CPT

## 2021-03-30 PROCEDURE — 6360000002 HC RX W HCPCS: Performed by: PHYSICIAN ASSISTANT

## 2021-03-30 PROCEDURE — 94761 N-INVAS EAR/PLS OXIMETRY MLT: CPT

## 2021-03-30 PROCEDURE — 6370000000 HC RX 637 (ALT 250 FOR IP): Performed by: INTERNAL MEDICINE

## 2021-03-30 PROCEDURE — 2500000003 HC RX 250 WO HCPCS: Performed by: INTERNAL MEDICINE

## 2021-03-30 PROCEDURE — 94640 AIRWAY INHALATION TREATMENT: CPT

## 2021-03-30 PROCEDURE — 36415 COLL VENOUS BLD VENIPUNCTURE: CPT

## 2021-03-30 PROCEDURE — 85025 COMPLETE CBC W/AUTO DIFF WBC: CPT

## 2021-03-30 PROCEDURE — 6360000002 HC RX W HCPCS: Performed by: INTERNAL MEDICINE

## 2021-03-30 PROCEDURE — 94660 CPAP INITIATION&MGMT: CPT

## 2021-03-30 RX ORDER — SODIUM PHOSPHATE, DIBASIC AND SODIUM PHOSPHATE, MONOBASIC 7; 19 G/133ML; G/133ML
1 ENEMA RECTAL
Status: ON HOLD | COMMUNITY
End: 2021-04-13 | Stop reason: HOSPADM

## 2021-03-30 RX ORDER — PANTOPRAZOLE SODIUM 40 MG/1
40 TABLET, DELAYED RELEASE ORAL DAILY
COMMUNITY

## 2021-03-30 RX ORDER — DIAZEPAM 10 MG/2ML
15 GEL RECTAL
COMMUNITY

## 2021-03-30 RX ADMIN — IPRATROPIUM BROMIDE AND ALBUTEROL SULFATE 3 ML: .5; 3 SOLUTION RESPIRATORY (INHALATION) at 20:50

## 2021-03-30 RX ADMIN — CIPROFLOXACIN 400 MG: 2 INJECTION, SOLUTION INTRAVENOUS at 11:27

## 2021-03-30 RX ADMIN — OXYCODONE HYDROCHLORIDE AND ACETAMINOPHEN 500 MG: 500 TABLET ORAL at 21:26

## 2021-03-30 RX ADMIN — QUETIAPINE FUMARATE 100 MG: 100 TABLET ORAL at 21:27

## 2021-03-30 RX ADMIN — PANTOPRAZOLE SODIUM 40 MG: 40 INJECTION, POWDER, FOR SOLUTION INTRAVENOUS at 21:23

## 2021-03-30 RX ADMIN — IPRATROPIUM BROMIDE AND ALBUTEROL SULFATE 3 ML: .5; 3 SOLUTION RESPIRATORY (INHALATION) at 08:25

## 2021-03-30 RX ADMIN — BACLOFEN 10 MG: 10 TABLET ORAL at 14:36

## 2021-03-30 RX ADMIN — POTASSIUM BICARBONATE 10 MEQ: 782 TABLET, EFFERVESCENT ORAL at 21:25

## 2021-03-30 RX ADMIN — Medication 2000 UNITS: at 09:59

## 2021-03-30 RX ADMIN — POTASSIUM BICARBONATE 10 MEQ: 782 TABLET, EFFERVESCENT ORAL at 09:58

## 2021-03-30 RX ADMIN — LEVETIRACETAM 1500 MG: 500 SOLUTION ORAL at 21:22

## 2021-03-30 RX ADMIN — IPRATROPIUM BROMIDE AND ALBUTEROL SULFATE 3 ML: .5; 3 SOLUTION RESPIRATORY (INHALATION) at 11:44

## 2021-03-30 RX ADMIN — LAMOTRIGINE 400 MG: 100 TABLET ORAL at 10:27

## 2021-03-30 RX ADMIN — METRONIDAZOLE 500 MG: 500 INJECTION, SOLUTION INTRAVENOUS at 09:51

## 2021-03-30 RX ADMIN — ESCITALOPRAM OXALATE 10 MG: 10 TABLET ORAL at 10:00

## 2021-03-30 RX ADMIN — QUETIAPINE FUMARATE 50 MG: 25 TABLET ORAL at 09:59

## 2021-03-30 RX ADMIN — PANTOPRAZOLE SODIUM 40 MG: 40 INJECTION, POWDER, FOR SOLUTION INTRAVENOUS at 09:51

## 2021-03-30 RX ADMIN — IPRATROPIUM BROMIDE AND ALBUTEROL SULFATE 3 ML: .5; 3 SOLUTION RESPIRATORY (INHALATION) at 15:36

## 2021-03-30 RX ADMIN — MINERAL SUPPLEMENT IRON 300 MG / 5 ML STRENGTH LIQUID 100 PER BOX UNFLAVORED 300 MG: at 21:23

## 2021-03-30 RX ADMIN — CIPROFLOXACIN 400 MG: 2 INJECTION, SOLUTION INTRAVENOUS at 00:37

## 2021-03-30 RX ADMIN — METRONIDAZOLE 500 MG: 500 INJECTION, SOLUTION INTRAVENOUS at 02:32

## 2021-03-30 RX ADMIN — QUETIAPINE FUMARATE 50 MG: 25 TABLET ORAL at 17:54

## 2021-03-30 RX ADMIN — BACLOFEN 10 MG: 10 TABLET ORAL at 21:26

## 2021-03-30 RX ADMIN — METRONIDAZOLE 500 MG: 500 INJECTION, SOLUTION INTRAVENOUS at 15:50

## 2021-03-30 RX ADMIN — LEVETIRACETAM 1500 MG: 500 SOLUTION ORAL at 09:59

## 2021-03-30 RX ADMIN — MULTIVITAMIN 30 ML: LIQUID ORAL at 10:27

## 2021-03-30 RX ADMIN — Medication 10 ML: at 10:01

## 2021-03-30 RX ADMIN — LAMOTRIGINE 400 MG: 100 TABLET ORAL at 21:26

## 2021-03-30 RX ADMIN — MINERAL SUPPLEMENT IRON 300 MG / 5 ML STRENGTH LIQUID 100 PER BOX UNFLAVORED 300 MG: at 09:59

## 2021-03-30 RX ADMIN — BACLOFEN 10 MG: 10 TABLET ORAL at 10:00

## 2021-03-30 RX ADMIN — OXYCODONE HYDROCHLORIDE AND ACETAMINOPHEN 500 MG: 500 TABLET ORAL at 10:00

## 2021-03-30 RX ADMIN — DOXAZOSIN 4 MG: 1 TABLET ORAL at 09:58

## 2021-03-30 RX ADMIN — Medication 10 ML: at 21:27

## 2021-03-30 NOTE — PROGRESS NOTES
Physician Progress Note      PATIENTChforest Thomas  [facility]                  [id number]  :                       1950  ADMIT DATE:       3/28/2021 7:08 PM  [address] DATE:  RESPONDING  PROVIDER #:        Tobi Crystal MD          QUERY TEXT:    Pt admitted with Coffee ground emesis. Pt. noted to have Cerebral Palsy and   Mental Retardation and total dependent of ADLs. If possible, please document   in the progress notes and/or discharge summary if you are treating and/or   evaluating any of the following: The medical record reflects the following:  Risk Factors: Cerebral Palsy, Mental Retardation  Clinical Indicators: Per recent admission H&P -\"The patient does have   cerebral palsy,functional quadriplegia and severe intellectual mental   retardation. The patient is nonambulatory. \" Per Nursing-pt dependent on ADLs,   contracture LUE, weakness in remaining extremities  Treatment: Total pt care: Turn Q2H, Tube Feed, bathing, pericare  Options provided:  -- Functional quadriplegia  -- Severe debility/impaired mobility  -- Other - I will add my own diagnosis  -- Disagree - Not applicable / Not valid  -- Disagree - Clinically unable to determine / Unknown  -- Refer to Clinical Documentation Reviewer    PROVIDER RESPONSE TEXT:    This patient has functional quadriplegia. Query created by: Kalyn Deal on 3/30/2021 2:41 PM      QUERY TEXT:    Noted documentation of Mental Retardation. If possible, please document in the   progress notes and discharge summary if you are evaluating and/or treating   any of the following: The medical record reflects the following:  Risk Factors: Mental Retardation, Cerebral Palsy  Clinical Indicators: Per recent H&P-The patient does have cerebral palsy,   functional quadriplegia and severe intellectual mental retardation. The   patient is nonambulatory. \" Noted history of MRDD  Treatment: Total dependent  nursing support /Tube Feed/Resides at [facility]

## 2021-03-30 NOTE — PROGRESS NOTES
Gastroenterology Progress Note    Omar Andrade is a 79 y.o. male patient. 1. Hematemesis, presence of nausea not specified    2. Colitis    3. Aspiration pneumonia of right lung, unspecified aspiration pneumonia type, unspecified part of lung ([address]        SUBJECTIVE:  Had a green BM. No N/V.     ROS:  Unable to obtain    Physical    VITALS:  BP (!) 147/76   Pulse 64   Temp 97.6 °F (36.4 °C) (Axillary)   Resp 18   Ht 5' 6\" (1.676 m)   Wt 159 lb 8 oz (72.3 kg)   SpO2 99%   BMI 25.74 kg/m²   TEMPERATURE:  Current - Temp: 97.6 °F (36.4 °C); Max - Temp  Av.6 °F (36.4 °C)  Min: 97.4 °F (36.3 °C)  Max: 97.8 °F (36.6 °C)    NAD  RRR, Nl s1s2  Lungs CTA Bilaterally, normal effort  Abdomen soft, ND, NT, no HSM, Bowel sounds normal  AAOx3, No asterixis     Data    Data Review:    Recent Labs     21  1036   WBC 8.3  --   --  5.4  --    HGB 9.5*   < > 8.8* 7.9* 7.8*   HCT 30.6*   < > 28.4* 25.1* 24.7*   MCV 90.3  --   --  89.0  --    *  --   --  548*  --     < > = values in this interval not displayed. Recent Labs     21    142 136   K 4.5 4.2 3.9    109 106   CO2 27 25 24   BUN 38* 34* 25*   CREATININE 0.6* <0.5* <0.5*     Recent Labs     21   AST 22 16   ALT 17 14   BILITOT <0.2 <0.2   ALKPHOS 142* 118     Recent Labs     21   LIPASE 52.0     Recent Labs     21   PROTIME 11.1   INR 0.96     No results for input(s): PTT in the last 72 hours. Radiology Review:        ASSESSMENT     Coffee ground emesis - h/o ulcerative esophagitis (none at last EGD 2021) and large hiatal hernia. No vomiting here. Acute on chronic anemia - hgb stable from recent admission at 7. 7. hgb stable today. Pneumonia  Diarrhea - [facility] at [facility]. None here.  CT with wall thickening of the descending colon which could indicate colitis vs incomplete

## 2021-03-30 NOTE — PROGRESS NOTES
Shift assessment complete. Vital signs obtained. AM medications administered per MAR. Flagyl currently infusing through IV. Tube feeding running at goal rate of 40mL/hr. Pt does not appear to be in distress. Non-violent restraint / mitten in place on R hand, no sign of redness or trauma noted. Pt repositioned in bed, frederic-care provided, brief and externa-cath changed. Pt's brother Genesis You at bedside. Pt expresses no further needs at this time. Call light in reach. Fall precautions in place.

## 2021-03-30 NOTE — PROGRESS NOTES
Routine vital signs stable. O2 sats 97% on RA. Patients head to toe assessment completed. Alert. Scheduled medications given per MAR. Tolerated well. No signs of distress. Patient denies any pain at the moment Bed alarm engaged. Call light within reach. Bed in low position. Patient resting in bed. Mitten restraints renewed.

## 2021-03-31 VITALS
RESPIRATION RATE: 18 BRPM | DIASTOLIC BLOOD PRESSURE: 68 MMHG | BODY MASS INDEX: 25.63 KG/M2 | WEIGHT: 159.5 LBS | OXYGEN SATURATION: 98 % | HEART RATE: 74 BPM | SYSTOLIC BLOOD PRESSURE: 120 MMHG | HEIGHT: 66 IN | TEMPERATURE: 97.8 F

## 2021-03-31 LAB
GI BACTERIAL PATHOGENS BY PCR: NORMAL
HCT VFR BLD CALC: 26 % (ref 40.5–52.5)
HCT VFR BLD CALC: 28 % (ref 40.5–52.5)
HEMOGLOBIN: 8.2 G/DL (ref 13.5–17.5)
HEMOGLOBIN: 8.8 G/DL (ref 13.5–17.5)

## 2021-03-31 PROCEDURE — 94660 CPAP INITIATION&MGMT: CPT

## 2021-03-31 PROCEDURE — 6360000002 HC RX W HCPCS: Performed by: PHYSICIAN ASSISTANT

## 2021-03-31 PROCEDURE — 2580000003 HC RX 258: Performed by: INTERNAL MEDICINE

## 2021-03-31 PROCEDURE — 85018 HEMOGLOBIN: CPT

## 2021-03-31 PROCEDURE — 6370000000 HC RX 637 (ALT 250 FOR IP): Performed by: INTERNAL MEDICINE

## 2021-03-31 PROCEDURE — 94640 AIRWAY INHALATION TREATMENT: CPT

## 2021-03-31 PROCEDURE — 85014 HEMATOCRIT: CPT

## 2021-03-31 PROCEDURE — C9113 INJ PANTOPRAZOLE SODIUM, VIA: HCPCS | Performed by: PHYSICIAN ASSISTANT

## 2021-03-31 PROCEDURE — 6360000002 HC RX W HCPCS: Performed by: INTERNAL MEDICINE

## 2021-03-31 PROCEDURE — 36415 COLL VENOUS BLD VENIPUNCTURE: CPT

## 2021-03-31 PROCEDURE — 94761 N-INVAS EAR/PLS OXIMETRY MLT: CPT

## 2021-03-31 PROCEDURE — 2500000003 HC RX 250 WO HCPCS: Performed by: INTERNAL MEDICINE

## 2021-03-31 RX ORDER — LORAZEPAM 0.5 MG/1
0.5 TABLET ORAL EVERY 12 HOURS PRN
Qty: 10 TABLET | Refills: 0 | Status: SHIPPED | OUTPATIENT
Start: 2021-03-31 | End: 2021-04-30

## 2021-03-31 RX ORDER — LORAZEPAM 0.5 MG/1
0.5 TABLET ORAL EVERY 12 HOURS PRN
Qty: 10 TABLET | Refills: 0 | Status: SHIPPED | OUTPATIENT
Start: 2021-03-31 | End: 2021-03-31 | Stop reason: SDUPTHER

## 2021-03-31 RX ADMIN — MULTIVITAMIN 30 ML: LIQUID ORAL at 10:58

## 2021-03-31 RX ADMIN — OXYCODONE HYDROCHLORIDE AND ACETAMINOPHEN 500 MG: 500 TABLET ORAL at 10:57

## 2021-03-31 RX ADMIN — LACTULOSE 20 G: 20 SOLUTION ORAL at 10:59

## 2021-03-31 RX ADMIN — ESCITALOPRAM OXALATE 10 MG: 10 TABLET ORAL at 10:57

## 2021-03-31 RX ADMIN — LEVETIRACETAM 1500 MG: 500 SOLUTION ORAL at 10:58

## 2021-03-31 RX ADMIN — Medication 10 ML: at 11:00

## 2021-03-31 RX ADMIN — LAMOTRIGINE 400 MG: 100 TABLET ORAL at 10:57

## 2021-03-31 RX ADMIN — MINERAL SUPPLEMENT IRON 300 MG / 5 ML STRENGTH LIQUID 100 PER BOX UNFLAVORED 300 MG: at 10:58

## 2021-03-31 RX ADMIN — METRONIDAZOLE 500 MG: 500 INJECTION, SOLUTION INTRAVENOUS at 10:53

## 2021-03-31 RX ADMIN — CIPROFLOXACIN 400 MG: 2 INJECTION, SOLUTION INTRAVENOUS at 00:47

## 2021-03-31 RX ADMIN — BACLOFEN 10 MG: 10 TABLET ORAL at 10:58

## 2021-03-31 RX ADMIN — IPRATROPIUM BROMIDE AND ALBUTEROL SULFATE 3 ML: .5; 3 SOLUTION RESPIRATORY (INHALATION) at 13:10

## 2021-03-31 RX ADMIN — PANTOPRAZOLE SODIUM 40 MG: 40 INJECTION, POWDER, FOR SOLUTION INTRAVENOUS at 10:53

## 2021-03-31 RX ADMIN — METRONIDAZOLE 500 MG: 500 INJECTION, SOLUTION INTRAVENOUS at 01:53

## 2021-03-31 RX ADMIN — DOXAZOSIN 4 MG: 1 TABLET ORAL at 10:56

## 2021-03-31 RX ADMIN — POTASSIUM BICARBONATE 10 MEQ: 782 TABLET, EFFERVESCENT ORAL at 10:57

## 2021-03-31 RX ADMIN — Medication 2000 UNITS: at 11:00

## 2021-03-31 RX ADMIN — SENNOSIDES 17.2 MG: 8.6 TABLET, FILM COATED ORAL at 10:57

## 2021-03-31 RX ADMIN — IPRATROPIUM BROMIDE AND ALBUTEROL SULFATE 3 ML: .5; 3 SOLUTION RESPIRATORY (INHALATION) at 08:15

## 2021-03-31 NOTE — PROGRESS NOTES
Department of Internal Medicine  General Internal Medicine   Progress Note      SUBJECTIVE: appears to be in no acute distress  But nursing staff reported some agitation earlier  Tolerating tube feeding        History obtained from chart review and the patient's nursing staff   General ROS: positive for  - fatigue and malaise  negative for - chills, fever or night sweats  Psychological ROS: negative  Respiratory ROS: no cough, shortness of breath, or wheezing  Cardiovascular ROS: positive for - , dyspnea on exertion and shortness of breath  negative for - chest pain  Gastrointestinal ROS: no abdominal pain, change in bowel habits, or black or bloody stools    OBJECTIVE      Medications      Current Facility-Administered Medications: pantoprazole (PROTONIX) injection 40 mg, 40 mg, Intravenous, BID  [employer]/Antioxidants solution 30 mL, 30 mL, Per G Tube, Daily  potassium bicarb-citric acid (EFFER-K) effervescent tablet 10 mEq, 10 mEq, Per G Tube, BID  ascorbic acid (VITAMIN C) tablet 500 mg, 500 mg, Per G Tube, BID  QUEtiapine (SEROQUEL) tablet 50 mg, 50 mg, Per G Tube, BID  escitalopram (LEXAPRO) tablet 10 mg, 10 mg, Per G Tube, Daily  chlorhexidine (PERIDEX) 0.12 % solution 7.5 mL, 7.5 mL, Mouth/Throat, BID  doxazosin (CARDURA) tablet 4 mg, 4 mg, Per G Tube, Daily  lactulose (CHRONULAC) 10 GM/15ML solution 20 g, 20 g, Per G Tube, Daily  acetaminophen (TYLENOL) 160 MG/5ML solution 650 mg, 650 mg, Per G Tube, Q4H PRN  levETIRAcetam (KEPPRA) 100 MG/ML solution 1,500 mg, 1,500 mg, Per G Tube, BID  lamoTRIgine (LAMICTAL) tablet 400 mg, 400 mg, Per J Tube, BID  QUEtiapine (SEROQUEL) tablet 100 mg, 100 mg, Oral, Nightly  baclofen (LIORESAL) tablet 10 mg, 10 mg, Per G Tube, TID  Vitamin D (CHOLECALCIFEROL) tablet 2,000 Units, 2,000 Units, Per G Tube, Daily  senna (SENOKOT) tablet 17.2 mg, 2 tablet, Per G Tube, Daily  bisacodyl (DULCOLAX) EC tablet 10 mg, 10 mg, Oral, PRN  ferrous sulfate 300 (60 Fe) MG/5ML syrup 300 mg, 300 mg, Oral, BID  LORazepam (ATIVAN) tablet 0.5 mg, 0.5 mg, Oral, Q12H PRN  ipratropium-albuterol (DUONEB) nebulizer solution 3 mL, 3 mL, Inhalation, Q4H WA  sodium chloride flush 0.9 % injection 10 mL, 10 mL, Intravenous, 2 times per day  sodium chloride flush 0.9 % injection 10 mL, 10 mL, Intravenous, PRN  0.9 % sodium chloride infusion, 25 mL, Intravenous, PRN  promethazine (PHENERGAN) tablet 12.5 mg, 12.5 mg, Oral, Q6H PRN **OR** ondansetron (ZOFRAN) injection 4 mg, 4 mg, Intravenous, Q6H PRN  acetaminophen (TYLENOL) tablet 650 mg, 650 mg, Oral, Q6H PRN **OR** acetaminophen (TYLENOL) suppository 650 mg, 650 mg, Rectal, Q6H PRN  ciprofloxacin (CIPRO) IVPB 400 mg, 400 mg, Intravenous, Q12H  metronidazole (FLAGYL) 500 mg in NaCl 100 mL IVPB premix, 500 mg, Intravenous, Q8H    Physical      VITALS:  /71   Pulse 54   Temp 97.2 °F (36.2 °C) (Axillary)   Resp 18   Ht 5' 6\" (1.676 m)   Wt 159 lb 8 oz (72.3 kg)   SpO2 99%   BMI 25.74 kg/m²   TEMPERATURE:  Current - Temp: 97.2 °F (36.2 °C);  Max - Temp  Av.7 °F (36.5 °C)  Min: 97.2 °F (36.2 °C)  Max: 97.9 °F (36.6 °C)  RESPIRATIONS RANGE: Resp  Av.6  Min: 18  Max: 28  PULSE RANGE: Pulse  Av.2  Min: 54  Max: 72  BLOOD PRESSURE RANGE:  Systolic (39XSC), TRF:751 , Min:99 , TDD:163   ; Diastolic (71LMZ), ODT:65, Min:62, Max:74    PULSE OXIMETRY RANGE: SpO2  Av.7 %  Min: 94 %  Max: 99 %  24HR INTAKE/OUTPUT:      Intake/Output Summary (Last 24 hours) at 3/31/2021 1018  Last data filed at 3/30/2021 1603  Gross per 24 hour   Intake 350 ml   Output --   Net 350 ml     CONSTITUTIONAL:  fatigued, somnolent, uncooperative, distracted, severe distress and appears older than stated age  NECK:  supple, symmetrical, trachea midline, skin normal and no stridor  BACK:  symmetric  LUNGS:  Decreased BS taras crackles , few wheezes   CARDIOVASCULAR:  normal apical pulses, tachycardic with regular rhythm and normal S1 and S2  ABDOMEN:  Soft BS + non tender MUSCULOSKELETAL:  Contracted      NEUROLOGIC:  Right hemiplegia   SKIN:  Warm and dry  and no bruising or bleeding    Data      No results found for: PHART, Stephania Fruit, [id number]    Lab Results   Component Value Date     03/30/2021    K 3.9 03/30/2021    K 4.2 03/29/2021     03/30/2021    CO2 24 03/30/2021    BUN 25 03/30/2021    CREATININE <0.5 03/30/2021    GLUCOSE 98 03/30/2021    CALCIUM 8.2 03/30/2021     Lab Results   Component Value Date    WBC 5.4 03/30/2021    HGB 8.2 03/31/2021    HCT 26.0 03/31/2021    MCV 89.0 03/30/2021     03/30/2021         Lab Results   Component Value Date    INR 0.96 03/28/2021    PROTIME 11.1 03/28/2021       ASSESSMENT AND PLAN      Patient Active Problem List   Diagnosis    Altered mental status    Dehydration    Cerebral palsy (HCC)    Seizure disorder (HCC)    Nuclear sclerosis    Anemia, chronic disease    Partial epilepsy with impairment of consciousness, intractable ([address]    Mental retardation    Malfunction of gastrostomy tube ([address]    Hypoxemia    Hypotension    Recurrent seizures ([address]    Acute aspiration pneumonia ([address]    Sepsis ([address]    Acute respiratory failure with hypoxia ([address]    Coffee ground emesis    Suspected COVID-19 virus infection    Gastroduodenitis    GI bleed     Increase  Tube feeding rate , no endoscopy intervention  Ct PPI

## 2021-03-31 NOTE — CARE COORDINATION
CM called Lynn Rosario NP at [facility] [phone] and informed her pt ready for d/c and I will arrange transport. CM called [facility] and pt will be  at 2:30 pm. CM called pt's brother and Irma Molina and informed him. CM called and updated Lynn Rosario. CM will faxed d/c paperwork when [address] completed by physician and RN.     Alma Rosa Smith RN, BSN  [phone]

## 2021-03-31 NOTE — PROGRESS NOTES
Routine vital signs stable. Patients head to toe assessment completed. Scheduled medications given per [facility] through G tube. No signs of distress. Patient denies any pain at the moment. Non-violet restraint/mittten in place on R hand, no sign of redness or trauma noted. Tube feeding stopped at this time per order, will resume at 6am.   Bed alarm engaged. Call light within reach. Bed in low position. Patient resting in bed.

## 2021-03-31 NOTE — CARE COORDINATION
Discharge Plan:     Patient discharged to: [facility]   SW/DC Planner faxed, [address] and AVS to:[phone]  Narcotic Prescriptions faxed were:n/a  RN: Wisam Lomeli will call report to:   [phone]    Medical Transport with: [address]  [phone]   time: 2:30  Family advised of discharge?:  HENS Submitted?:    All discharge needs met per case management.

## 2021-03-31 NOTE — PLAN OF CARE
Problem: Falls - Risk of:  Goal: Will remain free from falls  Description: Will remain free from falls  Outcome: Ongoing  Goal: Absence of physical injury  Description: Absence of physical injury  Outcome: Ongoing     Problem: Non-Violent Restraints  Goal: Removal from restraints as soon as assessed to be safe  Outcome: Ongoing  Goal: No harm/injury to patient while restraints in use  Outcome: Ongoing  Goal: Patient's dignity will be maintained  Outcome: Ongoing     Problem: Skin Integrity:  Goal: Will show no infection signs and symptoms  Description: Will show no infection signs and symptoms  Outcome: Ongoing  Goal: Absence of new skin breakdown  Description: Absence of new skin breakdown  Outcome: Ongoing     Problem: Nutrition  Goal: Optimal nutrition therapy  Outcome: Ongoing

## 2021-03-31 NOTE — DISCHARGE INSTR - COC
Resulted    C-diff Rule Out 03/28/21 03/28/21 03/28/21 GI Bacterial Pathogens By PCR (Ordered)   03/28/21 Rule-Out Test Resulted    COVID-19 Rule Out 03/18/21 03/18/21 03/18/21 COVID-19, Rapid (Ordered)   03/18/21 Rule-Out Test Resulted    COVID-19 Rule Out 02/16/21 02/16/21 02/16/21 COVID-19 (Ordered)   02/17/21 Rule-Out Test Resulted    COVID-19 Rule Out 01/31/21 01/31/21 01/31/21 COVID-19 (Ordered)   02/01/21 Rule-Out Test Resulted    COVID-19 Rule Out 08/21/20 08/21/20 08/21/20 COVID-19 (Ordered)   08/22/20 Rule-Out Test Resulted            Nurse Assessment:  Last Vital Signs: BP (!) 149/75   Pulse 63   Temp 97.4 °F (36.3 °C) (Axillary)   Resp 18   Ht 5' 6\" (1.676 m)   Wt 159 lb 8 oz (72.3 kg)   SpO2 99%   BMI 25.74 kg/m²     Last documented pain score (0-10 scale): Pain Level: 0  Last Weight:   Wt Readings from Last 1 Encounters:   03/28/21 159 lb 8 oz (72.3 kg)     Mental Status:  alert    IV Access:  - None    Nursing Mobility/ADLs:  Walking   Dependent  Transfer  Dependent  Bathing  Dependent  Dressing  Dependent  Toileting  Dependent  Feeding  Dependent Tube feeds  Med Admin  Dependent  Med Delivery   crushed and in J-Tube    Wound Care Documentation and Therapy:        Elimination:  Continence: Bowel: No  Bladder: No  Urinary Catheter: None   Colostomy/Ileostomy/Ileal Conduit: No       Date of Last BM: 3/31/21    Intake/Output Summary (Last 24 hours) at 3/31/2021 1042  Last data filed at 3/30/2021 1603  Gross per 24 hour   Intake 350 ml   Output --   Net 350 ml     I/O last 3 completed shifts: In: 440 [NG/GT:440]  Out: -     Safety Concerns:      At Risk for Falls and Aspiration Risk    Impairments/Disabilities:      Speech and Contractures - LUE    Nutrition Therapy:  Current Nutrition Therapy:   - Tube Feedings:  65 ml/hr over 15 hrs per day and stopping at night    Routes of Feeding: Jejunal Tube  Liquids: NPO No Liquids  Daily Fluid Restriction: no  Last Modified Barium Swallow with Video (Video Swallowing Test): not done    Treatments at the Time of Hospital Discharge:   Respiratory Treatments: ***  Oxygen Therapy:  is not on home oxygen therapy. Ventilator:    - CPAP   only when sleeping    Rehab Therapies: none  Weight Bearing Status/Restrictions: No weight bearing restirctions  Other Medical Equipment (for information only, NOT a DME order):  hospital bed  Other Treatments: ***    Patient's personal belongings (please select all that are sent with patient):  None    RN SIGNATURE:  Electronically signed by Nuno Smith RN on 3/31/21 at 1:58 PM EDT    CASE MANAGEMENT/SOCIAL WORK SECTION    Inpatient Status Date: 3/28/2021    Readmission Risk Assessment Score:  Readmission Risk              Risk of Unplanned Readmission:        46           Discharging to Facility/ Agency   Name: [facility] [place]   Victor M Lala, [address] [place] [address]  PINEDA:[phone]    Dialysis Facility (if applicable)   Name:  Address:  Dialysis Schedule:  Phone:  Fax:    / signature: Haim Etienne RN, BSN  [phone]       PHYSICIAN SECTION    Prognosis: Poor    Condition at Discharge: Stable    Rehab Potential (if transferring to Rehab): Poor    Recommended Labs or Other Treatments After Discharge: repeat CBC in 1  week , tube feeding , palliative care recommended     Physician Certification: I certify the above information and transfer of Francisco Zavaleta  is necessary for the continuing treatment of the diagnosis listed and that he requires Intermediate/Mental Retardation/Developmental Disabilities Care for more than 30 days.      Update Admission H&P: No change in H&P    PHYSICIAN SIGNATURE:  Electronically signed by Iwona Guerrier MD on 3/31/21 at 2:14 PM EDT

## 2021-04-01 NOTE — PROGRESS NOTES
reflects the following:  Risk Factors: Tube Feeds, coffee ground emesis  Clinical Indicators: per CT-\"Ground-glass opacities in the right middle and   lower lobes. Correlate with presentation for aspiration or pneumonia\"; Per   GI-\"multiple admissions for Aspiration Pneumonia and coffee ground   emesis; Work-up here revealed pneumonia. \" Per ED-\"  has groundglass opacities,   patient has had emesis this likely represents aspiration pneumonia. \"; No SIRS   criteria met during stay  Treatment: Rocephin x1, IV Cipro 3/29-3/31,  ml bolus, NS 150ml/H x 9   hrs  Options provided:  -- Aspiration Pneumonia confirmed present on admission  -- Pneumonia d/t other, confirmed present on admission, please specify, was   confirmed as present on admission.   -- Pneumonia ruled out  -- Other - I will add my own diagnosis  -- Disagree - Not applicable / Not valid  -- Disagree - Clinically unable to determine / Unknown  -- Refer to Clinical Documentation Reviewer    PROVIDER RESPONSE TEXT:    He was recently admitted for a similar pneumonia just few days ago these CT   findings could be residual from a week or less ago    Query created by: Elvis Velez on 4/1/2021 8:00 AM      Electronically signed by:  Esteban Dyer MD 4/1/2021 7:50 PM

## 2021-04-03 NOTE — DISCHARGE SUMMARY
[address]                     [address] [facility], [address]                               DISCHARGE SUMMARY    PATIENT NAME: Zulema Peguero                     :        1950  MED REC NO:   [id number]                          ROOM:       [id number]  ACCOUNT NO:   [de-identified]                           ADMIT DATE: 2021  PROVIDER:     Ana Booth MD                  DISCHARGE DATE:  2021    FINAL DIAGNOSES:  1. Sepsis. 2.  Acute respiratory failure. 3.  Acute aspiration pneumonia. 4.  Severe oropharyngeal dysphagia. 5.  Nuclear sclerosis. 6.  Complicated seizure disorder. 7.  Severe intellectual disability. 8.  Cerebral palsy. DISCHARGE MEDICATIONS:  1. Ativan 0.5 mg every 12 hours p.r.n.  2.  DuoNeb one every 4 hours p.r.n.  3.  Diastat gel p.r.n. 20 mg dose as needed seizure. 4.  Prevacid SoluTab one tablet every day. 5.  Augmentin 875 mg one tablet twice a day. 6.  Phenergan 12.5 mg every 6 hours p.r.n.  7.  Ferrous sulfate 220 mg b.i.d.  8.  Dulcolax tablet one every day. 9.  Mycostatin cream apply twice a day. 10.  Senokot-S two tablets daily. 11.  Vitamin D3 2000 units daily. 12.  Zinc oxide 20% ointment as directed for skin protection. 13.  Dulcolax suppository daily. 14.  Baclofen 15 mg p.o. t.i.d.  15.  Lamictal 400 mg two times a day. 16.  Seroquel 100 mg nightly and 50 mg twice a day. 17.  Tylenol 650 mg q.4 p.r.n. 18.  Keppra liquid 15 mL two times a day. 19.  Chronulac 20 gm daily. 20.  Cardura 4 mg nightly. 21.  Multivitamin with mineral 30 mL every day. 22.  Potassium chloride 10 mEq two times a day. 23.  Vitamin C 500 mg two times a day. 24.  Lexapro 10 mg daily. 25.  Peridex 7.5 mL two times a day. 26.  Protonix 40 mg two times a day was discontinued because Prevacid  was started.     HOSPITAL COURSE:  This 77-year-old unfortunate elderly man who has been  lifelong disabled intellectually with cerebral palsy and contracted  state with intractable seizure disorder, came to [facility], has been having frequent hospitalizations, at least 11 or 12  of them over last four years in Epic that I can see myself under me,  came with elevated temperature and increased respiratory distress. The  patient was somewhat hypotensive. He is a resident of [address] [place] [address]  where he is a long-term resident, unable to provide any history, does  have a gastrostomy tube. The patient was diagnosed to have acute  aspiration pneumonia. His temperature was 96, blood pressure 115/58,  respirations 32, heart rate 87, O2 sat was 92% on 3 liters. Lung shows  bilateral extensive crackles. White blood cell count was 64905,  hemoglobin/hematocrit 9.3 and 28.9, platelet count 680. Blood cultures  were drawn. Urine cultures were also ordered. CT scan of the pulmonary  vasculature showed no evidence of pulmonary embolism, but had bilateral  ground-glass opacity, right greater than left consistent with pulmonary  edema and multifocal pneumonia with a large hiatal hernia. The patient  also had a CT scan of the head which I am not sure was indicated, showed  advanced neurologic disease. The patient admitted with sepsis, acute  respiratory failure, acute aspiration pneumonia. Treated with IV  hydration, IV Zosyn, nebulized aerosol, supplemental oxygen, DVT  prophylaxis. I had a very long and very rewarding conversations with  the patient's brother who is his next of kin and power of  who  even agreed on hospice care and definitely convert him into DNR comfort  care. He agreed with me in principal that he has had no quality of life  and has had too many hospitalizations without any rewarding lasting cure  for him. Three hours later I was notified by the  that the  nursing director at [address] [place] [address] got very upset over there at  their  facility.   They certainly do not believe in any kind of palliative care  or hospice care she fiercely persuaded the patient's brother to _____  him out of the hospice consultation. I had another conversation with  the patient's brother, who notified me that he is not interested in  hospice at this time, but he certainly agreed on DNR comfort care. After three days of care, the patient was placed on oral antibiotic that  would be given by gastrostomy tube. His septic parameters improved. His white blood count came down. His temperature came down. The  patient came back to his baseline. He was discharged. At the time of  discharge, I had another phone conversation with the patient's brother  and I told him that over the last three to four years and all these  multiple hospitalizations that exceeded more than a dozen, the patient  gas received the level of care that has certainly benefited the care  provider a lot more than what it has to the patient himself and he  should still consider something like palliative care and avoid all these  frequent hospitalization on him. He has an advanced neurologic  condition for which there is no cure and he remains at very high risk of  recurrent hospitalizations. His brother told me that he will see _____  this time and make a decision on hospice at a later date. Again, my  very honest and sincere recommendation for the patient is hospice care,  and I also explained that to the brother that I have no incentive in  giving this advice to him but in the best interest of the patient  because as often as the patient comes to the hospital he would be a  source of livelihood for me, but that would not be in the best interest  of the patient himself and that is the reason I gave this very honest,  sincere hearted advice. The brother seemed to be agreeing on that in  principal.  He says he will think further about it. The patient was  discharged in stable condition.     As always, it is not only a pleasure but matter of privilege to take  care of

## 2021-04-11 ENCOUNTER — APPOINTMENT (OUTPATIENT)
Dept: CT IMAGING | Age: 71
DRG: 377 | End: 2021-04-11
Payer: MEDICARE

## 2021-04-11 ENCOUNTER — TELEPHONE (OUTPATIENT)
Dept: OTHER | Facility: CLINIC | Age: 71
End: 2021-04-11

## 2021-04-11 ENCOUNTER — HOSPITAL ENCOUNTER (INPATIENT)
Age: 71
LOS: 5 days | Discharge: ANOTHER HOSPITAL | DRG: 377 | End: 2021-04-16
Attending: STUDENT IN AN ORGANIZED HEALTH CARE EDUCATION/TRAINING PROGRAM | Admitting: INTERNAL MEDICINE
Payer: MEDICARE

## 2021-04-11 DIAGNOSIS — G40.909 SEIZURE DISORDER (HCC): ICD-10-CM

## 2021-04-11 DIAGNOSIS — K94.23 MALFUNCTION OF GASTROSTOMY TUBE (HCC): ICD-10-CM

## 2021-04-11 DIAGNOSIS — K92.2 GASTROINTESTINAL HEMORRHAGE, UNSPECIFIED GASTROINTESTINAL HEMORRHAGE TYPE: Primary | ICD-10-CM

## 2021-04-11 PROBLEM — J96.01 ACUTE RESPIRATORY FAILURE WITH HYPOXIA (HCC): Status: RESOLVED | Noted: 2021-03-18 | Resolved: 2021-04-11

## 2021-04-11 PROBLEM — J69.0 ACUTE ASPIRATION PNEUMONIA (HCC): Status: RESOLVED | Noted: 2021-03-17 | Resolved: 2021-04-11

## 2021-04-11 PROBLEM — Z20.822 SUSPECTED COVID-19 VIRUS INFECTION: Status: RESOLVED | Noted: 2021-03-28 | Resolved: 2021-04-11

## 2021-04-11 LAB
ALBUMIN SERPL-MCNC: 3.8 G/DL (ref 3.4–5)
ALP BLD-CCNC: 152 U/L (ref 40–129)
ALT SERPL-CCNC: 17 U/L (ref 10–40)
ANION GAP SERPL CALCULATED.3IONS-SCNC: 12 MMOL/L (ref 3–16)
AST SERPL-CCNC: 33 U/L (ref 15–37)
BASOPHILS ABSOLUTE: 0.1 K/UL (ref 0–0.2)
BASOPHILS RELATIVE PERCENT: 0.6 %
BILIRUB SERPL-MCNC: <0.2 MG/DL (ref 0–1)
BILIRUBIN DIRECT: <0.2 MG/DL (ref 0–0.3)
BILIRUBIN, INDIRECT: ABNORMAL MG/DL (ref 0–1)
BUN BLDV-MCNC: 22 MG/DL (ref 7–20)
CALCIUM SERPL-MCNC: 9.1 MG/DL (ref 8.3–10.6)
CHLORIDE BLD-SCNC: 99 MMOL/L (ref 99–110)
CO2: 23 MMOL/L (ref 21–32)
CREAT SERPL-MCNC: <0.5 MG/DL (ref 0.8–1.3)
EOSINOPHILS ABSOLUTE: 0.1 K/UL (ref 0–0.6)
EOSINOPHILS RELATIVE PERCENT: 1.6 %
GFR AFRICAN AMERICAN: >60
GFR NON-AFRICAN AMERICAN: >60
GLUCOSE BLD-MCNC: 107 MG/DL (ref 70–99)
HCT VFR BLD CALC: 27.7 % (ref 40.5–52.5)
HCT VFR BLD CALC: 30.5 % (ref 40.5–52.5)
HEMOGLOBIN: 8.3 G/DL (ref 13.5–17.5)
HEMOGLOBIN: 9.6 G/DL (ref 13.5–17.5)
LIPASE: 64 U/L (ref 13–60)
LYMPHOCYTES ABSOLUTE: 0.9 K/UL (ref 1–5.1)
LYMPHOCYTES RELATIVE PERCENT: 10 %
MCH RBC QN AUTO: 28.5 PG (ref 26–34)
MCHC RBC AUTO-ENTMCNC: 31.5 G/DL (ref 31–36)
MCV RBC AUTO: 90.4 FL (ref 80–100)
MONOCYTES ABSOLUTE: 0.8 K/UL (ref 0–1.3)
MONOCYTES RELATIVE PERCENT: 8.9 %
NEUTROPHILS ABSOLUTE: 7.5 K/UL (ref 1.7–7.7)
NEUTROPHILS RELATIVE PERCENT: 78.9 %
OCCULT BLOOD DIAGNOSTIC: ABNORMAL
PDW BLD-RTO: 19.2 % (ref 12.4–15.4)
PLATELET # BLD: 428 K/UL (ref 135–450)
PMV BLD AUTO: 8.3 FL (ref 5–10.5)
POTASSIUM REFLEX MAGNESIUM: 4.9 MMOL/L (ref 3.5–5.1)
RBC # BLD: 3.38 M/UL (ref 4.2–5.9)
SODIUM BLD-SCNC: 134 MMOL/L (ref 136–145)
TOTAL PROTEIN: 7.2 G/DL (ref 6.4–8.2)
WBC # BLD: 9.5 K/UL (ref 4–11)

## 2021-04-11 PROCEDURE — 80048 BASIC METABOLIC PNL TOTAL CA: CPT

## 2021-04-11 PROCEDURE — 83690 ASSAY OF LIPASE: CPT

## 2021-04-11 PROCEDURE — 6370000000 HC RX 637 (ALT 250 FOR IP): Performed by: INTERNAL MEDICINE

## 2021-04-11 PROCEDURE — 80076 HEPATIC FUNCTION PANEL: CPT

## 2021-04-11 PROCEDURE — 85014 HEMATOCRIT: CPT

## 2021-04-11 PROCEDURE — 99285 EMERGENCY DEPT VISIT HI MDM: CPT

## 2021-04-11 PROCEDURE — 6360000002 HC RX W HCPCS: Performed by: STUDENT IN AN ORGANIZED HEALTH CARE EDUCATION/TRAINING PROGRAM

## 2021-04-11 PROCEDURE — 85025 COMPLETE CBC W/AUTO DIFF WBC: CPT

## 2021-04-11 PROCEDURE — C9113 INJ PANTOPRAZOLE SODIUM, VIA: HCPCS | Performed by: STUDENT IN AN ORGANIZED HEALTH CARE EDUCATION/TRAINING PROGRAM

## 2021-04-11 PROCEDURE — 96365 THER/PROPH/DIAG IV INF INIT: CPT

## 2021-04-11 PROCEDURE — 2500000003 HC RX 250 WO HCPCS: Performed by: STUDENT IN AN ORGANIZED HEALTH CARE EDUCATION/TRAINING PROGRAM

## 2021-04-11 PROCEDURE — 74174 CTA ABD&PLVS W/CONTRAST: CPT

## 2021-04-11 PROCEDURE — 85018 HEMOGLOBIN: CPT

## 2021-04-11 PROCEDURE — G0328 FECAL BLOOD SCRN IMMUNOASSAY: HCPCS

## 2021-04-11 PROCEDURE — C9113 INJ PANTOPRAZOLE SODIUM, VIA: HCPCS | Performed by: INTERNAL MEDICINE

## 2021-04-11 PROCEDURE — 6360000004 HC RX CONTRAST MEDICATION: Performed by: STUDENT IN AN ORGANIZED HEALTH CARE EDUCATION/TRAINING PROGRAM

## 2021-04-11 PROCEDURE — 2580000003 HC RX 258: Performed by: STUDENT IN AN ORGANIZED HEALTH CARE EDUCATION/TRAINING PROGRAM

## 2021-04-11 PROCEDURE — 2580000003 HC RX 258: Performed by: INTERNAL MEDICINE

## 2021-04-11 PROCEDURE — 36415 COLL VENOUS BLD VENIPUNCTURE: CPT

## 2021-04-11 PROCEDURE — 6360000002 HC RX W HCPCS: Performed by: INTERNAL MEDICINE

## 2021-04-11 PROCEDURE — 2060000000 HC ICU INTERMEDIATE R&B

## 2021-04-11 RX ORDER — FERROUS SULFATE 220 (44)/5
220 ELIXIR ORAL 2 TIMES DAILY
Status: DISCONTINUED | OUTPATIENT
Start: 2021-04-11 | End: 2021-04-11 | Stop reason: RX

## 2021-04-11 RX ORDER — SODIUM CHLORIDE, SODIUM LACTATE, POTASSIUM CHLORIDE, CALCIUM CHLORIDE 600; 310; 30; 20 MG/100ML; MG/100ML; MG/100ML; MG/100ML
INJECTION, SOLUTION INTRAVENOUS CONTINUOUS
Status: DISCONTINUED | OUTPATIENT
Start: 2021-04-11 | End: 2021-04-16 | Stop reason: HOSPADM

## 2021-04-11 RX ORDER — QUETIAPINE FUMARATE 25 MG/1
50 TABLET, FILM COATED ORAL 2 TIMES DAILY
Status: DISCONTINUED | OUTPATIENT
Start: 2021-04-11 | End: 2021-04-11

## 2021-04-11 RX ORDER — DIPHENHYDRAMINE HCL 12.5MG/5ML
25 LIQUID (ML) ORAL EVERY 6 HOURS PRN
Status: DISCONTINUED | OUTPATIENT
Start: 2021-04-11 | End: 2021-04-16 | Stop reason: HOSPADM

## 2021-04-11 RX ORDER — DOXAZOSIN MESYLATE 4 MG/1
4 TABLET ORAL DAILY
Status: DISCONTINUED | OUTPATIENT
Start: 2021-04-12 | End: 2021-04-16 | Stop reason: HOSPADM

## 2021-04-11 RX ORDER — HYDROXYZINE HYDROCHLORIDE 10 MG/1
10 TABLET, FILM COATED ORAL 3 TIMES DAILY PRN
Status: DISCONTINUED | OUTPATIENT
Start: 2021-04-11 | End: 2021-04-16 | Stop reason: HOSPADM

## 2021-04-11 RX ORDER — QUETIAPINE FUMARATE 100 MG/1
100 TABLET, FILM COATED ORAL NIGHTLY
Status: DISCONTINUED | OUTPATIENT
Start: 2021-04-11 | End: 2021-04-11

## 2021-04-11 RX ORDER — VITAMIN B COMPLEX
2000 TABLET ORAL DAILY
Status: DISCONTINUED | OUTPATIENT
Start: 2021-04-12 | End: 2021-04-16 | Stop reason: HOSPADM

## 2021-04-11 RX ORDER — CHLORHEXIDINE GLUCONATE 0.12 MG/ML
7.5 RINSE ORAL 2 TIMES DAILY
Status: DISCONTINUED | OUTPATIENT
Start: 2021-04-11 | End: 2021-04-16 | Stop reason: HOSPADM

## 2021-04-11 RX ORDER — CLINDAMYCIN PHOSPHATE 600 MG/50ML
600 INJECTION INTRAVENOUS ONCE
Status: COMPLETED | OUTPATIENT
Start: 2021-04-11 | End: 2021-04-11

## 2021-04-11 RX ORDER — LAMOTRIGINE 100 MG/1
400 TABLET ORAL 2 TIMES DAILY
Status: DISCONTINUED | OUTPATIENT
Start: 2021-04-11 | End: 2021-04-16 | Stop reason: HOSPADM

## 2021-04-11 RX ORDER — DIAZEPAM 10 MG/2ML
15 GEL RECTAL
Status: ACTIVE | OUTPATIENT
Start: 2021-04-11 | End: 2021-04-11

## 2021-04-11 RX ORDER — NYSTATIN 100000 U/G
CREAM TOPICAL 2 TIMES DAILY PRN
Status: DISCONTINUED | OUTPATIENT
Start: 2021-04-11 | End: 2021-04-16 | Stop reason: HOSPADM

## 2021-04-11 RX ORDER — LORAZEPAM 0.5 MG/1
0.5 TABLET ORAL EVERY 12 HOURS PRN
Status: DISCONTINUED | OUTPATIENT
Start: 2021-04-11 | End: 2021-04-16 | Stop reason: HOSPADM

## 2021-04-11 RX ORDER — QUETIAPINE FUMARATE 25 MG/1
50 TABLET, FILM COATED ORAL EVERY MORNING
Status: DISCONTINUED | OUTPATIENT
Start: 2021-04-12 | End: 2021-04-12 | Stop reason: DRUGHIGH

## 2021-04-11 RX ORDER — ACETAMINOPHEN 160 MG/5ML
650 SOLUTION ORAL EVERY 4 HOURS PRN
Status: DISCONTINUED | OUTPATIENT
Start: 2021-04-11 | End: 2021-04-16 | Stop reason: HOSPADM

## 2021-04-11 RX ORDER — LACTULOSE 10 G/15ML
20 SOLUTION ORAL DAILY
Status: DISCONTINUED | OUTPATIENT
Start: 2021-04-12 | End: 2021-04-15

## 2021-04-11 RX ORDER — PROMETHAZINE HYDROCHLORIDE 25 MG/1
12.5 TABLET ORAL EVERY 6 HOURS PRN
Status: DISCONTINUED | OUTPATIENT
Start: 2021-04-11 | End: 2021-04-16 | Stop reason: HOSPADM

## 2021-04-11 RX ORDER — BISACODYL 10 MG
10 SUPPOSITORY, RECTAL RECTAL DAILY PRN
Status: DISCONTINUED | OUTPATIENT
Start: 2021-04-11 | End: 2021-04-16 | Stop reason: HOSPADM

## 2021-04-11 RX ORDER — BACITRACIN 500 [USP'U]/G
OINTMENT TOPICAL 2 TIMES DAILY
Status: DISCONTINUED | OUTPATIENT
Start: 2021-04-11 | End: 2021-04-16 | Stop reason: HOSPADM

## 2021-04-11 RX ORDER — TRIAMCINOLONE ACETONIDE 1 MG/G
CREAM TOPICAL 2 TIMES DAILY
Status: DISCONTINUED | OUTPATIENT
Start: 2021-04-11 | End: 2021-04-16 | Stop reason: HOSPADM

## 2021-04-11 RX ORDER — SENNA PLUS 8.6 MG/1
2 TABLET ORAL DAILY
Status: DISCONTINUED | OUTPATIENT
Start: 2021-04-12 | End: 2021-04-15

## 2021-04-11 RX ORDER — LEVETIRACETAM 100 MG/ML
1500 SOLUTION ORAL 2 TIMES DAILY
Status: DISCONTINUED | OUTPATIENT
Start: 2021-04-11 | End: 2021-04-14

## 2021-04-11 RX ORDER — BACLOFEN 10 MG/1
15 TABLET ORAL 3 TIMES DAILY
Status: DISCONTINUED | OUTPATIENT
Start: 2021-04-11 | End: 2021-04-16 | Stop reason: HOSPADM

## 2021-04-11 RX ORDER — DIPHENHYDRAMINE HCL 25 MG
25 TABLET ORAL EVERY 6 HOURS PRN
Status: DISCONTINUED | OUTPATIENT
Start: 2021-04-11 | End: 2021-04-11

## 2021-04-11 RX ORDER — ASCORBIC ACID 500 MG
500 TABLET ORAL 2 TIMES DAILY
Status: DISCONTINUED | OUTPATIENT
Start: 2021-04-11 | End: 2021-04-16 | Stop reason: HOSPADM

## 2021-04-11 RX ORDER — MULTIVIT WITH IRON,MINERALS
30 LIQUID (ML) ORAL DAILY
Status: DISCONTINUED | OUTPATIENT
Start: 2021-04-12 | End: 2021-04-16 | Stop reason: HOSPADM

## 2021-04-11 RX ORDER — ESCITALOPRAM OXALATE 10 MG/1
10 TABLET ORAL DAILY
Status: DISCONTINUED | OUTPATIENT
Start: 2021-04-12 | End: 2021-04-16 | Stop reason: HOSPADM

## 2021-04-11 RX ADMIN — DIPHENHYDRAMINE HYDROCHLORIDE 25 MG: 12.5 SOLUTION ORAL at 22:06

## 2021-04-11 RX ADMIN — SODIUM CHLORIDE 80 MG: 9 INJECTION, SOLUTION INTRAVENOUS at 13:47

## 2021-04-11 RX ADMIN — TRIAMCINOLONE ACETONIDE: 1 CREAM TOPICAL at 23:12

## 2021-04-11 RX ADMIN — ZINC OXIDE: 200 OINTMENT TOPICAL at 18:51

## 2021-04-11 RX ADMIN — SODIUM CHLORIDE 8 MG/HR: 9 INJECTION, SOLUTION INTRAVENOUS at 13:48

## 2021-04-11 RX ADMIN — CLINDAMYCIN PHOSPHATE 600 MG: 600 INJECTION, SOLUTION INTRAVENOUS at 15:35

## 2021-04-11 RX ADMIN — IOPAMIDOL 75 ML: 755 INJECTION, SOLUTION INTRAVENOUS at 13:26

## 2021-04-11 RX ADMIN — CHLORHEXIDINE GLUCONATE 7.5 ML: 1.2 RINSE ORAL at 23:14

## 2021-04-11 RX ADMIN — SODIUM CHLORIDE 8 MG/HR: 9 INJECTION, SOLUTION INTRAVENOUS at 16:45

## 2021-04-11 RX ADMIN — POTASSIUM BICARBONATE 10 MEQ: 782 TABLET, EFFERVESCENT ORAL at 22:07

## 2021-04-11 RX ADMIN — LEVETIRACETAM 1500 MG: 500 SOLUTION ORAL at 22:06

## 2021-04-11 RX ADMIN — HYDROXYZINE HYDROCHLORIDE 10 MG: 10 TABLET, FILM COATED ORAL at 22:06

## 2021-04-11 RX ADMIN — QUETIAPINE FUMARATE 150 MG: 25 TABLET ORAL at 22:08

## 2021-04-11 RX ADMIN — SODIUM CHLORIDE, POTASSIUM CHLORIDE, SODIUM LACTATE AND CALCIUM CHLORIDE: 600; 310; 30; 20 INJECTION, SOLUTION INTRAVENOUS at 18:13

## 2021-04-11 RX ADMIN — LAMOTRIGINE 400 MG: 100 TABLET ORAL at 22:08

## 2021-04-11 RX ADMIN — OXYCODONE HYDROCHLORIDE AND ACETAMINOPHEN 500 MG: 500 TABLET ORAL at 22:06

## 2021-04-11 RX ADMIN — BACLOFEN 15 MG: 10 TABLET ORAL at 22:08

## 2021-04-11 NOTE — ED NOTES
Bedside handoff to Teodoro Ordaz, nurse resuming care of patient. No questions or concerns at this time. Pt transported with shirt in belongings bag, brother left at this time.       Winsome Leonard RN  50/60/12 [phone]

## 2021-04-11 NOTE — ED NOTES
PIV established, blood work obtained and sent to lab per order. Brother, POA at bedside. Cycling on monitor. 2/2 side rails in place, call light within reach.       Yris Contreras RN  [phone]/99/[phone]

## 2021-04-11 NOTE — ED NOTES
Spoke with Omar Gary, nurse that takes care of patient at facility, updated on POC.       Celina Ortiz RN  [phone]

## 2021-04-11 NOTE — ED PROVIDER NOTES
Movement disorder     Neurogenic bladder     Neuromuscular disorder (HCC)     spastic hemiplegia, MDS,neurogenic bladder    NS (nuclear sclerosis)     Nuclear sclerosis     Osteoarthritis     Periodontal disease     Pneumonia 1/17/2013    Primary optic atrophy     Primary optic atrophy     PVD [facility]peripheral vascular disease) [facility]     Seizure ([facility]     last seizure 9/20/19    Spastic hemiplegia     Tinea pedis     Unspecified diseases of blood and blood-forming organs     anemia    Urinary incontinence          SURGICAL HISTORY       Past Surgical History:   Procedure Laterality Date    ABDOMEN SURGERY      can see scar/details unknown, feeding tube    ABDOMINAL EXPLORATION SURGERY  9-28-14    LAPAROTOMY EXPLORATORY, ERIKA-EN-Y TUBE JEJUNOSTOMY, SMALL BOWEL RESECTION                                              COLONOSCOPY      COLONOSCOPY N/A 6/4/2019    COLONOSCOPY WITH BIOPSY performed by Denisse Mercado MD at [address] [place] [address]  6/25/14    with dilitation    DILATATION, ESOPHAGUS      ENDOSCOPY, COLON, DIAGNOSTIC      OTHER SURGICAL HISTORY  4-30-13    JEJUNOSTOMY TUBE INSERTION    SIGMOIDOSCOPY N/A 5/2/2019    SIGMOIDOSCOPY DIAGNOSTIC FLEXIBLE performed by Denisse Mercado MD at [facility] N/A 4/21/2020    REPLACEMENT, JEJUNOSTOMY TUBE performed by Denisse Mercado MD at [address] [place] [address]  1/24/13    PEG placement    UPPER GASTROINTESTINAL ENDOSCOPY  4/26/13    UPPER GASTROINTESTINAL ENDOSCOPY N/A 10/11/2019    EGD DILATION BALLOON performed by Denisse Mercado MD at [address] [place] [address] 8/22/2020    EGD BIOPSY performed by Grace Miranda MD at [address] [place] [address] N/A 2/17/2021    EGD DIAGNOSTIC ONLY performed by Denisse Mercado MD at [place] [address].       Previous Medications    ACETAMINOPHEN (TYLENOL) 160 MG/5ML SOLUTION    20.3 mLs by Per G Tube route every 4 hours as needed for Fever or Pain    ASCORBIC ACID (VITAMIN C) 500 MG TABLET    500 mg by Per G Tube route 2 times daily. BACLOFEN (LIORESAL) 10 MG TABLET    15 mg by Per G Tube route 3 times daily     BISACODYL (DULCOLAX) 10 MG SUPPOSITORY    Place 10 mg rectally daily as needed If no BM in 48 hours    BISACODYL (DULCOLAX) 5 MG EC TABLET    Take 10 mg by mouth as needed (No bowel movement in 48 hours)    CHLORHEXIDINE (PERIDEX) 0.12 % SOLUTION    Take 7.5 mLs by mouth 2 times daily. CHOLECALCIFEROL (VITAMIN D3) 2000 UNITS CAPS    2,000 Units by Per G Tube route daily     DIAZEPAM (DIASTAT) 10 MG GEL    Place 15 mg rectally once as needed (Seizures lasting over 5 minutes). DOXAZOSIN (CARDURA) 4 MG TABLET    4 mg by Per G Tube route daily     ESCITALOPRAM (LEXAPRO) 10 MG TABLET    10 mg by Per G Tube route daily. FERROUS SULFATE 220 (44 FE) MG/5ML SOLUTION    Take 220 mg by mouth 2 times daily    LACTULOSE (CHRONULAC) 10 GM/15ML SOLUTION    20 g by Per G Tube route daily     LAMOTRIGINE (LAMICTAL) 150 MG TABLET    400 mg by Per J Tube route 2 times daily     LEVETIRACETAM (KEPPRA) 100 MG/ML SOLUTION    15 mLs by Per G Tube route 2 times daily    LORAZEPAM (ATIVAN) 0.5 MG TABLET    Take 1 tablet by mouth every 12 hours as needed for Anxiety for up to 30 days.  For seizures up to 7 days    MULTIPLE VITAMINS-MINERALS (CERTA-KENTON) LIQUID    30 mLs by Per G Tube route daily     NYSTATIN (MYCOSTATIN) 301516 UNIT/GM CREAM    Apply topically 2 times daily as needed (yeast infections)    PANTOPRAZOLE (PROTONIX) 40 MG TABLET    Take 40 mg by mouth 2 times daily    POTASSIUM CHLORIDE (KAYCIEL) 20 MEQ/15ML (10%) SOLUTION    10 mEq by Per G Tube route 2 times daily     PROMETHAZINE (PHENERGAN) 12.5 MG TABLET    Take 12.5 mg by mouth every 6 hours as needed for Nausea    QUETIAPINE (SEROQUEL) 100 MG TABLET    50 mg by Per G Tube route 2 times daily Give 50 mg twice daily at 5:30 am/5:30 pm. Give 100 mg at bedtime. QUETIAPINE (SEROQUEL) 100 MG TABLET    Take 100 mg by mouth nightly Give 50 mg twice daily at 5:30 am/5:30 pm. Give 100 mg at bedtime. SENNA (SENOKOT) 8.6 MG TABLET    2 tablets by Per G Tube route daily     SODIUM PHOSPHATES (FLEET) 7-19 GM/118ML    Place 1 enema rectally once as needed (If no BM after bisacodyl)    ZINC OXIDE 20 % OINTMENT    Apply topically 2 times daily To J-tube site       ALLERGIES     Biaxin [clarithromycin] and Invanz [ertapenem]    FAMILY HISTORY     History reviewed. No pertinent family history.        SOCIAL HISTORY       Social History     Socioeconomic History    Marital status: Single     Spouse name: None    Number of children: None    Years of education: None    Highest education level: None   Occupational History    None   Social Needs    Financial resource strain: None    Food insecurity     Worry: None     Inability: None    Transportation needs     Medical: None     Non-medical: None   Tobacco Use    Smoking status: Never Smoker    Smokeless tobacco: Never Used   Substance and Sexual Activity    Alcohol use: No    Drug use: No     Comment: unknown    Sexual activity: Not Currently   Lifestyle    Physical activity     Days per week: None     Minutes per session: None    Stress: None   Relationships    Social connections     Talks on phone: None     Gets together: None     Attends Spiritism service: None     Active member of club or organization: None     Attends meetings of clubs or organizations: None     Relationship status: None    Intimate partner violence     Fear of current or ex partner: None     Emotionally abused: None     Physically abused: None     Forced sexual activity: None   Other Topics Concern    None   Social History Narrative    None       SCREENINGS                        PHYSICAL EXAM    (up to 7 for level 4, 8 or more for level 5)     ED Triage Vitals [04/11/21 1107]   BP Temp Temp src Pulse Resp SpO2 Height Weight   (!) 122/58 97.3 °F (36.3 °C) -- 80 11 97 % -- 160 lb (72.6 kg)       Physical Exam  Constitutional:       Appearance: Normal appearance. HENT:      Head: Normocephalic. Comments: Healed linear laceration to the scalp  Eyes:      General:         Right eye: No discharge. Left eye: No discharge. Conjunctiva/sclera: Conjunctivae normal.   Cardiovascular:      Rate and Rhythm: Normal rate and regular rhythm. Heart sounds: Normal heart sounds. No murmur. Pulmonary:      Effort: Pulmonary effort is normal. No respiratory distress. Breath sounds: Normal breath sounds. Abdominal:      General: Abdomen is flat. Bowel sounds are normal.      Palpations: Abdomen is soft. Tenderness: There is no abdominal tenderness. Comments: Jejunostomy tube in place with significant cellulitic change and underlying mass   Genitourinary:     Comments: Chaperone present, no active extravasation from the rectum, stool appears green but Hemoccult positive  Skin:     General: Skin is warm and dry. Capillary Refill: Capillary refill takes less than 2 seconds. Neurological:      Mental Status: He is alert. Comments: Alert, answers yes and no to some questioning         DIAGNOSTIC RESULTS       RADIOLOGY:   Non-plain film images such as CT, Ultrasound and MRI are read by the radiologist. Plain radiographic images are visualized and preliminarily interpreted by the emergency physician with the below findings:        Interpretation per the Radiologist below, if available at the time of this note:    CTA ABDOMEN PELVIS W WO CONTRAST   Preliminary Result   1. No evidence of active GI bleeding. 2. Otherwise unremarkable CTA of the abdomen and pelvis. Minimal   atherosclerotic plaque with no significant visceral stenosis. 3. Jejunostomy catheter in place.   Stable mild infiltration of the   subcutaneous course of the catheter with no evidence of focal subcutaneous collection. 4. Patchy bibasilar opacification, right greater than left, possibly   infiltrate or edema. 5. Otherwise stable CT of the abdomen and pelvis. 6. Moderate-sized hiatal hernia. Mural thickening of the distal esophagus,   possibly reflux or an esophagitis.                LABS:  Labs Reviewed   CBC WITH AUTO DIFFERENTIAL - Abnormal; Notable for the following components:       Result Value    RBC 3.38 (*)     Hemoglobin 9.6 (*)     Hematocrit 30.5 (*)     RDW 19.2 (*)     Lymphocytes Absolute 0.9 (*)     All other components within normal limits    Narrative:     Performed at:  [facility] [address] [place],  [place], [facility] [employer]   Phone [phone]   HEPATIC FUNCTION PANEL - Abnormal; Notable for the following components:    Alkaline Phosphatase 152 (*)     All other components within normal limits    Narrative:     Performed at:  [facility] [address], [place], [facility] [employer]   Phone [phone]   LIPASE - Abnormal; Notable for the following components:    Lipase 64.0 (*)     All other components within normal limits    Narrative:     Performed at:  [facility] [address], [place], [facility] [employer]   Phone [phone]   BASIC METABOLIC PANEL W/ REFLEX TO MG FOR LOW K - Abnormal; Notable for the following components:    Sodium 134 (*)     Glucose 107 (*)     BUN 22 (*)     CREATININE <0.5 (*)     All other components within normal limits    Narrative:     Performed at:  [facility] [address], [place], [facility] [employer]   Phone [phone]   BLOOD OCCULT STOOL DIAGNOSTIC - Abnormal; Notable for the following components:    Occult Blood Diagnostic   (*)     Value: Result: POSITIVE  Normal range: Negative      All other components within normal limits    Narrative:     ORDER#: [id number]                          ORDERED BY:  SOURCE: Stool COLLECTED:  04/11/21 11:45  ANTIBIOTICS AT KIM.:                      RECEIVED :  04/11/21 12:24  Performed at:  [facility],  [facility], [address]   Phone [phone]   URINE RT REFLEX TO CULTURE       All other labs were within normal range or not returned as of this dictation. EMERGENCY DEPARTMENT COURSE and DIFFERENTIAL DIAGNOSIS/MDM:   Vitals:    Vitals:    04/11/21 1330 04/11/21 1430 04/11/21 1445 04/11/21 1500   BP: (!) 132/56 (!) 120/49  (!) 114/48   Pulse: 80 77 70 75   Resp: 24 24 20 22   Temp:       SpO2: 95% (!) 88% 97% 94%   Weight:         Medications   pantoprazole (PROTONIX) 80 mg in sodium chloride 0.9 % 100 mL infusion (8 mg/hr Intravenous New Bag 4/11/21 1348)   clindamycin (CLEOCIN) 600 mg in dextrose 5 % 50 mL IVPB (has no administration in time range)   iopamidol (ISOVUE-370) 76 % injection 75 mL (75 mLs Intravenous Given 4/11/21 1326)   pantoprazole (PROTONIX) 80 mg in sodium chloride 0.9 % 50 mL bolus (0 mg Intravenous Stopped 4/11/21 1437)     Course and MDM:  Patient is 70-year-old male with history of GI bleed presenting to the emergency room for alleged coffee-ground emesis since the end of March from [address]. On arrival he is hemodynamically stable I do not see any active signs of bleeding however he does have some coffee-ground emesis staining his sheets as well as occult positive stool today. Hoshemoglobin is actually improved from baseline. He has not had an EGD since February. Occult positive stool is new today when compared to the end of March. I did confirm that the brother would like the patient to remain full code status at this time. PPI bolus and drip were initiated. CTA of the abdomen does not show any sign of active bleeding. There is some cellulitic change noted to the jejunostomy site without significant underlying abscess. Clindamycin will be initiated for this.   Patient to be

## 2021-04-12 LAB
BILIRUBIN URINE: NEGATIVE
BLOOD, URINE: NEGATIVE
CLARITY: CLEAR
COLOR: YELLOW
GLUCOSE BLD-MCNC: 106 MG/DL (ref 70–99)
GLUCOSE BLD-MCNC: 99 MG/DL (ref 70–99)
GLUCOSE URINE: NEGATIVE MG/DL
HCT VFR BLD CALC: 27.1 % (ref 40.5–52.5)
HCT VFR BLD CALC: 29.4 % (ref 40.5–52.5)
HEMOGLOBIN: 8.4 G/DL (ref 13.5–17.5)
HEMOGLOBIN: 9.5 G/DL (ref 13.5–17.5)
KETONES, URINE: NEGATIVE MG/DL
LEUKOCYTE ESTERASE, URINE: NEGATIVE
MICROSCOPIC EXAMINATION: NORMAL
NITRITE, URINE: NEGATIVE
PERFORMED ON: ABNORMAL
PERFORMED ON: NORMAL
PH UA: 7.5 (ref 5–8)
PROTEIN UA: NEGATIVE MG/DL
SPECIFIC GRAVITY UA: >1.03 (ref 1–1.03)
URINE REFLEX TO CULTURE: NORMAL
URINE TYPE: NORMAL
UROBILINOGEN, URINE: 0.2 E.U./DL

## 2021-04-12 PROCEDURE — 36415 COLL VENOUS BLD VENIPUNCTURE: CPT

## 2021-04-12 PROCEDURE — C9113 INJ PANTOPRAZOLE SODIUM, VIA: HCPCS | Performed by: INTERNAL MEDICINE

## 2021-04-12 PROCEDURE — 85014 HEMATOCRIT: CPT

## 2021-04-12 PROCEDURE — 94761 N-INVAS EAR/PLS OXIMETRY MLT: CPT

## 2021-04-12 PROCEDURE — 81003 URINALYSIS AUTO W/O SCOPE: CPT

## 2021-04-12 PROCEDURE — 6360000002 HC RX W HCPCS: Performed by: INTERNAL MEDICINE

## 2021-04-12 PROCEDURE — 2700000000 HC OXYGEN THERAPY PER DAY

## 2021-04-12 PROCEDURE — 2500000003 HC RX 250 WO HCPCS: Performed by: INTERNAL MEDICINE

## 2021-04-12 PROCEDURE — 2580000003 HC RX 258: Performed by: INTERNAL MEDICINE

## 2021-04-12 PROCEDURE — 2060000000 HC ICU INTERMEDIATE R&B

## 2021-04-12 PROCEDURE — 85018 HEMOGLOBIN: CPT

## 2021-04-12 PROCEDURE — 92526 ORAL FUNCTION THERAPY: CPT

## 2021-04-12 PROCEDURE — 94760 N-INVAS EAR/PLS OXIMETRY 1: CPT

## 2021-04-12 PROCEDURE — 6370000000 HC RX 637 (ALT 250 FOR IP): Performed by: INTERNAL MEDICINE

## 2021-04-12 PROCEDURE — 94660 CPAP INITIATION&MGMT: CPT

## 2021-04-12 RX ORDER — QUETIAPINE FUMARATE 25 MG/1
50 TABLET, FILM COATED ORAL DAILY
Status: DISCONTINUED | OUTPATIENT
Start: 2021-04-12 | End: 2021-04-16 | Stop reason: HOSPADM

## 2021-04-12 RX ORDER — QUETIAPINE FUMARATE 100 MG/1
100 TABLET, FILM COATED ORAL NIGHTLY
Status: DISCONTINUED | OUTPATIENT
Start: 2021-04-12 | End: 2021-04-16 | Stop reason: HOSPADM

## 2021-04-12 RX ORDER — HYDROPHILIC CREAM
PASTE (GRAM) TOPICAL 2 TIMES DAILY
Status: DISCONTINUED | OUTPATIENT
Start: 2021-04-12 | End: 2021-04-16 | Stop reason: HOSPADM

## 2021-04-12 RX ORDER — QUETIAPINE FUMARATE 25 MG/1
50 TABLET, FILM COATED ORAL EVERY MORNING
Status: DISCONTINUED | OUTPATIENT
Start: 2021-04-12 | End: 2021-04-16 | Stop reason: HOSPADM

## 2021-04-12 RX ADMIN — POTASSIUM BICARBONATE 10 MEQ: 782 TABLET, EFFERVESCENT ORAL at 21:41

## 2021-04-12 RX ADMIN — OXYCODONE HYDROCHLORIDE AND ACETAMINOPHEN 500 MG: 500 TABLET ORAL at 08:48

## 2021-04-12 RX ADMIN — CHLORHEXIDINE GLUCONATE 7.5 ML: 1.2 RINSE ORAL at 21:42

## 2021-04-12 RX ADMIN — MULTIVITAMIN 30 ML: LIQUID ORAL at 10:00

## 2021-04-12 RX ADMIN — OXYCODONE HYDROCHLORIDE AND ACETAMINOPHEN 500 MG: 500 TABLET ORAL at 21:41

## 2021-04-12 RX ADMIN — LEVETIRACETAM 1500 MG: 500 SOLUTION ORAL at 08:46

## 2021-04-12 RX ADMIN — LACTULOSE 20 G: 20 SOLUTION ORAL at 08:46

## 2021-04-12 RX ADMIN — Medication: at 19:20

## 2021-04-12 RX ADMIN — BACLOFEN 15 MG: 10 TABLET ORAL at 21:41

## 2021-04-12 RX ADMIN — ZINC OXIDE: 200 OINTMENT TOPICAL at 09:28

## 2021-04-12 RX ADMIN — Medication 2000 UNITS: at 08:48

## 2021-04-12 RX ADMIN — LAMOTRIGINE 400 MG: 100 TABLET ORAL at 21:41

## 2021-04-12 RX ADMIN — TRIAMCINOLONE ACETONIDE: 1 CREAM TOPICAL at 08:50

## 2021-04-12 RX ADMIN — DOXAZOSIN 4 MG: 4 TABLET ORAL at 08:48

## 2021-04-12 RX ADMIN — SODIUM CHLORIDE 8 MG/HR: 9 INJECTION, SOLUTION INTRAVENOUS at 15:34

## 2021-04-12 RX ADMIN — QUETIAPINE FUMARATE 50 MG: 25 TABLET ORAL at 17:50

## 2021-04-12 RX ADMIN — ESCITALOPRAM OXALATE 10 MG: 10 TABLET ORAL at 08:48

## 2021-04-12 RX ADMIN — LAMOTRIGINE 400 MG: 100 TABLET ORAL at 08:48

## 2021-04-12 RX ADMIN — LEVETIRACETAM 1500 MG: 500 SOLUTION ORAL at 21:41

## 2021-04-12 RX ADMIN — SODIUM CHLORIDE, POTASSIUM CHLORIDE, SODIUM LACTATE AND CALCIUM CHLORIDE: 600; 310; 30; 20 INJECTION, SOLUTION INTRAVENOUS at 14:47

## 2021-04-12 RX ADMIN — QUETIAPINE FUMARATE 100 MG: 100 TABLET ORAL at 21:41

## 2021-04-12 RX ADMIN — SENNOSIDES 17.2 MG: 8.6 TABLET, FILM COATED ORAL at 08:47

## 2021-04-12 RX ADMIN — SODIUM CHLORIDE 8 MG/HR: 9 INJECTION, SOLUTION INTRAVENOUS at 02:48

## 2021-04-12 RX ADMIN — BACLOFEN 15 MG: 10 TABLET ORAL at 14:48

## 2021-04-12 RX ADMIN — SODIUM CHLORIDE, POTASSIUM CHLORIDE, SODIUM LACTATE AND CALCIUM CHLORIDE: 600; 310; 30; 20 INJECTION, SOLUTION INTRAVENOUS at 04:18

## 2021-04-12 RX ADMIN — POTASSIUM BICARBONATE 10 MEQ: 782 TABLET, EFFERVESCENT ORAL at 08:49

## 2021-04-12 RX ADMIN — Medication: at 14:48

## 2021-04-12 RX ADMIN — BACLOFEN 15 MG: 10 TABLET ORAL at 08:47

## 2021-04-12 RX ADMIN — CHLORHEXIDINE GLUCONATE 7.5 ML: 1.2 RINSE ORAL at 08:49

## 2021-04-12 ASSESSMENT — PAIN SCALES - GENERAL
PAINLEVEL_OUTOF10: 0

## 2021-04-12 ASSESSMENT — PAIN SCALES - WONG BAKER: WONGBAKER_NUMERICALRESPONSE: 0

## 2021-04-12 NOTE — PROGRESS NOTES
Patient Active Problem List   Diagnosis    Altered mental status    Dehydration    Cerebral palsy (HCC)    [facility] disorder ([facility]    Nuclear sclerosis    Anemia, chronic disease    Partial epilepsy with impairment of consciousness, intractable ([address]    Mental retardation    Malfunction of gastrostomy tube ([facility]    Hypoxemia    Hypotension    Recurrent seizures ([facility]    Anemia due to acute blood loss    Sepsis ([facility]    Coffee ground emesis    Gastroduodenitis    GI bleed    Upper GI hemorrhage   H&P dictated

## 2021-04-12 NOTE — CONSULTS
Recs:  · Feeding Route: Jejunostomy  · Formula: Fluid restricted  · Schedule: Cyclic(x15 hr)  · Goal TF & Flush Orders Provides: Two Gagan HN @ 65mL/hr x15 hr provides 975 mL total volume, 1950 kcal, 81 g protein & 683 mL free water. Anthropometric Measures:  · Height: 5' 6\" (167.6 cm)(per last admission)  · Current Body Weight: 169 lb (76.7 kg)   · Admission Body Weight: 157 lb (71.2 kg)    · Ideal Body Weight: 142 lbs; % Ideal Body Weight 119 %   · BMI: 27.3  · BMI Categories: Overweight (BMI 25.0-29. 9)       Nutrition Diagnosis:   · Inadequate oral intake related to altered GI structure as evidenced by (EN on hold d/t coffee ground emesis upon admission)      Nutrition Interventions:   Food and/or Nutrient Delivery:  Start Tube Feeding  Nutrition Education/Counseling:  No recommendation at this time   Coordination of Nutrition Care:  Continue to monitor while inpatient    Goals:  tolerance to nutrition support       Nutrition Monitoring and Evaluation:   Behavioral-Environmental Outcomes:  None Identified   Food/Nutrient Intake Outcomes:  Enteral Nutrition Intake/Tolerance  Physical Signs/Symptoms Outcomes:  GI Status     Discharge Planning:     Too soon to determine     Electronically signed by Nehal Hays RD, LD on 4/12/21 at 2:44 PM EDT  Contact: [phone]

## 2021-04-12 NOTE — PLAN OF CARE
Nutrition Problem #1: Inadequate oral intake  Intervention: Food and/or Nutrient Delivery: Start Tube Feeding  Nutritional Goals: tolerance to nutrition support

## 2021-04-12 NOTE — PLAN OF CARE
Problem: Skin Integrity:  Intervention: Wound cleansing  Note: Clean affected area around J tube with saline moistened guaze. Intervention: Wound dressing application  Note: Apply Triad ointment to skin around J tube twice a day and prn.   Apply hydrofiber and cover dressing over J tube to contain drainage

## 2021-04-12 NOTE — DISCHARGE INSTR - COC
performed by Freddie Lerner MD at [place] [address]  1/24/13    PEG placement    UPPER GASTROINTESTINAL ENDOSCOPY  4/26/13    UPPER GASTROINTESTINAL ENDOSCOPY N/A 10/11/2019    EGD DILATION BALLOON performed by Freddie Lerner MD at [place] [address] N/A 8/22/2020    EGD BIOPSY performed by Tarsha Wren MD at [facility] ENDOSCOPY N/A 2/17/2021    EGD DIAGNOSTIC ONLY performed by Freddie Lerner MD at [address] [place] [address]       Immunization History:   Immunization History   Administered Date(s) Administered    Influenza Vaccine, unspecified formulation 10/02/2016    Influenza Virus Vaccine 10/16/2009, 09/17/2012, 11/30/2014    Influenza Whole 09/30/2013    Pneumococcal Conjugate 13-valent ([id number]) 10/02/2016    Pneumococcal Conjugate 7-valent (Para Rom) 11/15/2007, 12/02/2013       Active Problems:  Patient Active Problem List   Diagnosis Code    Altered mental status R41.82    Dehydration E86.0    Cerebral palsy ([address] G80.9    Seizure disorder ([address] G40.909    Nuclear sclerosis H25.10    Anemia, chronic disease D63.8    Partial epilepsy with impairment of consciousness, intractable ([address] G40.219    Mental retardation F79    Malfunction of gastrostomy tube ([address] K94.23    Hypoxemia R09.02    Hypotension I95.9    Recurrent seizures ([address] G40.909    Anemia due to acute blood loss D62    Sepsis (HCC) A41.9    Coffee ground emesis K92.0    Gastroduodenitis K29.90    GI bleed K92.2    Upper GI hemorrhage K92.2       Isolation/Infection:   Isolation          No Isolation        Patient Infection Status     Infection Onset Added Last Indicated Last Indicated By Review Planned Expiration Resolved Resolved By    None active    Resolved    C-diff Rule Out 03/29/21 03/29/21 03/29/21 Gastrointestinal Panel, Molecular (Ordered)   03/29/21 Rule-Out Test Resulted    COVID-19 Rule Out 03/28/21 03/28/21 03/28/21 COVID-19, Rapid (Ordered)   03/28/21 Rule-Out Test Resulted    C-diff Rule Out 03/28/21 03/28/21 03/28/21 GI Bacterial Pathogens By PCR (Ordered)   03/28/21 Rule-Out Test Resulted    COVID-19 Rule Out 03/18/21 03/18/21 03/18/21 COVID-19, Rapid (Ordered)   03/18/21 Rule-Out Test Resulted    COVID-19 Rule Out 02/16/21 02/16/21 02/16/21 COVID-19 (Ordered)   02/17/21 Rule-Out Test Resulted    COVID-19 Rule Out 01/31/21 01/31/21 01/31/21 COVID-19 (Ordered)   02/01/21 Rule-Out Test Resulted    COVID-19 Rule Out 08/21/20 08/21/20 08/21/20 COVID-19 (Ordered)   08/22/20 Rule-Out Test Resulted          Nurse Assessment:  Last Vital Signs: BP (!) 107/48   Pulse 54   Temp 98 °F (36.7 °C) (Oral)   Resp 16   Ht 5' 6\" (1.676 m) Comment: per last admission  Wt 169 lb 5 oz (76.8 kg)   SpO2 99%   BMI 27.33 kg/m²     Last documented pain score (0-10 scale): Pain Level: 7  Last Weight:   Wt Readings from Last 1 Encounters:   04/12/21 169 lb 5 oz (76.8 kg)     Mental Status:  alert and oriented to self and date. IV Access:  - None    Nursing Mobility/ADLs:  Walking   Dependent  Transfer  Dependent  Bathing  Dependent  Dressing  Dependent  Toileting  Dependent  Feeding  Dependent  Med Admin  Dependent  Med Delivery   crushed    Wound Care Documentation and Therapy:  Wound 04/11/21 Abdomen Left MASD around J tube (Active)   Wound Image   04/12/21 1314   Dressing Status New dressing applied 04/12/21 2355   Wound Cleansed Not Cleansed 04/12/21 1920   Dressing/Treatment Cotton;Protective barrier;Split gauze 04/12/21 1920   Wound Length (cm) 8 cm 04/12/21 1314   Wound Width (cm) 6 cm 04/12/21 1314   Wound Surface Area (cm^2) 48 cm^2 04/12/21 1314   Wound Assessment Erythema;Pink/red 04/12/21 2355   Drainage Amount Copious 04/12/21 2355   Drainage Description Yellow 04/12/21 2355   Odor None 04/12/21 2355   Ewa-wound Assessment Erosion; Excoriated 04/12/21 2355   Number of days: 2     Plan of Care    for skin around J tube: Clean skin with water only, then dry. \"Crust\": sprinkle stoma powder, brush off excess, dab powder with no sting barrier wipe, apply powder again, and no sting barrier wipe. Place strip paste snug around tube, cover with barrier sheet. then the convex ostomy barrier  wafer. Bring the external bumper down on top of the ostomy barrier wafer. Cut a hole in the pouch and  bring the J tube through the pouch. Secure the  outside of the pouch  where the J tube is pulled through with tegaderm or water proof tape. Attach ostomy belt to tabs on ostomy barrier, this will help hold to ostomy system in placed. Supplies: We use Coloplast, may use any brand your facility has. -[address] ring # [id number]  --Brava Elastic barrier strips  #[id number]  -Brava  belt  For Sensura August # [id number] or # [id number]   BNNXSGF CZX Flex convex #60536 (5/8-1-9/16\" RED)  Jason Gold #[id number]  Brava Strip paste #[id number]  Coloplast barrier sheet or duoderm   SenSura August Flex Drainable Pouch #[id number] RED  Please follow up with Shiva Ambriz NP in [address] [place] [address] [phone]      Elimination:  Continence:   · Bowel: No  · Bladder: No  Urinary Catheter: None   Colostomy/Ileostomy/Ileal Conduit: No       Date of Last BM: 4/12/20212    Intake/Output Summary (Last 24 hours) at 4/13/2021 1030  Last data filed at 4/13/2021 0859  Gross per 24 hour   Intake 2120.47 ml   Output 2260 ml   Net -139.53 ml     I/O last 3 completed shifts: In: 1801.5 [I.V.:1503; NG/GT:267; IV Piggyback:31.5]  Out: 1800 [Urine:1800]    Safety Concerns:      At Risk for Falls, History of Seizures and Aspiration Risk    Impairments/Disabilities:      Vision and Contractures - LUE    Nutrition Therapy:  Current Nutrition Therapy:   - Tube Feedings:  Renal    Routes of Feeding: Jejunal Tube  Liquids: No Liquids  Daily Fluid Restriction: no  Last Modified Barium Swallow with Video (Video Swallowing Test): not done    Treatments at the Time of Hospital Discharge:

## 2021-04-12 NOTE — PROGRESS NOTES
Treatment Time: 25 min    If patient discharges prior to next session this note will serve as a discharge summary.       Dewey Owens [facility]#[id number]

## 2021-04-12 NOTE — H&P
[facility] [address]                     [facility], [address]                              HISTORY AND PHYSICAL    PATIENT NAME: Gustavo Johnson                     :        1950  MED REC NO:   [id number]                          ROOM:       [id number]  ACCOUNT NO:   [de-identified]                           ADMIT DATE: 2021  PROVIDER:     jR Suarez MD    HISTORY OF PRESENT ILLNESS:  The patient is a 19-year-old profoundly  intellectually disabled man who is also in contracted paralyzed state  and functionally quadriplegic, came to the emergency room with history  of coffee-ground emesis. The patient has presented with this symptoms  many, many times before and had been admitted. He was here last time  with a similar symptom, but at that time the GI service did not the it  was appropriate to do an endoscopy on him since the patient already has  had so many of them. The patient also had in the past some gastrostomy  malfunction that was treated by the GI service and the symptoms had  resolved. The patient had an emesis since , known to have GI bleed  in the past.  The patient had coffee-ground emesis. The patient has a  gastrostomy tube, the patient is being fed by that. Patient's brother,  who is very attentive to his need and by his side. The patient has also  high aspiration. There is no fever, no chills. PAST MEDICAL HISTORY:  Pertinent for anemia, anhydrosis, bilateral  cataract, bipolar affective disorder, peripheral vascular disease,  cataract, severe intellectual disability, functional quadriplegia,  autism, GERD, the patient is very hard of hearing, impulse control  disorder, chronic liver disease, movement disorder, neurogenic bladder,  seizure activity, spastic hemiplegia, urinary incontinence.     PAST SURGICAL HISTORY:  Pertinent for abdominal surgery with a  gastrostomy tube placement, exploratory laparotomy, multiple  colonoscopies, multiple EGDs, esophageal dilation, jejunostomy,  sigmoidoscopy. CURRENT MEDICATIONS:  Include acetaminophen, ascorbic acid, baclofen,  bisacodyl, chlorhexidine solution, vitamin D3, doxazosin, Lexapro,  ferrous sulfate, lactulose, Chronulac, Lamictal, Keppra, lorazepam,  multivitamin, nystatin cream, pantoprazole, potassium chloride,  promethazine, quetiapine, Senokot and zinc oxide. ALLERGIES:  The patient is allergic to [facility] and [facility] [place]. FAMILY HISTORY:  Unremarkable. SOCIAL HISTORY:  Single, never , never had any children, never  had any education, never had any occupation. No history of smoking, no  history of drinking. Does not have any education. Does not have any  children. No history of alcohol or substance abuse, in half-way care  all his life. Presently a resident at [facility]. REVIEW OF SYSTEMS:  Negative for loss of consciousness. Does have  increased lethargy. Does have psychomotor instability. The patient can  get agitated intermittently. Appetite, poor. The patient is on tube  feedings. Recent more than one episode of coffee-ground emesis. No  angina pectoris. Does have exertional shortness of breath. Does have  abdominal pain. Does have hematemesis. No melanotic stools as of yet. The patient has severe oropharyngeal dysphagia. The patient is  incontinent to urine and feces. Does have chronic musculoskeletal pain. PHYSICAL EXAMINATION:  GENERAL:  Lethargic, incoherent, totally confused intellectually  disabled 20-year-old white man. VITAL SIGNS:  Temperature 97.2, blood pressure 137/70, respirations 18,  heart rate 74. O2 sat 97% on room air. HEENT:  Oral mucosa dry. SKIN:  Warm and dry. NECK:  Supple. Faint carotid bruit. No jugular venous distention. LUNGS:  Vesicular breath sounds. Fairly clear to auscultation. HEART:  Regular rate and rhythm. S1, S2 without any murmur or gallop. ABDOMEN:  Soft.   Minimal tenderness in the epigastrium gastrostomy tube  present. Bowel sounds present. There is some surrounding edema. EXTREMITIES:  Show no cyanosis or edema. Distal pulsations are weak. NEUROLOGIC:  No acute focal deficit. The patient has spastic  hemiplegia. Babinski present. LABORATORY DATA:  Sodium 134, potassium 4.9, chloride 99, CO2 23, BUN  22, creatinine 0.5, blood sugar 107, albumin 3.8, alkaline phosphatase  152, AST and ALT 33 and 17, direct acting bilirubin less than 0.2. Blood sugar 107. White blood cell count 9.5, hemoglobin/hematocrit is  9.6 and 30.5, platelet count 037. DIAGNOSTIC DATA:  The patient had a CT scan of the abdomen and pelvis  with a CT angiography. No evidence of active GI bleeding, otherwise  unremarkable CT of the abdomen and pelvis with minimal atherosclerotic  plaque. No significant visceral stenosis. Jejunostomy catheter in  place. Stable mild infiltration of the subcutaneous course of the  catheter with no evidence of focal subcutaneous collection. Patchy  bibasilar opacification right side greater than the left, possible  infiltrates or edema. Stable CT of the abdomen and pelvis. Moderate-sized hiatal hernia and mural thickening at the distal  esophagus, possible reflux or an esophagitis. ASSESSMENT:  Upper GI hemorrhage, anemia due to acute blood loss,  oropharyngeal dysphagia, reflux esophagitis, intellectual disability. PLAN:  Get him admitted. Treat him with IV hydration, parenteral pain  relief,GI consultation, IV Protonix infusion, hemoglobin/hematocrit  monitoring and IV hydration.         Nani Alfaro MD    D: 04/11/2021 21:40:39       T: 04/11/2021 21:45:20     [id number]  Job#: [id number]     Doc#: [id number]    CC:

## 2021-04-12 NOTE — CONSULTS
unknown, feeding tube    ABDOMINAL EXPLORATION SURGERY  9-28-14    LAPAROTOMY EXPLORATORY, ERIKA-EN-Y TUBE JEJUNOSTOMY, SMALL BOWEL RESECTION                                              COLONOSCOPY      COLONOSCOPY N/A 6/4/2019    COLONOSCOPY WITH BIOPSY performed by Claritza [facility]quez MD [facility]  6/25/14    with dilitation    DILATATION, ESOPHAGUS      ENDOSCOPY, COLON, DIAGNOSTIC      OTHER SURGICAL HISTORY  4-30-13    JEJUNOSTOMY TUBE INSERTION    SIGMOIDOSCOPY N/A 5/2/2019    SIGMOIDOSCOPY DIAGNOSTIC FLEXIBLE performed by Claritza Vasquez MD at [facility] N/A 4/21/2020    REPLACEMENT, JEJUNOSTOMY TUBE performed by Claritza Vasquez MD at [facility]  1/24/13    PEG placement    UPPER GASTROINTESTINAL ENDOSCOPY  4/26/13    UPPER GASTROINTESTINAL ENDOSCOPY N/A 10/11/2019    EGD DILATION BALLOON performed by Claritza Vasquez MD at [facility] N/A 8/22/2020    EGD BIOPSY performed by Juanito Queen MD at [facility] N/A 2/17/2021    EGD DIAGNOSTIC ONLY performed by Claritza Vasquez MD at [address] [place] [address]      Past Endoscopic History  EGD with Dr Dhiraj Reyes 2/2021 for coffee ground emesis  IMPRESSION : Narrow caliber esophagus  Benign distal esophageal ring  12cm hiatal hernia  distal esophageal diverticulum. .  The esophagus was dilated by the endoscope passage  Otherwise normal EGD. no source of potential GI  bleeding seen. Admission Meds  No current facility-administered medications on file prior to encounter. Current Outpatient Medications on File Prior to Encounter   Medication Sig Dispense Refill    LORazepam (ATIVAN) 0.5 MG tablet Take 1 tablet by mouth every 12 hours as needed for Anxiety for up to 30 days.  For seizures up to 7 days 10 tablet 0    pantoprazole (PROTONIX) 40 MG tablet Take 40 mg by mouth 2 times daily MG tablet 50 mg by Per G Tube route 2 times daily Give 50 mg twice daily at 5:30 am/5:30 pm. Give 100 mg at bedtime.  escitalopram (LEXAPRO) 10 MG tablet 10 mg by Per G Tube route daily.  chlorhexidine (PERIDEX) 0.12 % solution Take 7.5 mLs by mouth 2 times daily. Allergies  Allergies   Allergen Reactions    Biaxin [Clarithromycin] Other (See Comments)     Unknown reaction    Invanz [Ertapenem] Other (See Comments)     Caused SEIZURES  Caused SEIZURES      Social   Social History     Tobacco Use    Smoking status: Never Smoker    Smokeless tobacco: Never Used   Substance Use Topics    Alcohol use: No        History reviewed. No pertinent family history. Review of Systems  Review of systems not obtained due to patient factors. Physical Exam  Blood pressure (!) 156/82, pulse 58, temperature 98 °F (36.7 °C), temperature source Oral, resp. rate 16, weight 169 lb 5 oz (76.8 kg), SpO2 98 %. General appearance: awakens, cooperative, no distress, appears stated age  Eyes: Anicteric  Head: Normocephalic, without obvious abnormality  Lungs: clear to auscultation bilaterally, Normal Effort  Heart: regular rate and rhythm, normal S1 and S2, no murmurs or rubs  Abdomen: soft, non-tender. Bowel sounds normal. No masses,  no organomegaly. Multiple scars.  J-tube in left abdomen with surrounding erythema of skin  Extremities: atraumatic, no cyanosis or edema  Skin: warm and dry, no jaundice  Neuro: Grossly intact, alert   Musculoskeletal: contractures      Data Review:    Recent Labs     04/11/21  1142 04/11/21  2301 04/12/21  0813   WBC 9.5  --   --    HGB 9.6* 8.3* 9.5*   HCT 30.5* 27.7* 29.4*   MCV 90.4  --   --      --   --      Recent Labs     04/11/21  1142   *   K 4.9   CL 99   CO2 23   BUN 22*   CREATININE <0.5*     Recent Labs     04/11/21  1142   AST 33   ALT 17   BILIDIR <0.2   BILITOT <0.2   ALKPHOS 152*     Recent Labs     04/11/21  1142   LIPASE 64.0*     No results for input(s): PROTIME, INR in the last 72 hours. No results for input(s): PTT in the last 72 hours. No results for input(s): OCCULTBLD in the last 72 hours. Imaging Studies:                                          CTA -scan of abdomen and pelvis w and wo contrast: 4/11/21  Impression   1. No evidence of active GI bleeding. 2. Otherwise unremarkable CTA of the abdomen and pelvis.  Minimal   atherosclerotic plaque with no significant visceral stenosis. 3. Jejunostomy catheter in place.  Stable mild infiltration of the   subcutaneous course of the catheter with no evidence of focal subcutaneous   collection. 4. Patchy bibasilar opacification, right greater than left, possibly   infiltrate or edema. 5. Otherwise stable CT of the abdomen and pelvis. 6. Moderate-sized hiatal hernia.  Mural thickening of the distal esophagus,   possibly reflux or an esophagitis. Assessment:     Active Problems:    Altered mental status    Dehydration    Cerebral palsy (HCC)    Seizure disorder (HCC)    Nuclear sclerosis    Anemia, chronic disease    Partial epilepsy with impairment of consciousness, intractable (HCC)    Malfunction of gastrostomy tube (HCC)    Hypoxemia    Hypotension    Recurrent seizures ([facility])    Anemia due to acute blood loss    Coffee ground emesis    Gastroduodenitis    GI bleed    Upper GI hemorrhage  Resolved Problems:    Acute respiratory failure with hypoxia (HCC)    Coffee ground emesis - h/o ulcerative esophagitis (none at last EGD 2/2021) and large hiatal hernia. Suspect pt has recurrent vomiting from the large hiatal hernia. CT reviewed. Chronic anemia - hgb is at baseline. Recommendations:   - PPI  - wound care to eval J-tube site  - no plans for repeat endoscopy with stable hgb. Recurrent N/V is likely from the large hiatal hernia.      Discussed with Dr. Frank Benitez PA-C  [facility]      I have personally performed a face to face diagnostic evaluation on this patient. I have interviewed and examined the patient and I agree with the findings and recommended plan of care. In summary, my findings and plan are the following:  Numerous admissions for the same thing: CGE. Pt hgb is unchanged from baseline. Ab exam shows some bile leaking around J tube with some skin erythema. Will have wound care see him. No plan for egd. Will sign off.         Miryam Katz MD  [address]

## 2021-04-12 NOTE — PLAN OF CARE
Problem: Skin Integrity:  Goal: Will show no infection signs and symptoms  Description: Will show no infection signs and symptoms  4/12/2021 1107 by Reina Polanco RN  Outcome: Ongoing  Note: No new evidence of skin breakdown, skin around G tube site red, consult for GI and wound already in place    Problem: Skin Integrity:  Goal: Absence of new skin breakdown  Description: Absence of new skin breakdown  4/12/2021 1107 by Reina Polanco RN  Outcome: Ongoing     Problem: Falls - Risk of:  Goal: Will remain free from falls  Description: Will remain free from falls  4/12/2021 1107 by Reina Polanco RN  Outcome: Ongoing     Problem: Falls - Risk of:  Goal: Absence of physical injury  Description: Absence of physical injury  4/12/2021 1107 by Reina Polanco RN  Outcome: Ongoing

## 2021-04-12 NOTE — CARE COORDINATION
Discharge Planning Assessment    RN/SW discharge planner met with patient/ (and family member) to discuss reason for admission, current living situation, and potential needs at the time of discharge    Demographics/Insurance verified Yes    Current type of dwellin[address] [place] [address]  [address] [address]         Phone: [phone]       Fax: [phone]      Call Natalie Prabhakar, [phone]    Transportation at the time of discharge: Ambulance

## 2021-04-13 LAB
GLUCOSE BLD-MCNC: 110 MG/DL (ref 70–99)
GLUCOSE BLD-MCNC: 127 MG/DL (ref 70–99)
GLUCOSE BLD-MCNC: 82 MG/DL (ref 70–99)
GLUCOSE BLD-MCNC: 96 MG/DL (ref 70–99)
HCT VFR BLD CALC: 26.1 % (ref 40.5–52.5)
HCT VFR BLD CALC: 26.8 % (ref 40.5–52.5)
HCT VFR BLD CALC: 28.2 % (ref 40.5–52.5)
HEMOGLOBIN: 8.3 G/DL (ref 13.5–17.5)
HEMOGLOBIN: 8.5 G/DL (ref 13.5–17.5)
HEMOGLOBIN: 8.9 G/DL (ref 13.5–17.5)
PERFORMED ON: ABNORMAL
PERFORMED ON: ABNORMAL
PERFORMED ON: NORMAL
PERFORMED ON: NORMAL

## 2021-04-13 PROCEDURE — 6370000000 HC RX 637 (ALT 250 FOR IP): Performed by: INTERNAL MEDICINE

## 2021-04-13 PROCEDURE — 2060000000 HC ICU INTERMEDIATE R&B

## 2021-04-13 PROCEDURE — 2580000003 HC RX 258: Performed by: PHYSICIAN ASSISTANT

## 2021-04-13 PROCEDURE — 36415 COLL VENOUS BLD VENIPUNCTURE: CPT

## 2021-04-13 PROCEDURE — 85014 HEMATOCRIT: CPT

## 2021-04-13 PROCEDURE — 6360000002 HC RX W HCPCS: Performed by: PHYSICIAN ASSISTANT

## 2021-04-13 PROCEDURE — 2580000003 HC RX 258: Performed by: INTERNAL MEDICINE

## 2021-04-13 PROCEDURE — 85018 HEMOGLOBIN: CPT

## 2021-04-13 PROCEDURE — C9113 INJ PANTOPRAZOLE SODIUM, VIA: HCPCS | Performed by: INTERNAL MEDICINE

## 2021-04-13 PROCEDURE — 6360000002 HC RX W HCPCS: Performed by: STUDENT IN AN ORGANIZED HEALTH CARE EDUCATION/TRAINING PROGRAM

## 2021-04-13 PROCEDURE — APPNB30 APP NON BILLABLE TIME 0-30 MINS: Performed by: NURSE PRACTITIONER

## 2021-04-13 PROCEDURE — 6360000002 HC RX W HCPCS: Performed by: INTERNAL MEDICINE

## 2021-04-13 RX ORDER — HYDROMORPHONE HYDROCHLORIDE 1 MG/ML
1 INJECTION, SOLUTION INTRAMUSCULAR; INTRAVENOUS; SUBCUTANEOUS ONCE
Status: COMPLETED | OUTPATIENT
Start: 2021-04-13 | End: 2021-04-13

## 2021-04-13 RX ORDER — HYDROPHILIC CREAM
2 PASTE (GRAM) TOPICAL 2 TIMES DAILY
Refills: 0 | COMMUNITY
Start: 2021-04-13

## 2021-04-13 RX ORDER — LORAZEPAM 0.5 MG/1
0.5 TABLET ORAL EVERY 12 HOURS PRN
Qty: 20 TABLET | Refills: 0 | Status: SHIPPED | OUTPATIENT
Start: 2021-04-13 | End: 2021-05-13

## 2021-04-13 RX ORDER — MORPHINE SULFATE 4 MG/ML
4 INJECTION, SOLUTION INTRAMUSCULAR; INTRAVENOUS
Status: DISCONTINUED | OUTPATIENT
Start: 2021-04-13 | End: 2021-04-16 | Stop reason: HOSPADM

## 2021-04-13 RX ORDER — MORPHINE SULFATE 2 MG/ML
2 INJECTION, SOLUTION INTRAMUSCULAR; INTRAVENOUS
Status: DISCONTINUED | OUTPATIENT
Start: 2021-04-13 | End: 2021-04-16 | Stop reason: HOSPADM

## 2021-04-13 RX ORDER — HYDROCODONE BITARTRATE AND ACETAMINOPHEN 5; 325 MG/1; MG/1
1 TABLET ORAL EVERY 4 HOURS PRN
Qty: 15 TABLET | Refills: 0 | Status: SHIPPED | OUTPATIENT
Start: 2021-04-13 | End: 2021-04-16

## 2021-04-13 RX ORDER — HYDROXYZINE HYDROCHLORIDE 10 MG/1
10 TABLET, FILM COATED ORAL 3 TIMES DAILY PRN
Qty: 30 TABLET | Refills: 0 | Status: SHIPPED | OUTPATIENT
Start: 2021-04-13 | End: 2021-04-23

## 2021-04-13 RX ORDER — DIPHENHYDRAMINE HCL 12.5MG/5ML
25 LIQUID (ML) ORAL EVERY 6 HOURS PRN
Qty: 200 ML | Refills: 4 | Status: SHIPPED | OUTPATIENT
Start: 2021-04-13

## 2021-04-13 RX ORDER — CLINDAMYCIN HYDROCHLORIDE 300 MG/1
300 CAPSULE ORAL 2 TIMES DAILY
Qty: 14 CAPSULE | Refills: 0 | Status: SHIPPED
Start: 2021-04-13 | End: 2021-04-15 | Stop reason: HOSPADM

## 2021-04-13 RX ORDER — HYDROCODONE BITARTRATE AND ACETAMINOPHEN 5; 325 MG/1; MG/1
1 TABLET ORAL EVERY 4 HOURS PRN
Status: DISCONTINUED | OUTPATIENT
Start: 2021-04-13 | End: 2021-04-16 | Stop reason: HOSPADM

## 2021-04-13 RX ORDER — TRIAMCINOLONE ACETONIDE 1 MG/G
CREAM TOPICAL
Qty: 45 G | Refills: 0 | Status: SHIPPED | OUTPATIENT
Start: 2021-04-13

## 2021-04-13 RX ADMIN — QUETIAPINE FUMARATE 100 MG: 100 TABLET ORAL at 21:00

## 2021-04-13 RX ADMIN — POTASSIUM BICARBONATE 10 MEQ: 782 TABLET, EFFERVESCENT ORAL at 09:21

## 2021-04-13 RX ADMIN — OXYCODONE HYDROCHLORIDE AND ACETAMINOPHEN 500 MG: 500 TABLET ORAL at 09:20

## 2021-04-13 RX ADMIN — ACETAMINOPHEN ORAL SOLUTION 650 MG: 650 SOLUTION ORAL at 00:59

## 2021-04-13 RX ADMIN — LAMOTRIGINE 400 MG: 100 TABLET ORAL at 09:21

## 2021-04-13 RX ADMIN — CEFAZOLIN SODIUM 1000 MG: 1 INJECTION, POWDER, FOR SOLUTION INTRAMUSCULAR; INTRAVENOUS at 16:28

## 2021-04-13 RX ADMIN — SODIUM CHLORIDE, POTASSIUM CHLORIDE, SODIUM LACTATE AND CALCIUM CHLORIDE: 600; 310; 30; 20 INJECTION, SOLUTION INTRAVENOUS at 00:59

## 2021-04-13 RX ADMIN — SODIUM CHLORIDE, POTASSIUM CHLORIDE, SODIUM LACTATE AND CALCIUM CHLORIDE: 600; 310; 30; 20 INJECTION, SOLUTION INTRAVENOUS at 22:53

## 2021-04-13 RX ADMIN — SODIUM CHLORIDE 8 MG/HR: 9 INJECTION, SOLUTION INTRAVENOUS at 00:59

## 2021-04-13 RX ADMIN — HYDROCODONE BITARTRATE AND ACETAMINOPHEN 1 TABLET: 5; 325 TABLET ORAL at 02:45

## 2021-04-13 RX ADMIN — LEVETIRACETAM 1500 MG: 100 INJECTION, SOLUTION INTRAVENOUS at 23:23

## 2021-04-13 RX ADMIN — DOXAZOSIN 4 MG: 4 TABLET ORAL at 09:20

## 2021-04-13 RX ADMIN — MULTIVITAMIN 30 ML: LIQUID ORAL at 09:21

## 2021-04-13 RX ADMIN — SODIUM CHLORIDE, POTASSIUM CHLORIDE, SODIUM LACTATE AND CALCIUM CHLORIDE: 600; 310; 30; 20 INJECTION, SOLUTION INTRAVENOUS at 10:59

## 2021-04-13 RX ADMIN — ESCITALOPRAM OXALATE 10 MG: 10 TABLET ORAL at 09:21

## 2021-04-13 RX ADMIN — ZINC OXIDE: 200 OINTMENT TOPICAL at 09:22

## 2021-04-13 RX ADMIN — BACLOFEN 15 MG: 10 TABLET ORAL at 09:20

## 2021-04-13 RX ADMIN — BACLOFEN 15 MG: 10 TABLET ORAL at 16:28

## 2021-04-13 RX ADMIN — LACTULOSE 20 G: 20 SOLUTION ORAL at 09:21

## 2021-04-13 RX ADMIN — Medication 2000 UNITS: at 09:21

## 2021-04-13 RX ADMIN — QUETIAPINE FUMARATE 50 MG: 25 TABLET ORAL at 16:28

## 2021-04-13 RX ADMIN — QUETIAPINE FUMARATE 50 MG: 25 TABLET ORAL at 04:55

## 2021-04-13 RX ADMIN — Medication: at 09:22

## 2021-04-13 RX ADMIN — CHLORHEXIDINE GLUCONATE 7.5 ML: 1.2 RINSE ORAL at 09:21

## 2021-04-13 RX ADMIN — SENNOSIDES 17.2 MG: 8.6 TABLET, FILM COATED ORAL at 09:21

## 2021-04-13 RX ADMIN — TRIAMCINOLONE ACETONIDE: 1 CREAM TOPICAL at 09:22

## 2021-04-13 RX ADMIN — HYDROMORPHONE HYDROCHLORIDE 1 MG: 1 INJECTION, SOLUTION INTRAMUSCULAR; INTRAVENOUS; SUBCUTANEOUS at 22:10

## 2021-04-13 RX ADMIN — LEVETIRACETAM 1500 MG: 500 SOLUTION ORAL at 09:21

## 2021-04-13 NOTE — PLAN OF CARE
Problem: Skin Integrity:  Intervention: Manage dressing change  Note: For Drainage around J tube. 4/13/21. Clean and dry frederic wound skin with water only, dry with paper towel or washcloth. No wipes. Crust frederic tube skin with stoma powder, the wipe with sting wipes, apply stoma powder, wipe with barrier wipes, apply stoma powder ; dust off excess powder. Apply barrier ring around J tube,   cut slit in stoma wafer to apply around J tube under bumper, over barrier ring. Pouch with a ostomy pouch cut hole to feed J tube through. Use tegaderm to close pouch hole around J tube. Use elastic barrier strips on edges of ostomy barrier wafer.

## 2021-04-13 NOTE — CONSULTS
[facility] and Laparoscopic [facility]     Consult Note      Reason for Consult: leaking J tube    History of Present Illness:   Kika Espinoza is a 79 y.o. male with Hx of cerebral palsy (lives at [facility]) who was admitted with GIB. Pt is also having leaking around the J tube site. Leaking has been going on for some time and causing skin irritation. He has been using tube feeds for the past 2.5 years and has had several issues requiring surgical replacement of the J tube, most recently in 2018 at [address] The tube was last changed with a 20 F mikael G tube 4/21/2020.     Past Medical History:        Diagnosis Date    Anemia, macrocytic     Anhidrosis     Anhidrosis     Bilateral cataracts     Bipolar affective (HCC)     Blood circulation, collateral     unspecified PVD    Cataract     Clostridium difficile diarrhea 3/7/15    Depression     Dermatophytosis     Drug induced hepatitis     GERD (gastroesophageal reflux disease)     Hard of hearing     Impulse control disorder     Liver disease     drug induced    Mental handicap     Movement disorder     Neurogenic bladder     Neuromuscular disorder (HCC)     spastic hemiplegia, MDS,neurogenic bladder    NS (nuclear sclerosis)     Nuclear sclerosis     Osteoarthritis     Periodontal disease     Pneumonia 1/17/2013    Primary optic atrophy     Primary optic atrophy     [facility]peripheral [facility] disease) [facility]     Seizure ([facility]     last seizure 9/20/19    Spastic hemiplegia     Tinea pedis     Unspecified diseases of blood and blood-forming organs     anemia    Urinary incontinence        Past Surgical History:        Procedure Laterality Date    ABDOMEN SURGERY      can see scar/details unknown, feeding tube    ABDOMINAL EXPLORATION SURGERY  9-28-14    LAPAROTOMY EXPLORATORY, ERIKA-EN-Y TUBE JEJUNOSTOMY, SMALL BOWEL RESECTION                                              COLONOSCOPY      COLONOSCOPY N/A 6/4/2019    COLONOSCOPY WITH BIOPSY performed by Bernard Lau MD at [address] [facility] [address]  6/25/14    with dilitation    DILATATION, ESOPHAGUS      ENDOSCOPY, COLON, DIAGNOSTIC      OTHER SURGICAL HISTORY  4-30-13    JEJUNOSTOMY TUBE INSERTION    SIGMOIDOSCOPY N/A 5/2/2019    SIGMOIDOSCOPY DIAGNOSTIC FLEXIBLE performed by Bernard Lau MD at [facility] N/A 4/21/2020    REPLACEMENT, JEJUNOSTOMY TUBE performed by Bernard Lau MD at  [address]  1/24/13    PEG placement    UPPER GASTROINTESTINAL ENDOSCOPY  4/26/13    UPPER GASTROINTESTINAL ENDOSCOPY N/A 10/11/2019    EGD DILATION BALLOON performed by Bernard Lau MD at [address] N/A 8/22/2020    EGD BIOPSY performed by Georgeana Lesch, MD at  [address] N/A 2/17/2021    EGD DIAGNOSTIC ONLY performed by Bernard Lau MD at [facility] ENDOSCOPY       Allergies:  Biaxin [clarithromycin] and Invanz [ertapenem]    Medications:   Home Meds  No current facility-administered medications on file prior to encounter. Current Outpatient Medications on File Prior to Encounter   Medication Sig Dispense Refill    LORazepam (ATIVAN) 0.5 MG tablet Take 1 tablet by mouth every 12 hours as needed for Anxiety for up to 30 days. For seizures up to 7 days 10 tablet 0    pantoprazole (PROTONIX) 40 MG tablet Take 40 mg by mouth 2 times daily      diazePAM (DIASTAT) 10 MG GEL Place 15 mg rectally once as needed (Seizures lasting over 5 minutes).       promethazine (PHENERGAN) 12.5 MG tablet Take 12.5 mg by mouth every 6 hours as needed for Nausea      ferrous sulfate 220 (44 Fe) MG/5ML solution Take 220 mg by mouth 2 times daily      bisacodyl (DULCOLAX) 5 MG EC tablet Take 10 mg by mouth as needed (No bowel movement in 48 hours)      nystatin (MYCOSTATIN) 276072 UNIT/GM cream Apply topically 2 times daily as needed (yeast infections)      senna (SENOKOT) 8.6 MG tablet 2 tablets by Per G Tube route daily       Cholecalciferol (VITAMIN D3) 2000 units CAPS 2,000 Units by Per G Tube route daily       zinc oxide 20 % ointment Apply topically 2 times daily To J-tube site      bisacodyl (DULCOLAX) 10 MG suppository Place 10 mg rectally daily as needed If no BM in 48 hours      baclofen (LIORESAL) 10 MG tablet 15 mg by Per G Tube route 3 times daily       lamoTRIgine (LAMICTAL) 150 MG tablet 400 mg by Per J Tube route 2 times daily       QUEtiapine (SEROQUEL) 100 MG tablet Take 100 mg by mouth nightly Give 50 mg twice daily at 5:30 am/5:30 pm. Give 100 mg at bedtime.  acetaminophen (TYLENOL) 160 MG/5ML solution 20.3 mLs by Per G Tube route every 4 hours as needed for Fever or Pain 473 mL 3    levETIRAcetam (KEPPRA) 100 MG/ML solution 15 mLs by Per G Tube route 2 times daily 1 Bottle 3    lactulose (CHRONULAC) 10 GM/15ML solution 20 g by Per G Tube route daily       doxazosin (CARDURA) 4 MG tablet 4 mg by Per G Tube route daily       Multiple Vitamins-Minerals (CERTA-KENTON) liquid 30 mLs by Per G Tube route daily       potassium chloride (KAYCIEL) 20 MEQ/15ML (10%) solution 10 mEq by Per G Tube route 2 times daily       ascorbic acid (VITAMIN C) 500 MG tablet 500 mg by Per G Tube route 2 times daily.  QUEtiapine (SEROQUEL) 100 MG tablet 50 mg by Per G Tube route 2 times daily Give 50 mg twice daily at 5:30 am/5:30 pm. Give 100 mg at bedtime.  escitalopram (LEXAPRO) 10 MG tablet 10 mg by Per G Tube route daily.  chlorhexidine (PERIDEX) 0.12 % solution Take 7.5 mLs by mouth 2 times daily.          Current Meds  HYDROcodone-acetaminophen (NORCO) 5-325 MG per tablet 1 tablet, Q4H PRN  ceFAZolin (ANCEF) 1,000 mg in dextrose 5 % 50 mL IVPB (mini-bag), Q8H  QUEtiapine (SEROQUEL) tablet 50 mg, QAM    And  QUEtiapine (SEROQUEL) tablet 50 mg, Daily    And  QUEtiapine (SEROQUEL) tablet 100 mg, Nightly  zinc oxide (TRIAD HYDROPHILIC) paste, BID  acetaminophen (TYLENOL) 160 MG/5ML solution 650 mg, Q4H PRN  ascorbic acid (VITAMIN C) tablet 500 mg, BID  baclofen (LIORESAL) tablet 15 mg, TID  bisacodyl (DULCOLAX) suppository 10 mg, Daily PRN  bisacodyl (DULCOLAX) EC tablet 10 mg, PRN  chlorhexidine (PERIDEX) 0.12 % solution 7.5 mL, BID  Vitamin D (CHOLECALCIFEROL) tablet 2,000 Units, Daily  doxazosin (CARDURA) tablet 4 mg, Daily  escitalopram (LEXAPRO) tablet 10 mg, Daily  lactulose (CHRONULAC) 10 GM/15ML solution 20 g, Daily  lamoTRIgine (LAMICTAL) tablet 400 mg, BID  levETIRAcetam (KEPPRA) 100 MG/ML solution 1,500 mg, BID  LORazepam (ATIVAN) tablet 0.5 mg, Q12H PRN  multivitamin with iron-minerals liquid 30 mL, Daily  nystatin (MYCOSTATIN) cream, BID PRN  potassium bicarb-citric acid (EFFER-K) effervescent tablet 10 mEq, BID  promethazine (PHENERGAN) tablet 12.5 mg, Q6H PRN  senna (SENOKOT) tablet 17.2 mg, Daily  zinc oxide 20 % ointment, BID  pantoprazole (PROTONIX) 80 mg in sodium chloride 0.9 % 100 mL infusion, Continuous  lactated ringers infusion, Continuous  hydrOXYzine (ATARAX) tablet 10 mg, TID PRN  diphenhydrAMINE (BENADRYL) 12.5 MG/5ML elixir 25 mg, Q6H PRN  triamcinolone (KENALOG) 0.1 % cream, BID        Family History:   History reviewed. No pertinent family history. Social History:   TOBACCO:   reports that he has never smoked. He has never used smokeless tobacco.  ETOH:   reports no history of alcohol use. DRUGS:   reports no history of drug use. Review of Systems:     Constitutional: Negative. HENT: Negative. Eyes: Negative. Respiratory: Negative. Cardiovascular: Negative. Gastrointestinal: Negative except for pain at J tube site  Genitourinary: Negative. Musculoskeletal: Negative. Skin: Negative. Endocrine: Negative. Allergic/Immunologic: Negative. Neurological: Negative. Hematological: Negative. Psychiatric/Behavioral: Negative.     Physical exam:    Vitals:    04/13/21 0341 04/13/21 0445 04/13/21 0900 04/13/21 1215   BP:  (!) 151/73 (!) 107/48 (!) 101/56   Pulse:  58 54 51   Resp: 16 17 16 16   Temp:  98.6 °F (37 °C) 98 °F (36.7 °C) 96.2 °F (35.7 °C)   TempSrc:  Oral Oral Temporal   SpO2: 100% 100% 99% 97%   Weight:       Height:           General appearance: alert, no acute distress, grooming appropriate  Eyes: PERRLA, no scleral icterus  Neck: trachea midline, no JVD, no lymphadenopathy  Chest/Lungs: CTAB, no crackles/rales, wheezes/rhonchi, normal effort  Cardiovascular: RRR, no murmurs/gallops/rubs  Abdomen: soft, severely tender at J tube site; ~5x5cm area of blanching erythematous skin, bile drainage; no purulence, non-distended, no guarding/rigidity/rebound  Skin: warm and dry, no rashes  Extremities: no edema, no cyanosis  Neuro: A&Ox3, no focal deficits, sensation intact    Labs:    CBC:   Recent Labs     04/11/21  1142 04/11/21  1142 04/12/21  1605 04/12/21  2350 04/13/21  0818   WBC 9.5  --   --   --   --    HGB 9.6*   < > 8.4* 8.3* 8.9*   HCT 30.5*   < > 27.1* 26.1* 28.2*   MCV 90.4  --   --   --   --      --   --   --   --     < > = values in this interval not displayed. BMP:   Recent Labs     04/11/21  1142   *   K 4.9   CL 99   CO2 23   BUN 22*   CREATININE <0.5*     PT/INR: No results for input(s): PROTIME, INR in the last 72 hours. APTT: No results for input(s): APTT in the last 72 hours.   Liver Profile:   Lab Results   Component Value Date    AST 33 04/11/2021    ALT 17 04/11/2021    BILIDIR <0.2 04/11/2021    BILITOT <0.2 04/11/2021    ALKPHOS 152 04/11/2021     Lab Results   Component Value Date    TRIG 49 02/03/2021     UA:   Lab Results   Component Value Date    COLORU YELLOW 04/12/2021    PHUR 7.5 04/12/2021    WBCUA >900 02/17/2021    RBCUA 15 02/17/2021    BACTERIA 4+ 02/17/2021    CLARITYU Clear 04/12/2021    SPECGRAV >1.030 04/12/2021    LEUKOCYTESUR Negative 04/12/2021    UROBILINOGEN 0.2 04/12/2021    BILIRUBINUR Negative 04/12/2021    BLOODU Negative

## 2021-04-13 NOTE — PLAN OF CARE
Pt complained of pain r/t to j-tube cellulitis; Dr. Sung Ramirez added PRN Norco every 4 hours; pt given Norco at [phone] and was satisfied upon reassessment. Pt has tube feed running at 65 mL per hour for 15 hours with 30 mL water flush every four hours;  the alarm on the Kangaroo pump did alarm several times reading \"occluded\";  tube was checks and Nepro feeding resumed. The site of the j-tube has cellulitis with large amount of yellow drainage; the dressing was changed several times throughout the shift; see flow sheet. Pt remains free from falls & injury;  video monitoring continues for pt safety; pt does try to roll out of bed on occasion; additional bed alarm added. VSS; Will continue to assess.       Problem: Skin Integrity:  Goal: Will show no infection signs and symptoms  Description: Will show no infection signs and symptoms  Outcome: Ongoing  Goal: Absence of new skin breakdown  Description: Absence of new skin breakdown  Outcome: Ongoing     Problem: Falls - Risk of:  Goal: Will remain free from falls  Description: Will remain free from falls  Outcome: Ongoing  Goal: Absence of physical injury  Description: Absence of physical injury  Outcome: Ongoing     Problem: Cardiovascular  Goal: No DVT, peripheral vascular complications  Outcome: Ongoing  Goal: Hemodynamic stability  Outcome: Ongoing  Goal: Anticoagulate/Hct stable  Outcome: Ongoing  Goal: Agreement to quit smoking  Outcome: Ongoing  Goal: Weight maintained or lost  Outcome: Ongoing  Goal: Understanding of dietary restrictions  Outcome: Ongoing     Problem: Nutrition  Goal: Optimal nutrition therapy  Outcome: Ongoing     Problem: Pain:  Goal: Pain level will decrease  Description: Pain level will decrease  Outcome: Ongoing  Goal: Control of acute pain  Description: Control of acute pain  Outcome: Ongoing  Goal: Control of chronic pain  Description: Control of chronic pain  Outcome: Ongoing

## 2021-04-13 NOTE — CARE COORDINATION
Patient discharged  4-13-21 to [facility] at 12:45pm via [facility] care. Call report [phone], fax [phone]. POA/brother was  notified.   Please complete and print HANK/AVS.  All discharge needs met per case management

## 2021-04-13 NOTE — PROGRESS NOTES
Department of Internal Medicine  General Internal Medicine   Progress Note      SUBJECTIVE: appears to be in no acute distress  But nursing staff reported some agitation earlier        History obtained from chart review and the patient's nursing staff   General ROS: positive for  - fatigue and malaise  negative for - chills, fever or night sweats  Psychological ROS: negative  Respiratory ROS: no cough, shortness of breath, or wheezing  Cardiovascular ROS: positive for - , dyspnea on exertion and shortness of breath  negative for - chest pain  Gastrointestinal ROS: no abdominal pain, change in bowel habits, or black or bloody stools    OBJECTIVE      Medications      Current Facility-Administered Medications: HYDROcodone-acetaminophen (NORCO) 5-325 MG per tablet 1 tablet, 1 tablet, Oral, Q4H PRN  ceFAZolin (ANCEF) 1,000 mg in dextrose 5 % 50 mL IVPB (mini-bag), 1,000 mg, Intravenous, Q8H  QUEtiapine (SEROQUEL) tablet 50 mg, 50 mg, Oral, QAM **AND** QUEtiapine (SEROQUEL) tablet 50 mg, 50 mg, Oral, Daily **AND** QUEtiapine (SEROQUEL) tablet 100 mg, 100 mg, Oral, Nightly  zinc oxide (TRIAD HYDROPHILIC) paste, , Topical, BID  acetaminophen (TYLENOL) 160 MG/5ML solution 650 mg, 650 mg, Per G Tube, Q4H PRN  ascorbic acid (VITAMIN C) tablet 500 mg, 500 mg, Per G Tube, BID  baclofen (LIORESAL) tablet 15 mg, 15 mg, Per G Tube, TID  bisacodyl (DULCOLAX) suppository 10 mg, 10 mg, Rectal, Daily PRN  bisacodyl (DULCOLAX) EC tablet 10 mg, 10 mg, Oral, PRN  chlorhexidine (PERIDEX) 0.12 % solution 7.5 mL, 7.5 mL, Mouth/Throat, BID  Vitamin D (CHOLECALCIFEROL) tablet 2,000 Units, 2,000 Units, Per G Tube, Daily  doxazosin (CARDURA) tablet 4 mg, 4 mg, Per G Tube, Daily  escitalopram (LEXAPRO) tablet 10 mg, 10 mg, Per G Tube, Daily  lactulose (CHRONULAC) 10 GM/15ML solution 20 g, 20 g, Per G Tube, Daily  lamoTRIgine (LAMICTAL) tablet 400 mg, 400 mg, Per J Tube, BID  levETIRAcetam (KEPPRA) 100 MG/ML solution 1,500 mg, 1,500 mg, Per G Tube, BID  LORazepam (ATIVAN) tablet 0.5 mg, 0.5 mg, Oral, Q12H PRN  multivitamin with iron-minerals liquid 30 mL, 30 mL, Per G Tube, Daily  nystatin (MYCOSTATIN) cream, , Topical, BID PRN  potassium bicarb-citric acid (EFFER-K) effervescent tablet 10 mEq, 10 mEq, Per G Tube, BID  promethazine (PHENERGAN) tablet 12.5 mg, 12.5 mg, Oral, Q6H PRN  senna (SENOKOT) tablet 17.2 mg, 2 tablet, Per G Tube, Daily  zinc oxide 20 % ointment, , Topical, BID  pantoprazole (PROTONIX) 80 mg in sodium chloride 0.9 % 100 mL infusion, 8 mg/hr, Intravenous, Continuous  lactated ringers infusion, , Intravenous, Continuous  hydrOXYzine (ATARAX) tablet 10 mg, 10 mg, Oral, TID PRN  diphenhydrAMINE (BENADRYL) 12.5 MG/5ML elixir 25 mg, 25 mg, Per G Tube, Q6H PRN  triamcinolone (KENALOG) 0.1 % cream, , Topical, BID    Physical      VITALS:  /76   Pulse 60   Temp 97.1 °F (36.2 °C) (Temporal)   Resp 16   Ht 5' 6\" (1.676 m) Comment: per last admission  Wt 169 lb 5 oz (76.8 kg)   SpO2 95%   BMI 27.33 kg/m²   TEMPERATURE:  Current - Temp: 97.1 °F (36.2 °C);  Max - Temp  Av.7 °F (36.5 °C)  Min: 96.2 °F (35.7 °C)  Max: 98.6 °F (37 °C)  RESPIRATIONS RANGE: Resp  Av.7  Min: 13  Max: 21  PULSE RANGE: Pulse  Av.4  Min: 51  Max: 67  BLOOD PRESSURE RANGE:  Systolic (33DMV), JHN:012 , Min:101 , RJH:715   ; Diastolic (59JMA), ZIS:15, Min:48, Max:89    PULSE OXIMETRY RANGE: SpO2  Av %  Min: 95 %  Max: 100 %  24HR INTAKE/OUTPUT:      Intake/Output Summary (Last 24 hours) at 2021 1722  Last data filed at 2021 0859  Gross per 24 hour   Intake 1416.4 ml   Output 2010 ml   Net -593.6 ml     CONSTITUTIONAL:  fatigued, somnolent, uncooperative, distracted, severe distress and appears older than stated age  NECK:  supple, symmetrical, trachea midline, skin normal and no stridor  BACK:  symmetric  LUNGS:  Decreased BS taras crackles , few wheezes   CARDIOVASCULAR:  normal apical pulses, tachycardic with regular rhythm and normal S1 and S2  ABDOMEN:  Soft BS + non tender   MUSCULOSKELETAL:  Contracted      NEUROLOGIC:  Right hemiplegia   SKIN:  Warm and dry  and no bruising or bleeding    Data      No results found for: Donnald Yash, [id number]    Lab Results   Component Value Date     04/11/2021    K 4.9 04/11/2021    CL 99 04/11/2021    CO2 23 04/11/2021    BUN 22 04/11/2021    CREATININE <0.5 04/11/2021    GLUCOSE 107 04/11/2021    CALCIUM 9.1 04/11/2021     Lab Results   Component Value Date    WBC 9.5 04/11/2021    HGB 8.9 04/13/2021    HCT 28.2 04/13/2021    MCV 90.4 04/11/2021     04/11/2021         Lab Results   Component Value Date    INR 0.96 03/28/2021    PROTIME 11.1 03/28/2021       ASSESSMENT AND PLAN      Patient Active Problem List   Diagnosis    Altered mental status    Dehydration    Cerebral palsy (HCC)    Seizure disorder (HCC)    Nuclear sclerosis    Anemia, chronic disease    Partial epilepsy with impairment of consciousness, intractable ([address]    Mental retardation    Malfunction of gastrostomy tube ([address]    Hypoxemia    Hypotension    Recurrent seizures ([address]    Anemia due to acute blood loss    Sepsis ([facility])    Coffee ground emesis    Gastroduodenitis    GI bleed    Upper GI hemorrhage    brother has now revoked DNR CC     he is full code    GI consultant does not think repeat endoscopy would be of any yield or indicated    ct H/H Monitoring    cautious advance diet

## 2021-04-13 NOTE — CARE COORDINATION
[facility] Nurse  Consult Note       NAME:  Jill Record  MEDICAL RECORD NUMBER:  [id number]  AGE: 79 y.o.    GENDER: male  : 1950  TODAY'S DATE:  2021    Subjective   Reason for WOCN Evaluation and Assessment:  Erythema around J tube      Jill Record is a 79 y.o. male referred by:   [x] Physician  [x] Nursing  [] Other:     Wound Identification:  Wound Type: traumatic  Contributing Factors: MASD d/t intestinal drainage    Wound History: present on admission  Current Wound Care Treatment:  Zinc paste    Patient Goal of Care:  [x] Wound Healing  [] Odor Control  [] Palliative Care  [] Pain Control   [] Other:         PAST MEDICAL HISTORY        Diagnosis Date    Anemia, macrocytic     Anhidrosis     Anhidrosis     Bilateral cataracts     Bipolar affective ([address]     Blood circulation, collateral     unspecified PVD    Cataract     Clostridium difficile diarrhea 3/7/15    Depression     Dermatophytosis     Drug induced hepatitis     GERD (gastroesophageal reflux disease)     Hard of hearing     Impulse control disorder     Liver disease     drug induced    Mental handicap     Movement disorder     Neurogenic bladder     Neuromuscular disorder (HCC)     spastic hemiplegia, MDS,neurogenic bladder    NS (nuclear sclerosis)     Nuclear sclerosis     Osteoarthritis     Periodontal disease     Pneumonia 2013    Primary optic atrophy     Primary optic atrophy     PVD (peripheral vascular disease) ([address]     Seizure ([address]     last seizure 19    Spastic hemiplegia     Tinea pedis     Unspecified diseases of blood and blood-forming organs     anemia    Urinary incontinence        PAST SURGICAL HISTORY    Past Surgical History:   Procedure Laterality Date    ABDOMEN SURGERY      can see scar/details unknown, feeding tube    ABDOMINAL EXPLORATION SURGERY  14    LAPAROTOMY EXPLORATORY, ERIKA-EN-Y TUBE JEJUNOSTOMY, SMALL BOWEL RESECTION  COLONOSCOPY      COLONOSCOPY N/A 6/4/2019    COLONOSCOPY WITH BIOPSY performed by Yajaira Crandall MD at [address]  6/25/14    with dilitation    DILATATION, ESOPHAGUS      ENDOSCOPY, COLON, DIAGNOSTIC      OTHER SURGICAL HISTORY  4-30-13    JEJUNOSTOMY TUBE INSERTION    SIGMOIDOSCOPY N/A 5/2/2019    SIGMOIDOSCOPY DIAGNOSTIC FLEXIBLE performed by Yajaira Crandall MD at [facility] N/A 4/21/2020    REPLACEMENT, JEJUNOSTOMY TUBE performed by Yajaira Crandall MD at [address]  1/24/13    PEG placement    UPPER GASTROINTESTINAL ENDOSCOPY  4/26/13    UPPER GASTROINTESTINAL ENDOSCOPY N/A 10/11/2019    EGD DILATION BALLOON performed by Yajaira Crandall MD at [address] N/A 8/22/2020    EGD BIOPSY performed by Durga Rosenthal MD at [address] N/A 2/17/2021    EGD DIAGNOSTIC ONLY performed by Yajaira Crandall MD at [address] [place] [address]    History reviewed. No pertinent family history. SOCIAL HISTORY    Social History     Tobacco Use    Smoking status: Never Smoker    Smokeless tobacco: Never Used   Substance Use Topics    Alcohol use: No    Drug use: No     Comment: unknown       ALLERGIES    Allergies   Allergen Reactions    Biaxin [Clarithromycin] Other (See Comments)     Unknown reaction    Invanz [Ertapenem] Other (See Comments)     Caused SEIZURES  Caused SEIZURES       MEDICATIONS    No current facility-administered medications on file prior to encounter. Current Outpatient Medications on File Prior to Encounter   Medication Sig Dispense Refill    LORazepam (ATIVAN) 0.5 MG tablet Take 1 tablet by mouth every 12 hours as needed for Anxiety for up to 30 days.  For seizures up to 7 days 10 tablet 0    pantoprazole (PROTONIX) 40 MG tablet Take 40 mg by mouth 2 times daily      diazePAM (DIASTAT) 10 MG GEL Place 15 mg rectally once as needed (Seizures lasting over 5 minutes).  Sodium Phosphates (FLEET) 7-19 GM/118ML Place 1 enema rectally once as needed (If no BM after bisacodyl)      promethazine (PHENERGAN) 12.5 MG tablet Take 12.5 mg by mouth every 6 hours as needed for Nausea      ferrous sulfate 220 (44 Fe) MG/5ML solution Take 220 mg by mouth 2 times daily      bisacodyl (DULCOLAX) 5 MG EC tablet Take 10 mg by mouth as needed (No bowel movement in 48 hours)      nystatin (MYCOSTATIN) 298395 UNIT/GM cream Apply topically 2 times daily as needed (yeast infections)      senna (SENOKOT) 8.6 MG tablet 2 tablets by Per G Tube route daily       Cholecalciferol (VITAMIN D3) 2000 units CAPS 2,000 Units by Per G Tube route daily       zinc oxide 20 % ointment Apply topically 2 times daily To J-tube site      bisacodyl (DULCOLAX) 10 MG suppository Place 10 mg rectally daily as needed If no BM in 48 hours      baclofen (LIORESAL) 10 MG tablet 15 mg by Per G Tube route 3 times daily       lamoTRIgine (LAMICTAL) 150 MG tablet 400 mg by Per J Tube route 2 times daily       QUEtiapine (SEROQUEL) 100 MG tablet Take 100 mg by mouth nightly Give 50 mg twice daily at 5:30 am/5:30 pm. Give 100 mg at bedtime.  acetaminophen (TYLENOL) 160 MG/5ML solution 20.3 mLs by Per G Tube route every 4 hours as needed for Fever or Pain 473 mL 3    levETIRAcetam (KEPPRA) 100 MG/ML solution 15 mLs by Per G Tube route 2 times daily 1 Bottle 3    lactulose (CHRONULAC) 10 GM/15ML solution 20 g by Per G Tube route daily       doxazosin (CARDURA) 4 MG tablet 4 mg by Per G Tube route daily       Multiple Vitamins-Minerals (CERTA-KENTON) liquid 30 mLs by Per G Tube route daily       potassium chloride (KAYCIEL) 20 MEQ/15ML (10%) solution 10 mEq by Per G Tube route 2 times daily       ascorbic acid (VITAMIN C) 500 MG tablet 500 mg by Per G Tube route 2 times daily.       QUEtiapine (SEROQUEL) 100 MG tablet 50 mg by Per G Tube route 2 times daily Give 50 mg twice daily at 5:30 am/5:30 pm. Give 100 mg at bedtime.  escitalopram (LEXAPRO) 10 MG tablet 10 mg by Per G Tube route daily.  chlorhexidine (PERIDEX) 0.12 % solution Take 7.5 mLs by mouth 2 times daily. Objective    /73   Pulse 59   Temp 96.5 °F (35.8 °C) (Temporal)   Resp 18   Wt 169 lb 5 oz (76.8 kg)   SpO2 98%   BMI 27.33 kg/m²     LABS:  WBC:    Lab Results   Component Value Date    WBC 9.5 04/11/2021     H/H:    Lab Results   Component Value Date    HGB 9.5 04/12/2021    HCT 29.4 04/12/2021     PTT:    Lab Results   Component Value Date    APTT 32.5 03/28/2021   [APTT}  PT/INR:    Lab Results   Component Value Date    PROTIME 11.1 03/28/2021    INR 0.96 03/28/2021     HgBA1c:  No results found for: LABA1C    Assessment   Cheikh Risk Score: Cheikh Scale Score: 13    Patient Active Problem List   Diagnosis Code    Altered mental status R41.82    Dehydration E86.0    Cerebral palsy ([facility]) G80.9    Seizure disorder ([facility] G40.909    Nuclear sclerosis H25.10    Anemia, chronic disease D63.8    Partial epilepsy with impairment of consciousness, intractable ([facility]) G40.219    Mental retardation F79    Malfunction of gastrostomy tube ([facility]) K94.23    Hypoxemia R09.02    Hypotension I95.9    Recurrent seizures ([facility]) G40.909    Anemia due to acute blood loss D62    Sepsis ([facility]) A41.9    Coffee ground emesis K92.0    Gastroduodenitis K29.90    GI bleed K92.2    Upper GI hemorrhage K92.2       Measurements:  Wound 04/11/21 Abdomen Left MASD around J tube (Active)   Wound Image   04/12/21 1314   Wound Length (cm) 8 cm 04/12/21 1314   Wound Width (cm) 6 cm 04/12/21 1314   Wound Surface Area (cm^2) 48 cm^2 04/12/21 1314   Wound Assessment Erythema;Pink/red 04/12/21 1314   Drainage Amount Moderate 04/12/21 1314   Drainage Description Green 04/12/21 1314   Ewa-wound Assessment Erosion; Excoriated 04/12/21 1314 understanding able to:   [] Indicates understanding       [] Needs reinforcement  [] Unsuccessful      [] Verbal Understanding  [] Demonstrated understanding       [] No evidence of learning  [] Refused teaching         [x] N/A       Electronically signed by  RANDALL Burr, RN  Wound/Ostomy Care on 4/12/2021 at 1:18 PM Burow's Advancement Flap Text: The defect edges were debeveled with a #15 scalpel blade.  Given the location of the defect and the proximity to free margins a Burow's advancement flap was deemed most appropriate.  Using a sterile surgical marker, the appropriate advancement flap was drawn incorporating the defect and placing the expected incisions within the relaxed skin tension lines where possible.    The area thus outlined was incised deep to adipose tissue with a #15 scalpel blade.  The skin margins were undermined to an appropriate distance in all directions utilizing iris scissors.

## 2021-04-14 ENCOUNTER — APPOINTMENT (OUTPATIENT)
Dept: GENERAL RADIOLOGY | Age: 71
DRG: 377 | End: 2021-04-14
Payer: MEDICARE

## 2021-04-14 LAB
GLUCOSE BLD-MCNC: 105 MG/DL (ref 70–99)
GLUCOSE BLD-MCNC: 106 MG/DL (ref 70–99)
GLUCOSE BLD-MCNC: 90 MG/DL (ref 70–99)
HCT VFR BLD CALC: 25.9 % (ref 40.5–52.5)
HCT VFR BLD CALC: 28.8 % (ref 40.5–52.5)
HCT VFR BLD CALC: 29.6 % (ref 40.5–52.5)
HEMOGLOBIN: 8.3 G/DL (ref 13.5–17.5)
HEMOGLOBIN: 9 G/DL (ref 13.5–17.5)
HEMOGLOBIN: 9.1 G/DL (ref 13.5–17.5)
PERFORMED ON: ABNORMAL
PERFORMED ON: ABNORMAL
PERFORMED ON: NORMAL

## 2021-04-14 PROCEDURE — C9113 INJ PANTOPRAZOLE SODIUM, VIA: HCPCS | Performed by: INTERNAL MEDICINE

## 2021-04-14 PROCEDURE — 2060000000 HC ICU INTERMEDIATE R&B

## 2021-04-14 PROCEDURE — 6360000002 HC RX W HCPCS: Performed by: INTERNAL MEDICINE

## 2021-04-14 PROCEDURE — 85014 HEMATOCRIT: CPT

## 2021-04-14 PROCEDURE — 36415 COLL VENOUS BLD VENIPUNCTURE: CPT

## 2021-04-14 PROCEDURE — 6360000004 HC RX CONTRAST MEDICATION

## 2021-04-14 PROCEDURE — 2580000003 HC RX 258: Performed by: INTERNAL MEDICINE

## 2021-04-14 PROCEDURE — 6370000000 HC RX 637 (ALT 250 FOR IP): Performed by: INTERNAL MEDICINE

## 2021-04-14 PROCEDURE — 2580000003 HC RX 258: Performed by: PHYSICIAN ASSISTANT

## 2021-04-14 PROCEDURE — 6360000002 HC RX W HCPCS: Performed by: STUDENT IN AN ORGANIZED HEALTH CARE EDUCATION/TRAINING PROGRAM

## 2021-04-14 PROCEDURE — 6360000002 HC RX W HCPCS: Performed by: PHYSICIAN ASSISTANT

## 2021-04-14 PROCEDURE — 94660 CPAP INITIATION&MGMT: CPT

## 2021-04-14 PROCEDURE — 43762 RPLC GTUBE NO REVJ TRC: CPT | Performed by: SURGERY

## 2021-04-14 PROCEDURE — 74250 X-RAY XM SM INT 1CNTRST STD: CPT

## 2021-04-14 PROCEDURE — 85018 HEMOGLOBIN: CPT

## 2021-04-14 RX ADMIN — CEFAZOLIN SODIUM 1000 MG: 1 INJECTION, POWDER, FOR SOLUTION INTRAMUSCULAR; INTRAVENOUS at 17:49

## 2021-04-14 RX ADMIN — POTASSIUM BICARBONATE 10 MEQ: 782 TABLET, EFFERVESCENT ORAL at 21:23

## 2021-04-14 RX ADMIN — MORPHINE SULFATE 4 MG: 4 INJECTION, SOLUTION INTRAMUSCULAR; INTRAVENOUS at 14:59

## 2021-04-14 RX ADMIN — MORPHINE SULFATE 2 MG: 2 INJECTION, SOLUTION INTRAMUSCULAR; INTRAVENOUS at 09:18

## 2021-04-14 RX ADMIN — SODIUM CHLORIDE, POTASSIUM CHLORIDE, SODIUM LACTATE AND CALCIUM CHLORIDE: 600; 310; 30; 20 INJECTION, SOLUTION INTRAVENOUS at 09:48

## 2021-04-14 RX ADMIN — LAMOTRIGINE 400 MG: 100 TABLET ORAL at 21:23

## 2021-04-14 RX ADMIN — MORPHINE SULFATE 2 MG: 2 INJECTION, SOLUTION INTRAMUSCULAR; INTRAVENOUS at 04:10

## 2021-04-14 RX ADMIN — QUETIAPINE FUMARATE 100 MG: 100 TABLET ORAL at 21:23

## 2021-04-14 RX ADMIN — SODIUM CHLORIDE 8 MG/HR: 9 INJECTION, SOLUTION INTRAVENOUS at 04:03

## 2021-04-14 RX ADMIN — BACLOFEN 15 MG: 10 TABLET ORAL at 21:23

## 2021-04-14 RX ADMIN — OXYCODONE HYDROCHLORIDE AND ACETAMINOPHEN 500 MG: 500 TABLET ORAL at 21:23

## 2021-04-14 RX ADMIN — IOHEXOL 70 ML: 350 INJECTION, SOLUTION INTRAVENOUS at 13:15

## 2021-04-14 RX ADMIN — ZINC OXIDE: 200 OINTMENT TOPICAL at 09:16

## 2021-04-14 RX ADMIN — QUETIAPINE FUMARATE 50 MG: 25 TABLET ORAL at 17:52

## 2021-04-14 RX ADMIN — CHLORHEXIDINE GLUCONATE 7.5 ML: 1.2 RINSE ORAL at 08:49

## 2021-04-14 RX ADMIN — LEVETIRACETAM 1500 MG: 100 INJECTION, SOLUTION INTRAVENOUS at 12:13

## 2021-04-14 RX ADMIN — Medication: at 21:24

## 2021-04-14 RX ADMIN — CEFAZOLIN SODIUM 1000 MG: 1 INJECTION, POWDER, FOR SOLUTION INTRAMUSCULAR; INTRAVENOUS at 08:40

## 2021-04-14 RX ADMIN — CEFAZOLIN SODIUM 1000 MG: 1 INJECTION, POWDER, FOR SOLUTION INTRAMUSCULAR; INTRAVENOUS at 00:42

## 2021-04-14 RX ADMIN — SODIUM CHLORIDE, POTASSIUM CHLORIDE, SODIUM LACTATE AND CALCIUM CHLORIDE: 600; 310; 30; 20 INJECTION, SOLUTION INTRAVENOUS at 23:35

## 2021-04-14 RX ADMIN — CEFAZOLIN SODIUM 1000 MG: 1 INJECTION, POWDER, FOR SOLUTION INTRAMUSCULAR; INTRAVENOUS at 23:35

## 2021-04-14 RX ADMIN — CHLORHEXIDINE GLUCONATE 7.5 ML: 1.2 RINSE ORAL at 21:24

## 2021-04-14 RX ADMIN — TRIAMCINOLONE ACETONIDE: 1 CREAM TOPICAL at 21:24

## 2021-04-14 ASSESSMENT — PAIN SCALES - GENERAL
PAINLEVEL_OUTOF10: 0
PAINLEVEL_OUTOF10: 0

## 2021-04-14 NOTE — PROGRESS NOTES
Pt has copious amounts of green/yellow drainage from J-tube site;  Barrier wafer with ostomy pouch is not containing drainage; PerfectServe sent to Dr. Josefina Katz; pt cleaned & changed.       Addendum: LM for general surgeon, Dr. Roland Hidalgo, to call r/t copious; Roland Hidalgo is going to come to hospital to insert Prince J-Tube; verbal order I mg IVP Dilaudid one time for pain     Addendum at 2300: MD at bedside; J-tube placement not successful; J-tube placement & evaluation to be done on Wednesday with contrast; MD to enter orders r/t procedure & PRN IVP pain medication; pt continues to be NPO; current J-tube is not to be used; site cleaned with gauze & saline, prescription zinc oxide applied & loosely covered; pt resting comfortably    RN remained with pt in excess of 45 minutes to assess situation with MD via phone, console pt, & keep pt informed

## 2021-04-14 NOTE — PLAN OF CARE
Pt remained free from falls & new impaired skin integrity throughout shift; pain controlled with PRN medications & pt satisfied; bed alarms on & video monitoring continues for pt safety; family at bedside since 0630 & in agreement with today's care plan; will continue to assess.       Problem: Skin Integrity:  Goal: Will show no infection signs and symptoms  Description: Will show no infection signs and symptoms  4/14/2021 0656 by iGuseppe Jefferson RN  Outcome: Ongoing     Problem: Skin Integrity:  Goal: Absence of new skin breakdown  Description: Absence of new skin breakdown  4/14/2021 0656 by Giuseppe Jefferson RN  Outcome: Ongoing     Problem: Falls - Risk of:  Goal: Will remain free from falls  Description: Will remain free from falls  4/14/2021 0656 by Giuseppe Jefferson RN  Outcome: Ongoing     Problem: Falls - Risk of:  Goal: Absence of physical injury  Description: Absence of physical injury  4/14/2021 0656 by iGuseppe Jefferson RN  Outcome: Ongoing     Problem: Cardiovascular  Goal: No DVT, peripheral vascular complications  9/26/2414 0656 by Giuseppe Jefferson RN  Outcome: Ongoing     Problem: Pain:  Goal: Pain level will decrease  Description: Pain level will decrease  4/14/2021 0656 by Giuseppe Jefferson RN  Outcome: Ongoing     Problem: Pain:  Goal: Control of acute pain  Description: Control of acute pain  4/14/2021 0656 by Giuseppe Jefferson RN  Outcome: Ongoing     Problem: Pain:  Goal: Control of chronic pain  Description: Control of chronic pain  4/14/2021 0656 by Giuseppe Jefferson RN  Outcome: Ongoing

## 2021-04-14 NOTE — PROGRESS NOTES
Procedure note      Pre op Dx-leaking jejunostomy tube    Post op Dx-same    Procedure-removal of 20 [place] jejunostomy tube with placement of 24 [place] SHASTA G-tube    Surgeon-Dr. Hilary Grace    Anesthesia-local    EBL-min    Operative indications and consent-79year-old male with MRDD with a longstanding jejunostomy tube in place. We were asked to see him about bilious drainage surrounding the tube which is causing significant excoriation of the skin. The plan is for G-tube exchange up to a 24 [place] SHASTA G-tube. Patient explained risks,benefits, complications and alternatives. Procedure-the patient was given 4 mg of IV morphine per the RN at bedside. He was prepped and draped in the usual sterile fashion. The old 21 [place] J-tube was removed. A new 24 [place] SHASTA G-tube was placed. This did cause some discomfort to the patient as the tube was placed. We placed 7 mL of saline in the balloon. Location of the tube in the jejunum was confirmed by aspiration of enteric contents. PLAN-continue PPI drip. Okay to resume feedings from surgical perspective. We have reconsulted the wound/stoma nurse to place a colostomy bag surrounding the J-tube entrance in order to help keep the bilious fluid off of the skin. Ultimately, if this is unsuccessful, the only option will be for surgical placement of a new jejunostomy tube.

## 2021-04-14 NOTE — PLAN OF CARE
Problem: Skin Integrity:  Intervention: Manage dressing change  Note: MASD to abdomen around J tube  Apply zinc paste to skin  Apply hydrocolloid dressing around J tube to protect skin   Apply pieces of barrier ring around tube  Cover with ostomy wafer, cut hole in pouch for J tube; pull through. Cover opening in pouch with tegaderm. Change every two to three days.

## 2021-04-14 NOTE — PROGRESS NOTES
CARDIOVASCULAR:  normal apical pulses, tachycardic with regular rhythm and normal S1 and S2  ABDOMEN:  Soft BS + non tender   MUSCULOSKELETAL:  Contracted      NEUROLOGIC:  Right hemiplegia   SKIN:  Warm and dry  and no bruising or bleeding    Data      No results found for: Mindyruben Rosen, [id number]    Lab Results   Component Value Date     04/11/2021    K 4.9 04/11/2021    CL 99 04/11/2021    CO2 23 04/11/2021    BUN 22 04/11/2021    CREATININE <0.5 04/11/2021    GLUCOSE 107 04/11/2021    CALCIUM 9.1 04/11/2021     Lab Results   Component Value Date    WBC 9.5 04/11/2021    HGB 8.5 04/13/2021    HCT 26.8 04/13/2021    MCV 90.4 04/11/2021     04/11/2021         Lab Results   Component Value Date    INR 0.96 03/28/2021    PROTIME 11.1 03/28/2021       ASSESSMENT AND PLAN      Patient Active Problem List   Diagnosis    Altered mental status    Dehydration    Cerebral palsy (HCC)    Seizure disorder (HCC)    Nuclear sclerosis    Anemia, chronic disease    Partial epilepsy with impairment of consciousness, intractable ([address]    Mental retardation    Malfunction of gastrostomy tube ([address]    Hypoxemia    Hypotension    Recurrent seizures ([address]    Anemia due to acute blood loss    Sepsis ([address]    Coffee ground emesis    Gastroduodenitis    GI bleed    Upper GI hemorrhage      surgery now consulted for J tube leakage .  J tube  Repositioned and replaced , if he has continuous leakage he needs contrast study

## 2021-04-14 NOTE — CARE COORDINATION
[facility] Nurse  Follow-up Progress Note       NAME:  Gavin Saha  MEDICAL RECORD NUMBER:  [id number]  AGE:  79 y.o. GENDER:  male  :  1950  TODAY'S DATE:  2021    Subjective:   Wound Identification:  Wound Type: non-healing/non-surgical  Contributing Factors: moisture associated skin damage        Patient Goal of Care:  [] Wound Healing  [] Odor Control  [] Palliative Care  [] Pain Control   [] Other:     Objective:    BP (!) 152/73   Pulse 76   Temp 98.4 °F (36.9 °C) (Temporal)   Resp 16   Ht 5' 6\" (1.676 m) Comment: per last admission  Wt 156 lb 6.4 oz (70.9 kg)   SpO2 94%   BMI 25.24 kg/m²   Cheikh Risk Score: Cheikh Scale Score: 14  Assessment:   Measurements:  Wound 21 Abdomen Left MASD around J tube (Active)   Wound Image   21 1314   Dressing Status Old drainage noted;New drainage noted 21 1215   Wound Cleansed Not Cleansed 21 1215   Dressing/Treatment ABD; Foam 21 1215   Dressing Change Due 21 1215   Wound Length (cm) 8 cm 21 1314   Wound Width (cm) 6 cm 21 1314   Wound Surface Area (cm^2) 48 cm^2 21 1314   Wound Assessment Erythema;Pink/red 21 1215   Drainage Amount Moderate 21 1215   Drainage Description Yellow;Green 21 1215   Odor None 21 1215   Ewa-wound Assessment Erosion; Excoriated 21 2300   Number of days: 3       Response to treatment:  Well tolerated by patient. Pain Assessment:  Severity:  0 / 10  Quality of pain: N/A  Wound Pain Timing/Severity: none  Premedicated: No  Plan:   Plan of Care: MASD to abdomen around J tube  Apply zinc paste to skin  Apply hydrocolloid dressing around J tube  Apply pieces of barrier ring around J tube  Cover with ostomy wafer, cut hole in pouch for J tube; pull through  Cover opening in pouch with tegaderm. Change every two to three days.      Specialty Bed Required : Yes   [] Low Air Loss   [] Pressure Redistribution  [] Fluid Immersion  [] Bariatric  [] Total Pressure Relief  [x] Other: Low bed    Current Diet: DIET TUBE FEED CONTINUOUS/CYCLIC NPO; Fluid Restricted; Jejunostomy;  Cyclic  Dietician consult:  No    Discharge Plan:  Placement for patient upon discharge: group home   Patient appropriate for Outpatient [address] [place] [address] Road: No    Referrals:  [x]   [] [facility]  [] Supplies  [] Other    Patient/Caregiver Teaching:  Level of patient/caregiver understanding able to:   [] Indicates understanding       [] Needs reinforcement  [] Unsuccessful      [] Verbal Understanding  [] Demonstrated understanding       [] No evidence of learning  [] Refused teaching         [x] N/A       Electronically signed by RANDALL Pastrana, RN  Wound/Ostomy Care on 4/14/2021 at 5:23 PM

## 2021-04-14 NOTE — PLAN OF CARE
Problem: Skin Integrity:  Goal: Will show no infection signs and symptoms  Description: Will show no infection signs and symptoms  Outcome: Ongoing  Goal: Absence of new skin breakdown  Description: Absence of new skin breakdown  Outcome: Ongoing     Problem: Falls - Risk of:  Goal: Will remain free from falls  Description: Will remain free from falls  Outcome: Ongoing  Goal: Absence of physical injury  Description: Absence of physical injury  Outcome: Ongoing     Problem: Cardiovascular  Goal: No DVT, peripheral vascular complications  Outcome: Ongoing  Goal: Hemodynamic stability  Outcome: Ongoing  Goal: Anticoagulate/Hct stable  Outcome: Ongoing  Goal: Agreement to quit smoking  Outcome: Ongoing  Goal: Weight maintained or lost  Outcome: Ongoing  Goal: Understanding of dietary restrictions  Outcome: Ongoing     Problem: Nutrition  Goal: Optimal nutrition therapy  Outcome: Ongoing     Problem: Pain:  Goal: Pain level will decrease  Description: Pain level will decrease  Outcome: Ongoing  Goal: Control of acute pain  Description: Control of acute pain  Outcome: Ongoing  Goal: Control of chronic pain  Description: Control of chronic pain  Outcome: Ongoing     VSS, d/c cancelled due to placement of new J tube by Gen Surgery, WOC RN rounded on PT and redressed J tube with ostomy bag to catch drainage and for skin healing. Okay to use J tube according to Gen Surgery after placement.

## 2021-04-15 LAB
GLUCOSE BLD-MCNC: 128 MG/DL (ref 70–99)
GLUCOSE BLD-MCNC: 81 MG/DL (ref 70–99)
HCT VFR BLD CALC: 28.4 % (ref 40.5–52.5)
HCT VFR BLD CALC: 29.8 % (ref 40.5–52.5)
HEMOGLOBIN: 8.9 G/DL (ref 13.5–17.5)
HEMOGLOBIN: 9 G/DL (ref 13.5–17.5)
PERFORMED ON: ABNORMAL
PERFORMED ON: NORMAL

## 2021-04-15 PROCEDURE — 2580000003 HC RX 258: Performed by: INTERNAL MEDICINE

## 2021-04-15 PROCEDURE — 6370000000 HC RX 637 (ALT 250 FOR IP): Performed by: INTERNAL MEDICINE

## 2021-04-15 PROCEDURE — 36415 COLL VENOUS BLD VENIPUNCTURE: CPT

## 2021-04-15 PROCEDURE — 94660 CPAP INITIATION&MGMT: CPT

## 2021-04-15 PROCEDURE — 2060000000 HC ICU INTERMEDIATE R&B

## 2021-04-15 PROCEDURE — 85018 HEMOGLOBIN: CPT

## 2021-04-15 PROCEDURE — 6360000002 HC RX W HCPCS: Performed by: INTERNAL MEDICINE

## 2021-04-15 PROCEDURE — 85014 HEMATOCRIT: CPT

## 2021-04-15 PROCEDURE — 99232 SBSQ HOSP IP/OBS MODERATE 35: CPT | Performed by: SURGERY

## 2021-04-15 RX ORDER — LOPERAMIDE HYDROCHLORIDE 2 MG/1
2 CAPSULE ORAL 4 TIMES DAILY PRN
Status: DISCONTINUED | OUTPATIENT
Start: 2021-04-15 | End: 2021-04-16 | Stop reason: HOSPADM

## 2021-04-15 RX ORDER — LOPERAMIDE HYDROCHLORIDE 2 MG/1
2 CAPSULE ORAL 4 TIMES DAILY PRN
COMMUNITY
Start: 2021-04-15 | End: 2021-04-25

## 2021-04-15 RX ADMIN — TRIAMCINOLONE ACETONIDE: 1 CREAM TOPICAL at 09:32

## 2021-04-15 RX ADMIN — CHLORHEXIDINE GLUCONATE 7.5 ML: 1.2 RINSE ORAL at 09:31

## 2021-04-15 RX ADMIN — BACLOFEN 15 MG: 10 TABLET ORAL at 17:06

## 2021-04-15 RX ADMIN — LORAZEPAM 0.5 MG: 0.5 TABLET ORAL at 01:18

## 2021-04-15 RX ADMIN — TRIAMCINOLONE ACETONIDE: 1 CREAM TOPICAL at 21:00

## 2021-04-15 RX ADMIN — BACLOFEN 15 MG: 10 TABLET ORAL at 09:29

## 2021-04-15 RX ADMIN — OXYCODONE HYDROCHLORIDE AND ACETAMINOPHEN 500 MG: 500 TABLET ORAL at 20:58

## 2021-04-15 RX ADMIN — HYDROCODONE BITARTRATE AND ACETAMINOPHEN 1 TABLET: 5; 325 TABLET ORAL at 17:10

## 2021-04-15 RX ADMIN — OXYCODONE HYDROCHLORIDE AND ACETAMINOPHEN 500 MG: 500 TABLET ORAL at 09:29

## 2021-04-15 RX ADMIN — LAMOTRIGINE 400 MG: 100 TABLET ORAL at 09:29

## 2021-04-15 RX ADMIN — MULTIVITAMIN 30 ML: LIQUID ORAL at 09:30

## 2021-04-15 RX ADMIN — POTASSIUM BICARBONATE 10 MEQ: 782 TABLET, EFFERVESCENT ORAL at 09:28

## 2021-04-15 RX ADMIN — ZINC OXIDE: 200 OINTMENT TOPICAL at 09:30

## 2021-04-15 RX ADMIN — Medication: at 21:00

## 2021-04-15 RX ADMIN — QUETIAPINE FUMARATE 50 MG: 25 TABLET ORAL at 09:28

## 2021-04-15 RX ADMIN — LACTULOSE 20 G: 20 SOLUTION ORAL at 09:28

## 2021-04-15 RX ADMIN — QUETIAPINE FUMARATE 50 MG: 25 TABLET ORAL at 17:57

## 2021-04-15 RX ADMIN — DOXAZOSIN 4 MG: 4 TABLET ORAL at 09:28

## 2021-04-15 RX ADMIN — QUETIAPINE FUMARATE 100 MG: 100 TABLET ORAL at 20:59

## 2021-04-15 RX ADMIN — Medication 2000 UNITS: at 09:28

## 2021-04-15 RX ADMIN — ESCITALOPRAM OXALATE 10 MG: 10 TABLET ORAL at 09:28

## 2021-04-15 RX ADMIN — Medication: at 09:30

## 2021-04-15 RX ADMIN — LEVETIRACETAM 1500 MG: 100 INJECTION, SOLUTION INTRAVENOUS at 00:54

## 2021-04-15 RX ADMIN — LAMOTRIGINE 400 MG: 100 TABLET ORAL at 20:59

## 2021-04-15 RX ADMIN — BACLOFEN 15 MG: 10 TABLET ORAL at 20:58

## 2021-04-15 RX ADMIN — POTASSIUM BICARBONATE 10 MEQ: 782 TABLET, EFFERVESCENT ORAL at 20:58

## 2021-04-15 RX ADMIN — LORAZEPAM 0.5 MG: 0.5 TABLET ORAL at 20:58

## 2021-04-15 RX ADMIN — ZINC OXIDE: 200 OINTMENT TOPICAL at 17:58

## 2021-04-15 RX ADMIN — SENNOSIDES 17.2 MG: 8.6 TABLET, FILM COATED ORAL at 09:28

## 2021-04-15 NOTE — PROGRESS NOTES
Department of Internal Medicine  General Internal Medicine   Progress Note      SUBJECTIVE:  No coffee ground emesis , no unusual pain         History obtained from chart review and the patient's nursing staff   General ROS: positive for  - fatigue and malaise  negative for - chills, fever or night sweats  Psychological ROS: negative  Respiratory ROS: no cough, shortness of breath, or wheezing  Cardiovascular ROS: positive for - , dyspnea on exertion and shortness of breath  negative for - chest pain  Gastrointestinal ROS: no abdominal pain, change in bowel habits, or black or bloody stools    OBJECTIVE      Medications      Current Facility-Administered Medications: levETIRAcetam (KEPPRA) 1,500 mg in sodium chloride 0.9 % 100 mL IVPB, 1,500 mg, Intravenous, Q12H  HYDROcodone-acetaminophen (NORCO) 5-325 MG per tablet 1 tablet, 1 tablet, Oral, Q4H PRN  ceFAZolin (ANCEF) 1,000 mg in dextrose 5 % 50 mL IVPB (mini-bag), 1,000 mg, Intravenous, Q8H  morphine (PF) injection 2 mg, 2 mg, Intravenous, Q2H PRN **OR** morphine injection 4 mg, 4 mg, Intravenous, Q2H PRN  QUEtiapine (SEROQUEL) tablet 50 mg, 50 mg, Oral, QAM **AND** QUEtiapine (SEROQUEL) tablet 50 mg, 50 mg, Oral, Daily **AND** QUEtiapine (SEROQUEL) tablet 100 mg, 100 mg, Oral, Nightly  zinc oxide (TRIAD HYDROPHILIC) paste, , Topical, BID  acetaminophen (TYLENOL) 160 MG/5ML solution 650 mg, 650 mg, Per G Tube, Q4H PRN  ascorbic acid (VITAMIN C) tablet 500 mg, 500 mg, Per G Tube, BID  baclofen (LIORESAL) tablet 15 mg, 15 mg, Per G Tube, TID  bisacodyl (DULCOLAX) suppository 10 mg, 10 mg, Rectal, Daily PRN  bisacodyl (DULCOLAX) EC tablet 10 mg, 10 mg, Oral, PRN  chlorhexidine (PERIDEX) 0.12 % solution 7.5 mL, 7.5 mL, Mouth/Throat, BID  Vitamin D (CHOLECALCIFEROL) tablet 2,000 Units, 2,000 Units, Per G Tube, Daily  doxazosin (CARDURA) tablet 4 mg, 4 mg, Per G Tube, Daily  escitalopram (LEXAPRO) tablet 10 mg, 10 mg, Per G Tube, Daily  lactulose (CHRONULAC) 10 GM/15ML few wheezes   CARDIOVASCULAR:  normal apical pulses, tachycardic with regular rhythm and normal S1 and S2  ABDOMEN:  Soft BS + non tender   MUSCULOSKELETAL:  Contracted      NEUROLOGIC:  Right hemiplegia   SKIN:  Warm and dry  and no bruising or bleeding    Data      No results found for: Saúl Root, [id number]    Lab Results   Component Value Date     04/11/2021    K 4.9 04/11/2021    CL 99 04/11/2021    CO2 23 04/11/2021    BUN 22 04/11/2021    CREATININE <0.5 04/11/2021    GLUCOSE 107 04/11/2021    CALCIUM 9.1 04/11/2021     Lab Results   Component Value Date    WBC 9.5 04/11/2021    HGB 8.9 04/15/2021    HCT 28.4 04/15/2021    MCV 90.4 04/11/2021     04/11/2021         Lab Results   Component Value Date    INR 0.96 03/28/2021    PROTIME 11.1 03/28/2021       ASSESSMENT AND PLAN      Patient Active Problem List   Diagnosis    Altered mental status    Dehydration    Cerebral palsy (HCC)    Seizure disorder ([address]    Nuclear sclerosis    Anemia, chronic disease    Partial epilepsy with impairment of consciousness, intractable ([address]    Mental retardation    Malfunction of gastrostomy tube ([address]    Hypoxemia    Hypotension    Recurrent seizures ([address]    Anemia due to acute blood loss    Sepsis ([address]    Coffee ground emesis    Gastroduodenitis    GI bleed    Upper GI hemorrhage       J tube replaced at bedside under Morphine and sedation , it is functioning OK without leakage tube feeding advanced  Patient can be dismissed in am if uneventful tonight

## 2021-04-15 NOTE — PLAN OF CARE
Problem: Skin Integrity:  Goal: Will show no infection signs and symptoms  Description: Will show no infection signs and symptoms  Outcome: Ongoing  Goal: Absence of new skin breakdown  Description: Absence of new skin breakdown  Outcome: Ongoing     Problem: Falls - Risk of:  Goal: Will remain free from falls  Description: Will remain free from falls  Outcome: Ongoing  Goal: Absence of physical injury  Description: Absence of physical injury  Outcome: Ongoing     Problem: Cardiovascular  Goal: No DVT, peripheral vascular complications  Outcome: Ongoing  Goal: Hemodynamic stability  Outcome: Ongoing  Goal: Anticoagulate/Hct stable  Outcome: Ongoing  Goal: Agreement to quit smoking  Outcome: Ongoing  Goal: Weight maintained or lost  Outcome: Ongoing  Goal: Understanding of dietary restrictions  Outcome: Ongoing     Problem: Nutrition  Goal: Optimal nutrition therapy  Outcome: Ongoing     Problem: Pain:  Goal: Pain level will decrease  Description: Pain level will decrease  Outcome: Ongoing  Goal: Control of acute pain  Description: Control of acute pain  Outcome: Ongoing  Goal: Control of chronic pain  Description: Control of chronic pain  Outcome: Ongoing     VSS at this time. Dr. Gaby Olmos replaced previous J tube with new 24 fr J tube giving RN staff permission to resume use. TF started at or about 7722-5020 with no complications and TF started at set rate and goal at 65 ml/hr. No complications were noted in IR and the PT will need to have a BiPAP order added prior to discharge to accomodate sleep apnea per night shift RN.

## 2021-04-15 NOTE — PLAN OF CARE
Problem: Skin Integrity:  Intervention: Wound dressing application  Note: Clean skin with water only, then dry. Place a barrier ring  around the tube, then the convex ostomy barrier  wafer. Bring the external bumper down on top of the ostomy barrier wafer. Cut a hole in the pouch and  bring the J tube through the pouch. Secure the  outside of the pouch  where the J tube is pulled through with tegaderm or water proof tape. Attach ostomy belt to tabs on ostomy barrier, this will help hold to ostomy system in placed.      Supplies:    -Barrier ring # [id number]  -[employer] [place] flex convex light barrier  [id number]  -Pouch # [id number]  -Elastic barrier strips  [id number]  -Ostomy belt # [id number] or # [id number]

## 2021-04-15 NOTE — PROGRESS NOTES
Reynold Blanco is a 79 y.o. male patient.     Chief complaint-leaking J-tube    HPI-79year-old male seen in follow-up for a leaking jejunostomy tube  Current Facility-Administered Medications   Medication Dose Route Frequency Provider Last Rate Last Admin    levETIRAcetam (KEPPRA) 1,500 mg in sodium chloride 0.9 % 100 mL IVPB  1,500 mg Intravenous Q12H David Lopez MD   Stopped at 04/15/21 0149    HYDROcodone-acetaminophen ([employer]) 5-325 MG per tablet 1 tablet  1 tablet Oral Q4H PRN Daivd Lopez MD   1 tablet at 04/13/21 0245    ceFAZolin (ANCEF) 1,000 mg in dextrose 5 % 50 mL IVPB (mini-bag)  1,000 mg Intravenous A7X Kevin Shah PA-C   Stopped at 04/15/21 0030    morphine (PF) injection 2 mg  2 mg Intravenous Q2H PRN Leyla Osorio MD   2 mg at 04/14/21 0512    Or    morphine injection 4 mg  4 mg Intravenous Q2H PRN Leyla Osorio MD   4 mg at 04/14/21 1459    QUEtiapine (SEROQUEL) tablet 50 mg  50 mg Oral QAM David Lopez MD   50 mg at 04/15/21 3521    And    QUEtiapine (SEROQUEL) tablet 50 mg  50 mg Oral Daily David Lopez MD   50 mg at 04/14/21 1752    And    QUEtiapine (SEROQUEL) tablet 100 mg  100 mg Oral Nightly David Lopez MD   100 mg at 04/14/21 2123    zinc oxide (TRIAD HYDROPHILIC) paste   Topical BID David Lopez MD   Given at 04/15/21 0930    acetaminophen (TYLENOL) 160 MG/5ML solution 650 mg  650 mg Per G Tube Q4H PRN David Lopez MD   650 mg at 04/13/21 0059    ascorbic acid (VITAMIN C) tablet 500 mg  500 mg Per G Tube BID David Lopez MD   500 mg at 04/15/21 5992    baclofen (LIORESAL) tablet 15 mg  15 mg Per G Tube TID David Lopez MD   15 mg at 04/15/21 1349    bisacodyl (DULCOLAX) suppository 10 mg  10 mg Rectal Daily PRN David Lopez MD        bisacodyl (DULCOLAX) EC tablet 10 mg  10 mg Oral PRN David Lopez MD        chlorhexidine (PERIDEX) 0.12 % solution 7.5 mL  7.5 mL Mouth/Throat BID David Lopez MD   7.5 mL at 04/15/21 0931    Vitamin D (CHOLECALCIFEROL) tablet 2,000 Units  2,000 Units Per G Tube Daily Kallie Rojas MD   2,000 Units at 04/15/21 5718    doxazosin (CARDURA) tablet 4 mg  4 mg Per G Tube Daily Kallie Rojas MD   4 mg at 04/15/21 4658    escitalopram (LEXAPRO) tablet 10 mg  10 mg Per G Tube Daily Kallie Rojas MD   10 mg at 04/15/21 0928    lactulose (CHRONULAC) 10 GM/15ML solution 20 g  20 g Per G Tube Daily Kallie Rojas MD   20 g at 04/15/21 7170    lamoTRIgine (LAMICTAL) tablet 400 mg  400 mg Per J Tube BID Kallie Rojas MD   400 mg at 04/15/21 3827    LORazepam (ATIVAN) tablet 0.5 mg  0.5 mg Oral Q12H PRN Kallie Rojas MD   0.5 mg at 04/15/21 0118    multivitamin with iron-minerals liquid 30 mL  30 mL Per G Tube Daily Kallie Rojas MD   30 mL at 04/15/21 0930    nystatin (MYCOSTATIN) cream   Topical BID PRN Kallie Rojas MD        potassium bicarb-citric acid (EFFER-K) effervescent tablet 10 mEq  10 mEq Per G Tube BID Kallie Rojas MD   10 mEq at 04/15/21 0928    promethazine (PHENERGAN) tablet 12.5 mg  12.5 mg Oral Q6H PRN Kallie Rojas MD        senna (SENOKOT) tablet 17.2 mg  2 tablet Per G Tube Daily Kallie Rojas MD   17.2 mg at 04/15/21 6766    zinc oxide 20 % ointment   Topical BID Kallie Rojas MD   Given at 04/15/21 0930    pantoprazole (PROTONIX) 80 mg in sodium chloride 0.9 % 100 mL infusion  8 mg/hr Intravenous Continuous Kallie Rojas MD   Stopped at 04/14/21 1400    lactated ringers infusion   Intravenous Continuous Kallie Rojas  mL/hr at 04/14/21 2335 New Bag at 04/14/21 2335    hydrOXYzine (ATARAX) tablet 10 mg  10 mg Oral TID PRN Kallie Rojas MD   10 mg at 04/11/21 2206    diphenhydrAMINE (BENADRYL) 12.5 MG/5ML elixir 25 mg  25 mg Per G Tube Q6H PRN Kallie Rojas MD   25 mg at 04/11/21 2206    triamcinolone (KENALOG) 0.1 % cream   Topical BID Kallie Rojas MD   Given at 04/15/21 0932     Allergies   Allergen Reactions    Biaxin [Clarithromycin] Other (See Comments)     Unknown reaction    Invanz [Ertapenem] Other (See Comments)     Caused SEIZURES  Caused SEIZURES     Active Problems:    Altered mental status    Dehydration    Cerebral palsy (HCC)    Seizure disorder (HCC)    Nuclear sclerosis    Anemia, chronic disease    Partial epilepsy with impairment of consciousness, intractable (HCC)    Malfunction of gastrostomy tube (HCC)    Hypoxemia    Hypotension    Recurrent seizures (HCC)    Anemia due to acute blood loss    Coffee ground emesis    Gastroduodenitis    GI bleed    Upper GI hemorrhage  Resolved Problems:    Acute respiratory failure with hypoxia (HCC)    Blood pressure (!) 147/66, pulse 71, temperature 96.9 °F (36.1 °C), temperature source Temporal, resp. rate 16, height 5' 6\" (1.676 m), weight 155 lb 6.8 oz (70.5 kg), SpO2 98 %. Subjective:  Diet:  NPO. Activity level: Normal.    Pain:  He complains of pain that is moderate. Objective:  General Appearance:  Comfortable. Vital signs: (most recent): Blood pressure (!) 147/66, pulse 71, temperature 96.9 °F (36.1 °C), temperature source Temporal, resp. rate 16, height 5' 6\" (1.676 m), weight 155 lb 6.8 oz (70.5 kg), SpO2 98 %. Output: Producing urine. Lungs:  Normal effort and normal respiratory rate. Abdomen: Abdomen is soft and non-distended. Neurological: Patient is alert and oriented to person, place and time. Skin:  Warm and dry. Assessment & Plan 80-year-old male with MRDD who receives nighttime tube feeds via a jejunostomy tube. He is admitted for leakage around the J-tube with significant skin excoriation. The tube was upsized from a 20 [place] to a 24 [place] J-tube yesterday. To date, the pouching around the J-tube has been unsuccessful in controlling the leakage. Would recommend holding off on discharge until we have  better control of the leakage.   If the drainage is ultimately unable to be controlled, the

## 2021-04-15 NOTE — PLAN OF CARE
Problem: Skin Integrity:  Goal: Will show no infection signs and symptoms  Description: Will show no infection signs and symptoms  5/43/6128 9723 by Kaley Barrios RN  Outcome: Ongoing  4/14/2021 2159 by Lorenza Falk RN  Outcome: Ongoing  Goal: Absence of new skin breakdown  Description: Absence of new skin breakdown  5/07/2607 3563 by Kaley Barrios RN  Outcome: Ongoing  Note: Gauze replaced at j tube insertion site frequently  4/14/2021 2159 by Lorenza Falk RN  Outcome: Ongoing  Intervention: Manage dressing change  4/14/2021 1727 by Domenica Linda RN  Note: MASD to abdomen around J tube  Apply zinc paste to skin  Apply hydrocolloid dressing around J tube to protect skin   Apply pieces of barrier ring around tube  Cover with ostomy wafer, cut hole in pouch for J tube; pull through. Cover opening in pouch with tegaderm. Change every two to three days.       Problem: Falls - Risk of:  Goal: Will remain free from falls  Description: Will remain free from falls  1/94/1682 1204 by Kaley Barrios RN  Outcome: Ongoing  4/14/2021 2159 by Lorenza Falk RN  Outcome: Ongoing  Goal: Absence of physical injury  Description: Absence of physical injury  2/80/7770 8432 by Kaley Barrios RN  Outcome: Ongoing  4/14/2021 2159 by Lorenza Falk RN  Outcome: Ongoing     Problem: Cardiovascular  Goal: No DVT, peripheral vascular complications  9/13/0029 0847 by Kaley Barrios RN  Outcome: Ongoing  4/14/2021 2159 by Lorenza Falk RN  Outcome: Ongoing  Goal: Hemodynamic stability  2/79/2175 1670 by Kaley Barrios RN  Outcome: Ongoing  4/14/2021 2159 by Lorenza Falk RN  Outcome: Ongoing  Goal: Anticoagulate/Hct stable  4/04/7973 5618 by Kaley Barrios RN  Outcome: Ongoing  4/14/2021 2159 by Lorenza Falk RN  Outcome: Ongoing  Goal: Agreement to quit smoking  9/85/5263 0775 by Kaley Barrios RN  Outcome: Ongoing  4/14/2021 2159 by Lorenza Falk RN  Outcome: Ongoing  Goal: Weight maintained or lost  1/14/5746 7697 by Hannah Sharma RN  Outcome: Ongoing  4/14/2021 2159 by Manny Subramanian RN  Outcome: Ongoing  Goal: Understanding of dietary restrictions  1/66/1801 8394 by Hannah Sharma RN  Outcome: Ongoing  4/14/2021 2159 by Manny Subramanian RN  Outcome: Ongoing     Problem: Nutrition  Goal: Optimal nutrition therapy  9/75/6143 4713 by Hannah Sharma RN  Outcome: Ongoing  4/14/2021 2159 by Manny Subramainan RN  Outcome: Ongoing     Problem: Pain:  Goal: Pain level will decrease  Description: Pain level will decrease  2/19/4033 0060 by Hannah Sharma RN  Outcome: Ongoing  4/14/2021 2159 by Manny Subramanian RN  Outcome: Ongoing  Goal: Control of acute pain  Description: Control of acute pain  9/56/1225 3056 by Hannah Sharma RN  Outcome: Ongoing  4/14/2021 2159 by Manny Subramanian RN  Outcome: Ongoing  Goal: Control of chronic pain  Description: Control of chronic pain  4/39/9539 8735 by Hannah Sharma RN  Outcome: Ongoing  4/14/2021 2159 by Manny Subramanian RN  Outcome: Ongoing

## 2021-04-15 NOTE — CARE COORDINATION
Patient discharged  4-15-21 to [address] [place] [address] at 2pm via [facility] care. Call report [phone], fax [phone]. POA/brother was  notified. Please complete and print HANK/AVS.  All discharge needs met per case management    Addendum:  Per RN pt is not discharging d/t G Tube leak. FABRICIO sent message to MD Alban Alfaro requesting clarification. \"can you please advise regarding B Downeys d/c?  I have him set to go at 2pm and being told he is not leaving due to G tube leak\"      Chicho Shaw MSW, [address]

## 2021-04-16 VITALS
OXYGEN SATURATION: 98 % | BODY MASS INDEX: 24.98 KG/M2 | HEIGHT: 66 IN | WEIGHT: 155.42 LBS | RESPIRATION RATE: 18 BRPM | TEMPERATURE: 97.3 F | SYSTOLIC BLOOD PRESSURE: 125 MMHG | HEART RATE: 64 BPM | DIASTOLIC BLOOD PRESSURE: 65 MMHG

## 2021-04-16 LAB
GLUCOSE BLD-MCNC: 109 MG/DL (ref 70–99)
GLUCOSE BLD-MCNC: 124 MG/DL (ref 70–99)
GLUCOSE BLD-MCNC: 97 MG/DL (ref 70–99)
PERFORMED ON: ABNORMAL
PERFORMED ON: ABNORMAL
PERFORMED ON: NORMAL

## 2021-04-16 PROCEDURE — 99232 SBSQ HOSP IP/OBS MODERATE 35: CPT | Performed by: SURGERY

## 2021-04-16 PROCEDURE — APPSS15 APP SPLIT SHARED TIME 0-15 MINUTES: Performed by: NURSE PRACTITIONER

## 2021-04-16 PROCEDURE — 6370000000 HC RX 637 (ALT 250 FOR IP): Performed by: INTERNAL MEDICINE

## 2021-04-16 PROCEDURE — 94660 CPAP INITIATION&MGMT: CPT

## 2021-04-16 PROCEDURE — 94761 N-INVAS EAR/PLS OXIMETRY MLT: CPT

## 2021-04-16 PROCEDURE — APPNB30 APP NON BILLABLE TIME 0-30 MINS: Performed by: NURSE PRACTITIONER

## 2021-04-16 PROCEDURE — 2700000000 HC OXYGEN THERAPY PER DAY

## 2021-04-16 RX ORDER — PANTOPRAZOLE SODIUM 40 MG/10ML
40 INJECTION, POWDER, LYOPHILIZED, FOR SOLUTION INTRAVENOUS 2 TIMES DAILY
Status: DISCONTINUED | OUTPATIENT
Start: 2021-04-16 | End: 2021-04-16 | Stop reason: HOSPADM

## 2021-04-16 RX ADMIN — QUETIAPINE FUMARATE 50 MG: 25 TABLET ORAL at 17:50

## 2021-04-16 RX ADMIN — MULTIVITAMIN 30 ML: LIQUID ORAL at 12:21

## 2021-04-16 RX ADMIN — LAMOTRIGINE 400 MG: 100 TABLET ORAL at 12:21

## 2021-04-16 RX ADMIN — LOPERAMIDE HYDROCHLORIDE 2 MG: 2 CAPSULE ORAL at 12:08

## 2021-04-16 RX ADMIN — POTASSIUM BICARBONATE 10 MEQ: 782 TABLET, EFFERVESCENT ORAL at 12:08

## 2021-04-16 RX ADMIN — BACLOFEN 15 MG: 10 TABLET ORAL at 12:07

## 2021-04-16 RX ADMIN — ESCITALOPRAM OXALATE 10 MG: 10 TABLET ORAL at 12:08

## 2021-04-16 RX ADMIN — HYDROCODONE BITARTRATE AND ACETAMINOPHEN 1 TABLET: 5; 325 TABLET ORAL at 04:41

## 2021-04-16 RX ADMIN — CHLORHEXIDINE GLUCONATE 7.5 ML: 1.2 RINSE ORAL at 12:11

## 2021-04-16 RX ADMIN — OXYCODONE HYDROCHLORIDE AND ACETAMINOPHEN 500 MG: 500 TABLET ORAL at 12:07

## 2021-04-16 RX ADMIN — HYDROCODONE BITARTRATE AND ACETAMINOPHEN 1 TABLET: 5; 325 TABLET ORAL at 17:50

## 2021-04-16 RX ADMIN — DOXAZOSIN 4 MG: 4 TABLET ORAL at 12:10

## 2021-04-16 RX ADMIN — QUETIAPINE FUMARATE 50 MG: 25 TABLET ORAL at 04:41

## 2021-04-16 RX ADMIN — Medication 2000 UNITS: at 12:09

## 2021-04-16 ASSESSMENT — PAIN SCALES - WONG BAKER: WONGBAKER_NUMERICALRESPONSE: 0

## 2021-04-16 NOTE — PROGRESS NOTES
Patient is resting comfortably on BiPAP with SpO2 99%. There are no skin integrity issues at this time. Switched mask to a large size.

## 2021-04-16 NOTE — PLAN OF CARE
Nutrition Problem #1: Inadequate energy intake  Intervention: Food and/or Nutrient Delivery: Start Tube Feeding(Resume TF)  Nutritional Goals: New goal restart TF and pt tolerate goal rate

## 2021-04-16 NOTE — PROGRESS NOTES
Department of Internal Medicine  General Internal Medicine   Progress Note      SUBJECTIVE:  Reportedly has diarrhea ,  Still has J tube Leakage even after replacement          History obtained from chart review and the patient's nursing staff   General ROS: positive for  - fatigue and malaise  negative for - chills, fever or night sweats  Psychological ROS: negative  Respiratory ROS: no cough, shortness of breath, or wheezing  Cardiovascular ROS: positive for - , dyspnea on exertion and shortness of breath  negative for - chest pain  Gastrointestinal ROS: no abdominal pain, change in bowel habits, or black or bloody stools    OBJECTIVE      Medications      Current Facility-Administered Medications: loperamide (IMODIUM) capsule 2 mg, 2 mg, Oral, 4x Daily PRN  levETIRAcetam (KEPPRA) 1,500 mg in sodium chloride 0.9 % 100 mL IVPB, 1,500 mg, Intravenous, Q12H  HYDROcodone-acetaminophen (NORCO) 5-325 MG per tablet 1 tablet, 1 tablet, Oral, Q4H PRN  ceFAZolin (ANCEF) 1,000 mg in dextrose 5 % 50 mL IVPB (mini-bag), 1,000 mg, Intravenous, Q8H  morphine (PF) injection 2 mg, 2 mg, Intravenous, Q2H PRN **OR** morphine injection 4 mg, 4 mg, Intravenous, Q2H PRN  QUEtiapine (SEROQUEL) tablet 50 mg, 50 mg, Oral, QAM **AND** QUEtiapine (SEROQUEL) tablet 50 mg, 50 mg, Oral, Daily **AND** QUEtiapine (SEROQUEL) tablet 100 mg, 100 mg, Oral, Nightly  zinc oxide (TRIAD HYDROPHILIC) paste, , Topical, BID  acetaminophen (TYLENOL) 160 MG/5ML solution 650 mg, 650 mg, Per G Tube, Q4H PRN  ascorbic acid (VITAMIN C) tablet 500 mg, 500 mg, Per G Tube, BID  baclofen (LIORESAL) tablet 15 mg, 15 mg, Per G Tube, TID  bisacodyl (DULCOLAX) suppository 10 mg, 10 mg, Rectal, Daily PRN  bisacodyl (DULCOLAX) EC tablet 10 mg, 10 mg, Oral, PRN  chlorhexidine (PERIDEX) 0.12 % solution 7.5 mL, 7.5 mL, Mouth/Throat, BID  Vitamin D (CHOLECALCIFEROL) tablet 2,000 Units, 2,000 Units, Per G Tube, Daily  doxazosin (CARDURA) tablet 4 mg, 4 mg, Per G Tube, Daily  escitalopram (LEXAPRO) tablet 10 mg, 10 mg, Per G Tube, Daily  lamoTRIgine (LAMICTAL) tablet 400 mg, 400 mg, Per J Tube, BID  LORazepam (ATIVAN) tablet 0.5 mg, 0.5 mg, Oral, Q12H PRN  multivitamin with iron-minerals liquid 30 mL, 30 mL, Per G Tube, Daily  nystatin (MYCOSTATIN) cream, , Topical, BID PRN  potassium bicarb-citric acid (EFFER-K) effervescent tablet 10 mEq, 10 mEq, Per G Tube, BID  promethazine (PHENERGAN) tablet 12.5 mg, 12.5 mg, Oral, Q6H PRN  zinc oxide 20 % ointment, , Topical, BID  pantoprazole (PROTONIX) 80 mg in sodium chloride 0.9 % 100 mL infusion, 8 mg/hr, Intravenous, Continuous  lactated ringers infusion, , Intravenous, Continuous  hydrOXYzine (ATARAX) tablet 10 mg, 10 mg, Oral, TID PRN  diphenhydrAMINE (BENADRYL) 12.5 MG/5ML elixir 25 mg, 25 mg, Per G Tube, Q6H PRN  triamcinolone (KENALOG) 0.1 % cream, , Topical, BID    Physical      VITALS:  /77   Pulse 65   Temp 97 °F (36.1 °C)   Resp 22   Ht 5' 6\" (1.676 m) Comment: per last admission  Wt 155 lb 6.8 oz (70.5 kg)   SpO2 100%   BMI 25.09 kg/m²   TEMPERATURE:  Current - Temp: 97 °F (36.1 °C);  Max - Temp  Av.3 °F (36.3 °C)  Min: 96.9 °F (36.1 °C)  Max: 97.9 °F (36.6 °C)  RESPIRATIONS RANGE: Resp  Av  Min: 16  Max: 31  PULSE RANGE: Pulse  Av.8  Min: 65  Max: 82  BLOOD PRESSURE RANGE:  Systolic (48SWM), CARINA:821 , Min:124 , DNM:538   ; Diastolic (08OWR), QLE:63, Min:66, Max:78    PULSE OXIMETRY RANGE: SpO2  Av.6 %  Min: 98 %  Max: 100 %  24HR INTAKE/OUTPUT:      Intake/Output Summary (Last 24 hours) at 2021 6906  Last data filed at 2021 0236  Gross per 24 hour   Intake 1053 ml   Output 300 ml   Net 753 ml     CONSTITUTIONAL:  fatigued, somnolent, uncooperative, distracted, severe distress and appears older than stated age  NECK:  supple, symmetrical, trachea midline, skin normal and no stridor  BACK:  symmetric  LUNGS:  Decreased BS taras crackles , few wheezes   CARDIOVASCULAR:  normal apical pulses, tachycardic with regular rhythm and normal S1 and S2  ABDOMEN:  Soft BS + non tender   MUSCULOSKELETAL:  Contracted      NEUROLOGIC:  Right hemiplegia   SKIN:  Warm and dry  and no bruising or bleeding    Data      No results found for: Saúl Root, [id number]    Lab Results   Component Value Date     04/11/2021    K 4.9 04/11/2021    CL 99 04/11/2021    CO2 23 04/11/2021    BUN 22 04/11/2021    CREATININE <0.5 04/11/2021    GLUCOSE 107 04/11/2021    CALCIUM 9.1 04/11/2021     Lab Results   Component Value Date    WBC 9.5 04/11/2021    HGB 9.0 04/15/2021    HCT 29.8 04/15/2021    MCV 90.4 04/11/2021     04/11/2021         Lab Results   Component Value Date    INR 0.96 03/28/2021    PROTIME 11.1 03/28/2021       ASSESSMENT AND PLAN      Patient Active Problem List   Diagnosis    Altered mental status    Dehydration    Cerebral palsy (HCC)    Seizure disorder (HCC)    Nuclear sclerosis    Anemia, chronic disease    Partial epilepsy with impairment of consciousness, intractable ([address]    Mental retardation    Malfunction of gastrostomy tube ([address]    Hypoxemia    Hypotension    Recurrent seizures ([address]    Anemia due to acute blood loss    Sepsis (HCC)    Coffee ground emesis    Gastroduodenitis    GI bleed    Upper GI hemorrhage       J tube replacement has not achieved the goal   Another surgical intervention    try immodium  Stop Lactulose and Senna    ideally a Palliative care candidate    Patient not deriving any benefit  Or pleasure out of present level of care

## 2021-04-16 NOTE — PROGRESS NOTES
Call from [facility] that 0620 is available for this patient. Report should be called to [phone]. Transfer Center notified for assistance to transfer per squad/stretcher. Family informed.

## 2021-04-16 NOTE — CARE COORDINATION
Notified Bridger Garcia, [phone]  at [facility] of the plan to transfer patient to [facility] pending bed availability per Guardian's request.

## 2021-04-16 NOTE — PROGRESS NOTES
Provided  By Cassidy  A contact info of a  Dr Denzel Aguilar . When I called that number the doctor told me she is not an admitting doctor  And was some what upset about being called .  Discussed with Cassidy at [facility] [address] , she will make more efforts  To find out the right person , apparently she was misinformed  By Kopi

## 2021-04-16 NOTE — PROGRESS NOTES
[address] 3 times to give report. Was disconnected twice and nurse unavailable upon third call. Left 3 [place] main phone number with Shine Arceo for nurse to call back for report.

## 2021-04-16 NOTE — PROGRESS NOTES
Comprehensive Nutrition Assessment    Type and Reason for Visit:  Reassess    Nutrition Recommendations/Plan:   1. Resume TF- 2 Gagan HN at 65 ml/hr for the next 4 hours until pt is transferred or resume original feed it pt is to stay. 2. Goal: Two Gagan HN @ 65mL/hr x15 hr provides 975 mL total volume, 1950 kcal, 81 g protein & 683 mL free water. Nutrition Assessment:  Pt was tolerating TF 2 gagan HN abel 65 ml/hr x 15 hours. However TF was stopped d/t bile leaking around J-tube. Pt was to have surgical intervention today, however pt may now be transferred to another facility. Malnutrition Assessment:  Malnutrition Status:  No malnutrition      Estimated Daily Nutrient Needs:  Energy (kcal):  8020-5278 cals; Weight Used for Energy Requirements:  Current     Protein (g):  85-98 gms; Weight Used for Protein Requirements:  Ideal        Fluid (ml/day):   ; Method Used for Fluid Requirements:  1 ml/kcal      Nutrition Related Findings:  Generalized trace edema; I/O's: +2401      Wounds:  (erosion around J tube site)       Current Nutrition Therapies:    Current Tube Feeding (TF) Orders:  · Feeding Route: Jejunostomy  · Formula: Fluid restricted  · Schedule: Cyclic(x15 hr)  · Goal TF & Flush Orders Provides: Two Gagan HN @ 65mL/hr x15 hr provides 975 mL total volume, 1950 kcal, 81 g protein & 683 mL free water. Anthropometric Measures:  · Height: 5' 6\" (167.6 cm)(per last admission)  · Current Body Weight: 155 lb (70.3 kg)   · Admission Body Weight: 157 lb (71.2 kg)    · Ideal Body Weight: 142 lbs; % Ideal Body Weight 119 %   · BMI: 25  · BMI Categories: Overweight (BMI 25.0-29. 9)       Nutrition Diagnosis:   · Inadequate energy intake related to altered GI structure as evidenced by NPO or clear liquid status due to medical condition      Nutrition Interventions:   Food and/or Nutrient Delivery:  Start Tube Feeding(Resume TF)  Nutrition Education/Counseling:  No recommendation at this time   Coordination of

## 2021-04-16 NOTE — PROGRESS NOTES
[facility]        Progress Note    Patient Name: Анна York  MRN: [id number]  YOB: 1950  Date of Evaluation: 2021    Chief Complaint: Leaking J-tube    Subjective:  No acute events overnight  Pain controlled  Drainage better controlled with pouching, still some leaking but overall improved  Tolerating enteral feeds, drainage is bilious, does not resemble tube feed formula  Brother frustrated with care and requesting transfer to another hospital   Appearance of surrounding skin reportedly improving      Vital Signs:  Patient Vitals for the past 24 hrs:   BP Temp Temp src Pulse Resp SpO2   21 1543 -- -- -- -- 18 98 %   21 1205 -- -- -- -- 18 --   21 1000 125/65 97.3 °F (36.3 °C) Axillary 64 14 94 %   21 0800 132/77 97 °F (36.1 °C) Axillary 65 22 100 %   21 0745 -- -- -- -- 22 --   21 0436 -- -- -- -- 22 --   21 0430 124/75 97.3 °F (36.3 °C) Axillary 82 18 98 %   21 0029 -- -- -- -- (!) 31 --   04/15/21 2045 135/78 97.5 °F (36.4 °C) Oral 65 16 98 %   04/15/21 1630 131/71 97.9 °F (36.6 °C) Axillary 71 -- 99 %      TEMPERATURE HISTORY 24H: Temp (24hrs), Av.4 °F (36.3 °C), Min:97 °F (36.1 °C), Max:97.9 °F (36.6 °C)    BLOOD PRESSURE HISTORY: Systolic (37RSB), ILL:769 , Min:124 , VNL:784    Diastolic (88FEE), MZZ:67, Min:65, Max:78      Intake/Output:  I/O last 3 completed shifts: In: 0214 [NG/GT:1053]  Out: -   No intake/output data recorded. Drain/tube Output:       Physical Exam:  General: awake, alert, loosely oriented  Lungs: unlabored respirations  Abdomen: soft, nontender, nondistended, J-tube in place and pouch controlling drainage--bilious, tube feeds infusing    Labs:  CBC:    Recent Labs     21  1910 04/15/21  0732 04/15/21  1550   HGB 8.3* 8.9* 9.0*   HCT 25.9* 28.4* 29.8*     BMP:  No results for input(s): NA, K, CL, CO2, BUN, CREATININE, GLUCOSE in the last 72 hours.   Hepatic:  No results for input(s): AST, ALT, ALB, BILITOT, ALKPHOS in the last 72 hours. Amylase:  No results found for: AMYLASE  Lipase:    Lab Results   Component Value Date    LIPASE 64.0 04/11/2021    LIPASE 52.0 03/28/2021    LIPASE 45.0 01/31/2021      Mag:    Lab Results   Component Value Date    MG 2.30 02/04/2021    MG 1.90 02/03/2021     Phos:     Lab Results   Component Value Date    PHOS 3.3 02/04/2021    PHOS 2.8 02/03/2021      Coags:   Lab Results   Component Value Date    PROTIME 11.1 03/28/2021    INR 0.96 03/28/2021    APTT 32.5 03/28/2021       Cultures:  Anaerobic culture  No results found for: LABANAE  Fungus stain  No results found for requested labs within last 30 days. Gram stain  No results found for requested labs within last 30 days. Organism  Lab Results   Component Value Date/Time    ORG Escherichia coli (A) 02/17/2021 12:50 PM     Surgical culture  No results found for: CXSURG  Blood culture 1  No results found for requested labs within last 30 days. Blood culture 2  No results found for requested labs within last 30 days. Fecal occult  Results in Past 30 Days  Result Component Current Result Ref Range Previous Result Ref Range   Occult Blood Diagnostic Result: POSITIVE  Normal range: Negative   (A) (4/11/2021)  Result: Negative  Normal range: Negative   (3/28/2021)      GI bacterial pathogens by PCR  Results in Past 30 Days  Result Component Current Result Ref Range Previous Result Ref Range   GI Bacterial Pathogens By PCR No Shigella spp/EIEC DNA detected  No Shiga toxin-producing gene(s) detected  No Campylobacter spp. (jejuni and coli)DNA detected  No Salmonella spp. DNA detected  Normal Range:  None detected   (3/29/2021)  No Shigella spp/EIEC DNA detected  No Shiga toxin-producing gene(s) detected  No Campylobacter spp. (jejuni and coli)DNA detected  No Salmonella spp.  DNA detected  Normal Range:  None detected   (3/28/2021)      C. difficile  No results found for requested labs within last 30 days. Urine culture  Lab Results   Component Value Date    LABURIN >100,000 CFU/ml 02/17/2021       Pathology:  No relevant pathology     Imaging:  I have personally reviewed the following films:    No results found. Scheduled Meds:   pantoprazole  40 mg Intravenous BID    levetiracetam  1,500 mg Intravenous Q12H    ceFAZolin  1,000 mg Intravenous Q8H    QUEtiapine  50 mg Oral QAM    And    QUEtiapine  50 mg Oral Daily    And    QUEtiapine  100 mg Oral Nightly    zinc oxide   Topical BID    ascorbic acid  500 mg Per G Tube BID    baclofen  15 mg Per G Tube TID    chlorhexidine  7.5 mL Mouth/Throat BID    Vitamin D  2,000 Units Per G Tube Daily    doxazosin  4 mg Per G Tube Daily    escitalopram  10 mg Per G Tube Daily    lamoTRIgine  400 mg Per J Tube BID    multivitamin with iron-minerals  30 mL Per G Tube Daily    potassium bicarb-citric acid  10 mEq Per G Tube BID    zinc oxide   Topical BID    triamcinolone   Topical BID     Continuous Infusions:   lactated ringers 100 mL/hr at 04/14/21 2335     PRN Meds:. loperamide, HYDROcodone 5 mg - acetaminophen, morphine **OR** morphine, acetaminophen, bisacodyl, bisacodyl, LORazepam, nystatin, promethazine, hydrOXYzine, diphenhydrAMINE      Assessment:  79 y.o. male admitted with   1. Gastrointestinal hemorrhage, unspecified gastrointestinal hemorrhage type    2. Seizure disorder ([facility]    3. Malfunction of gastrostomy tube (HCC)        Leaking J-tube  Cerebral palsy  Mental retardation      Plan:  1. Continue with pouching of J-tube to control drainage, if leaking continues could remove tube for a couple of days to allow site to shrink down and then reinsert tube, if failed option would be for surgical placement of tube in new site  2. Resume enteral feeds as tolerated  3. Activity as tolerated  4. PRN analgesics and antiemetics--minimizing narcotics as tolerated  5.  Management of medical comorbid etiologies per primary team and consulting services  6. Disposition: Brother wishes for patient to be moved to another hospital for care, transfer arrangements in progress     EDUCATION:  Educated patient on plan of care and disease process--all questions answered. Plans discussed with patient and nursing. Reviewed and discussed with Dr. Rohit Leal. Signed:  CATALINA Ely - CNP  4/16/2021 3:56 PM    Surg Staff:  Pt seen and examined with NP  See full note above  Pt has had less leak, 24 Fr SHASTA in place. Bag on, skin improved  Cont same.  If fails will need to do closure and tube replacement at new site (if aggressive care to be continued long term)    Chilango Brower

## 2021-04-16 NOTE — PLAN OF CARE
Problem: Skin Integrity:  Intervention: Wound dressing application  Note:   Plan of Care    for skin around J tube: Clean skin with water only, then dry. \"Crust\": sprinkle stoma poweer, brush off excess, dab powder with no sting barrier wipe, apply powder again, and no sting barrier wipe. Place strip paste snug around tube, cover with barrier sheet or duoderm. Then place  the convex ostomy barrier  wafer. Bring the external bumper down on top of the ostomy barrier wafer. Cut a hole in the pouch and  bring the J tube through the pouch. Secure the  outside of the pouch  where the J tube is pulled through with tegaderm or water proof tape. Attach ostomy belt to tabs on ostomy barrier, this will help hold the ostomy system in placed.       Supplies needed:   -Coloplast Brava Barrier ring # [id number]  --Brava Elastic barrier strips  #[id number]  [id number] barrier sheet or duoderme  -Brava  belt  For [employer] # [id number] or # [id number]   Sensura August Flex convex #[id number] (5/8-1-9/16\" RED)  Lutricia Sees #[id number]  Brava Strip paste #[id number]  SenSura August Flex Drainable Pouch #[id number] RED

## 2021-04-16 NOTE — CARE COORDINATION
Rechecked ostomy pouch over J tube. Small leak again to the left lateral side of abdomen. Skin dried. Filled area with strip paste, covered with a piece of ostomy barrier sheet to reinforce dressing. Good seal obtained. Patient scheduled to be transferred to [facility] this evening.  Savannah Contreras, CRISTAN, RN  [facility]

## 2021-04-16 NOTE — PROGRESS NOTES
Nutrition Note    Pt was NPO today for possible surgery, however pt wants to be transferred. RN notified RD and stated that pt was typically on cyclic nocturnal feeds which were interrupted d/t NPO status. Recommend resuming TF for the next 4 hours until pt can be transferred. RD reordered TF.       Electronically signed by Tessie Mcgraw RD, [address] KACEY Madrigal on 4/16/21 at 12:01 PM EDT    Contact: [phone]

## 2021-04-16 NOTE — PROGRESS NOTES
Department of Internal Medicine  General Internal Medicine   Progress Note      SUBJECTIVE:  Reportedly in no distress but J tube  Leakage has persisted giving the skin surrounding the tube lot of excoriation        History obtained from chart review and the patient's nursing staff   General ROS: positive for  - fatigue and malaise  negative for - chills, fever or night sweats  Psychological ROS: negative  Respiratory ROS: no cough, shortness of breath, or wheezing  Cardiovascular ROS: positive for - , dyspnea on exertion and shortness of breath  negative for - chest pain  Gastrointestinal ROS: no abdominal pain, change in bowel habits, or black or bloody stools    OBJECTIVE      Medications      Current Facility-Administered Medications: loperamide (IMODIUM) capsule 2 mg, 2 mg, Oral, 4x Daily PRN  levETIRAcetam (KEPPRA) 1,500 mg in sodium chloride 0.9 % 100 mL IVPB, 1,500 mg, Intravenous, Q12H  HYDROcodone-acetaminophen (NORCO) 5-325 MG per tablet 1 tablet, 1 tablet, Oral, Q4H PRN  ceFAZolin (ANCEF) 1,000 mg in dextrose 5 % 50 mL IVPB (mini-bag), 1,000 mg, Intravenous, Q8H  morphine (PF) injection 2 mg, 2 mg, Intravenous, Q2H PRN **OR** morphine injection 4 mg, 4 mg, Intravenous, Q2H PRN  QUEtiapine (SEROQUEL) tablet 50 mg, 50 mg, Oral, QAM **AND** QUEtiapine (SEROQUEL) tablet 50 mg, 50 mg, Oral, Daily **AND** QUEtiapine (SEROQUEL) tablet 100 mg, 100 mg, Oral, Nightly  zinc oxide (TRIAD HYDROPHILIC) paste, , Topical, BID  acetaminophen (TYLENOL) 160 MG/5ML solution 650 mg, 650 mg, Per G Tube, Q4H PRN  ascorbic acid (VITAMIN C) tablet 500 mg, 500 mg, Per G Tube, BID  baclofen (LIORESAL) tablet 15 mg, 15 mg, Per G Tube, TID  bisacodyl (DULCOLAX) suppository 10 mg, 10 mg, Rectal, Daily PRN  bisacodyl (DULCOLAX) EC tablet 10 mg, 10 mg, Oral, PRN  chlorhexidine (PERIDEX) 0.12 % solution 7.5 mL, 7.5 mL, Mouth/Throat, BID  Vitamin D (CHOLECALCIFEROL) tablet 2,000 Units, 2,000 Units, Per G Tube, Daily  doxazosin (CARDURA) tablet 4 mg, 4 mg, Per G Tube, Daily  escitalopram (LEXAPRO) tablet 10 mg, 10 mg, Per G Tube, Daily  lamoTRIgine (LAMICTAL) tablet 400 mg, 400 mg, Per J Tube, BID  LORazepam (ATIVAN) tablet 0.5 mg, 0.5 mg, Oral, Q12H PRN  multivitamin with iron-minerals liquid 30 mL, 30 mL, Per G Tube, Daily  nystatin (MYCOSTATIN) cream, , Topical, BID PRN  potassium bicarb-citric acid (EFFER-K) effervescent tablet 10 mEq, 10 mEq, Per G Tube, BID  promethazine (PHENERGAN) tablet 12.5 mg, 12.5 mg, Oral, Q6H PRN  zinc oxide 20 % ointment, , Topical, BID  pantoprazole (PROTONIX) 80 mg in sodium chloride 0.9 % 100 mL infusion, 8 mg/hr, Intravenous, Continuous  lactated ringers infusion, , Intravenous, Continuous  hydrOXYzine (ATARAX) tablet 10 mg, 10 mg, Oral, TID PRN  diphenhydrAMINE (BENADRYL) 12.5 MG/5ML elixir 25 mg, 25 mg, Per G Tube, Q6H PRN  triamcinolone (KENALOG) 0.1 % cream, , Topical, BID    Physical      VITALS:  /65   Pulse 64   Temp 97.3 °F (36.3 °C) (Axillary)   Resp 18   Ht 5' 6\" (1.676 m) Comment: per last admission  Wt 155 lb 6.8 oz (70.5 kg)   SpO2 94%   BMI 25.09 kg/m²   TEMPERATURE:  Current - Temp: 97.3 °F (36.3 °C);  Max - Temp  Av.4 °F (36.3 °C)  Min: 97 °F (36.1 °C)  Max: 97.9 °F (36.6 °C)  RESPIRATIONS RANGE: Resp  Av.4  Min: 14  Max: 31  PULSE RANGE: Pulse  Av.4  Min: 64  Max: 82  BLOOD PRESSURE RANGE:  Systolic (97VAE), BFU:739 , Min:124 , XWD:305   ; Diastolic (70FQU), NDY:34, Min:65, Max:78    PULSE OXIMETRY RANGE: SpO2  Av.8 %  Min: 94 %  Max: 100 %  24HR INTAKE/OUTPUT:      Intake/Output Summary (Last 24 hours) at 2021 1342  Last data filed at 2021 0236  Gross per 24 hour   Intake 1053 ml   Output --   Net 1053 ml     CONSTITUTIONAL:  fatigued, somnolent, uncooperative, distracted, severe distress and appears older than stated age  NECK:  supple, symmetrical, trachea midline, skin normal and no stridor  BACK:  symmetric  LUNGS:  Decreased BS taras crackles , few wheezes   CARDIOVASCULAR:  normal apical pulses, tachycardic with regular rhythm and normal S1 and S2  ABDOMEN:  Soft BS + non tender   MUSCULOSKELETAL:  Contracted      NEUROLOGIC:  Right hemiplegia   SKIN:  Warm and dry  and no bruising or bleeding    Data      No results found for: Ilda Haile, [id number]    Lab Results   Component Value Date     04/11/2021    K 4.9 04/11/2021    CL 99 04/11/2021    CO2 23 04/11/2021    BUN 22 04/11/2021    CREATININE <0.5 04/11/2021    GLUCOSE 107 04/11/2021    CALCIUM 9.1 04/11/2021     Lab Results   Component Value Date    WBC 9.5 04/11/2021    HGB 9.0 04/15/2021    HCT 29.8 04/15/2021    MCV 90.4 04/11/2021     04/11/2021         Lab Results   Component Value Date    INR 0.96 03/28/2021    PROTIME 11.1 03/28/2021       ASSESSMENT AND PLAN      Patient Active Problem List   Diagnosis    Altered mental status    Dehydration    Cerebral palsy (HCC)    Seizure disorder (HCC)    Nuclear sclerosis    Anemia, chronic disease    Partial epilepsy with impairment of consciousness, intractable ([address]    Mental retardation    Malfunction of gastrostomy tube ([address]    Hypoxemia    Hypotension    Recurrent seizures ([address]    Anemia due to acute blood loss    Sepsis ([address]    Coffee ground emesis    Gastroduodenitis    GI bleed    Upper GI hemorrhage        Brother is very irate wants him transferred to [facility] .  I again explained him the whole situation and encouraged him to reach out to [facility] , also tried to convey to him Dwight Garcia is ideally a palliative care candidate and he is receivng very meaningless and painful  Level of care without him receiving any benefit or pleasure out of it , I sincerely hope he understands it

## 2021-04-16 NOTE — PROGRESS NOTES
Patient is resting comfortably on BiPAP with SpO2 93%. There are no skin integrity issues at this time.

## 2021-04-16 NOTE — PROGRESS NOTES
Notified by Transfer center  [facility] is not accepting transfer from any other hospital at this time .  I will not certify on a piece of paper under oath that this transfer is medically necessary  And benefits of transfer outweigh any risks involved since  This is a personal choice of brother to receive care that can also be provided at this hospital

## 2021-04-16 NOTE — CARE COORDINATION
Notified by nurse Cece Sloan, ostomy pouch around J tube leaking. Some effluent in pouch, but there is a little area on the lateral left side that was undermining the dressing. New addressing applied    Plan of Care    for skin around J tube: Clean skin with water only, then dry. \"Crust\": sprinkle stoma poweer, brush off excess, dab powder with no sting barrier wipe, apply powder again, and no sting barrier wipe. Place strip paste snug around tube, cover with barrier sheet or duoderm. Then place  the convex ostomy barrier  wafer. Bring the external bumper down on top of the ostomy barrier wafer. Cut a hole in the pouch and  bring the J tube through the pouch. Secure the  outside of the pouch  where the J tube is pulled through with tegaderm or water proof tape. Attach ostomy belt to tabs on ostomy barrier, this will help hold the ostomy system in placed.      Supplies needed:   -[address] ring # [id number]  --Brava Elastic barrier strips  #[id number]  GCDBNLIVP barrier sheet or duoderme  -Brava  belt  For [employer] # [id number] or # [id number]   HAJCZQH MDM Flex convex #[id number] (5/8-1-9/16\" RED)  Myton Presser #[id number]  Brava Strip paste #[id number]  SenSura August Flex Drainable Pouch #[id number] MAGO Rees, BSN, RN  [facility]

## 2021-04-16 NOTE — PLAN OF CARE
Problem: Skin Integrity:  Goal: Will show no infection signs and symptoms  Description: Will show no infection signs and symptoms  Outcome: Ongoing  Goal: Absence of new skin breakdown  Description: Absence of new skin breakdown  Outcome: Ongoing  Note:  Zinc oxide paste applied to areas of redness and irritation to prevent further breakdown    Problem: Falls - Risk of:  Goal: Will remain free from falls  Description: Will remain free from falls  1/41/4215 2688 by Nikki Gonzalez RN  Outcome: Ongoing  Note: All fall precautions in place, bed in lowest position, frequent rounding to assist with repositioning. Pt absent of fall this shift. 4/15/2021 1738 by Kath Desai RN  Outcome: Ongoing  Goal: Absence of physical injury  Description: Absence of physical injury  Outcome: Ongoing     Problem: Cardiovascular  Goal: No DVT, peripheral vascular complications  Outcome: Ongoing  Goal: Hemodynamic stability  Outcome: Ongoing  Goal: Anticoagulate/Hct stable  Outcome: Ongoing  Goal: Agreement to quit smoking  Outcome: Ongoing  Goal: Weight maintained or lost  Outcome: Ongoing  Goal: Understanding of dietary restrictions  Outcome: Ongoing  Note: Ptis MRDD and cannot process dietary restrictions at this time.       Problem: Nutrition  Goal: Optimal nutrition therapy  Outcome: Ongoing     Problem: Pain:  Goal: Pain level will decrease  Description: Pain level will decrease  Outcome: Ongoing  Goal: Control of acute pain  Description: Control of acute pain  Outcome: Ongoing  Note: Pt has not complained of pain this shift  Goal: Control of chronic pain  Description: Control of chronic pain  Outcome: Ongoing

## 2021-04-16 NOTE — PROGRESS NOTES
Pt legal guardian, Ladonna Montaño, has agreed for [facility] to [facility]. Informed legal guardian that Dr. Reba Vásquez will be covering for Dr. Omkar French over the weekend and he voiced understanding.

## 2021-04-16 NOTE — PROGRESS NOTES
Report called to nurse at [facility]. All questions answered. EMS here to transport pt.

## 2021-04-26 ENCOUNTER — TELEPHONE (OUTPATIENT)
Dept: INTERNAL MEDICINE CLINIC | Age: 71
End: 2021-04-26

## 2021-04-26 NOTE — TELEPHONE ENCOUNTER
Spoke with brother Roberto Winsome and got updates about treatments at [facility] .  He is undergoing  J tube replacement

## 2021-05-04 ENCOUNTER — APPOINTMENT (OUTPATIENT)
Dept: GENERAL RADIOLOGY | Age: 71
End: 2021-05-04
Payer: MEDICARE

## 2021-05-04 ENCOUNTER — HOSPITAL ENCOUNTER (EMERGENCY)
Age: 71
Discharge: NURSING, REHAB OR LONG-TERM CARE | End: 2021-05-05
Attending: EMERGENCY MEDICINE
Payer: MEDICARE

## 2021-05-04 DIAGNOSIS — G40.919 BREAKTHROUGH SEIZURE (HCC): Primary | ICD-10-CM

## 2021-05-04 LAB
A/G RATIO: 1 (ref 1.1–2.2)
ALBUMIN SERPL-MCNC: 3.6 G/DL (ref 3.4–5)
ALP BLD-CCNC: 166 U/L (ref 40–129)
ALT SERPL-CCNC: 56 U/L (ref 10–40)
ANION GAP SERPL CALCULATED.3IONS-SCNC: 9 MMOL/L (ref 3–16)
AST SERPL-CCNC: 48 U/L (ref 15–37)
BASOPHILS ABSOLUTE: 0 K/UL (ref 0–0.2)
BASOPHILS RELATIVE PERCENT: 0.3 %
BILIRUB SERPL-MCNC: <0.2 MG/DL (ref 0–1)
BUN BLDV-MCNC: 23 MG/DL (ref 7–20)
CALCIUM SERPL-MCNC: 8.8 MG/DL (ref 8.3–10.6)
CHLORIDE BLD-SCNC: 99 MMOL/L (ref 99–110)
CO2: 24 MMOL/L (ref 21–32)
CREAT SERPL-MCNC: 0.5 MG/DL (ref 0.8–1.3)
EOSINOPHILS ABSOLUTE: 0.1 K/UL (ref 0–0.6)
EOSINOPHILS RELATIVE PERCENT: 0.7 %
GFR AFRICAN AMERICAN: >60
GFR NON-AFRICAN AMERICAN: >60
GLOBULIN: 3.6 G/DL
GLUCOSE BLD-MCNC: 85 MG/DL (ref 70–99)
HCT VFR BLD CALC: 28.5 % (ref 40.5–52.5)
HEMOGLOBIN: 9.3 G/DL (ref 13.5–17.5)
LYMPHOCYTES ABSOLUTE: 1.7 K/UL (ref 1–5.1)
LYMPHOCYTES RELATIVE PERCENT: 17.1 %
MCH RBC QN AUTO: 28.6 PG (ref 26–34)
MCHC RBC AUTO-ENTMCNC: 32.8 G/DL (ref 31–36)
MCV RBC AUTO: 87.2 FL (ref 80–100)
MONOCYTES ABSOLUTE: 0.9 K/UL (ref 0–1.3)
MONOCYTES RELATIVE PERCENT: 8.9 %
NEUTROPHILS ABSOLUTE: 7.1 K/UL (ref 1.7–7.7)
NEUTROPHILS RELATIVE PERCENT: 73 %
PDW BLD-RTO: 19.3 % (ref 12.4–15.4)
PLATELET # BLD: 326 K/UL (ref 135–450)
PMV BLD AUTO: 9.4 FL (ref 5–10.5)
POTASSIUM SERPL-SCNC: 3.9 MMOL/L (ref 3.5–5.1)
RBC # BLD: 3.27 M/UL (ref 4.2–5.9)
SODIUM BLD-SCNC: 132 MMOL/L (ref 136–145)
TOTAL PROTEIN: 7.2 G/DL (ref 6.4–8.2)
TROPONIN: <0.01 NG/ML
WBC # BLD: 9.7 K/UL (ref 4–11)

## 2021-05-04 PROCEDURE — 80053 COMPREHEN METABOLIC PANEL: CPT

## 2021-05-04 PROCEDURE — 85025 COMPLETE CBC W/AUTO DIFF WBC: CPT

## 2021-05-04 PROCEDURE — 96374 THER/PROPH/DIAG INJ IV PUSH: CPT

## 2021-05-04 PROCEDURE — 6360000002 HC RX W HCPCS: Performed by: PHYSICIAN ASSISTANT

## 2021-05-04 PROCEDURE — 84484 ASSAY OF TROPONIN QUANT: CPT

## 2021-05-04 PROCEDURE — 71045 X-RAY EXAM CHEST 1 VIEW: CPT

## 2021-05-04 PROCEDURE — 80177 DRUG SCRN QUAN LEVETIRACETAM: CPT

## 2021-05-04 PROCEDURE — 93005 ELECTROCARDIOGRAM TRACING: CPT | Performed by: PHYSICIAN ASSISTANT

## 2021-05-04 PROCEDURE — 36415 COLL VENOUS BLD VENIPUNCTURE: CPT

## 2021-05-04 PROCEDURE — 99284 EMERGENCY DEPT VISIT MOD MDM: CPT

## 2021-05-04 RX ORDER — SODIUM PHOSPHATE, DIBASIC AND SODIUM PHOSPHATE, MONOBASIC 7; 19 G/133ML; G/133ML
1 ENEMA RECTAL DAILY PRN
COMMUNITY

## 2021-05-04 RX ORDER — LACTULOSE 10 G/15ML
10 SOLUTION ORAL DAILY PRN
COMMUNITY

## 2021-05-04 RX ORDER — LANSOPRAZOLE 30 MG/1
30 TABLET, ORALLY DISINTEGRATING, DELAYED RELEASE ORAL 2 TIMES DAILY
COMMUNITY

## 2021-05-04 RX ORDER — LEVETIRACETAM 10 MG/ML
1000 INJECTION INTRAVASCULAR EVERY 12 HOURS
Status: DISCONTINUED | OUTPATIENT
Start: 2021-05-04 | End: 2021-05-05 | Stop reason: HOSPADM

## 2021-05-04 RX ADMIN — LEVETIRACETAM 1000 MG: 10 INJECTION INTRAVENOUS at 23:05

## 2021-05-05 ENCOUNTER — APPOINTMENT (OUTPATIENT)
Dept: CT IMAGING | Age: 71
End: 2021-05-05
Payer: MEDICARE

## 2021-05-05 VITALS
DIASTOLIC BLOOD PRESSURE: 61 MMHG | TEMPERATURE: 98.5 F | RESPIRATION RATE: 23 BRPM | OXYGEN SATURATION: 100 % | SYSTOLIC BLOOD PRESSURE: 127 MMHG | HEART RATE: 88 BPM

## 2021-05-05 LAB
BILIRUBIN URINE: NEGATIVE
BLOOD, URINE: NEGATIVE
CLARITY: ABNORMAL
COLOR: YELLOW
COMMENT UA: NORMAL
EKG ATRIAL RATE: 98 BPM
EKG DIAGNOSIS: NORMAL
EKG P AXIS: 54 DEGREES
EKG P-R INTERVAL: 166 MS
EKG Q-T INTERVAL: 352 MS
EKG QRS DURATION: 80 MS
EKG QTC CALCULATION (BAZETT): 449 MS
EKG R AXIS: 11 DEGREES
EKG T AXIS: 78 DEGREES
EKG VENTRICULAR RATE: 98 BPM
GLUCOSE URINE: NEGATIVE MG/DL
KEPPRA DOSE AMT: ABNORMAL
KEPPRA: 56.3 UG/ML (ref 6–46)
KETONES, URINE: NEGATIVE MG/DL
LEUKOCYTE ESTERASE, URINE: NEGATIVE
MICROSCOPIC EXAMINATION: YES
NITRITE, URINE: NEGATIVE
PH UA: 6 (ref 5–8)
PROTEIN UA: 30 MG/DL
RBC UA: NORMAL /HPF (ref 0–4)
SPECIFIC GRAVITY UA: 1.03 (ref 1–1.03)
URINE REFLEX TO CULTURE: ABNORMAL
URINE TYPE: ABNORMAL
UROBILINOGEN, URINE: 0.2 E.U./DL
WBC UA: NORMAL /HPF (ref 0–5)

## 2021-05-05 PROCEDURE — 81001 URINALYSIS AUTO W/SCOPE: CPT

## 2021-05-05 PROCEDURE — 93010 ELECTROCARDIOGRAM REPORT: CPT | Performed by: INTERNAL MEDICINE

## 2021-05-05 PROCEDURE — 70450 CT HEAD/BRAIN W/O DYE: CPT

## 2021-05-05 NOTE — ED PROVIDER NOTES
jejunostomy placed per Dr Kevin De La Cruz 9/14. His work-up he is chronic appearing labs with some chronic mild anemia. Chemistries normal.  Medication levels will come back tonight. X-ray shows PICC line in normal position. Head CT was obtained out of an abundance of caution and demonstrates no change from prior. At present, no more seizure episodes. Labs benign. Imaging unchanged from prior. Continue Antiepileptic drug regimen. DC back to SNF. Patient Referrals:  Sedrick Patterson            Discharge Medications:  New Prescriptions    No medications on file       FINAL IMPRESSION  1. Breakthrough seizure ([address]        Blood pressure 127/61, pulse 88, temperature 98.5 °F (36.9 °C), resp. rate 23, SpO2 100 %. For further details of [address] [place] [address] emergency department encounter, please see documentation by advanced practice provider, Elgin Dorado MD  05/05/21 0157

## 2021-05-05 NOTE — ED PROVIDER NOTES
[address] [facility]        Pt Name: Miko Dominguez  MRN: [id number]  [place] 1950  Date of evaluation: 5/4/2021  Provider: Daisy Salazar PA-C  PCP: Tori Chaney     I have seen and evaluated this patient with my supervising physician Dr. Arley Vela. CHIEF COMPLAINT       Chief Complaint   Patient presents with    Seizures     pt from [facility] after having a seizure - hx of sz and cerebral palsy. Pt was given 20mg valium rectally - EMS gave Narcan. Pt postictal and responds to pain - very sleepy. NRB on        HISTORY OF PRESENT ILLNESS   (Location, Timing/Onset, Context/Setting, Quality, Duration, Modifying Factors, Severity, Associated Signs and Symptoms)  Note limiting factors. Miko Dominguez is a 79 y.o. male with history of CP and seizure disorder on Keppra with diazepam as needed presents for evaluation after witnessed seizure-like activity. Nursing home reports seizure lasted approximately 15 minutes. They administered 20 mg of Diastat rectally x2. EMS reports agonal respiratory upon their arrival and they report pinpoint pupils and gave him a dose of Narcan. He is on nonrebreather at time of arrival, still unresponsive. No additional information is able to obtained at this time. Nursing Notes were all reviewed and agreed with or any disagreements were addressed in the HPI. REVIEW OF SYSTEMS    (2-9 systems for level 4, 10 or more for level 5)     Review of Systems   Unable to perform ROS: Patient unresponsive   Neurological: Positive for seizures. Positives and Pertinent negatives as per HPI. Except as noted above in the ROS, all other systems were reviewed and negative.        PAST MEDICAL HISTORY     Past Medical History:   Diagnosis Date    Anemia, macrocytic     Anhidrosis     Anhidrosis     Bilateral cataracts     Bipolar affective ([facility]     Blood circulation, collateral     unspecified PANTOPRAZOLE (PROTONIX) 40 MG TABLET    Take 40 mg by mouth daily     POTASSIUM CHLORIDE (KAYCIEL) 20 MEQ/15ML (10%) SOLUTION    20 mEq by Per G Tube route 2 times daily     PROMETHAZINE (PHENERGAN) 12.5 MG TABLET    12.5 mg by Per J Tube route every 6 hours as needed for Nausea     QUETIAPINE (SEROQUEL) 100 MG TABLET    50 mg by Per G Tube route 2 times daily Give 50 mg twice daily at 5:30 am/5:30 pm. Give 100 mg at bedtime. QUETIAPINE (SEROQUEL) 100 MG TABLET    Take 100 mg by mouth nightly Give 50 mg twice daily at 5:30 am/5:30 pm. Give 100 mg at bedtime. SODIUM PHOSPHATES (FLEET) 7-19 GM/118ML    Place 1 enema rectally daily as needed    TRIAMCINOLONE (KENALOG) 0.1 % CREAM    Apply topically 2 times daily. ZINC OXIDE (TRIAD HYDROPHILIC) PSTE PASTE    Apply 2 g topically 2 times daily    ZINC OXIDE 20 % OINTMENT    Apply topically 2 times daily To J-tube site         ALLERGIES     Biaxin [clarithromycin] and Invanz [ertapenem]    FAMILYHISTORY     History reviewed. No pertinent family history. SOCIAL HISTORY       Social History     Tobacco Use    Smoking status: Never Smoker    Smokeless tobacco: Never Used   Substance Use Topics    Alcohol use: No    Drug use: No     Comment: unknown       SCREENINGS             PHYSICAL EXAM    (up to 7 for level 4, 8 or more for level 5)     ED Triage Vitals [05/04/21 2240]   BP Temp Temp src Pulse Resp SpO2 Height Weight   119/73 98.5 °F (36.9 °C) -- 91 24 100 % -- --       Physical Exam  Vitals signs and nursing note reviewed. Constitutional:       Appearance: He is well-developed. He is ill-appearing. He is not diaphoretic. HENT:      Head: Normocephalic and atraumatic. Right Ear: External ear normal.      Left Ear: External ear normal.      Nose: Nose normal.   Eyes:      General:         Right eye: No discharge. Left eye: No discharge. Extraocular Movements: Extraocular movements intact.       Conjunctiva/sclera: Conjunctivae normal.      Pupils: Pupils are equal, round, and reactive to light. Neck:      Musculoskeletal: Normal range of motion and neck supple. Cardiovascular:      Rate and Rhythm: Normal rate and regular rhythm. Heart sounds: Normal heart sounds. Pulmonary:      Effort: Pulmonary effort is normal. No respiratory distress. Abdominal:      General: There is no distension. Palpations: Abdomen is soft. Tenderness: There is no abdominal tenderness. Musculoskeletal: Normal range of motion. Skin:     General: Skin is warm and dry. Neurological:      Mental Status: He is unresponsive. Motor: No seizure activity. Psychiatric:         Behavior: Behavior normal.         DIAGNOSTIC RESULTS   LABS:    Labs Reviewed   LEVETIRACETAM LEVEL    Narrative:     Performed at:  [place] [address] [place], [place] [address]   Phone [phone]   CBC WITH AUTO DIFFERENTIAL   COMPREHENSIVE METABOLIC PANEL   TROPONIN   URINE RT REFLEX TO CULTURE       All other labs were within normal range or not returned as of this dictation. EKG: All EKG's are interpreted by the Emergency Department Physician in the absence of a cardiologist.  Please see their note for interpretation of EKG. RADIOLOGY:   Non-plain film images such as CT, Ultrasound and MRI are read by the radiologist. Plain radiographic images are visualized and preliminarily interpreted by the ED Provider with the below findings:        Interpretation per the Radiologist below, if available at the time of this note:    XR CHEST PORTABLE    (Results Pending)     No results found.         PROCEDURES   Unless otherwise noted below, none     Procedures    CRITICAL CARE TIME   N/A    CONSULTS:  None      EMERGENCY DEPARTMENT COURSE and DIFFERENTIAL DIAGNOSIS/MDM:   Vitals:    Vitals:    05/04/21 2240   BP: 119/73   Pulse: 91   Resp: 24   Temp: 98.5 °F (36.9 °C)   SpO2: 100%       Patient was given the following medications:  Medications   levETIRAcetam (KEPPRA) 1000 mg/100 mL IVPB (1,000 mg Intravenous New Bag 5/4/21 3958)           Patient presents for evaluation after witnessed seizure-like activity. On exam, he is chronically ill-appearing, contracted and unresponsive, nontoxic. No witnessed seizure-like activity, however, seizure precautions initiated he was given loading dose of Keppra. He has snoring respirations and transmitted upper airway noises. No obvious wheezing rales or rhonchi. Arrived on nonrebreather, satting 100%. Vitals otherwise stable. Please see attending note for EKG interpretation. Chest x-ray confirms right PICC line placement. No other acute abnormalities. Remainder of work-up pending as well as observation. At end of shift. Please see attending note regarding these results, reevaluation and patient disposition. Critical Care  There was a high probability of life-threatening clinical deterioration in the patient's condition requiring my urgent intervention. Total critical care time with the patient was 31 minutes excluding separately reportable procedures. Critical care required due to patients presentation, comorbidities and concern for acute life-threatening disease process, altered mental status, respiratory failure status epilepticus and decompensation. FINAL IMPRESSION      1. Breakthrough seizure ([address]          DISPOSITION/PLAN   DISPOSITION        PATIENT REFERREDTO:  No follow-up provider specified.     DISCHARGE MEDICATIONS:  New Prescriptions    No medications on file       DISCONTINUED MEDICATIONS:  Discontinued Medications    No medications on file              (Please note that portions of this note were completed with a voice recognition program.  Efforts were made to edit the dictations but occasionally words are mis-transcribed.)    Luba Anguiano PA-C (electronically signed)            Jaswant Shirley PA-C  05/04/21 1514

## 2021-05-05 NOTE — ED NOTES
Bed: 01  Expected date:   Expected time:   Means of arrival:   Comments:  981 Shannon Victor RN  05/04/21 3989
Detail Level: Detailed
RN spoke to nurse [facility] regarding plan of care and discharge.      Farooq Esquivel RN  05/05/21 7964
[facility] at bedside.      Leann Sol RN  05/05/21 2024
Urine sent to lab for testing.      Topher Tinoco RN  05/05/21 4828
Quality 111:Pneumonia Vaccination Status For Older Adults: Pneumococcal Vaccination Previously Received
Quality 110: Preventive Care And Screening: Influenza Immunization: Influenza Immunization previously received during influenza season

## 2021-05-07 NOTE — DISCHARGE SUMMARY
[facility]                     [address] [facility], [address]                               DISCHARGE SUMMARY    PATIENT NAME: Bashir Hall                     :        1950  MED REC NO:   [id number]                          ROOM:       3420  ACCOUNT NO:   [de-identified]                           ADMIT DATE: 2021  PROVIDER:     Rufina Anderson MD                  DISCHARGE DATE:  2021    FINAL DIAGNOSES:  1. Coffee-ground emesis. 2.  Gastrostomy malfunction. 3.  Cerebral palsy. 4.  Functional quadriplegia. 5.  Severe intellectual disability. 6.  Gastroduodenitis. DISCHARGE MEDICATIONS:  1. Protonix 40 mg b.i.d.  2.  Ativan 0.5 every 12 hours p.r.n.  3.  Diastat gel p.r.n. for seizure. 4.  Fleet Enema p.r.n.  5.  Phenergan 12.5 q. 6 hours p.r.n.  6.  Ferrous sulfate 220 mg once a day. 7.  Mycostatin cream b.i.d.  8.  Dulcolax tablet 5 mg p.r.n.  9.  Senokot S two tablets daily. 10.  Vitamin D3 2000 units daily. 11.  Zinc oxide ointment as directed for skin protection. 12.  Baclofen 15 mg three times a day. 13.  Lamotrigine 200 mg b.i.d.  14.  Seroquel 100 mg by mouth nightly and 50 mg twice daily. 15.  Tylenol 650 q. 4 hours p.r.n. 16.  Keppra 1500 mg b.i.d.  17.  Chronulac 20 gm daily. 18.  Cardura 4 mg at bedtime. 19.  Multivitamin once a day. 20.  Lexapro 10 mg once a day. 21.  Peridex solution b.i.d. HOSPITAL COURSE:  This elderly man in a chronic vegetative state with  severe intellectual disability, cerebral palsy, intellectually disabled  lifelong came to the emergency room with history of coffee-ground  emesis. The patient had gastrostomy malfunction. Case was referred to  GI service. The patient had stable vital signs, treated with IV proton  pump inhibitor infusion. The patient has been admitted multiple  multiple times.   At one point, the patient was even considered for  palliative care by his brother, but then he changed his mind. The  patient now wants to be a full code and the patient's brother wants him  to keep him alive as long as possible by any means and every means. The  patient was discharged in stable condition after adequate treatment. Ongoing care provider will be Dr. Sheryl Jarvis. As always, it is a pleasure and privilege to take care of your patients  at [facility].         Haley Krishnan MD    D: 05/06/2021 8:20:10       T: 05/07/2021 3:31:14     [id number]  Job#: [id number]     Doc#: [id number]    CC:  MD Arely Gutierrez

## 2021-05-20 NOTE — DISCHARGE SUMMARY
[facility]                     [facility], [address]                               DISCHARGE SUMMARY    PATIENT NAME: Nikolai Dexter                     :        1950  MED REC NO:   [id number]                          ROOM:       0336  ACCOUNT NO:   [de-identified]                           ADMIT DATE: 2021  PROVIDER:     Fabian Alford MD                  DISCHARGE DATE:  2021    FINAL DIAGNOSES:  1. Gastrostomy site infection. 2.  Gastrostomy complication. 3.  Upper GI hemorrhage. 4.  Anemia due to acute blood loss. 5.  Oropharyngeal dysphagia. 6.  Reflux esophagitis. 7.  Intellectual disability. DISCHARGE MEDICATIONS:  1. Loperamide 2 mg p.o. four times a day p.r.n.  2.  Norco 5/325 every 4 hours p.r.n.  3.  Atarax 10 mg p.o. t.i.d.  4.  Diphenhydramine 10 mL every 6 hours p.r.n.  5.  Triamcinolone cream apply twice a day. 6.  Zinc oxide paste apply twice a day. 7.  Lorazepam 0.5 mg every 12 hours. 8.  Pantoprazole 40 mg once a day. 9.  Diastat gel p.r.n.  10.  Promethazine 12.5 every 6 hours p.r.n. 11.  Ferrous sulfate 325 b.i.d.  12.  Nystatin cream apply b.i.d. 13.  Vitamin D3 2000 units daily. 14.  Baclofen 15 mg three times a day. 15.  Lamotrigine 200 mg p.o. b.i.d.  16.  Quetiapine 100 mg nightly and 50 mg twice a day. 17.  Tylenol 650 q. 4 hours p.r.n. 18.  Keppra 1500 mg b.i.d.  19.  Doxazosin 4 mg nightly. 20.  Multivitamin once a day. 21.  Potassium chloride 20 mEq b.i.d.  22.  Ascorbic acid 500 mg b.i.d.  23.  Lexapro 10 mg once a day. HOSPITAL COURSE:  This elderly man who is chronically ill with multiple  hospital admissions came in with history of coffee-ground emesis. The  patient also has a gastrostomy site infection. The patient has advanced  mental retardation, cerebral palsy with spastic quadriplegia. Blood  pressure was 137/70, respirations 18, heart rate 74, O2 sat 97% on room  air.   Lungs were fairly clear to auscultation. The patient had a CT  scan of the abdomen and pelvis with CT angiography. There was no  evidence of active GI bleeding. No significant visceral stenosis. Jejunostomy catheter in place. The patient had stable mild infiltration  in the subcutaneous _____ of the catheter with no evidence of focal  subcutaneous collection, no abscess, patchy bibasilar opacification of  the right side greater than left. The patient has been a candidate for  recurrent aspiration pneumonitis. The patient was treated with IV  hydration, parenteral pain relief, GI consultation, Surgical  consultation, IV Protonix infusion. GI service has seen him multiple  times and they have made an opinion that it is counterproductive to do  an upper endoscopy on him. The patient was referred to surgical  services. On more than one occasion, I initiated the talks about  palliative care since the patient has been receiving care that has been  not helpful to him at all. Dr. Ronald Vicente saw the patient from  surgical service and he did removal of the 20-Kazakh jejunostomy tube  and placement of the 24-Kazakh G-tube under local anesthesia. Wound  care nurse also saw the patient. The patient was discharged in stable  condition on _____ to [facility]. The patient continues to remain at  a very high risk of recurrent hospitalization and I made very sincere  effort to patient's brother who has power of  to consider  palliative care. It appeared to me that he initially liked it, but  subsequently he rejected that idea. The patient continued to have  diarrhea, so was put on antimotility agent prior to discharge. The  patient continued to remain without any distress. The patient's brother  asked for transfer to [facility].  We were able to find a  physician who would accept him as a transfer.   The patient's primary  care physician is attending at [facility] and it was his brother's preference so the patient was transferred in stable  condition  to Ted Penn MD    D: 05/19/2021 16:15:44       T: 05/20/2021 10:07:21     [id number]  Job#: [id number]     Doc#: [id number]    CC:

## 2022-09-21 NOTE — PROGRESS NOTES
Anesthesia Post Evaluation    Patient: Renato Nair    Procedure(s) Performed: Procedure(s) (LRB):  INSERTION, TYMPANOSTOMY TUBE (Bilateral)    Final Anesthesia Type: general      Patient location during evaluation: PACU  Patient participation: Yes- Able to Participate  Level of consciousness: awake and alert and oriented  Post-procedure vital signs: reviewed and stable  Pain management: adequate  Airway patency: patent    PONV status at discharge: No PONV  Anesthetic complications: no      Cardiovascular status: blood pressure returned to baseline, stable and hemodynamically stable  Respiratory status: unassisted  Hydration status: euvolemic  Follow-up not needed.          Vitals Value Taken Time   /103 09/21/22 0736   Temp 36.6 °C (97.9 °F) 09/21/22 0733   Pulse 125 09/21/22 0800   Resp 22 09/21/22 0800   SpO2 100 % 09/21/22 0800         Event Time   Out of Recovery 07:45:00         Pain/Rinku Score: Presence of Pain: non-verbal indicators absent (9/21/2022  8:00 AM)  Rinku Score: 10 (9/21/2022  8:00 AM)         Transfer picked up pt to take him to [address] [place] [address].

## 2024-08-29 NOTE — ED NOTES
Called to give report.   RN not assigned     Corazon Hartmna RN  01/31/21 2024 I called spouse back he has been trying to get medication refills for pt. Pt's last appointment was 7.6.23, and I advised him per Dr Valerio: It is not safe to continue to prescribe without lab work and visits.  He stated to me that pt will not leave the house even to just go for a ride, she is home bound. I advised him to call his insurance company to see what kind of home health services are offered to the pt, and he is to call us back. He can also see what they can do to help further.      I asked pt spouse if there is anyone in the home to help him out. He then stated to me that his son and daughter in-law live in the home, and I asked what hours do they work he stated to me he thinks his son works M-F  and comes home at all different hours and that the daughter in-law works really late as well. Sometimes they do not come home till 9 or 10 pm at night. He also stated at one time there was help from [facility] that would come in to help pt take bath ect, and that was stopped because pt did not need much help at that time.

## (undated) DEVICE — 60 ML SYRINGE,REGULAR TIP: Brand: MONOJECT

## (undated) DEVICE — ESOPHAGEAL/PYLORIC/BILIARY WIREGUIDED BALLOON DILATATION CATHETER: Brand: CRE™ PRO

## (undated) DEVICE — SOLUTION IV IRRIG WATER 500ML POUR BRL ST 2F7113

## (undated) DEVICE — BW-412T DISP COMBO CLEANING BRUSH: Brand: SINGLE USE COMBINATION CLEANING BRUSH

## (undated) DEVICE — MOUTHPIECE ENDOSCP L CTRL OPN AND SIDE PORTS DISP

## (undated) DEVICE — SET VLV 3 PC AWS DISPOSABLE GRDIAN SCOPEVALET

## (undated) DEVICE — PROCEDURE KIT ENDOSCP CUST

## (undated) DEVICE — FORCEPS BX L240CM WRK CHN 2.8MM STD CAP W/ NDL MIC MESH

## (undated) DEVICE — SYRINGE INFL 60ML DISP ALLIANCE II

## (undated) DEVICE — TUBE FEED GASTROSTOMY MIC 20FR

## (undated) DEVICE — GOWN AURORA NONREINF LG: Brand: MEDLINE INDUSTRIES, INC.